# Patient Record
Sex: FEMALE | Race: WHITE | NOT HISPANIC OR LATINO | Employment: OTHER | URBAN - METROPOLITAN AREA
[De-identification: names, ages, dates, MRNs, and addresses within clinical notes are randomized per-mention and may not be internally consistent; named-entity substitution may affect disease eponyms.]

---

## 2017-01-03 ENCOUNTER — GENERIC CONVERSION - ENCOUNTER (OUTPATIENT)
Dept: OTHER | Facility: OTHER | Age: 72
End: 2017-01-03

## 2017-01-17 ENCOUNTER — APPOINTMENT (OUTPATIENT)
Dept: SLEEP CENTER | Facility: CLINIC | Age: 72
End: 2017-01-17
Payer: MEDICARE

## 2017-01-24 ENCOUNTER — TRANSCRIBE ORDERS (OUTPATIENT)
Dept: URGENT CARE | Age: 72
End: 2017-01-24

## 2017-01-24 ENCOUNTER — GENERIC CONVERSION - ENCOUNTER (OUTPATIENT)
Dept: OTHER | Facility: OTHER | Age: 72
End: 2017-01-24

## 2017-01-24 ENCOUNTER — HOSPITAL ENCOUNTER (OUTPATIENT)
Dept: RADIOLOGY | Age: 72
Discharge: HOME | End: 2017-01-24
Payer: MEDICARE

## 2017-01-24 DIAGNOSIS — M85.80 OTHER SPECIFIED DISORDERS OF BONE DENSITY AND STRUCTURE, UNSPECIFIED SITE: ICD-10-CM

## 2017-01-24 DIAGNOSIS — J18.9 PNEUMONIA: ICD-10-CM

## 2017-01-24 PROCEDURE — 71020 HB CHEST X-RAY 2VW FRONTAL&LATL: CPT

## 2017-01-24 PROCEDURE — 77080 DXA BONE DENSITY AXIAL: CPT

## 2017-01-25 ENCOUNTER — GENERIC CONVERSION - ENCOUNTER (OUTPATIENT)
Dept: OTHER | Facility: OTHER | Age: 72
End: 2017-01-25

## 2017-02-27 ENCOUNTER — GENERIC CONVERSION - ENCOUNTER (OUTPATIENT)
Dept: OTHER | Facility: OTHER | Age: 72
End: 2017-02-27

## 2017-02-27 ENCOUNTER — HOSPITAL ENCOUNTER (OUTPATIENT)
Dept: MAMMOGRAPHY | Facility: MEDICAL CENTER | Age: 72
Discharge: HOME | End: 2017-02-27
Payer: MEDICARE

## 2017-02-27 DIAGNOSIS — N60.19 DIFFUSE CYSTIC MASTOPATHY OF BREAST: ICD-10-CM

## 2017-02-27 PROCEDURE — 77063 BREAST TOMOSYNTHESIS BI: CPT

## 2017-02-27 PROCEDURE — G0202 SCR MAMMO BI INCL CAD: HCPCS

## 2017-03-13 ENCOUNTER — ALLSCRIPTS OFFICE VISIT (OUTPATIENT)
Dept: OTHER | Facility: OTHER | Age: 72
End: 2017-03-13

## 2017-03-21 ENCOUNTER — APPOINTMENT (OUTPATIENT)
Dept: LAB | Age: 72
End: 2017-03-21
Payer: MEDICARE

## 2017-03-21 ENCOUNTER — TRANSCRIBE ORDERS (OUTPATIENT)
Dept: ADMINISTRATIVE | Age: 72
End: 2017-03-21

## 2017-03-21 ENCOUNTER — GENERIC CONVERSION - ENCOUNTER (OUTPATIENT)
Dept: OTHER | Facility: OTHER | Age: 72
End: 2017-03-21

## 2017-03-21 DIAGNOSIS — E03.8 OTHER SPECIFIED ACQUIRED HYPOTHYROIDISM: ICD-10-CM

## 2017-03-21 DIAGNOSIS — R73.01 IMPAIRED FASTING GLUCOSE: ICD-10-CM

## 2017-03-21 DIAGNOSIS — E03.8 OTHER SPECIFIED ACQUIRED HYPOTHYROIDISM: Primary | ICD-10-CM

## 2017-03-21 DIAGNOSIS — E03.8 OTHER SPECIFIED HYPOTHYROIDISM: ICD-10-CM

## 2017-03-21 LAB
ALBUMIN SERPL BCP-MCNC: 3.5 G/DL (ref 3.5–5)
ALP SERPL-CCNC: 43 U/L (ref 46–116)
ALT SERPL W P-5'-P-CCNC: 25 U/L (ref 12–78)
ANION GAP SERPL CALCULATED.3IONS-SCNC: 7 MMOL/L (ref 4–13)
AST SERPL W P-5'-P-CCNC: 13 U/L (ref 5–45)
BILIRUB SERPL-MCNC: 0.52 MG/DL (ref 0.2–1)
BUN SERPL-MCNC: 9 MG/DL (ref 5–25)
CALCIUM SERPL-MCNC: 9 MG/DL (ref 8.3–10.1)
CHLORIDE SERPL-SCNC: 105 MMOL/L (ref 100–108)
CHOLEST SERPL-MCNC: 157 MG/DL (ref 50–200)
CO2 SERPL-SCNC: 29 MMOL/L (ref 21–32)
CREAT SERPL-MCNC: 0.51 MG/DL (ref 0.6–1.3)
EST. AVERAGE GLUCOSE BLD GHB EST-MCNC: 128 MG/DL
GFR SERPL CREATININE-BSD FRML MDRD: >60 ML/MIN/1.73SQ M
GLUCOSE P FAST SERPL-MCNC: 110 MG/DL (ref 65–99)
HBA1C MFR BLD: 6.1 % (ref 4.2–6.3)
HCV AB SER QL: NORMAL
HDLC SERPL-MCNC: 52 MG/DL (ref 40–60)
LDLC SERPL CALC-MCNC: 82 MG/DL (ref 0–100)
POTASSIUM SERPL-SCNC: 4.1 MMOL/L (ref 3.5–5.3)
PROT SERPL-MCNC: 6.4 G/DL (ref 6.4–8.2)
SODIUM SERPL-SCNC: 141 MMOL/L (ref 136–145)
T3FREE SERPL-MCNC: 2.28 PG/ML (ref 2.3–4.2)
T4 FREE SERPL-MCNC: 1.11 NG/DL (ref 0.76–1.46)
TRIGL SERPL-MCNC: 113 MG/DL
TSH SERPL DL<=0.05 MIU/L-ACNC: 1.18 UIU/ML (ref 0.36–3.74)

## 2017-03-21 PROCEDURE — 80053 COMPREHEN METABOLIC PANEL: CPT

## 2017-03-21 PROCEDURE — 86803 HEPATITIS C AB TEST: CPT

## 2017-03-21 PROCEDURE — 36415 COLL VENOUS BLD VENIPUNCTURE: CPT

## 2017-03-21 PROCEDURE — 83036 HEMOGLOBIN GLYCOSYLATED A1C: CPT

## 2017-03-21 PROCEDURE — 84481 FREE ASSAY (FT-3): CPT

## 2017-03-21 PROCEDURE — 84439 ASSAY OF FREE THYROXINE: CPT

## 2017-03-21 PROCEDURE — 84443 ASSAY THYROID STIM HORMONE: CPT

## 2017-03-21 PROCEDURE — 80061 LIPID PANEL: CPT

## 2017-03-22 ENCOUNTER — GENERIC CONVERSION - ENCOUNTER (OUTPATIENT)
Dept: OTHER | Facility: OTHER | Age: 72
End: 2017-03-22

## 2017-03-28 DIAGNOSIS — M25.552 PAIN IN LEFT HIP: ICD-10-CM

## 2017-03-28 DIAGNOSIS — R79.9 ABNORMAL FINDING OF BLOOD CHEMISTRY: ICD-10-CM

## 2017-03-28 DIAGNOSIS — E78.5 HYPERLIPIDEMIA: ICD-10-CM

## 2017-03-28 DIAGNOSIS — M19.90 OSTEOARTHRITIS: ICD-10-CM

## 2017-03-28 DIAGNOSIS — M85.80 OTHER SPECIFIED DISORDERS OF BONE DENSITY AND STRUCTURE, UNSPECIFIED SITE: ICD-10-CM

## 2017-03-28 DIAGNOSIS — M41.9 SCOLIOSIS: ICD-10-CM

## 2017-04-03 ENCOUNTER — ALLSCRIPTS OFFICE VISIT (OUTPATIENT)
Dept: OTHER | Facility: OTHER | Age: 72
End: 2017-04-03

## 2017-04-11 ENCOUNTER — ALLSCRIPTS OFFICE VISIT (OUTPATIENT)
Dept: OTHER | Facility: OTHER | Age: 72
End: 2017-04-11

## 2017-04-12 ENCOUNTER — HOSPITAL ENCOUNTER (OUTPATIENT)
Dept: RADIOLOGY | Age: 72
Discharge: HOME | End: 2017-04-12
Payer: MEDICARE

## 2017-04-12 DIAGNOSIS — M25.552 PAIN IN LEFT HIP: ICD-10-CM

## 2017-04-12 PROCEDURE — 72110 X-RAY EXAM L-2 SPINE 4/>VWS: CPT

## 2017-04-12 PROCEDURE — 73502 X-RAY EXAM HIP UNI 2-3 VIEWS: CPT

## 2017-04-14 ENCOUNTER — GENERIC CONVERSION - ENCOUNTER (OUTPATIENT)
Dept: OTHER | Facility: OTHER | Age: 72
End: 2017-04-14

## 2017-04-24 ENCOUNTER — GENERIC CONVERSION - ENCOUNTER (OUTPATIENT)
Dept: OTHER | Facility: OTHER | Age: 72
End: 2017-04-24

## 2017-04-25 ENCOUNTER — TRANSCRIBE ORDERS (OUTPATIENT)
Dept: ADMINISTRATIVE | Age: 72
End: 2017-04-25

## 2017-04-25 ENCOUNTER — APPOINTMENT (OUTPATIENT)
Dept: LAB | Age: 72
End: 2017-04-25
Payer: MEDICARE

## 2017-04-25 DIAGNOSIS — R79.9 ABNORMAL BLOOD CHEMISTRY: ICD-10-CM

## 2017-04-25 DIAGNOSIS — E78.5 OTHER AND UNSPECIFIED HYPERLIPIDEMIA: ICD-10-CM

## 2017-04-25 DIAGNOSIS — R79.9 ABNORMAL BLOOD CHEMISTRY: Primary | ICD-10-CM

## 2017-04-25 LAB
T3FREE SERPL-MCNC: 2.35 PG/ML (ref 2.3–4.2)
TSH SERPL DL<=0.05 MIU/L-ACNC: 2.11 UIU/ML (ref 0.36–3.74)

## 2017-04-25 PROCEDURE — 84443 ASSAY THYROID STIM HORMONE: CPT

## 2017-04-25 PROCEDURE — 84481 FREE ASSAY (FT-3): CPT

## 2017-04-25 PROCEDURE — 36415 COLL VENOUS BLD VENIPUNCTURE: CPT

## 2017-05-15 ENCOUNTER — APPOINTMENT (OUTPATIENT)
Dept: PHYSICAL THERAPY | Age: 72
End: 2017-05-15
Payer: MEDICARE

## 2017-05-15 DIAGNOSIS — M41.9 SCOLIOSIS: ICD-10-CM

## 2017-05-15 DIAGNOSIS — M19.90 OSTEOARTHRITIS: ICD-10-CM

## 2017-05-15 PROCEDURE — G8990 OTHER PT/OT CURRENT STATUS: HCPCS

## 2017-05-15 PROCEDURE — G8991 OTHER PT/OT GOAL STATUS: HCPCS

## 2017-05-15 PROCEDURE — 97110 THERAPEUTIC EXERCISES: CPT

## 2017-05-15 PROCEDURE — 97161 PT EVAL LOW COMPLEX 20 MIN: CPT

## 2017-05-26 ENCOUNTER — APPOINTMENT (OUTPATIENT)
Dept: PHYSICAL THERAPY | Age: 72
End: 2017-05-26
Payer: MEDICARE

## 2017-06-01 ENCOUNTER — GENERIC CONVERSION - ENCOUNTER (OUTPATIENT)
Dept: OTHER | Facility: OTHER | Age: 72
End: 2017-06-01

## 2017-08-11 ENCOUNTER — ALLSCRIPTS OFFICE VISIT (OUTPATIENT)
Dept: OTHER | Facility: OTHER | Age: 72
End: 2017-08-11

## 2017-08-29 ENCOUNTER — GENERIC CONVERSION - ENCOUNTER (OUTPATIENT)
Dept: OTHER | Facility: OTHER | Age: 72
End: 2017-08-29

## 2017-09-20 ENCOUNTER — GENERIC CONVERSION - ENCOUNTER (OUTPATIENT)
Dept: OTHER | Facility: OTHER | Age: 72
End: 2017-09-20

## 2017-09-20 ENCOUNTER — LAB REQUISITION (OUTPATIENT)
Dept: LAB | Facility: HOSPITAL | Age: 72
End: 2017-09-20
Payer: MEDICARE

## 2017-09-20 DIAGNOSIS — K57.30 DIVERTICULOSIS OF LARGE INTESTINE WITHOUT PERFORATION OR ABSCESS WITHOUT BLEEDING: ICD-10-CM

## 2017-09-20 DIAGNOSIS — Z86.010 HISTORY OF COLONIC POLYPS: ICD-10-CM

## 2017-09-20 PROCEDURE — 88305 TISSUE EXAM BY PATHOLOGIST: CPT | Performed by: INTERNAL MEDICINE

## 2017-09-28 ENCOUNTER — GENERIC CONVERSION - ENCOUNTER (OUTPATIENT)
Dept: OTHER | Facility: OTHER | Age: 72
End: 2017-09-28

## 2017-10-03 DIAGNOSIS — E78.5 HYPERLIPIDEMIA: ICD-10-CM

## 2017-10-03 DIAGNOSIS — R94.6 ABNORMAL RESULTS OF THYROID FUNCTION STUDIES: ICD-10-CM

## 2017-10-03 DIAGNOSIS — I65.29 OCCLUSION AND STENOSIS OF UNSPECIFIED CAROTID ARTERY: ICD-10-CM

## 2017-10-03 DIAGNOSIS — Z00.00 ENCOUNTER FOR GENERAL ADULT MEDICAL EXAMINATION WITHOUT ABNORMAL FINDINGS: ICD-10-CM

## 2017-10-26 ENCOUNTER — TRANSCRIBE ORDERS (OUTPATIENT)
Dept: ADMINISTRATIVE | Age: 72
End: 2017-10-26

## 2017-10-26 ENCOUNTER — APPOINTMENT (OUTPATIENT)
Dept: LAB | Age: 72
End: 2017-10-26
Payer: MEDICARE

## 2017-10-26 DIAGNOSIS — R94.6 ABNORMAL RESULTS OF THYROID FUNCTION STUDIES: ICD-10-CM

## 2017-10-26 DIAGNOSIS — Z00.00 ENCOUNTER FOR GENERAL ADULT MEDICAL EXAMINATION WITHOUT ABNORMAL FINDINGS: ICD-10-CM

## 2017-10-26 LAB
ALBUMIN SERPL BCP-MCNC: 3.5 G/DL (ref 3.5–5)
ALP SERPL-CCNC: 40 U/L (ref 46–116)
ALT SERPL W P-5'-P-CCNC: 22 U/L (ref 12–78)
ANION GAP SERPL CALCULATED.3IONS-SCNC: 5 MMOL/L (ref 4–13)
AST SERPL W P-5'-P-CCNC: 10 U/L (ref 5–45)
BILIRUB SERPL-MCNC: 0.43 MG/DL (ref 0.2–1)
BUN SERPL-MCNC: 14 MG/DL (ref 5–25)
CALCIUM SERPL-MCNC: 9 MG/DL (ref 8.3–10.1)
CHLORIDE SERPL-SCNC: 105 MMOL/L (ref 100–108)
CO2 SERPL-SCNC: 30 MMOL/L (ref 21–32)
CREAT SERPL-MCNC: 0.57 MG/DL (ref 0.6–1.3)
EST. AVERAGE GLUCOSE BLD GHB EST-MCNC: 131 MG/DL
GFR SERPL CREATININE-BSD FRML MDRD: 94 ML/MIN/1.73SQ M
GLUCOSE SERPL-MCNC: 111 MG/DL (ref 65–140)
HBA1C MFR BLD: 6.2 % (ref 4.2–6.3)
POTASSIUM SERPL-SCNC: 3.9 MMOL/L (ref 3.5–5.3)
PROT SERPL-MCNC: 6.7 G/DL (ref 6.4–8.2)
SODIUM SERPL-SCNC: 140 MMOL/L (ref 136–145)
T3FREE SERPL-MCNC: 2.41 PG/ML (ref 2.3–4.2)

## 2017-10-26 PROCEDURE — 80053 COMPREHEN METABOLIC PANEL: CPT

## 2017-10-26 PROCEDURE — 36415 COLL VENOUS BLD VENIPUNCTURE: CPT

## 2017-10-26 PROCEDURE — 84481 FREE ASSAY (FT-3): CPT

## 2017-10-26 PROCEDURE — 83036 HEMOGLOBIN GLYCOSYLATED A1C: CPT

## 2017-10-31 ENCOUNTER — GENERIC CONVERSION - ENCOUNTER (OUTPATIENT)
Dept: OTHER | Facility: OTHER | Age: 72
End: 2017-10-31

## 2017-11-03 ENCOUNTER — TRANSCRIBE ORDERS (OUTPATIENT)
Dept: ADMINISTRATIVE | Age: 72
End: 2017-11-03

## 2017-11-03 ENCOUNTER — APPOINTMENT (OUTPATIENT)
Dept: LAB | Age: 72
End: 2017-11-03
Payer: MEDICARE

## 2017-11-03 ENCOUNTER — GENERIC CONVERSION - ENCOUNTER (OUTPATIENT)
Dept: OTHER | Facility: OTHER | Age: 72
End: 2017-11-03

## 2017-11-03 DIAGNOSIS — E78.5 HYPERLIPIDEMIA: ICD-10-CM

## 2017-11-03 DIAGNOSIS — E06.5 HYPOTHYROIDISM DUE TO FIBROUS INVASIVE THYROIDITIS: Primary | ICD-10-CM

## 2017-11-03 DIAGNOSIS — E06.5 HYPOTHYROIDISM DUE TO FIBROUS INVASIVE THYROIDITIS: ICD-10-CM

## 2017-11-03 DIAGNOSIS — E03.8 HYPOTHYROIDISM DUE TO FIBROUS INVASIVE THYROIDITIS: Primary | ICD-10-CM

## 2017-11-03 DIAGNOSIS — E03.8 HYPOTHYROIDISM DUE TO FIBROUS INVASIVE THYROIDITIS: ICD-10-CM

## 2017-11-03 LAB
CHOLEST SERPL-MCNC: 198 MG/DL (ref 50–200)
HDLC SERPL-MCNC: 61 MG/DL (ref 40–60)
LDLC SERPL CALC-MCNC: 111 MG/DL (ref 0–100)
TRIGL SERPL-MCNC: 130 MG/DL
TSH SERPL DL<=0.05 MIU/L-ACNC: 3.58 UIU/ML (ref 0.36–3.74)

## 2017-11-03 PROCEDURE — 84443 ASSAY THYROID STIM HORMONE: CPT

## 2017-11-03 PROCEDURE — 80061 LIPID PANEL: CPT

## 2017-11-03 PROCEDURE — 36415 COLL VENOUS BLD VENIPUNCTURE: CPT

## 2017-11-05 ENCOUNTER — GENERIC CONVERSION - ENCOUNTER (OUTPATIENT)
Dept: OTHER | Facility: OTHER | Age: 72
End: 2017-11-05

## 2017-11-10 ENCOUNTER — TRANSCRIBE ORDERS (OUTPATIENT)
Dept: ADMINISTRATIVE | Facility: HOSPITAL | Age: 72
End: 2017-11-10

## 2017-11-10 DIAGNOSIS — I65.29 STENOSIS OF CAROTID ARTERY, UNSPECIFIED LATERALITY: Primary | ICD-10-CM

## 2017-12-04 ENCOUNTER — GENERIC CONVERSION - ENCOUNTER (OUTPATIENT)
Dept: OTHER | Facility: OTHER | Age: 72
End: 2017-12-04

## 2017-12-04 ENCOUNTER — HOSPITAL ENCOUNTER (OUTPATIENT)
Dept: RADIOLOGY | Age: 72
Discharge: HOME | End: 2017-12-04
Payer: MEDICARE

## 2017-12-04 DIAGNOSIS — Z82.49 FAMILY HX OF AORTIC ANEURYSM: ICD-10-CM

## 2017-12-04 DIAGNOSIS — I65.29 STENOSIS OF CAROTID ARTERY, UNSPECIFIED LATERALITY: ICD-10-CM

## 2017-12-04 PROCEDURE — 76775 US EXAM ABDO BACK WALL LIM: CPT

## 2017-12-13 ENCOUNTER — APPOINTMENT (OUTPATIENT)
Dept: NON INVASIVE DIAGNOSTICS | Facility: CLINIC | Age: 72
End: 2017-12-13
Payer: MEDICARE

## 2017-12-13 ENCOUNTER — HOSPITAL ENCOUNTER (OUTPATIENT)
Dept: MRI IMAGING | Facility: HOSPITAL | Age: 72
Discharge: HOME | End: 2017-12-13
Payer: MEDICARE

## 2017-12-13 ENCOUNTER — GENERIC CONVERSION - ENCOUNTER (OUTPATIENT)
Dept: OTHER | Facility: OTHER | Age: 72
End: 2017-12-13

## 2017-12-13 DIAGNOSIS — I65.29 STENOSIS OF CAROTID ARTERY, UNSPECIFIED LATERALITY: ICD-10-CM

## 2017-12-13 PROCEDURE — 70544 MR ANGIOGRAPHY HEAD W/O DYE: CPT

## 2017-12-14 ENCOUNTER — GENERIC CONVERSION - ENCOUNTER (OUTPATIENT)
Dept: OTHER | Facility: OTHER | Age: 72
End: 2017-12-14

## 2018-01-09 ENCOUNTER — HOSPITAL ENCOUNTER (OUTPATIENT)
Dept: NON INVASIVE DIAGNOSTICS | Facility: CLINIC | Age: 73
Discharge: HOME | End: 2018-01-09
Payer: MEDICARE

## 2018-01-09 ENCOUNTER — GENERIC CONVERSION - ENCOUNTER (OUTPATIENT)
Dept: OTHER | Facility: OTHER | Age: 73
End: 2018-01-09

## 2018-01-09 DIAGNOSIS — I65.29 OCCLUSION AND STENOSIS OF UNSPECIFIED CAROTID ARTERY: ICD-10-CM

## 2018-01-09 DIAGNOSIS — I65.23 CAROTID ARTERY STENOSIS, ASYMPTOMATIC, BILATERAL: ICD-10-CM

## 2018-01-09 PROCEDURE — 93880 EXTRACRANIAL BILAT STUDY: CPT

## 2018-01-10 ENCOUNTER — GENERIC CONVERSION - ENCOUNTER (OUTPATIENT)
Dept: OTHER | Facility: OTHER | Age: 73
End: 2018-01-10

## 2018-01-10 NOTE — RESULT NOTES
Message   notify the pt normal tsh f/u as scheduled        Verified Results  (1) TSH [id number] 06:33AM Beatricea Mast     Test Name Result Flag Reference   TSH 3 580 uIU/mL  0 358-3 740   This is a patient instruction: This test is non-fasting  Please drink two glasses of water morning of bloodwork  Patients undergoing fluorescein dye angiography may retain small amounts of fluorescein in the body for 48-72 hours post procedure  Samples containing fluorescein can produce falsely depressed TSH values  If the patient had this procedure,a specimen should be resubmitted post fluorescein clearance            The recommended reference ranges for TSH during pregnancy are as follows:  First trimester 0 1 to 2 5 uIU/mL  Second trimester  0 2 to 3 0 uIU/mL  Third trimester 0 3 to 3 0 uIU/m

## 2018-01-10 NOTE — RESULT NOTES
Message   Notify the patient normal lipid level follow-up as scheduled        Verified Results  (1) LIPID PANEL, FASTING [id number] 06:33AM Abhi MANN Order Number: [id number]     Test Name Result Flag Reference   CHOLESTEROL 198 mg/dL     HDL,DIRECT 61 mg/dL H 40-60   Specimen collection should occur prior to Metamizole administration due to the potential for falsley depressed results  LDL CHOLESTEROL CALCULATED 111 mg/dL H 0-100   Triglyceride:        Normal <150 mg/dl   Borderline High 150-199 mg/dl   High 200-499 mg/dl   Very High >499 mg/dl      Cholesterol:       Desirable <200 mg/dl    Borderline High 200-239 mg/dl    High >239 mg/dl      HDL Cholesterol:       High>59 mg/dL    Low <41 mg/dL      This screening LDL is a calculated result  It does not have the accuracy of the Direct Measured LDL in the monitoring of patients with hyperlipidemia and/or statin therapy  Direct Measure LDL (PEV385) must be ordered separately in these patients  TRIGLYCERIDES 130 mg/dL  <=150   Specimen collection should occur prior to N-Acetylcysteine or Metamizole administration due to the potential for falsely depressed results

## 2018-01-10 NOTE — RESULT NOTES
Message   Notify the patient the x-ray of the hip no acute abnormality follow-up as scheduled        Verified Results  * XR SPINE LUMBAR MINIMUM 4 VIEWS NON INJURY 12Apr2017 06:33AM [employer] Order Number: [id number]     Test Name Result Flag Reference   XR SPINE LUMBAR MINIMUM 4 VIEWS (Report)     LUMBAR SPINE     INDICATION: Lower back pain  COMPARISON: None     VIEWS: AP, lateral, bilateral oblique and coned down projections     IMAGES: 5     FINDINGS:     Mild lumbar levoscoliosis  There is no radiographic evidence of acute fracture or destructive osseous lesion  Mild degenerative changes are noted in the posterior elements at L4-5 and L5-S1  Surgical clips are noted in the right upper quadrant of the abdomen  IMPRESSION:     Mild lumbar levoscoliosis and lower lumbar degenerative changes  Workstation performed: [id number]     Signed by:   Milvia Jha MD   4/13/17     * XR HIP/PELV 2-3 VWS LEFT W PELVIS IF PERFORMED 12Apr2017 06:33AM [employer] Order Number: [id number]     Test Name Result Flag Reference   * XR HIP/PELV 2-3 VWS LEFT (Report)     LEFT HIP     INDICATION: Left hip pain  COMPARISON: None     VIEWS: AP pelvis and 2 coned down views     IMAGES: 3     FINDINGS:     There is no fracture or dislocation  Thoracolumbar levoscoliosis  No lytic or blastic lesions are seen  Soft tissues are unremarkable  IMPRESSION:     No acute osseous abnormality, left hip  Lower lumbar levoscoliosis  Workstation performed: [id number]     Signed by:   Milvia Jha MD   4/13/17       Signatures   Electronically signed by : Davey Shell DO;  Apr 14 2017  5:31PM EST                       (Author)

## 2018-01-11 NOTE — RESULT NOTES
Message   #1  Please call the patient the with results of her chest x-ray  #2  The radiologist reports that her lungs are clear on the chest x-ray  #3  I advise her to follow-up with her cardiologist, as planned  #4  It is okay to leave a message, if her communication consent allows for it  Verified Results  * XR CHEST PA & LATERAL [id number] 12:45PM Trever Kurtz Order Number: [id number]     Test Name Result Flag Reference   XR CHEST PA & LATERAL (Report)     CHEST      INDICATION: Shortness of breath on exertion for a few months     COMPARISON: 4/24/2015     VIEWS: Frontal and lateral projections; 3 images     FINDINGS:        Cardiomediastinal silhouette appears unremarkable  The lungs are clear  No pneumothorax or pleural effusion  Visualized osseous structures appear within normal limits for the patient's age  IMPRESSION:     No active pulmonary disease         Workstation performed: [id number]     Signed by:   Camila Cazares MD   4/6/16

## 2018-01-11 NOTE — MISCELLANEOUS
Message   Recorded as Task   Date: 05/31/2016 02:52 PM, Created By: Erick Smith   Task Name: Follow Up   Assigned To: Angela Silva   Regarding Patient: Polly Anne, Status: Active   CommentFrancy Romaine - 31 May 2016 2:52 PM     TASK CREATED  Caller: Self; General Medical Question; [phone] (Home)  Patient called and would like to have lab order to have thyroid checked  Please enter  Angela Silva - 31 May 2016 2:58 PM     TASK EDITED  CTN        Plan  Hypothyroidism due to Hashimoto's thyroiditis    · (1) TSH WITH FT4 REFLEX; Status:Active; Requested for:[id number];     Signatures   Electronically signed by :  Trixie Perez MD; May 31 2016  2:59PM EST                       (Author)

## 2018-01-11 NOTE — MISCELLANEOUS
Message  #1  I reviewed her TFT blood test results with her on the phone  #2  At the 137 Âµg dosage, she was over supplemented  #3  At the 125 Âµg dosage, she was under supplemented  #4  I will have her alternate 137 Âµg tablets with 125 Âµg tablets every other day and repeat a blood test in the middle of August 2016  #5  PUI  Plan  Hypothyroidism due to Hashimoto's thyroiditis    · (1) TSH WITH FT4 REFLEX; Status:Active; Requested for:[id number];     Signatures   Electronically signed by :  Melvin Eisenmenger, MD; Jun 29 2016 11:55AM EST                       (Author)

## 2018-01-11 NOTE — RESULT NOTES
Message   Rafyash Revels notify the patient the lab studies show a low free T3 and also prediabetes; please have the patient check ultrasensitive TSH third generation, free T3; please have the patient reduce carbohydrates and sweets in the diet and follow up as scheduled        Verified Results  (1) FREE T3 21Mar2017 06:38AM [facility] Ham    Order Number: [id number]     Test Name Result Flag Reference   FREE T3 2 28 pg/mL L 2 30-4 20   - Patient Instructions: This is a fasting blood test  Water,black tea or black  coffee only after 9:00pm the night before test Drink 2 glasses of water the morning of test      (1) T4, FREE 21Mar2017 06:38AM [employer] [facility]    Order Number: [id number]     Test Name Result Flag Reference   T4,FREE 1 11 ng/dL  0 76-1 46   - Patient Instructions: This is a fasting blood test  Water,black tea or black  coffee only after 9:00pm the night before test Drink 2 glasses of water the morning of test      (1) HEMOGLOBIN A1C 21Mar2017 06:38AM [employer] [facility]    Order Number: [id number]     Test Name Result Flag Reference   HEMOGLOBIN A1C 6 1 %  4 2-6 3   EST  AVG  GLUCOSE 128 mg/dl       (1) COMPREHENSIVE METABOLIC PANEL [id number] 42:45FV Dimitrios Bar Order Number: [id number]     Test Name Result Flag Reference   SODIUM 141 mmol/L  136-145   POTASSIUM 4 1 mmol/L  3 5-5 3   CHLORIDE 105 mmol/L  100-108   CARBON DIOXIDE 29 mmol/L  21-32   ANION GAP (CALC) 7 mmol/L  4-13   BLOOD UREA NITROGEN 9 mg/dL  5-25   CREATININE 0 51 mg/dL L 0 60-1 30   Standardized to IDMS reference method   CALCIUM 9 0 mg/dL  8 3-10 1   BILI, TOTAL 0 52 mg/dL  0 20-1 00   ALK PHOSPHATAS 43 U/L L    ALT (SGPT) 25 U/L  12-78   AST(SGOT) 13 U/L  5-45   ALBUMIN 3 5 g/dL  3 5-5 0   TOTAL PROTEIN 6 4 g/dL  6 4-8 2   eGFR Non-African American      >60 0 ml/min/1 73sq m   - Patient Instructions:  This is a fasting blood test  Water,black tea or black  coffee only after 9:00pm the night before test Drink 2 glasses of water the morning of test   National Kidney Disease Education Program recommendations are as follows:  GFR calculation is accurate only with a steady state creatinine  Chronic Kidney disease less than 60 ml/min/1 73 sq  meters  Kidney failure less than 15 ml/min/1 73 sq  meters  GLUCOSE FASTING 110 mg/dL H 65-99     (1) LIPID PANEL, FASTING 21Mar2017 06:38AM Kavitha Adair    Order Number: [id number]     Test Name Result Flag Reference   CHOLESTEROL 157 mg/dL     HDL,DIRECT 52 mg/dL  40-60   Specimen collection should occur prior to Metamizole administration due to the potential for falsely depressed results  LDL CHOLESTEROL CALCULATED 82 mg/dL  0-100   - Patient Instructions: This is a fasting blood test  Water,black tea or black  coffee only after 9:00pm the night before test   Drink 2 glasses of water the morning of test     - Patient Instructions: This is a fasting blood test  Water,black tea or black  coffee only after 9:00pm the night before test Drink 2 glasses of water the morning of test   Triglyceride:         Normal              <150 mg/dl       Borderline High    150-199 mg/dl       High               200-499 mg/dl       Very High          >499 mg/dl  Cholesterol:         Desirable        <200 mg/dl      Borderline High  200-239 mg/dl      High             >239 mg/dl  HDL Cholesterol:        High    >59 mg/dL      Low     <41 mg/dL  LDL CALCULATED:    This screening LDL is a calculated result  It does not have the accuracy of the Direct Measured LDL in the monitoring of patients with hyperlipidemia and/or statin therapy  Direct Measure LDL (ZWY736) must be ordered separately in these patients  TRIGLYCERIDES 113 mg/dL  <=150   Specimen collection should occur prior to N-Acetylcysteine or Metamizole administration due to the potential for falsely depressed results         Signatures   Electronically signed by : Carl Dodson DO Ch[place]; Mar 21 2017  2:12PM EST                       (Author)

## 2018-01-11 NOTE — RESULT NOTES
Verified Results  * XR CHEST PA & LATERAL [id number] 06:57AM Rebeka Potter Order Number: [id number]     Test Name Result Flag Reference   XR CHEST PA & LATERAL (Report)     CHEST      INDICATION: Pneumonia  COMPARISON: April 6, 2016  VIEWS: Frontal and lateral projections; 2 images     FINDINGS:        Cardiomediastinal silhouette appears unremarkable  The lungs are clear  No pneumothorax or pleural effusion  Visualized osseous structures appear within normal limits for the patient's age  IMPRESSION:     No active pulmonary disease  Workstation performed: [id number]     Signed by:   Fazal Thorpe MD   8/12/16       Discussion/Summary   CXR NORMAL  NO MORE PNEUMONIA, ALL CLEARED UP  GREAT!

## 2018-01-12 VITALS
HEIGHT: 66 IN | DIASTOLIC BLOOD PRESSURE: 62 MMHG | HEART RATE: 72 BPM | BODY MASS INDEX: 29.74 KG/M2 | WEIGHT: 185.04 LBS | RESPIRATION RATE: 16 BRPM | SYSTOLIC BLOOD PRESSURE: 118 MMHG

## 2018-01-12 NOTE — RESULT NOTES
Message   Notify the patient the DEXA scan does show osteopenia i e  decreased total bone mineral density compared to the previous there's been a decrease total bone mineral density of the left hip and also the lumbar spine please have the patient follow up as scheduled to discuss as scheduled        Verified Results  * DXA BONE DENSITY SPINE HIP AND PELVIS [id number] 09:34AM Kenan Metcalf Order Number: [id number]    - Patient Instructions: To schedule this appointment, please contact Central Scheduling at [phone]  Test Name Result Flag Reference   DXA BONE DENSITY SPINE HIP AND PELVIS (Report)     CENTRAL DXA SCAN     CLINICAL HISTORY:  73year old post-menopausal  female risk factors include smoking  Hypothyroidism, degenerative arthritis and renal calculi  Gastroesophageal reflux with Prilosec use  Prior Actonel use  TECHNIQUE: Bone densitometry was performed using a Hologic Horizon A bone densitometer  Regions of interest appear properly placed  There are no obvious fractures or other confounding variables which could limit the study  Degenerative changes of the    lumbar spine and hip  This will falsely elevate the bone mineral densities in these regions  COMPARISON: Several, most recent May 29, 2014     RESULTS:    LUMBAR SPINE: L1-L4:   BMD 0 860 gm/cm2   T-score -1 7   Z-score 0 4     LEFT TOTAL HIP:   BMD 0 848 gm/cm2   T-score -0 8   Z-score 0 7     LEFT FEMORAL NECK:   BMD 0 806 gm/cm2   T-score -0 4   Z-score 1 4             IMPRESSION:   1  Based on the [facility] [place] [place] classification, the T-score of -1 7 in the lumbar spine is consistent with low bone mineral density  2  When compared to the prior examination, there has been a 7 % DECREASE in the total bone mineral density of the lumbar spine and a 4 % DECREASE in the total bone mineral density of the left hip      3  Any secondary causes of low bone mineral density should be excluded prior to treatment, if clinically indicated  4  A daily intake of at least 1200 mg calcium and 800 to 1000 IU of Vitamin D, as well as weight bearing and muscle strengthening exercise, fall prevention and avoidance of tobacco and excessive alcohol intake as basic preventive measures are suggested  5  Repeat DXA in 18 - 24 months, on the same machine, as clinically indicated  The 10 year risk of hip fracture is 1%, with the 10 year risk of major osteoporotic fracture being 7%, as calculated by the [facility] fracture risk assessment tool (FRAX)  The current NOF guidelines recommend treating patients with FRAX 10 year risk score of    >3% for hip fracture and >20% for major osteoporotic fracture        WHO CLASSIFICATION:   Normal (a T-score of -1 0 or higher)   Low bone mineral density (a T-score of less than -1 0 but higher than -2 5)   Osteoporosis (a T-score of -2 5 or less)   Severe osteoporosis (a T-score of -2 5 or less with a fragility fracture)             Workstation performed: [id number]     Signed by:   Nathan Ambrose DO   1/24/17       Signatures   Electronically signed by : Krystal Werner DO; Jan 24 2017  5:34PM EST                       (Author)

## 2018-01-13 VITALS
HEART RATE: 84 BPM | DIASTOLIC BLOOD PRESSURE: 66 MMHG | HEIGHT: 67 IN | SYSTOLIC BLOOD PRESSURE: 118 MMHG | BODY MASS INDEX: 29.24 KG/M2 | WEIGHT: 186.31 LBS

## 2018-01-13 VITALS
HEIGHT: 67 IN | BODY MASS INDEX: 28.64 KG/M2 | WEIGHT: 182.5 LBS | HEART RATE: 76 BPM | DIASTOLIC BLOOD PRESSURE: 72 MMHG | SYSTOLIC BLOOD PRESSURE: 115 MMHG

## 2018-01-13 NOTE — RESULT NOTES
Verified Results  US THYROID [id number] 08:00AM Megan Baugh Order Number: [id number]    - Patient Instructions: To schedule this appointment, please contact Central Scheduling at [phone]  Test Name Result Flag Reference   US THYROID (Report)     THYROID ULTRASOUND     INDICATION: Follow-up nodules     COMPARISON: 3/23/2015     TECHNIQUE:  Ultrasound of the thyroid was performed with a high frequency linear transducer in transverse and sagittal planes including volumetric imaging sweeps as well as traditional still imaging technique  FINDINGS:   The thyroid is atrophic and heterogeneous  Right gland:  0 8 x 0 9 x 3 3 cm  Right lower pole  0 4 x 0 7 x 0 8 cm  Solid hypoechoic nodule  Smooth, well defined margins  No calcifications  Nodule is not taller than it is wide  Unchanged from prior, dating back to 2011 where it measured 0 5 x 0 7 x 0 9 cm  Right lower pole  0 7 x 0 7 x 1 0 cm  Solid hypoechoic nodule  Smooth, well defined margins  No calcifications  Nodule is not taller than it is wide  Unchanged from prior, dating back to 2011 where it measured 0 6 x 0 9 x 1 1 cm  Left gland: 1 0 x 0 8 x 2 7 cm     Isthmus: 0 1 cm in AP dimension  IMPRESSION:     Stable nodules  While the 1 cm hypoechoic nodule technically meets the most recent guidelines for fine-needle aspiration, stability since 2011 is highly suggestive of benignity  It is also possible that these nodules represent normal lymph nodes given    the far inferior location, possibly external to the atrophic thyroid         Workstation performed: [id number]     Signed by:   Kristel Jacob MD   9/6/16

## 2018-01-14 VITALS
SYSTOLIC BLOOD PRESSURE: 110 MMHG | HEART RATE: 92 BPM | DIASTOLIC BLOOD PRESSURE: 82 MMHG | BODY MASS INDEX: 30.91 KG/M2 | WEIGHT: 192.31 LBS | RESPIRATION RATE: 16 BRPM | HEIGHT: 66 IN

## 2018-01-14 NOTE — RESULT NOTES
Message   Notify the patient x-ray of lumbar spine does show mild scoliosis and also arthritis please have the patient go for physical therapy  and follow-up as scheduled        Verified Results  * XR SPINE LUMBAR MINIMUM 4 VIEWS NON INJURY 12Apr2017 06:33AM [facility] Order Number: [id number]     Test Name Result Flag Reference   XR SPINE LUMBAR MINIMUM 4 VIEWS (Report)     LUMBAR SPINE     INDICATION: Lower back pain  COMPARISON: None     VIEWS: AP, lateral, bilateral oblique and coned down projections     IMAGES: 5     FINDINGS:     Mild lumbar levoscoliosis  There is no radiographic evidence of acute fracture or destructive osseous lesion  Mild degenerative changes are noted in the posterior elements at L4-5 and L5-S1  Surgical clips are noted in the right upper quadrant of the abdomen  IMPRESSION:     Mild lumbar levoscoliosis and lower lumbar degenerative changes  Workstation performed: [id number]     Signed by:   Ashish Soliz MD   4/13/17       Signatures   Electronically signed by : Coby Allred DO;  Apr 14 2017  5:29PM EST                       (Author)

## 2018-01-14 NOTE — RESULT NOTES
Verified Results  (1) TSH WITH FT4 REFLEX [id number] 07:13AM Maritza Lacey     Test Name Result Flag Reference   TSH 1 180 uIU/mL  0 358-3 740   Patients undergoing fluorescein dye angiography may retain small amounts of fluorescein in the body for 48-72 hours post procedure  Samples containing fluorescein can produce falsely depressed TSH values  If the patient had this procedure,a specimen should be resubmitted post fluorescein clearance            The recommended reference ranges for TSH during pregnancy are as follows:  First trimester 0 1 to 2 5 uIU/mL  Second trimester  0 2 to 3 0 uIU/mL  Third trimester 0 3 to 3 0 uIU/m

## 2018-01-15 NOTE — PROGRESS NOTES
Assessment    1  Medicare annual wellness visit, initial (V70 0) (Z00 00)   2  Dupuytren's contracture (728 6) (M72 0)   3  Left hip pain (719 45) (M25 552)   4  Abnormal thyroid blood test (794 5) (R94 6)   5  Osteopenia (733 90) (M85 80)    Assessment #1 annual Medicare wellness examination completed per patient overall patient is currently stable and doing well she is up-to-date on her mammogram, colonoscopy; reports patient follow healthy balanced diet  Routine walking and exercise we did review her laboratories with her today and I will set up laboratories  She will go for her in 6 months including the comprehensive metabolic panel and hemoglobin A1c  #2 Dupuytren's contracture all have the patient see hand specialist Dr Ness Kendrick #3 left hip pain check x-ray of left hip and lumbar spine number for abnormal thyroid blood test she does have a slightly low free T3 will recheck the free T3  #4 osteopenia patient will start Os-Jaciel plus vitamin D 500 mg 1 tablet per day and continue with her usual dose of vitamin D we'll check a PTH along with a vitamin D level I did repeat the DEXA scan with the patient RTO in 6 months call with any problems  Plan  Abnormal thyroid blood test    · (1) FREE T3; Status:Active; Requested [id number];   Dupuytren's contracture    · 1 - Lelo PARRA, Nico Freeman  (Orthopedic Surgery) Physician Referral  Consult Only: the  expectation is that the referring provider will communicate back to the patient on  treatment options  Evaluation and Treatment: the expectation is that the referred to  provider will communicate back to the patient on treatment options  Status: Active   Requested for: 2017  Care Summary provided  : Yes  Left hip pain    · * XR SPINE LUMBAR MINIMUM 4 VIEWS NON INJURY; Status:Active; Requested  for:2017;    · XR HIP/PELV 4+ VW LEFT W PELVIS IF PERFORMED; Status:Active;  Requested  for:2017;   Medicare annual wellness visit, initial    · (1) COMPREHENSIVE METABOLIC PANEL; Status:Active; Requested XFK:[id number];    · (1) HEMOGLOBIN A1C; Status:Active; Requested LZE:60HTD5480;   Osteopenia    · (1) PTH N-TERMINAL (INTACT); Status:Active; Requested for:03Apr2017;    · *(Q) VITAMIN D, 25-HYDROXY, LC/MS/MS; Status:Active; Requested for:03Apr2017;   Screening for genitourinary condition    · *VB - Urinary Incontinence Screen (Dx Z13 89 Screen for UI); Status:Complete -  Retrospective By Protocol Authorization;   Done: 36IVG5432 08:35AM    History of Present Illness  Welcome to Medicare and Wellness Visits: The patient is being seen for the initial annual wellness visit  Medicare Screening and Risk Factors   Hospitalizations: no previous hospitalizations  Medicare Screening Tests Risk Questions   Osteoporosis risk assessment: dexa a few months ago  HIV risk assessment: none indicated  Drug and Alcohol Use: The patient is a former cigarette smoker  She has smoked for 15 year(s) and has 5 pack year(s) of cigarette use  The patient reports occasional alcohol use and drinking 1 drinks per month  Alcohol concern:   The patient has no concerns about alcohol abuse  She has never used illicit drugs  Diet and Physical Activity: Current diet includes well balanced meals, 0 servings of fruit per day, 2 servings of vegetables per day, 2 servings of meat per day, 0 servings of whole grains per day, 2-3 servings of dairy products per day, 3 cups of coffee per day, 2 cups of tea per day and 2-3 week cans of diet soda per day  She exercises 2 times per week  Exercise: walking 30 hours per week  (atkins )   Mood Disorder and Cognitive Impairment Screening: She denies feeling down, depressed, or hopeless over the past two weeks  She denies feeling little interest or pleasure in doing things over the past two weeks     Cognitive impairment screening: denies difficulty learning/retaining new information, denies difficulty handling complex tasks, denies difficulty with reasoning, denies difficulty with spatial ability and orientation, denies difficulty with language and denies difficulty with behavior  Functional Ability/Level of Safety: Hearing is normal in the right ear, slightly decreased in the left ear and a hearing aid is used  The patient is currently able to do activities of daily living without limitations, able to do instrumental activities of daily living without limitations and able to participate in social activities without limitations  Activities of daily living details: does not need help using the phone, no transportation help needed, does not need help shopping, no meal preparation help needed, does not need help doing housework, does not need help doing laundry, does not need help managing medications and does not need help managing money  Fall risk factors: The patient fell 0 times in the past 12 months  Home safety risk factors:  loose rugs and no grab bars in the bathroom, but no unfamiliar surroundings, no poor household lighting, no uneven floors, no household clutter and handrails on the stairs  Advance Directives: Advance directives: living will, durable power of  for health care directives, advance directives and   Co-Managers and Medical Equipment/Suppliers: See Patient Care Team   Preventive Quality Program 65 and Older: Falls Risk: The patient fell 0 times in the past 12 months  The patient currently has no urinary incontinence symptoms         Patient Care Team    Care Team Member Role Specialty Office Number   [address] Specialist Otolaryngology [phone]   Lorene INFANTE Specialist Cardiology [phone]   Sydnee Lopez MD Specialist Vascular Surgery [phone]   Stacy Stovall MD  Gastroenterology Adult [phone]   Adan Price MD Specialist Endocrinology [phone]   [address] DPM  Podiatry [phone]   Eliseo Ramirez MD Specialist Orthopedic Surgery [phone]   [address] Obstetrics/Gynecology [phone]   Jarochoashley Peñae DPM  Podiatry [phone] Specialist Orthopedic Surgery [phone]   Lindsey Ospina MD Resident Dermatology [phone]  Nurse Practitioner [phone]     Active Problems    1  Abdominal pain, RLQ (right lower quadrant) (789 03) (R10 31)   2  Abnormal blood chemistry (790 6) (R79 9)   3  Adenomatous colon polyp (211 3) (D12 6)   4  History of Breast Surgery Enlargement Procedure With Prosthetic Implant (V43 82)   5  History of CAP (community acquired pneumonia) (5) (J18 9)   6  Carotid artery plaque (433 10) (I65 29)   7  Community acquired pneumonia (5) (J18 9)   8  Dense breast tissue on mammogram (793 89) (R92 2)   9  Edema (782 3) (R60 9)   10  Encounter for screening mammogram for breast cancer (V76 12) (Z12 31)   11  Encounter for screening mammogram for malignant neoplasm of breast (V76 12)    (Z12 31)   12  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)   13  Fibrocystic breast changes (610 1) (N60 19)   14  Foot pain, right (729 5) (M79 671)   15  Headache (784 0) (R51)   16  Hyperlipidemia (272 4) (E78 5)   17  Hyperplastic colon polyp (211 3) (K63 5)   18  Hypothyroidism due to Hashimoto's thyroiditis (244 8,245 2) (E03 8,E06 3)   19  Impaired fasting glucose (790 21) (R73 01)   20  Iron deficiency anemia (280 9) (D50 9)   21  Long term use of drug (V58 69) (Z79 899)   22  Lymphedema (457 1) (I89 0)   23  Multiple thyroid nodules (241 1) (E04 2)   24  Need for hepatitis C screening test (V73 89) (Z11 59)   25  Need for prophylactic vaccination and inoculation against influenza (V04 81) (Z23)   26  Need for Tdap vaccination (V06 1) (Z23)   27  Need for vaccination with 13-polyvalent pneumococcal conjugate vaccine (V03 82) (Z23)   28  Nephrolithiasis (592 0) (N20 0)   29  Osteoarthritis (715 90) (M19 90)   30  Osteopenia (733 90) (M85 80)   31  Screening for genitourinary condition (V81 6) (Z13 89)   32   Screening for neurological condition (V80 09) (Z13 89)   33  Screening for osteoporosis (V82 81) (Z13 820)   34  Shortness of breath on exertion (786 05) (R06 02)   35  Shoulder pain, right (719 41) (M25 511)   36  Sigmoid diverticulosis (562 10) (K57 30)   37  Trouble swallowing (787 20) (R13 10)   38  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) (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 93)   39  Vitamin D deficiency (268 9) (E55 9)    Past Medical History    · History of Biliary dyskinesia (575 8) (K82 8)   · History of CAP (community acquired pneumonia) (5) (J18 9)   · History of Chest discomfort (786 59) (R07 89)   · History of Chills (780 64) (R68 83)   · History of Cough (786 2) (R05)   · History of Frequent urination at night (788 43) (R35 1)   · History of Helicobacter pylori gastritis (535 10,041 86) (K29 70,B96 81)   · History of backache (V13 59) (Z87 39)   · History of chest pain (V13 89) (T35 982)   · History of cholelithiasis (V12 79) (Z87 19)   · History of constipation (V12 79) (Z87 19)   · History of depression (V11 8) (Z86 59)   · History of diarrhea (V12 79) (Z23 783)   · History of frequent headaches   · History of gastroesophageal reflux (GERD) (V12 79) (Z87 19)   · History of hyperlipidemia (V12 29) (Z86 39)   · History of pulmonary embolism (V12 55) (Z86 711)   · History of rosacea (V13 3) (Z87 2)   · History of shortness of breath (V13 89) (B97 428)   · History of upper respiratory infection (V12 09) (Z87 09)   · History of wheezing (V12 69) (E48 454)   · History of Joint pain (719 40) (M25 50)   · 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)   · Need for prophylactic vaccination and inoculation against influenza (V04 81) (Z23)   · History of Otalgia of left ear (388 70) (H92 02)   · History of Palpitations (785 1) (R00 2)   · History of Rectal bleeding (569 3) (K62 5)   · History of Tingling (782 0) (R20 2)    The active problems and past medical history were reviewed and updated today        Surgical History    · History of Breast Surgery Enlargement Procedure With Prosthetic Implant (V43 82)   · History of Cardiac Cath Procedure Summary   · History of Cholecystectomy   · History of Hemorrhoidectomy   · History of Rotator Cuff Repair   · History of Tubal Ligation    The surgical history was reviewed and updated today  Family History  Mother    · Family history of    · Family history of Mother  At Age 78   · Family history of Ovarian Cancer (V16 41)  Father    · Family history of Cirrhosis, alcoholic   · Family history of    · Family history of Father  At Age 48    The family history was reviewed and updated today  Social History    · Caffeine Use   · Consumes alcohol occasionally (V49 89) (Z78 9)   · Denied: History of Drug Use   · Educational Level - Has High School Diploma   · Former smoker (J82 80) (U30 695)   · Smoked 1 pack a week from 18years old until 37years old  Also, less than a pack a      week - off and on - from  until   · Marital History - Currently   The social history was reviewed and updated today  The social history was reviewed and is unchanged  Current Meds   1  Aleve 220 MG Oral Tablet; TAKE TABLET  PRN; Therapy: (Recorded:52Blh6274) to Recorded   2  Atorvastatin Calcium 10 MG Oral Tablet; TAKE 1 TABLET AT BEDTIME; Therapy: 65Ebo1036 to (Evaluate:58Kux5245); Last Rx:77Gmc8095 Ordered   3  Biotin 5000 MCG Oral Capsule; TAKE 1 CAPSULE DAILY; Therapy: (Recorded:03Clk6952) to Recorded   4  Multivitamins Oral Capsule; TAKE 1 CAPSULE DAILY, AT LEAST  [facility]; Therapy: (Recorded:30Iwk9369) to Recorded   5  NexIUM 40 MG Oral Capsule Delayed Release; TAKE 1 CAPSULE DAILY; Therapy: 28EXC6569 to (Evaluate:74Mkg7642)  Requested for: 00IKP8781 Recorded   6  Synthroid 125 MCG Oral Tablet; TAKE 1 TABLET EVERY DAY ON AN EMPTY STOMACH; Therapy: 51OZW0492 to (Evaluate:29Qoo7569)  Requested for: 26Nqi9520; Last   Rx:13Kot8816 Ordered   7  Vitamin C 1000 MG Oral Tablet; TAKE 1 TABLET DAILY; Therapy: 98SLO5516 to Recorded   8  Vitamin D3 2000 UNIT Oral Tablet; Take 1 daily; Therapy: 86HOX8248 to Recorded    The medication list was reviewed and updated today  Allergies    1  HydroCHLOROthiazide TABS    2  No Known Environmental Allergies   3  No Known Food Allergies    Immunizations   ** Printed in Appendix #1 below  Vitals  Signs    Heart Rate: 72  Respiration: 16  Systolic: 285, LUE, Sitting  Diastolic: 62, LUE, Sitting  BP Cuff Size: Large  Height: 5 ft 6 in  Weight: 185 lb 0 6 oz  BMI Calculated: 29 87  BSA Calculated: 1 94    Physical Exam  right hand Dupuytren's contracture     Constitutional   General appearance: No acute distress, well appearing and well nourished  Head and Face   Head and face: Normal     Eyes   Conjunctiva and lids: No swelling, erythema or discharge  Pupils and irises: Equal, round, reactive to light  Ears, Nose, Mouth, and Throat   External inspection of ears and nose: Normal     Otoscopic examination: Tympanic membranes translucent with normal light reflex  Canals patent without erythema  Hearing: Normal     Nasal mucosa, septum, and turbinates: Normal without edema or erythema  Lips, teeth, and gums: Normal, good dentition  Oropharynx: Normal with no erythema, edema, exudate or lesions  Neck   Neck: Supple, symmetric, trachea midline, no masses  Pulmonary   Respiratory effort: No increased work of breathing or signs of respiratory distress  Auscultation of lungs: Clear to auscultation  Cardiovascular   Auscultation of heart: Normal rate and rhythm, normal S1 and S2, no murmurs  Examination of extremities for edema and/or varicosities: Normal     Lymphatic   Palpation of lymph nodes in neck: No lymphadenopathy      Psychiatric   Mood and affect: Normal        Results/Data  *VB - Urinary Incontinence Screen (Dx Z13 89 Screen for UI) 03Apr2017 08:35AM Adelina Bur     Test Name Result Flag Reference   Urinary Incontinence Assessment 03Apr2017       PHQ-2 Adult Depression Screening 03Apr2017 08:34AM User, Ahs     Test Name Result Flag Reference   PHQ-2 Adult Depression Score 0     Over the last two weeks, how often have you been bothered by any of the following problems? Little interest or pleasure in doing things: Not at all - 0  Feeling down, depressed, or hopeless: Not at all - 0   PHQ-2 Adult Depression Screening Negative       Falls Risk Assessment (Dx Z13 89 Screen for Neurologic Disorder) 03Apr2017 08:34AM User, Ahs     Test Name Result Flag Reference   Falls Risk      No falls in the past year     (1) FREE T3 21Mar2017 06:38AM Lily Grow Order Number: [id number]     Test Name Result Flag Reference   FREE T3 2 28 pg/mL L 2 30-4 20   - Patient Instructions: This is a fasting blood test  Water,black tea or black  coffee only after 9:00pm the night before test Drink 2 glasses of water the morning of test      (1) T4, FREE 21Mar2017 06:38AM Assignment Editor Order Number: [id number]     Test Name Result Flag Reference   T4,FREE 1 11 ng/dL  0 76-1 46   - Patient Instructions: This is a fasting blood test  Water,black tea or black  coffee only after 9:00pm the night before test Drink 2 glasses of water the morning of test      (1) HEMOGLOBIN A1C 21Mar2017 06:38AM Assignment Editor Order Number: [id number]     Test Name Result Flag Reference   HEMOGLOBIN A1C 6 1 %  4 2-6 3   EST  AVG   GLUCOSE 128 mg/dl       (1) COMPREHENSIVE METABOLIC PANEL 25POY5977 33:90WH Lily Grow Order Number: [id number]     Test Name Result Flag Reference   SODIUM 141 mmol/L  136-145   POTASSIUM 4 1 mmol/L  3 5-5 3   CHLORIDE 105 mmol/L  100-108   CARBON DIOXIDE 29 mmol/L  21-32   ANION GAP (CALC) 7 mmol/L  4-13   BLOOD UREA NITROGEN 9 mg/dL  5-25   CREATININE 0 51 mg/dL L 0 60-1 30   Standardized to IDMS reference method CALCIUM 9 0 mg/dL  8 3-10 1   BILI, TOTAL 0 52 mg/dL  0 20-1 00   ALK PHOSPHATAS 43 U/L L    ALT (SGPT) 25 U/L  12-78   AST(SGOT) 13 U/L  5-45   ALBUMIN 3 5 g/dL  3 5-5 0   TOTAL PROTEIN 6 4 g/dL  6 4-8 2   eGFR Non-African American      >60 0 ml/min/1 73sq m   - Patient Instructions: This is a fasting blood test  Water,black tea or black  coffee only after 9:00pm the night before test Drink 2 glasses of water the morning of test   National Kidney Disease Education Program recommendations are as follows:  GFR calculation is accurate only with a steady state creatinine  Chronic Kidney disease less than 60 ml/min/1 73 sq  meters  Kidney failure less than 15 ml/min/1 73 sq  meters  GLUCOSE FASTING 110 mg/dL H 65-99     (1) LIPID PANEL, FASTING 21Mar2017 06:38AM [employer]   TW Order Number: [id number]     Test Name Result Flag Reference   CHOLESTEROL 157 mg/dL     HDL,DIRECT 52 mg/dL  40-60   Specimen collection should occur prior to Metamizole administration due to the potential for falsely depressed results  LDL CHOLESTEROL CALCULATED 82 mg/dL  0-100   - Patient Instructions: This is a fasting blood test  Water,black tea or black  coffee only after 9:00pm the night before test   Drink 2 glasses of water the morning of test     - Patient Instructions: This is a fasting blood test  Water,black tea or black  coffee only after 9:00pm the night before test Drink 2 glasses of water the morning of test   Triglyceride:         Normal              <150 mg/dl       Borderline High    150-199 mg/dl       High               200-499 mg/dl       Very High          >499 mg/dl  Cholesterol:         Desirable        <200 mg/dl      Borderline High  200-239 mg/dl      High             >239 mg/dl  HDL Cholesterol:        High    >59 mg/dL      Low     <41 mg/dL  LDL CALCULATED:    This screening LDL is a calculated result    It does not have the accuracy of the Direct Measured LDL in the monitoring of patients with hyperlipidemia and/or statin therapy  Direct Measure LDL (ROY269) must be ordered separately in these patients  TRIGLYCERIDES 113 mg/dL  <=150   Specimen collection should occur prior to N-Acetylcysteine or Metamizole administration due to the potential for falsely depressed results  (1) TSH 40WUC8175 06:38AM Keith Mccarthy     Test Name Result Flag Reference   TSH 1 180 uIU/mL  0 358-3 740   - Patient Instructions: This is a fasting blood test  Water,black tea or black  coffee only after 9:00pm the night before test Drink 2 glasses of water the morning of test   Patients undergoing fluorescein dye angiography may retain small amounts of fluorescein in the body for 48-72 hours post procedure  Samples containing fluorescein can produce falsely depressed TSH values  If the patient had this procedure,a specimen should be resubmitted post fluorescein clearance  The recommended reference ranges for TSH during pregnancy are as follows:  First trimester 0 1 to 2 5 uIU/mL  Second trimester  0 2 to 3 0 uIU/mL  Third trimester 0 3 to 3 0 uIU/m     (1) HEP C ANTIBODY 2017 06:38AM Keith Mccarthy     Test Name Result Flag Reference   HEPATITIS C ANTIBODY Non-reactive  Non-reactive     Future Appointments    Date/Time Provider Specialty Site   2017 08:30 AM Keith Mccarthy DO Internal Medicine [facility]   2017 01:20 PM JOSE FRANCISCO Serna  Cardiology [facility]     Signatures   Electronically signed by : Coby Allred DO; Apr  3 2017  9:40AM EST                       (Author)    Appendix #1     Patient: Marisol Shrestha ; : 1945; MRN: [id number]      1 2 3    Influenza  Temporarily Deferred: Pt requests deferral, To get in the communtiy this Friday   Temporarily Deferred: Patient reports item recently done, 2015 17-Sep-2016    PCV  2017      PPSV  29-May-2012 29-May-2012     Td/DT  2004      Tdap 16-Aug-2016      Zoster  27-Aug-2007

## 2018-01-15 NOTE — RESULT NOTES
Message   Notify the patient normal TSH and normal hepatitis C antibody follow up as scheduled        Verified Results  (1) TSH [id number] 06:38AM Chayo Loida     Test Name Result Flag Reference   TSH 1 180 uIU/mL  0 358-3 740   - Patient Instructions: This is a fasting blood test  Water,black tea or black  coffee only after 9:00pm the night before test Drink 2 glasses of water the morning of test   Patients undergoing fluorescein dye angiography may retain small amounts of fluorescein in the body for 48-72 hours post procedure  Samples containing fluorescein can produce falsely depressed TSH values  If the patient had this procedure,a specimen should be resubmitted post fluorescein clearance            The recommended reference ranges for TSH during pregnancy are as follows:  First trimester 0 1 to 2 5 uIU/mL  Second trimester  0 2 to 3 0 uIU/mL  Third trimester 0 3 to 3 0 uIU/m     (1) HEP C ANTIBODY 21Mar2017 06:38AM Chayo [employer]     Test Name Result Flag Reference   HEPATITIS C ANTIBODY Non-reactive  Non-reactive       Signatures   Electronically signed by : Maya Vines DO; Mar 22 2017  9:11PM EST                       (Author)

## 2018-01-16 NOTE — RESULT NOTES
Verified Results  (1) TSH WITH FT4 REFLEX [id number] 09:57AM Josh Vazquez   Patients undergoing fluorescein dye angiography may retain small amounts of fluorescein in the body for 48-72 hours post procedure  Samples containing fluorescein can produce falsely depressed TSH values  If the patient had this procedure,a specimen should be resubmitted post fluorescein clearance          The recommended reference ranges for TSH during pregnancy are as follows:  First trimester 0 1 to 2 5 uIU/mL  Second trimester  0 2 to 3 0 uIU/mL  Third trimester 0 3 to 3 0 uIU/m     Test Name Result Flag Reference   TSH 2 220 uIU/mL  0 358-3 740

## 2018-01-16 NOTE — RESULT NOTES
Verified Results  (1) TSH WITH FT4 REFLEX 15Jun2016 11:07AM Jade Morales Order Number: [id number]   Order Number: [id number]     Test Name Result Flag Reference   TSH 0 255 uIU/mL L 0 358-3 740   The recommended reference ranges for TSH during pregnancy are as follows:  First trimester 0 1 to 2 5 uIU/mL  Second trimester  0 2 to 3 0 uIU/mL  Third trimester 0 3 to 3 0 uIU/m   T4,FREE 1 22 ng/dL  0 76-1 46

## 2018-01-16 NOTE — RESULT NOTES
Message   Notify the patient normal thyroid lab test follow up as scheduled        Verified Results  (1) FREE T3 25Apr2017 06:43AM Elle John     Test Name Result Flag Reference   FREE T3 2 35 pg/mL  2 30-4 20     (1) TSH WITH FT4 REFLEX 25Apr2017 06:43AM Elle John     Test Name Result Flag Reference   TSH 2 110 uIU/mL  0 358-3 740   Patients undergoing fluorescein dye angiography may retain small amounts of fluorescein in the body for 48-72 hours post procedure  Samples containing fluorescein can produce falsely depressed TSH values  If the patient had this procedure,a specimen should be resubmitted post fluorescein clearance  The recommended reference ranges for TSH during pregnancy are as follows:  First trimester 0 1 to 2 5 uIU/mL  Second trimester  0 2 to 3 0 uIU/mL  Third trimester 0 3 to 3 0 uIU/m       Plan  Arthritis, Mild scoliosis    · *1 - [facility] Co-[facility]  *  Status: Active  Requested for: 24Apr2017  Care Summary provided  : Yes    Signatures   Electronically signed by : Farhat Abdi DO;  Apr 26 2017  9:11PM EST                       (Author)

## 2018-01-16 NOTE — RESULT NOTES
Message   Notify the patient the mammogram no change when compared to the prior study recheck in 1 year, see OB/GYN for routine examination, follow-up as scheduled        Verified Results  [address] & CAD [id number] 12:16PM Eduardo De La Torre Order Number: [id number]    - Patient Instructions: To schedule this appointment, please contact Central Scheduling at [phone]  Do not wear any perfume, powder, lotion or deodorant on breast or underarm area  Please bring your doctors order, referral (if needed) and insurance information with you on the day of the test  Failure to bring this information may result in this test being rescheduled  Arrive 15 minutes prior to your appointment time to register  On the day of your test, please bring any prior mammogram or breast studies with you that were not performed at a [facility]  Failure to bring prior exams may result in your test needing to be rescheduled  Test Name Result Flag Reference   MAMMO SCREENING BILATERAL W 3D & CAD (Report)     Patient History:   Patient is postmenopausal    Family history of ovarian cancer at age 67 in mother, unknown    cancer in brother  Pre-pectoral saline implants in both breasts, January 1, 1998  Benign excisional biopsy of the left breast, 1993  Benign core    biopsy of the left breast, 1974  Took hormonal contraceptives for 5 years  Patient is a former smoker  Patient's BMI is 31 3  Reason for exam: screening, asymptomatic  Mammo Screening Bilateral W DBT and CAD: February 27, 2017 -    Check In #: [de-identified]   2D/3D Procedure   3D views: Bilateral MLOID view(s) were taken  CCID view(s) were    taken of the left breast    2D views: Bilateral MLO, CC, and CCID view(s) were taken  Technologist: JOHNY Hanks (JOHNY)(M)   Prior study comparison: February 19, 2016, mammo screening    bilateral W CAD, performed at [facility]      February 17, 2015, bilateral WB digitl bilat yaz, performed at    [address] [facility]  April 10, 2014, digital    bilateral screening mammogram, performed at [address]  April 3, 2013, digital bilateral screening mammogram,    performed at [facility]  March 28, 2012,    digital bilateral screening mammogram, performed at [facility]  The breast tissue is heterogeneously dense, potentially limiting    the sensitivity of mammography  Patient risk, included in this    report, assists in determining the appropriate screening regimen    (such as 3-D mammography or the inclusion of automated breast    ultrasound or MRI)  3-D mammography may also remain indicated as    screening  No dominant soft tissue mass, architectural distortion or    suspicious calcifications are noted in either breast   The skin    and nipple structures are within normal limits  Bilateral    implants intact  No significant changes when compared with prior studies  ACR BI-RADSï¾® Assessments: BiRad:2 - Benign     Recommendation:   Routine screening mammogram of both breasts in 1 year  A    reminder letter will be scheduled  8-10% of cancers will be missed on mammography  Management of a    palpable abnormality must be based on clinical grounds  Patients    will be notified of their results via letter from our facility  Accredited by [employer] and FDA       Transcription Location:  [place] [address]: [id number]     Risk Value(s):   Tyrer-Cuzick 10 Year: 3 700%, Tyrer-Cuzick Lifetime: 6 300%,    Myriad Table: 3 0%, FAHAD 5 Year: 3 4%, NCI Lifetime: 10 2%       Signatures   Electronically signed by : Maged Aldrich DO; Feb 27 2017  5:36PM EST                       (Author)

## 2018-01-17 NOTE — RESULT NOTES
Message   #1  Please call the patient with the results of her laboratory testing  #2  Her laboratory test results look well, except that her blood sugar is slightly elevated  #3  I recommend that she continue with her current medications, until her office visit later this month  #4  You may leave a message, if her communication consent allows for it  Verified Results  (1) TSH WITH FT4 REFLEX 01Apr2016 06:36AM [facility]   Patients undergoing fluorescein dye angiography may retain small amounts of fluorescein in the body for 48-72 hours post procedure  Samples containing fluorescein can produce falsely depressed TSH values  If the patient had this procedure,a specimen should be resubmitted post fluorescein clearance  The recommended reference ranges for TSH during pregnancy are as follows:  First trimester 0 1 to 2 5 uIU/mL  Second trimester  0 2 to 3 0 uIU/mL  Third trimester 0 3 to 3 0 uIU/m     Test Name Result Flag Reference   TSH 0 988 uIU/mL  0 358-3 740     (1) CBC/PLT/DIFF 01Apr2016 06:36AM Glendy China     Test Name Result Flag Reference   WBC COUNT 5 63 Thousand/uL  4 31-10 16   RBC COUNT 4 57 Million/uL  3 81-5 12   HEMOGLOBIN 12 7 g/dL  11 5-15 4   HEMATOCRIT 38 9 %  34 8-46  1   MCV 85 fL  82-98   MCH 27 8 pg  26 8-34 3   MCHC 32 6 g/dL  31 4-37 4   RDW 14 5 %  11 6-15 1   MPV 10 2 fL  8 9-12 7   PLATELET COUNT 908 Thousands/uL  149-390   nRBC AUTOMATED 0 /100 WBCs     NEUTROPHILS RELATIVE PERCENT 54 %  43-75   LYMPHOCYTES RELATIVE PERCENT 30 %  14-44   MONOCYTES RELATIVE PERCENT 9 %  4-12   EOSINOPHILS RELATIVE PERCENT 6 %  0-6   BASOPHILS RELATIVE PERCENT 1 %  0-1   NEUTROPHILS ABSOLUTE COUNT 3 09 Thousands/µL  1 85-7 62   LYMPHOCYTES ABSOLUTE COUNT 1 67 Thousands/µL  0 60-4 47   MONOCYTES ABSOLUTE COUNT 0 50 Thousand/µL  0 17-1 22   EOSINOPHILS ABSOLUTE COUNT 0 31 Thousand/µL  0 00-0 61   BASOPHILS ABSOLUTE COUNT 0 05 Thousands/µL  0 00-0 10     (1) COMPREHENSIVE METABOLIC PANEL [id number] 79:98KB Chintan, Andres [facility] Kidney Disease Education Program recommendations are as follows:  GFR calculation is accurate only with a steady state creatinine  Chronic Kidney disease less than 60 ml/min/1 73 sq  meters  Kidney failure less than 15 ml/min/1 73 sq  meters  Test Name Result Flag Reference   GLUCOSE,RANDM 103 mg/dL     SODIUM 140 mmol/L  136-145   POTASSIUM 4 2 mmol/L  3 5-5 3   CHLORIDE 107 mmol/L  100-108   CARBON DIOXIDE 26 mmol/L  21-32   ANION GAP (CALC) 7 mmol/L  4-13   BLOOD UREA NITROGEN 13 mg/dL  5-25   CREATININE 0 54 mg/dL L 0 60-1 30   CALCIUM 8 3 mg/dL  8 3-10 1   BILI, TOTAL 0 52 mg/dL  0 20-1 00   ALK PHOSPHATAS 46 U/L     ALT (SGPT) 25 U/L  12-78   AST(SGOT) 14 U/L  5-45   ALBUMIN 3 5 g/dL  3 5-5 0   TOTAL PROTEIN 6 3 g/dL L 6 4-8 2   eGFR Non-African American      >60 0 ml/min/1 73sq m     (1) HEMOGLOBIN A1C 01Apr2016 06:36AM Darene Hayder   5 7-6 4% impaired fasting glucose  >=6 5% diagnosis of diabetes    Falsely low levels are seen in conditions linked to short RBC life span-  hemolytic anemia, and splenomegaly  Falsely elevated levels are seen in situations where there is an increased production of RBC- receipt of erythropoietin or blood transfusions  Adopted from ADA-Clinical Practice Recommendations     Test Name Result Flag Reference   HEMOGLOBIN A1C 6 3 % H 4 0-5 6   EST  AVG   GLUCOSE 134 mg/dl       (1) LIPID PANEL FASTING W DIRECT LDL REFLEX 01Apr2016 06:36AM Darene Hayder   Triglyceride:         Normal              <150 mg/dl       Borderline High    150-199 mg/dl       High               200-499 mg/dl       Very High          >499 mg/dl  Cholesterol:         Desirable        <200 mg/dl      Borderline High  200-239 mg/dl      High             >239 mg/dl  HDL Cholesterol:        High    >59 mg/dL      Low     <41 mg/dL  LDL Cholesterol:        Optimal          <100 mg/dl         Near Optimal     100-129 mg/dl        Above Optimal          Borderline High   130-159 mg/dl          High              160-189 mg/dl          Very High        >189 mg/dl  LDL CALCULATED:    This screening LDL is a calculated result  It does not have the accuracy of the Direct Measured LDL in the monitoring of patients with hyperlipidemia and/or statin therapy  Direct Measure LDL (EXT191) must be ordered separately in these patients  Test Name Result Flag Reference   CHOLESTEROL 134 mg/dL     LDL CHOLESTEROL CALCULATED 59 mg/dL  0-100   TRIGLYCERIDES 116 mg/dL  <=150   Specimen collection should occur prior to N-Acetylcysteine or Metamizole administration due to the potential for falsely depressed results     HDL,DIRECT 52 mg/dL  40-60     (1) HEP C ANTIBODY 01Apr2016 06:36AM Tone Bras     Test Name Result Flag Reference   HEPATITIS C ANTIBODY Non-reactive  Non-reactive

## 2018-01-17 NOTE — RESULT NOTES
Message   Notify the patient chest x-ray no active pulmonary disease follow up as scheduled        Verified Results  * XR CHEST PA & LATERAL [id number] 09:39AM Timmy Courtney Order Number: [id number]     Test Name Result Flag Reference   XR CHEST PA & LATERAL (Report)     CHEST     INDICATION: Pneumonia  COMPARISON: 8/12/2016     VIEWS: Frontal and lateral projections; 2 images     FINDINGS:         The cardiomediastinal silhouette is unremarkable  The lungs are clear  No pleural effusions  Osseous structures are age appropriate  IMPRESSION:     No active pulmonary disease  Workstation performed: [id number]     Signed by:    Darren Machado MD   1/25/17       Signatures   Electronically signed by : Maricruz Allred DO; Jan 25 2017  8:01PM EST                       (Author)

## 2018-01-17 NOTE — RESULT NOTES
Verified Results  (1) TSH WITH FT4 REFLEX 25Oct2016 06:59AM Taina Prasad Order Number: [id number]     Test Name Result Flag Reference   TSH 1 520 uIU/mL  0 358-3 740   Performing Comments: RETURN ORDER SLIP TO THE PATIENT - REUSABLE FOR 6 MONTHS  - Patient Instructions: NO FASTING NEEDED BEFORE THE TEST - DO MONTHLY  - Patient Instructions: NO fastng needed before doing the lab test Performing Comments: RETURN ORDER SLIP TO THE PATIENT - REUSABLE FOR 6 MONTHS  - Patient Instructions: NO FASTING NEEDED BEFORE THE TEST - DO MONTHLY  Patients undergoing fluorescein dye angiography may retain small amounts of fluorescein in the body for 48-72 hours post procedure  Samples containing fluorescein can produce falsely depressed TSH values  If the patient had this procedure,a specimen should be resubmitted post fluorescein clearance  The recommended reference ranges for TSH during pregnancy are as follows:  First trimester 0 1 to 2 5 uIU/mL  Second trimester  0 2 to 3 0 uIU/mL  Third trimester 0 3 to 3 0 uIU/m     (1) COMPREHENSIVE METABOLIC PANEL [id number] 73:61HF Taina Prasad Order Number: [id number]     Test Name Result Flag Reference   GLUCOSE,RANDM 104 mg/dL     If the patient is fasting, the ADA then defines impaired fasting glucose as > 100 mg/dL and diabetes as > or equal to 123 mg/dL     SODIUM 139 mmol/L  136-145   POTASSIUM 4 1 mmol/L  3 5-5 3   CHLORIDE 106 mmol/L  100-108   CARBON DIOXIDE 28 mmol/L  21-32   ANION GAP (CALC) 5 mmol/L  4-13   BLOOD UREA NITROGEN 13 mg/dL  5-25   CREATININE 0 62 mg/dL  0 60-1 30   Standardized to IDMS reference method   CALCIUM 8 4 mg/dL  8 3-10 1   BILI, TOTAL 0 36 mg/dL  0 20-1 00   ALK PHOSPHATAS 46 U/L     ALT (SGPT) 23 U/L  12-78   AST(SGOT) 16 U/L  5-45   ALBUMIN 3 5 g/dL  3 5-5 0   TOTAL PROTEIN 6 7 g/dL  6 4-8 2   eGFR Non-African American      >60 0 ml/min/1 73sq m   - Patient Instructions: NO fastng needed before doing the lab test     - Patient Instructions: NO fastng needed before doing the lab test Performing Comments: [address] 6 MONTHS  - Patient Instructions: NO FASTING NEEDED BEFORE THE TEST - DO MONTHLY  National Kidney Disease Education Program recommendations are as follows:  GFR calculation is accurate only with a steady state creatinine  Chronic Kidney disease less than 60 ml/min/1 73 sq  meters  Kidney failure less than 15 ml/min/1 73 sq  meters

## 2018-01-22 VITALS
DIASTOLIC BLOOD PRESSURE: 84 MMHG | HEART RATE: 89 BPM | BODY MASS INDEX: 29.36 KG/M2 | HEIGHT: 67 IN | SYSTOLIC BLOOD PRESSURE: 134 MMHG | WEIGHT: 187.05 LBS | OXYGEN SATURATION: 94 % | RESPIRATION RATE: 16 BRPM

## 2018-01-23 NOTE — RESULT NOTES
Message   Notify the patient MRA of the brain radiologist reports no indication of intracranial aneurysm  No large vessel flow restrictive disease  Please have the pt follow up with me to discuss as scheduled        Verified Results  * MRA AND OR MRV HEAD WO CONTRAST [id number] 11:00AM Carolyn Santana     Test Name Result Flag Reference   MRA HEAD WO CONTRAST (Report)     MRA BRAIN     INDICATION: History of ruptured aneurysm, stenosis of the carotid artery     COMPARISON: MR of the brain 2/26/2013     TECHNIQUE: Axial 3-D time-of-flight imaging with 3-D reconstructions  FINDINGS:     IMAGE QUALITY: Diagnostic  ANATOMY     INTERNAL CAROTID ARTERIES: Normal flow related enhancement of the distal cervical, petrous and cavernous segments of the internal carotid arteries  Normal ICA terminus  ANTERIOR CIRCULATION: Normal A1 segments  Normal anterior communicating artery  Normal flow-related enhancement of the anterior cerebral arteries  MIDDLE CEREBRAL ARTERY CIRCULATION: The M1 segment and middle cerebral artery branches demonstrate normal flow-related enhancement  DISTAL VERTEBRAL ARTERIES: Distal vertebral arteries are patient with a normal vertebrobasilar junction  The right AICA/PICA and left posterior inferior cerebellar artery origins are normal       BASILAR ARTERY: Normal      POSTERIOR CEREBRAL ARTERIES: Both posterior cerebral arteries demonstrate normal flow-related enhancement  Posterior communicating arteries are prominent bilaterally, minor hypoplasia right P1 segment  IMPRESSION:     No indication of intracranial aneurysm  No large vessel flow restrictive disease  Workstation performed: [id number]     Signed by:    Cash Monroy MD   12/14/17

## 2018-01-23 NOTE — RESULT NOTES
Message   Please notify pt normal test-ultrasound abdominal aorta no evidence of abdominal aortic aneurysm please have the pt follow up with me to discuss as scheduled        Verified Results  [address] [id number] 07:44AM Lela Spotted     Test Name Result Flag Reference   US ABDOMINAL AORTA (Report)     ABDOMINAL AORTIC ULTRASOUND     INDICATION: Evaluate for aortic aneurysm  Family history of abdominal aortic aneurysm     COMPARISON: None  FINDINGS:      Ultrasound of the abdominal aorta was performed in longitudinal and transverse planes with a curvilinear transducer  Proximal aorta: 2 2 x 2 5 cm   Mid aorta:  2 1 x 2 4 cm   Distal aorta:  1 6 x 2 1 cm   Right common iliac origin: 1 3 x 1 6 cm   Left common iliac origin: 1 3 x 1 2 cm     No periaortic collections or adenopathy detected  IMPRESSION:     No evidence for abdominal aortic aneurysm         Workstation performed: [id number]     Signed by:   Aure Rivera MD   12/4/17

## 2018-01-23 NOTE — RESULT NOTES
Message   Notify the patient Carotid Doppler right side no evidence of significant stenosis, left carotid less than 50% stenosis, compared to the prior study there is no significant change please have the pt follow up with me to discuss as scheduled        Verified Results  VAS CAROTID COMPLETE STUDY [id number] 10:11AM Frank Rodriguez Order Number: [id number]    - Patient Instructions: To schedule this appointment, please contact Central Scheduling at [phone]  Test Name Result Flag Reference   VAS CAROTID COMPLETE STUDY (Report)     [facility] REPORT   CLINICAL:   Indications:   Carotid disease w/o CVA [I65 29]  Patient presents for a general health   evaluation secondary to other cardiovascular symptoms  Patient is asymptomatic   from a cerebral vascular standpoint  Operative History   Patient denies any cardiovascular surgeries   Risk Factors   The patient has history of hyperlipidemia and previous smoking (quit <1yr ago)  Clinical   Right Brachial Pressure: 134/82 mmHg, Left Brachial Pressure: 130/82 mmHg  FINDINGS:      Right    Impression PSV EDV (cm/s) Direction of Flow Ratio    Dist  ICA        124     54           2 18    Mid  ICA         83     38           1 47    Prox  ICA  Normal    47     18           0 83    Dist CCA         79     23                 Mid CCA          57     14           0 58    Prox CCA         99     22                 Ext Carotid        84      9           1 48    Prox Vert         48     14 Antegrade            Subclavian        98      0                    Left     Impression PSV EDV (cm/s) Direction of Flow Ratio    Dist  ICA         77     30           1 22    Mid  ICA         66     27           1 06    Prox   ICA  1 - 49%   58     20           0 93    Dist CCA         57     21                 Mid CCA          62     22           0 77    Prox CCA         82     19                 Ext Carotid        84     10           1 35    Prox Vert 43     20 Antegrade            Subclavian        108      0                          CONCLUSION:      Impression   RIGHT:   There is no evidence of significant stenosis noted  Vertebral artery flow is antegrade  There is no significant subclavian artery   disease  LEFT:   There is <50% stenosis noted in the internal carotid artery  Plaque is   homogenous and smooth  Vertebral artery flow is antegrade  There is no significant subclavian artery   disease  Compared to previous study on 06/04/2012, there is no significant change        SIGNATURE:   Electronically Signed by: Mary Dunn on 2018-01-09 09:21:40 PM

## 2018-02-28 ENCOUNTER — HOSPITAL ENCOUNTER (OUTPATIENT)
Dept: RADIOLOGY | Age: 73
Discharge: HOME | End: 2018-02-28
Payer: MEDICARE

## 2018-02-28 DIAGNOSIS — Z12.31 ENCOUNTER FOR SCREENING MAMMOGRAM FOR MALIGNANT NEOPLASM OF BREAST: ICD-10-CM

## 2018-02-28 PROCEDURE — 77067 SCR MAMMO BI INCL CAD: CPT

## 2018-02-28 PROCEDURE — 77063 BREAST TOMOSYNTHESIS BI: CPT

## 2018-03-06 ENCOUNTER — HOSPITAL ENCOUNTER (OUTPATIENT)
Dept: ULTRASOUND IMAGING | Facility: CLINIC | Age: 73
Discharge: HOME | End: 2018-03-06
Payer: MEDICARE

## 2018-03-06 DIAGNOSIS — R92.8 ABNORMAL MAMMOGRAM: ICD-10-CM

## 2018-03-06 PROCEDURE — 76642 ULTRASOUND BREAST LIMITED: CPT

## 2018-03-16 DIAGNOSIS — I10 ESSENTIAL HYPERTENSION: Primary | ICD-10-CM

## 2018-03-16 DIAGNOSIS — E78.5 HYPERLIPIDEMIA, UNSPECIFIED HYPERLIPIDEMIA TYPE: ICD-10-CM

## 2018-03-16 RX ORDER — MULTIVIT WITH MINERALS/LUTEIN
1 TABLET ORAL DAILY
COMMUNITY
Start: 2012-11-06

## 2018-03-16 RX ORDER — METRONIDAZOLE 7.5 MG/G
LOTION TOPICAL
COMMUNITY
End: 2019-08-20 | Stop reason: SDUPTHER

## 2018-03-16 RX ORDER — MECLIZINE HYDROCHLORIDE 25 MG/1
TABLET ORAL AS NEEDED
COMMUNITY

## 2018-03-16 RX ORDER — ESOMEPRAZOLE MAGNESIUM 40 MG/1
1 CAPSULE, DELAYED RELEASE ORAL DAILY
COMMUNITY
Start: 2011-05-03

## 2018-03-16 RX ORDER — NICOTINE 14MG/24HR
1 PATCH, TRANSDERMAL 24 HOURS TRANSDERMAL 2 TIMES DAILY
COMMUNITY
End: 2019-04-30

## 2018-03-16 RX ORDER — NAPROXEN SODIUM 220 MG
TABLET ORAL
COMMUNITY
End: 2019-06-27

## 2018-03-16 RX ORDER — ATORVASTATIN CALCIUM 10 MG/1
1 TABLET, FILM COATED ORAL
COMMUNITY
Start: 2016-12-27 | End: 2018-03-16 | Stop reason: SDUPTHER

## 2018-03-16 RX ORDER — CHOLECALCIFEROL (VITAMIN D3) 125 MCG
1 CAPSULE ORAL DAILY
COMMUNITY
Start: 2012-11-06 | End: 2020-01-17 | Stop reason: ALTCHOICE

## 2018-03-16 RX ORDER — DOXYCYCLINE HYCLATE 100 MG
TABLET ORAL AS NEEDED
COMMUNITY

## 2018-03-16 RX ORDER — MULTIVITAMIN
1 CAPSULE ORAL DAILY
COMMUNITY

## 2018-03-16 RX ORDER — LEVOTHYROXINE SODIUM 0.12 MG/1
TABLET ORAL
COMMUNITY
Start: 2016-06-29 | End: 2018-04-30 | Stop reason: SDUPTHER

## 2018-03-16 RX ORDER — ATORVASTATIN CALCIUM 10 MG/1
10 TABLET, FILM COATED ORAL DAILY
Qty: 90 TABLET | Refills: 1 | Status: SHIPPED | OUTPATIENT
Start: 2018-03-16 | End: 2018-09-12 | Stop reason: SDUPTHER

## 2018-04-30 DIAGNOSIS — E03.9 HYPOTHYROIDISM, UNSPECIFIED TYPE: Primary | ICD-10-CM

## 2018-04-30 DIAGNOSIS — R73.01 IMPAIRED FASTING GLUCOSE: ICD-10-CM

## 2018-04-30 DIAGNOSIS — E78.5 HYPERLIPIDEMIA: ICD-10-CM

## 2018-04-30 RX ORDER — LEVOTHYROXINE SODIUM 125 UG/1
TABLET ORAL
Qty: 90 TABLET | Refills: 0 | Status: SHIPPED | OUTPATIENT
Start: 2018-04-30 | End: 2018-07-06 | Stop reason: SDUPTHER

## 2018-05-02 ENCOUNTER — TRANSCRIBE ORDERS (OUTPATIENT)
Dept: ADMINISTRATIVE | Age: 73
End: 2018-05-02

## 2018-05-02 ENCOUNTER — LAB (OUTPATIENT)
Dept: LAB | Age: 73
End: 2018-05-02
Payer: MEDICARE

## 2018-05-02 DIAGNOSIS — R73.01 IMPAIRED FASTING GLUCOSE: ICD-10-CM

## 2018-05-02 DIAGNOSIS — E78.5 HYPERLIPIDEMIA, UNSPECIFIED HYPERLIPIDEMIA TYPE: ICD-10-CM

## 2018-05-02 DIAGNOSIS — E78.5 HYPERLIPIDEMIA: ICD-10-CM

## 2018-05-02 DIAGNOSIS — E78.5 HYPERLIPIDEMIA, UNSPECIFIED HYPERLIPIDEMIA TYPE: Primary | ICD-10-CM

## 2018-05-02 LAB
ALBUMIN SERPL BCP-MCNC: 3.7 G/DL (ref 3.5–5)
ALP SERPL-CCNC: 39 U/L (ref 46–116)
ALT SERPL W P-5'-P-CCNC: 18 U/L (ref 12–78)
ANION GAP SERPL CALCULATED.3IONS-SCNC: 7 MMOL/L (ref 4–13)
AST SERPL W P-5'-P-CCNC: 12 U/L (ref 5–45)
BILIRUB SERPL-MCNC: 0.64 MG/DL (ref 0.2–1)
BUN SERPL-MCNC: 14 MG/DL (ref 5–25)
CALCIUM SERPL-MCNC: 9.1 MG/DL (ref 8.3–10.1)
CHLORIDE SERPL-SCNC: 104 MMOL/L (ref 100–108)
CHOLEST SERPL-MCNC: 174 MG/DL (ref 50–200)
CO2 SERPL-SCNC: 28 MMOL/L (ref 21–32)
CREAT SERPL-MCNC: 0.56 MG/DL (ref 0.6–1.3)
EST. AVERAGE GLUCOSE BLD GHB EST-MCNC: 126 MG/DL
GFR SERPL CREATININE-BSD FRML MDRD: 94 ML/MIN/1.73SQ M
GLUCOSE P FAST SERPL-MCNC: 110 MG/DL (ref 65–99)
HBA1C MFR BLD: 6 % (ref 4.2–6.3)
HDLC SERPL-MCNC: 64 MG/DL (ref 40–60)
LDLC SERPL CALC-MCNC: 93 MG/DL (ref 0–100)
NONHDLC SERPL-MCNC: 110 MG/DL
POTASSIUM SERPL-SCNC: 3.8 MMOL/L (ref 3.5–5.3)
PROT SERPL-MCNC: 6.8 G/DL (ref 6.4–8.2)
SODIUM SERPL-SCNC: 139 MMOL/L (ref 136–145)
TRIGL SERPL-MCNC: 86 MG/DL

## 2018-05-02 PROCEDURE — 83036 HEMOGLOBIN GLYCOSYLATED A1C: CPT

## 2018-05-02 PROCEDURE — 80053 COMPREHEN METABOLIC PANEL: CPT

## 2018-05-02 PROCEDURE — 80061 LIPID PANEL: CPT

## 2018-05-08 ENCOUNTER — OFFICE VISIT (OUTPATIENT)
Dept: INTERNAL MEDICINE CLINIC | Facility: CLINIC | Age: 73
End: 2018-05-08
Payer: MEDICARE

## 2018-05-08 VITALS
WEIGHT: 201.8 LBS | OXYGEN SATURATION: 98 % | BODY MASS INDEX: 31.67 KG/M2 | RESPIRATION RATE: 16 BRPM | HEIGHT: 67 IN | DIASTOLIC BLOOD PRESSURE: 80 MMHG | SYSTOLIC BLOOD PRESSURE: 124 MMHG | HEART RATE: 79 BPM

## 2018-05-08 DIAGNOSIS — E03.9 HYPOTHYROIDISM, UNSPECIFIED TYPE: ICD-10-CM

## 2018-05-08 DIAGNOSIS — E66.09 CLASS 1 OBESITY DUE TO EXCESS CALORIES WITHOUT SERIOUS COMORBIDITY WITH BODY MASS INDEX (BMI) OF 31.0 TO 31.9 IN ADULT: ICD-10-CM

## 2018-05-08 DIAGNOSIS — E78.5 HYPERLIPIDEMIA, UNSPECIFIED HYPERLIPIDEMIA TYPE: ICD-10-CM

## 2018-05-08 DIAGNOSIS — I89.0 LYMPHEDEMA: Primary | ICD-10-CM

## 2018-05-08 DIAGNOSIS — R53.83 FATIGUE, UNSPECIFIED TYPE: ICD-10-CM

## 2018-05-08 PROBLEM — E66.811 CLASS 1 OBESITY DUE TO EXCESS CALORIES WITHOUT SERIOUS COMORBIDITY WITH BODY MASS INDEX (BMI) OF 31.0 TO 31.9 IN ADULT: Status: ACTIVE | Noted: 2018-05-08

## 2018-05-08 PROCEDURE — 99214 OFFICE O/P EST MOD 30 MIN: CPT | Performed by: INTERNAL MEDICINE

## 2018-05-08 RX ORDER — FUROSEMIDE 20 MG/1
TABLET ORAL
Qty: 30 TABLET | Refills: 0 | Status: SHIPPED | OUTPATIENT
Start: 2018-05-08 | End: 2018-06-04 | Stop reason: SDUPTHER

## 2018-05-08 NOTE — ASSESSMENT & PLAN NOTE
Hyperlipidemia controlled continue with current medical regiment recommend a low-cholesterol diet and recommend routine exercise we will continue to monitor the progress

## 2018-05-08 NOTE — ASSESSMENT & PLAN NOTE
Obesity -I have counseled patient following healthy and balanced diet, I would like the patient to lose weight, I would like the patient exercise routinely; we will continue monitor the patient's progress

## 2018-05-08 NOTE — PROGRESS NOTES
Assessment/Plan:    Hypothyroidism  Hypothyroidism controlled the patient is currently euthyroid I will be ordering a TSH  and the patient will continue with current medical regiment; we will continue to monitor the patient's progress  Based on her next TSH we may try to optimize her levothyroxine she is currently trying to lose weight  Fatigue  Check 3rd generation TSH    Hyperlipidemia  Hyperlipidemia controlled continue with current medical regiment recommend a low-cholesterol diet and recommend routine exercise we will continue to monitor the progress  Lymphedema  Patient does have the lymphedema pumps, compression stockings she reports me still intermittent swelling of the leg will treat with Lasix 20 milligrams 1 tablet once a day as needed the patient is to increase her potassium on the days of taking the Lasix  Will check a chemistry prior to next visit    Class 1 obesity due to excess calories without serious comorbidity with body mass index (BMI) of 31 0 to 31 9 in adult  Obesity -I have counseled patient following healthy and balanced diet, I would like the patient to lose weight, I would like the patient exercise routinely; we will continue monitor the patient's progress  Return to office  6 months  call if any problems     Problem List Items Addressed This Visit     Lymphedema - Primary     Patient does have the lymphedema pumps, compression stockings she reports me still intermittent swelling of the leg will treat with Lasix 20 milligrams 1 tablet once a day as needed the patient is to increase her potassium on the days of taking the Lasix    Will check a chemistry prior to next visit         Relevant Medications    furosemide (LASIX) 20 mg tablet    Fatigue     Check 3rd generation TSH         Relevant Orders    Vitamin B12    Hyperlipidemia     Hyperlipidemia controlled continue with current medical regiment recommend a low-cholesterol diet and recommend routine exercise we will continue to monitor the progress  Relevant Orders    Comprehensive metabolic panel    HEMOGLOBIN A1C W/ EAG ESTIMATION    Lipid Panel with Direct LDL reflex    Hypothyroidism     Hypothyroidism controlled the patient is currently euthyroid I will be ordering a TSH  and the patient will continue with current medical regiment; we will continue to monitor the patient's progress  Based on her next TSH we may try to optimize her levothyroxine she is currently trying to lose weight  Relevant Orders    TSH, 3rd generation    Class 1 obesity due to excess calories without serious comorbidity with body mass index (BMI) of 31 0 to 31 9 in adult     Obesity -I have counseled patient following healthy and balanced diet, I would like the patient to lose weight, I would like the patient exercise routinely; we will continue monitor the patient's progress  Subjective:      Patient ID: Tone Mejia is a 72 y o  female  HPI [age]-year old female coming in for a follow up office visit regarding hypothyroidism, class 1 obesity, fatigue, hyperlipidemia, chronic lymphedema; The patient reports me compliant taking medications without untoward side effects the  The patient is here to review his medical condition, update me on the medical condition and the patient reports me no hospitalizations and no ER visits  Patient does report me leg edema when on her feet for long periods of time most recently at her grandson's 1st communion she developed leg swelling and then needed to go out to eat dinner she does request further treatments of the leg edema  Reports me that she has been on the weight watchers diet over the last 2 weeks she reports me prior to this her diet had not been as good as usual   She reports me she developed my pressure she saw her optometrist who diagnosed her with narrow angle glaucoma and she will be going to see ophthalmologist Dr Josh Rosen    Patient is up-to-date on colonoscopy and upper endoscopy  The patient reports me she has tried to go off PPI in the past but not able to because of worsening of the acid reflux  She does have a known history of hiatal hernia she does follow a ulcer free diet and has a wedge pillow  The following portions of the patient's history were reviewed and updated as appropriate: allergies, current medications, past medical history, past social history, past surgical history and problem list     Review of Systems   Constitutional: Negative for activity change, appetite change and unexpected weight change  HENT: Negative for postnasal drip  Eyes: Negative for visual disturbance  Respiratory: Negative for cough and shortness of breath  Cardiovascular: Negative for chest pain  Gastrointestinal: Negative for abdominal pain, diarrhea, nausea and vomiting  Neurological: Negative for dizziness, light-headedness and headaches  Hematological: Negative for adenopathy  Objective:      /80 (BP Location: Right arm, Patient Position: Sitting, Cuff Size: Large)   Pulse 79   Resp 16   Ht 5' 7" (1 702 m)   Wt 91 5 kg (201 lb 12 8 oz)   SpO2 98%   BMI 31 61 kg/m²                     No Follow-up on file        Allergies   Allergen Reactions    Hydrochlorothiazide Palpitations       Past Medical History:   Diagnosis Date    Biliary dyskinesia     CAP (community acquired pneumonia)     last assessed 8/17/16    Depression     GERD (gastroesophageal reflux disease)     Hyperlipidemia     Palpitations     Pulmonary embolism ([facility] [address] )      Past Surgical History:   Procedure Laterality Date    AUGMENTATION MAMMAPLASTY      breast surgery- with prosthetic implant 1999    CARDIAC CATHETERIZATION      procedure summary    CHOLECYSTECTOMY      onset 2003    HEMORRHOID [address]       onset 2010    TUBAL LIGATION      onset 1980     Current Outpatient Prescriptions on File Prior to Visit   Medication Sig Dispense Refill    Ascorbic Acid (VITAMIN C) 1000 MG tablet Take 1 tablet by mouth daily      atorvastatin (LIPITOR) 10 mg tablet Take 1 tablet (10 mg total) by mouth daily 90 tablet 1    Biotin 5000 MCG CAPS Take 1 capsule by mouth daily      Cholecalciferol (VITAMIN D3) 2000 units TABS Take 1 tablet by mouth daily      doxycycline hyclate (VIBRA-TABS) 100 mg tablet Take by mouth      esomeprazole (NEXIUM) 40 MG capsule Take 1 capsule by mouth daily      meclizine (ANTIVERT) 25 mg tablet Take by mouth      METRONIDAZOLE, TOPICAL, 0 75 % LOTN Apply topically      Multiple Vitamin (MULTIVITAMIN) capsule Take 1 capsule by mouth daily      naproxen sodium (ALEVE) 220 MG tablet Take by mouth      Saccharomyces boulardii (PROBIOTIC) 250 MG CAPS Take 1 capsule by mouth 2 (two) times a day      SYNTHROID 125 MCG tablet TAKE 1 TABLET DAILY ON AN EMPTY STOMACH 90 tablet 0     No current facility-administered medications on file prior to visit  Family History   Problem Relation Age of Onset    Aneurysm Mother      cerebral    Ovarian cancer Mother     Cirrhosis Father      alcoholic    Aneurysm Brother      abdominal aortic; cerebral     Social History     Social History    Marital status: /Civil Union     Spouse name: N/A    Number of children: N/A    Years of education: 12     Occupational History    Not on file       Social History Main Topics    Smoking status: Former Smoker    Smokeless tobacco: Never Used      Comment: smoked 1 pk/wk from 18 yrs old until 37 yrs old; also, less than a pk/wk - off and on- from  [id number]    Alcohol use Yes      Comment: occasionally    Drug use: No    Sexual activity: Not on file     Other Topics Concern    Not on file     Social History Narrative    Caffeine use         Vitals:    05/08/18 0755   BP: 124/80   BP Location: Right arm   Patient Position: Sitting   Cuff Size: Large   Pulse: 79   Resp: 16   SpO2: 98%   Weight: 91 5 kg (201 lb 12 8 oz)   Height: 5' 7" (3 702 m)     Results for orders placed or performed in visit on 05/02/18   Lipid panel   Result Value Ref Range    Cholesterol 174 50 - 200 mg/dL    Triglycerides 86 <=150 mg/dL    HDL, Direct 64 (H) 40 - 60 mg/dL    LDL Calculated 93 0 - 100 mg/dL    Non-HDL-Chol (CHOL-HDL) 110 mg/dl   Comprehensive metabolic panel   Result Value Ref Range    Sodium 139 136 - 145 mmol/L    Potassium 3 8 3 5 - 5 3 mmol/L    Chloride 104 100 - 108 mmol/L    CO2 28 21 - 32 mmol/L    Anion Gap 7 4 - 13 mmol/L    BUN 14 5 - 25 mg/dL    Creatinine 0 56 (L) 0 60 - 1 30 mg/dL    Glucose, Fasting 110 (H) 65 - 99 mg/dL    Calcium 9 1 8 3 - 10 1 mg/dL    AST 12 5 - 45 U/L    ALT 18 12 - 78 U/L    Alkaline Phosphatase 39 (L) 46 - 116 U/L    Total Protein 6 8 6 4 - 8 2 g/dL    Albumin 3 7 3 5 - 5 0 g/dL    Total Bilirubin 0 64 0 20 - 1 00 mg/dL    eGFR 94 ml/min/1 73sq m   Hemoglobin A1c   Result Value Ref Range    Hemoglobin A1C 6 0 4 2 - 6 3 %     mg/dl     Weight (last 2 days)     Date/Time   Weight    05/08/18 0755  91 5 (201 8)            Body mass index is 31 61 kg/m²  BP      Temp      Pulse     Resp      SpO2        Vitals:    05/08/18 0755   Weight: 91 5 kg (201 lb 12 8 oz)     Vitals:    05/08/18 0755   Weight: 91 5 kg (201 lb 12 8 oz)          Physical Exam   Constitutional: She appears well-developed and well-nourished  HENT:   Head: Normocephalic  Mouth/Throat: Oropharynx is clear and moist    Eyes: Conjunctivae are normal  Pupils are equal, round, and reactive to light  Right eye exhibits no discharge  Left eye exhibits no discharge  No scleral icterus  Neck: Neck supple  Cardiovascular: Normal rate, regular rhythm, normal heart sounds and intact distal pulses  Exam reveals no gallop and no friction rub  No murmur heard  Pulmonary/Chest: Breath sounds normal  No respiratory distress  She has no wheezes  She has no rales  Abdominal: Soft  Bowel sounds are normal  She exhibits no distension and no mass   There is no tenderness  There is no rebound and no guarding  Musculoskeletal: She exhibits no edema or deformity  Lymphadenopathy:     She has no cervical adenopathy  Neurological: She is alert   Coordination normal

## 2018-05-08 NOTE — ASSESSMENT & PLAN NOTE
Patient does have the lymphedema pumps, compression stockings she reports me still intermittent swelling of the leg will treat with Lasix 20 milligrams 1 tablet once a day as needed the patient is to increase her potassium on the days of taking the Lasix    Will check a chemistry prior to next visit

## 2018-05-08 NOTE — ASSESSMENT & PLAN NOTE
Hypothyroidism controlled the patient is currently euthyroid I will be ordering a TSH  and the patient will continue with current medical regiment; we will continue to monitor the patient's progress  Based on her next TSH we may try to optimize her levothyroxine she is currently trying to lose weight

## 2018-05-11 ENCOUNTER — APPOINTMENT (OUTPATIENT)
Dept: LAB | Age: 73
End: 2018-05-11
Payer: MEDICARE

## 2018-05-11 DIAGNOSIS — E03.9 HYPOTHYROIDISM, UNSPECIFIED TYPE: ICD-10-CM

## 2018-05-11 DIAGNOSIS — R53.83 FATIGUE, UNSPECIFIED TYPE: ICD-10-CM

## 2018-05-11 LAB
TSH SERPL DL<=0.05 MIU/L-ACNC: 1.21 UIU/ML (ref 0.36–3.74)
VIT B12 SERPL-MCNC: 815 PG/ML (ref 100–900)

## 2018-05-11 PROCEDURE — 36415 COLL VENOUS BLD VENIPUNCTURE: CPT

## 2018-05-11 PROCEDURE — 84443 ASSAY THYROID STIM HORMONE: CPT

## 2018-05-11 PROCEDURE — 82607 VITAMIN B-12: CPT

## 2018-05-16 ENCOUNTER — OFFICE VISIT (OUTPATIENT)
Dept: CARDIOLOGY CLINIC | Facility: CLINIC | Age: 73
End: 2018-05-16
Payer: MEDICARE

## 2018-05-16 VITALS
HEART RATE: 99 BPM | HEIGHT: 67 IN | BODY MASS INDEX: 32.38 KG/M2 | WEIGHT: 206.3 LBS | DIASTOLIC BLOOD PRESSURE: 72 MMHG | SYSTOLIC BLOOD PRESSURE: 118 MMHG

## 2018-05-16 DIAGNOSIS — E78.2 MIXED HYPERLIPIDEMIA: Primary | ICD-10-CM

## 2018-05-16 PROCEDURE — 99213 OFFICE O/P EST LOW 20 MIN: CPT | Performed by: INTERNAL MEDICINE

## 2018-05-16 NOTE — PROGRESS NOTES
Cardiology Follow Up    [id number]  1945  [id number]  [address] [facility]  [address] [place] [address] [address]    1  Mixed hyperlipidemia         Interval History:  Cardiology follow-up  Patient continues to do well, he experience intermittent episode of midsternal left-sided chest discomfort, very brief and mild in intensity  No changing patterns  Overall feeling well  No exertional dyspnea he tries to walk regularly  Compliant with low-cholesterol diet  Recent lipid profile revealed an LDL 93 with an HDL 64 on statin therapy medium intensity  Compliant with low-sodium diet  Patient Active Problem List   Diagnosis    Lymphedema    Fatigue    Hyperlipidemia    Hypothyroidism    Class 1 obesity due to excess calories without serious comorbidity with body mass index (BMI) of 31 0 to 31 9 in adult     Past Medical History:   Diagnosis Date    Biliary dyskinesia     CAP (community acquired pneumonia)     last assessed 8/17/16    Depression     GERD (gastroesophageal reflux disease)     Hyperlipidemia     Palpitations     Pulmonary embolism ([facility])      Social History     Social History    Marital status: /Civil Union     Spouse name: N/A    Number of children: N/A    Years of education: 12     Occupational History    Not on file       Social History Main Topics    Smoking status: Former Smoker    Smokeless tobacco: Never Used      Comment: smoked 1 pk/wk from 18 yrs old until 37 yrs old; also, less than a pk/wk - off and on- from  [phone]    Alcohol use Yes      Comment: occasionally    Drug use: No    Sexual activity: Not on file     Other Topics Concern    Not on file     Social History Narrative    Caffeine use          Family History   Problem Relation Age of Onset    Aneurysm Mother      cerebral    Ovarian cancer Mother     Cirrhosis Father      alcoholic    Aneurysm Brother      abdominal aortic; cerebral     Past Surgical History:   Procedure Laterality Date    AUGMENTATION MAMMAPLASTY      breast surgery- with prosthetic implant 1999    CARDIAC CATHETERIZATION      procedure summary    CHOLECYSTECTOMY      onset 2003    HEMORRHOID SURGERY      ROTATOR CUFF REPAIR      onset 2010    TUBAL LIGATION      onset 1980       Current Outpatient Prescriptions:     Ascorbic Acid (VITAMIN C) 1000 MG tablet, Take 1 tablet by mouth daily, Disp: , Rfl:     atorvastatin (LIPITOR) 10 mg tablet, Take 1 tablet (10 mg total) by mouth daily, Disp: 90 tablet, Rfl: 1    Biotin 5000 MCG CAPS, Take 1 capsule by mouth daily, Disp: , Rfl:     Cholecalciferol (VITAMIN D3) 2000 units TABS, Take 1 tablet by mouth daily, Disp: , Rfl:     doxycycline hyclate (VIBRA-TABS) 100 mg tablet, Take by mouth as needed  , Disp: , Rfl:     esomeprazole (NEXIUM) 40 MG capsule, Take 1 capsule by mouth daily, Disp: , Rfl:     furosemide (LASIX) 20 mg tablet, Once a day prn edema, Disp: 30 tablet, Rfl: 0    meclizine (ANTIVERT) 25 mg tablet, Take by mouth as needed  , Disp: , Rfl:     METRONIDAZOLE, TOPICAL, 0 75 % LOTN, Apply topically, Disp: , Rfl:     Multiple Vitamin (MULTIVITAMIN) capsule, Take 1 capsule by mouth daily, Disp: , Rfl:     naproxen sodium (ALEVE) 220 MG tablet, Take by mouth, Disp: , Rfl:     Saccharomyces boulardii (PROBIOTIC) 250 MG CAPS, Take 1 capsule by mouth 2 (two) times a day, Disp: , Rfl:     SYNTHROID 125 MCG tablet, TAKE 1 TABLET DAILY ON AN EMPTY STOMACH, Disp: 90 tablet, Rfl: 0  Allergies   Allergen Reactions    Hydrochlorothiazide Palpitations       Labs:  Appointment on 05/11/2018   Component Date Value    TSH 3RD GENERATON 05/11/2018 1 210     Vitamin B-12 05/11/2018 815    Appointment on 05/02/2018   Component Date Value    Cholesterol 05/02/2018 174     Triglycerides 05/02/2018 86     HDL, Direct 05/02/2018 64*    LDL Calculated 05/02/2018 93     Non-HDL-Chol (CHOL-HDL) 05/02/2018 110     Sodium 05/02/2018 139     Potassium 05/02/2018 3 8     Chloride 05/02/2018 104     CO2 05/02/2018 28     Anion Gap 05/02/2018 7     BUN 05/02/2018 14     Creatinine 05/02/2018 0 56*    Glucose, Fasting 05/02/2018 110*    Calcium 05/02/2018 9 1     AST 05/02/2018 12     ALT 05/02/2018 18     Alkaline Phosphatase 05/02/2018 39*    Total Protein 05/02/2018 6 8     Albumin 05/02/2018 3 7     Total Bilirubin 05/02/2018 0 64     eGFR 05/02/2018 94     Hemoglobin A1C 05/02/2018 6 0     EAG 05/02/2018 126      Imaging: No results found  Review of Systems:  Review of Systems   Constitutional: Negative for activity change and fatigue  Respiratory: Negative for shortness of breath, wheezing and stridor  Cardiovascular: Positive for chest pain and leg swelling  Negative for palpitations  Gastrointestinal: Negative for abdominal pain  Neurological: Negative for syncope  Hematological: Does not bruise/bleed easily  Physical Exam:  Physical Exam   Constitutional: She is oriented to person, place, and time  No distress  Neck: No JVD present  Cardiovascular: Normal rate, regular rhythm and normal heart sounds  Exam reveals no gallop and no friction rub  No murmur heard  Lymphedema of the legs   Neurological: She is alert and oriented to person, place, and time  Skin: She is not diaphoretic  Psychiatric: She has a normal mood and affect  Discussion/Summary:  Chest pain syndrome, atypical   Stress test 2 years ago revealed no EKG changes, and small fixed anteroapical defect suggestive of breast attenuation artifact  But no ischemia  The ventricular systolic function previously normal  Normal coronary arteries on catheterization several years ago  Continue current medications  She is wearing compression stockings, postural maneuvers were again recommended

## 2018-06-04 DIAGNOSIS — I89.0 LYMPHEDEMA: ICD-10-CM

## 2018-06-04 RX ORDER — FUROSEMIDE 20 MG/1
TABLET ORAL
Qty: 30 TABLET | Refills: 0 | Status: SHIPPED | OUTPATIENT
Start: 2018-06-04 | End: 2018-11-15 | Stop reason: ALTCHOICE

## 2018-07-06 DIAGNOSIS — E03.9 HYPOTHYROIDISM, UNSPECIFIED TYPE: ICD-10-CM

## 2018-07-06 RX ORDER — LEVOTHYROXINE SODIUM 125 UG/1
TABLET ORAL
Qty: 90 TABLET | Refills: 0 | Status: SHIPPED | OUTPATIENT
Start: 2018-07-06 | End: 2018-10-04 | Stop reason: SDUPTHER

## 2018-09-12 DIAGNOSIS — E78.5 HYPERLIPIDEMIA, UNSPECIFIED HYPERLIPIDEMIA TYPE: ICD-10-CM

## 2018-09-12 RX ORDER — ATORVASTATIN CALCIUM 10 MG/1
TABLET, FILM COATED ORAL
Qty: 90 TABLET | Refills: 1 | Status: SHIPPED | OUTPATIENT
Start: 2018-09-12 | End: 2019-03-11 | Stop reason: SDUPTHER

## 2018-10-04 DIAGNOSIS — E03.9 HYPOTHYROIDISM, UNSPECIFIED TYPE: ICD-10-CM

## 2018-10-04 RX ORDER — LEVOTHYROXINE SODIUM 125 UG/1
TABLET ORAL
Qty: 90 TABLET | Refills: 0 | Status: SHIPPED | OUTPATIENT
Start: 2018-10-04 | End: 2019-01-06 | Stop reason: SDUPTHER

## 2018-10-31 ENCOUNTER — TRANSCRIBE ORDERS (OUTPATIENT)
Dept: ADMINISTRATIVE | Facility: HOSPITAL | Age: 73
End: 2018-10-31

## 2018-10-31 DIAGNOSIS — M25.531 RIGHT WRIST PAIN: Primary | ICD-10-CM

## 2018-11-06 ENCOUNTER — HOSPITAL ENCOUNTER (OUTPATIENT)
Dept: RADIOLOGY | Age: 73
Discharge: HOME | End: 2018-11-06
Payer: MEDICARE

## 2018-11-06 DIAGNOSIS — M25.531 RIGHT WRIST PAIN: ICD-10-CM

## 2018-11-06 PROCEDURE — 73221 MRI JOINT UPR EXTREM W/O DYE: CPT

## 2018-11-12 ENCOUNTER — APPOINTMENT (OUTPATIENT)
Dept: LAB | Age: 73
End: 2018-11-12
Payer: MEDICARE

## 2018-11-12 ENCOUNTER — TRANSCRIBE ORDERS (OUTPATIENT)
Dept: ADMINISTRATIVE | Age: 73
End: 2018-11-12

## 2018-11-12 DIAGNOSIS — E78.5 HYPERLIPIDEMIA, UNSPECIFIED HYPERLIPIDEMIA TYPE: ICD-10-CM

## 2018-11-12 LAB
ALBUMIN SERPL BCP-MCNC: 3.6 G/DL (ref 3.5–5)
ALP SERPL-CCNC: 41 U/L (ref 46–116)
ALT SERPL W P-5'-P-CCNC: 20 U/L (ref 12–78)
ANION GAP SERPL CALCULATED.3IONS-SCNC: 6 MMOL/L (ref 4–13)
AST SERPL W P-5'-P-CCNC: 12 U/L (ref 5–45)
BILIRUB SERPL-MCNC: 0.43 MG/DL (ref 0.2–1)
BUN SERPL-MCNC: 13 MG/DL (ref 5–25)
CALCIUM SERPL-MCNC: 8.8 MG/DL (ref 8.3–10.1)
CHLORIDE SERPL-SCNC: 104 MMOL/L (ref 100–108)
CHOLEST SERPL-MCNC: 192 MG/DL (ref 50–200)
CO2 SERPL-SCNC: 29 MMOL/L (ref 21–32)
CREAT SERPL-MCNC: 0.57 MG/DL (ref 0.6–1.3)
EST. AVERAGE GLUCOSE BLD GHB EST-MCNC: 140 MG/DL
GFR SERPL CREATININE-BSD FRML MDRD: 94 ML/MIN/1.73SQ M
GLUCOSE P FAST SERPL-MCNC: 105 MG/DL (ref 65–99)
HBA1C MFR BLD: 6.5 % (ref 4.2–6.3)
HDLC SERPL-MCNC: 52 MG/DL (ref 40–60)
LDLC SERPL CALC-MCNC: 103 MG/DL (ref 0–100)
POTASSIUM SERPL-SCNC: 3.7 MMOL/L (ref 3.5–5.3)
PROT SERPL-MCNC: 6.5 G/DL (ref 6.4–8.2)
SODIUM SERPL-SCNC: 139 MMOL/L (ref 136–145)
TRIGL SERPL-MCNC: 183 MG/DL

## 2018-11-12 PROCEDURE — 83036 HEMOGLOBIN GLYCOSYLATED A1C: CPT

## 2018-11-12 PROCEDURE — 80053 COMPREHEN METABOLIC PANEL: CPT

## 2018-11-12 PROCEDURE — 80061 LIPID PANEL: CPT

## 2018-11-12 PROCEDURE — 36415 COLL VENOUS BLD VENIPUNCTURE: CPT

## 2018-11-15 ENCOUNTER — OFFICE VISIT (OUTPATIENT)
Dept: INTERNAL MEDICINE CLINIC | Facility: CLINIC | Age: 73
End: 2018-11-15
Payer: MEDICARE

## 2018-11-15 VITALS
OXYGEN SATURATION: 98 % | HEART RATE: 81 BPM | HEIGHT: 67 IN | RESPIRATION RATE: 16 BRPM | WEIGHT: 212.8 LBS | SYSTOLIC BLOOD PRESSURE: 118 MMHG | BODY MASS INDEX: 33.4 KG/M2 | DIASTOLIC BLOOD PRESSURE: 74 MMHG

## 2018-11-15 DIAGNOSIS — E03.9 HYPOTHYROIDISM, UNSPECIFIED TYPE: ICD-10-CM

## 2018-11-15 DIAGNOSIS — E11.9 TYPE 2 DIABETES MELLITUS WITHOUT COMPLICATION, WITHOUT LONG-TERM CURRENT USE OF INSULIN (HCC): Primary | ICD-10-CM

## 2018-11-15 DIAGNOSIS — E66.09 CLASS 1 OBESITY DUE TO EXCESS CALORIES WITH SERIOUS COMORBIDITY AND BODY MASS INDEX (BMI) OF 33.0 TO 33.9 IN ADULT: ICD-10-CM

## 2018-11-15 DIAGNOSIS — R07.89 ATYPICAL CHEST PAIN: ICD-10-CM

## 2018-11-15 PROCEDURE — 99214 OFFICE O/P EST MOD 30 MIN: CPT | Performed by: INTERNAL MEDICINE

## 2018-11-15 NOTE — PROGRESS NOTES
Assessment/Plan:           Problem List Items Addressed This Visit        Endocrine    Hypothyroidism     Hypothyroidism controlled the patient is currently euthyroid I will be ordering a TSH prior to the next office visit and the patient will continue with current medical regiment; we will continue to monitor the patient's progress  Relevant Orders    TSH, 3rd generation    TSH, 3rd generation    Type 2 diabetes mellitus without complication, without long-term current use of insulin ([facility] [address] ) - Primary     Lab Results   Component Value Date    HGBA1C 6 5 (H) 11/12/2018       No results for input(s): POCGLU in the last 72 hours  Blood Sugar Average: Last 72 hrs:   today is see initial diagnosis for the patient today I did  patient at length about diabetic complications including retinopathy, nephropathy, neuropathy  I did  the patient about a healthy/balance diet reducing carbohydrates and sweets and going for exercise routinely  The patient appears to be very motivated to make change in her lifestyle  I will be ordering diabetic laboratories including comprehensive metabolic panel, hemoglobin A1c, urine microalbumin, lipid panel  Will be checked in 3 months  To consider metformin next visit I did explain to the patient she will need to get eye examination once yearly I will refer her for ambulatory medical nutritional therapy for diabetes    I did review the laboratories in detail with the patient         Relevant Orders    Comprehensive metabolic panel    Ambulatory referral to medical nutrition therapy for diabetes    Hemoglobin A1C    Microalbumin / creatinine urine ratio       Other    Class 1 obesity due to excess calories with serious comorbidity and body mass index (BMI) of 33 0 to 33 9 in adult     Obesity -I have counseled patient following healthy and balanced diet, I would like the patient to lose weight, I would like the patient exercise routinely; we will continue monitor the patient's progress  Atypical chest pain     Atypical in nature currently asymptomatic she does have several cardiac risk factors including new onset diabetes, hyperlipidemia I will check a stress echocardiogram          Relevant Orders    Echo stress test w contrast if indicated          Return to office 3  months  call if any problems  Subjective:      Patient ID: Panchito Jones is a 72 y o  female  HPI 69-year old female coming in for a follow up office visit regarding type 2 diabetes, hypothyroidism, atypical chest pain, obesity class 1; The patient reports me compliant taking medications without untoward side effects the  The patient is here to review his medical condition, update me on the medical condition and the patient reports me no hospitalizations and no ER visits  She does report me she could be doing a better job on her diet  She has not been going to gym  No family history of diabetes  She is here to review her laboratories  She also does mention to me mild chest pain at rest intermittent currently she is asymptomatic no exertional chest pain  diet not as good ,    The following portions of the patient's history were reviewed and updated as appropriate: allergies, current medications, past family history, past medical history, past social history, past surgical history and problem list     Review of Systems   Constitutional: Negative for activity change, appetite change and unexpected weight change  HENT: Negative for congestion and postnasal drip  Eyes: Negative for visual disturbance  Respiratory: Negative for cough and shortness of breath  Cardiovascular: Positive for chest pain (At rest, intermittent nonexertional)  Gastrointestinal: Negative for abdominal pain, diarrhea, nausea and vomiting  Neurological: Negative for dizziness, light-headedness and headaches  Hematological: Negative for adenopathy           Objective:      /74 (BP Location: Right arm, Patient Position: Sitting, Cuff Size: Standard)   Pulse 81   Resp 16   Ht 5' 7" (1 702 m)   Wt 96 5 kg (212 lb 12 8 oz)   SpO2 98%   BMI 33 33 kg/m²          Physical Exam   Constitutional: She appears well-developed and well-nourished  HENT:   Head: Normocephalic  Mouth/Throat: Oropharynx is clear and moist    Eyes: Pupils are equal, round, and reactive to light  Conjunctivae are normal  Right eye exhibits no discharge  Left eye exhibits no discharge  No scleral icterus  Neck: Neck supple  Cardiovascular: Normal rate, regular rhythm, normal heart sounds and intact distal pulses  Exam reveals no gallop and no friction rub  No murmur heard  Pulmonary/Chest: Breath sounds normal  No respiratory distress  She has no wheezes  She has no rales  Abdominal: Soft  Bowel sounds are normal  She exhibits no distension and no mass  There is no tenderness  There is no rebound and no guarding  Musculoskeletal: She exhibits no edema or deformity  Lymphadenopathy:     She has no cervical adenopathy  Neurological: She is alert   Coordination normal

## 2018-11-18 PROBLEM — R07.89 ATYPICAL CHEST PAIN: Status: ACTIVE | Noted: 2018-11-18

## 2018-11-18 PROBLEM — E11.9 TYPE 2 DIABETES MELLITUS WITHOUT COMPLICATION, WITHOUT LONG-TERM CURRENT USE OF INSULIN (HCC): Status: ACTIVE | Noted: 2018-11-18

## 2018-11-18 NOTE — ASSESSMENT & PLAN NOTE
Atypical in nature currently asymptomatic she does have several cardiac risk factors including new onset diabetes, hyperlipidemia I will check a stress echocardiogram  Bladder non-tender and non-distended. Urine clear yellow.

## 2018-11-18 NOTE — ASSESSMENT & PLAN NOTE
Lab Results   Component Value Date    HGBA1C 6 5 (H) 11/12/2018       No results for input(s): POCGLU in the last 72 hours  Blood Sugar Average: Last 72 hrs:   today is see initial diagnosis for the patient today I did  patient at length about diabetic complications including retinopathy, nephropathy, neuropathy  I did  the patient about a healthy/balance diet reducing carbohydrates and sweets and going for exercise routinely  The patient appears to be very motivated to make change in her lifestyle  I will be ordering diabetic laboratories including comprehensive metabolic panel, hemoglobin A1c, urine microalbumin, lipid panel  Will be checked in 3 months  To consider metformin next visit I did explain to the patient she will need to get eye examination once yearly I will refer her for ambulatory medical nutritional therapy for diabetes    I did review the laboratories in detail with the patient

## 2018-11-28 ENCOUNTER — APPOINTMENT (OUTPATIENT)
Dept: LAB | Age: 73
End: 2018-11-28
Payer: MEDICARE

## 2018-11-28 DIAGNOSIS — E03.9 HYPOTHYROIDISM, UNSPECIFIED TYPE: ICD-10-CM

## 2018-11-28 LAB — TSH SERPL DL<=0.05 MIU/L-ACNC: 1.97 UIU/ML (ref 0.36–3.74)

## 2018-11-28 PROCEDURE — 84443 ASSAY THYROID STIM HORMONE: CPT

## 2018-11-28 PROCEDURE — 36415 COLL VENOUS BLD VENIPUNCTURE: CPT

## 2019-01-06 DIAGNOSIS — E03.9 HYPOTHYROIDISM, UNSPECIFIED TYPE: ICD-10-CM

## 2019-01-06 RX ORDER — LEVOTHYROXINE SODIUM 125 UG/1
TABLET ORAL
Qty: 90 TABLET | Refills: 0 | Status: SHIPPED | OUTPATIENT
Start: 2019-01-06 | End: 2019-04-08 | Stop reason: SDUPTHER

## 2019-01-22 ENCOUNTER — HOSPITAL ENCOUNTER (OUTPATIENT)
Dept: NON INVASIVE DIAGNOSTICS | Facility: CLINIC | Age: 74
Discharge: HOME | End: 2019-01-22
Payer: MEDICARE

## 2019-01-22 DIAGNOSIS — R07.89 ATYPICAL CHEST PAIN: ICD-10-CM

## 2019-01-22 LAB
CHEST PAIN STATEMENT: NORMAL
MAX DIASTOLIC BP: 80 MMHG
MAX HEART RATE: 150 BPM
MAX PREDICTED HEART RATE: 149 BPM
MAX. SYSTOLIC BP: 170 MMHG
PROTOCOL NAME: NORMAL
REASON FOR TERMINATION: NORMAL
TARGET HR FORMULA: NORMAL
TEST INDICATION: NORMAL
TIME IN EXERCISE PHASE: NORMAL

## 2019-01-22 PROCEDURE — 93350 STRESS TTE ONLY: CPT

## 2019-01-22 PROCEDURE — 93351 STRESS TTE COMPLETE: CPT | Performed by: INTERNAL MEDICINE

## 2019-02-13 ENCOUNTER — APPOINTMENT (OUTPATIENT)
Dept: LAB | Age: 74
End: 2019-02-13
Payer: MEDICARE

## 2019-02-13 ENCOUNTER — TRANSCRIBE ORDERS (OUTPATIENT)
Dept: ADMINISTRATIVE | Age: 74
End: 2019-02-13

## 2019-02-13 DIAGNOSIS — E13.8 DIABETES MELLITUS OF OTHER TYPE WITH COMPLICATION, UNSPECIFIED WHETHER LONG TERM INSULIN USE: ICD-10-CM

## 2019-02-13 DIAGNOSIS — E13.8 DIABETES MELLITUS OF OTHER TYPE WITH COMPLICATION, UNSPECIFIED WHETHER LONG TERM INSULIN USE: Primary | ICD-10-CM

## 2019-02-13 LAB
ALBUMIN SERPL BCP-MCNC: 3.9 G/DL (ref 3.5–5)
ALP SERPL-CCNC: 44 U/L (ref 46–116)
ALT SERPL W P-5'-P-CCNC: 22 U/L (ref 12–78)
ANION GAP SERPL CALCULATED.3IONS-SCNC: 7 MMOL/L (ref 4–13)
AST SERPL W P-5'-P-CCNC: 15 U/L (ref 5–45)
BILIRUB SERPL-MCNC: 0.43 MG/DL (ref 0.2–1)
BUN SERPL-MCNC: 15 MG/DL (ref 5–25)
CALCIUM SERPL-MCNC: 8.9 MG/DL (ref 8.3–10.1)
CHLORIDE SERPL-SCNC: 104 MMOL/L (ref 100–108)
CO2 SERPL-SCNC: 28 MMOL/L (ref 21–32)
CREAT SERPL-MCNC: 0.58 MG/DL (ref 0.6–1.3)
EST. AVERAGE GLUCOSE BLD GHB EST-MCNC: 128 MG/DL
GFR SERPL CREATININE-BSD FRML MDRD: 93 ML/MIN/1.73SQ M
GLUCOSE P FAST SERPL-MCNC: 128 MG/DL (ref 65–99)
HBA1C MFR BLD: 6.1 % (ref 4.2–6.3)
POTASSIUM SERPL-SCNC: 4.1 MMOL/L (ref 3.5–5.3)
PROT SERPL-MCNC: 6.6 G/DL (ref 6.4–8.2)
SODIUM SERPL-SCNC: 139 MMOL/L (ref 136–145)
TSH SERPL DL<=0.05 MIU/L-ACNC: 3.08 UIU/ML (ref 0.36–3.74)

## 2019-02-13 PROCEDURE — 80053 COMPREHEN METABOLIC PANEL: CPT

## 2019-02-13 PROCEDURE — 83036 HEMOGLOBIN GLYCOSYLATED A1C: CPT

## 2019-02-13 PROCEDURE — 84443 ASSAY THYROID STIM HORMONE: CPT

## 2019-02-13 PROCEDURE — 36415 COLL VENOUS BLD VENIPUNCTURE: CPT

## 2019-02-14 ENCOUNTER — OFFICE VISIT (OUTPATIENT)
Dept: INTERNAL MEDICINE CLINIC | Facility: CLINIC | Age: 74
End: 2019-02-14
Payer: MEDICARE

## 2019-02-14 VITALS — RESPIRATION RATE: 16 BRPM | HEIGHT: 67 IN | BODY MASS INDEX: 31.83 KG/M2 | WEIGHT: 202.8 LBS

## 2019-02-14 DIAGNOSIS — E11.9 TYPE 2 DIABETES MELLITUS WITHOUT COMPLICATION, WITHOUT LONG-TERM CURRENT USE OF INSULIN (HCC): Primary | ICD-10-CM

## 2019-02-14 DIAGNOSIS — E66.09 CLASS 1 OBESITY DUE TO EXCESS CALORIES WITH SERIOUS COMORBIDITY AND BODY MASS INDEX (BMI) OF 33.0 TO 33.9 IN ADULT: ICD-10-CM

## 2019-02-14 DIAGNOSIS — Z00.00 WELLNESS EXAMINATION: ICD-10-CM

## 2019-02-14 DIAGNOSIS — Z80.41 FAMILY HISTORY OF OVARIAN CANCER: ICD-10-CM

## 2019-02-14 DIAGNOSIS — R10.31 RIGHT LOWER QUADRANT ABDOMINAL PAIN: ICD-10-CM

## 2019-02-14 DIAGNOSIS — R10.2 PELVIC PAIN: ICD-10-CM

## 2019-02-14 LAB
BILIRUB UR QL STRIP: NEGATIVE
CLARITY UR: CLEAR
COLOR UR: YELLOW
CREAT UR-MCNC: 34.8 MG/DL
GLUCOSE UR STRIP-MCNC: NEGATIVE MG/DL
HGB UR QL STRIP.AUTO: NEGATIVE
KETONES UR STRIP-MCNC: NEGATIVE MG/DL
LEUKOCYTE ESTERASE UR QL STRIP: NEGATIVE
MICROALBUMIN UR-MCNC: <5 MG/L (ref 0–20)
MICROALBUMIN/CREAT 24H UR: <14 MG/G CREATININE (ref 0–30)
NITRITE UR QL STRIP: NEGATIVE
PH UR STRIP.AUTO: 6.5 [PH] (ref 4.5–8)
PROT UR STRIP-MCNC: NEGATIVE MG/DL
SL AMB  POCT GLUCOSE, UA: NEGATIVE
SL AMB LEUKOCYTE ESTERASE,UA: NEGATIVE
SL AMB POCT BILIRUBIN,UA: NEGATIVE
SL AMB POCT BLOOD,UA: NEGATIVE
SL AMB POCT CLARITY,UA: CLEAR
SL AMB POCT COLOR,UA: YELLOW
SL AMB POCT KETONES,UA: NEGATIVE
SL AMB POCT NITRITE,UA: NEGATIVE
SL AMB POCT PH,UA: 6
SL AMB POCT SPECIFIC GRAVITY,UA: 1.02
SL AMB POCT URINE PROTEIN: NEGATIVE
SL AMB POCT UROBILINOGEN: 3.5
SP GR UR STRIP.AUTO: 1.01 (ref 1–1.03)
UROBILINOGEN UR QL STRIP.AUTO: 0.2 E.U./DL

## 2019-02-14 PROCEDURE — 81002 URINALYSIS NONAUTO W/O SCOPE: CPT | Performed by: INTERNAL MEDICINE

## 2019-02-14 PROCEDURE — 82570 ASSAY OF URINE CREATININE: CPT | Performed by: INTERNAL MEDICINE

## 2019-02-14 PROCEDURE — 99214 OFFICE O/P EST MOD 30 MIN: CPT | Performed by: INTERNAL MEDICINE

## 2019-02-14 PROCEDURE — G0439 PPPS, SUBSEQ VISIT: HCPCS | Performed by: INTERNAL MEDICINE

## 2019-02-14 PROCEDURE — 81003 URINALYSIS AUTO W/O SCOPE: CPT | Performed by: INTERNAL MEDICINE

## 2019-02-14 PROCEDURE — 82043 UR ALBUMIN QUANTITATIVE: CPT | Performed by: INTERNAL MEDICINE

## 2019-02-14 NOTE — PROGRESS NOTES
Assessment and Plan:    Problem List Items Addressed This Visit        Endocrine    Type 2 diabetes mellitus without complication, without long-term current use of insulin ([facility] [address] ) - Primary     Lab Results   Component Value Date    HGBA1C 6 1 02/13/2019       No results for input(s): POCGLU in the last 72 hours  Blood Sugar Average: Last 72 hrs:   improved I have counselled the pt to follow a healthy and balanced diet ,and recommend routine exercise  I will be ordering diabetic laboratories including comprehensive metabolic panel, hemoglobin A1c, urine microalbumin, lipid panel  Relevant Orders    Ambulatory referral to Ophthalmology    Glucometer    Lancets    Glucometer test strips    Comprehensive metabolic panel    Hemoglobin A1C    Lipid Panel with Direct LDL reflex    Microalbumin / creatinine urine ratio (Completed)       Other    Class 1 obesity due to excess calories with serious comorbidity and body mass index (BMI) of 33 0 to 33 9 in adult     Obesity -I have counseled patient following healthy and balanced diet, I would like the patient to lose weight, I would like the patient exercise routinely; we will continue monitor the patient's progress           Right lower quadrant abdominal pain     Will check a CT scan of the abdomen and pelvis         Relevant Orders    CT abdomen pelvis wo contrast    UA w Reflex to Microscopic w Reflex to Culture - Clinic Collect (Completed)    POCT urine dip (Completed)    Pelvic pain     Will check a CT scan of the pelvis rule out ovarian mass there is a family history ovary cancer         Relevant Orders    CT abdomen pelvis wo contrast    UA w Reflex to Microscopic w Reflex to Culture - Clinic Collect (Completed)    POCT urine dip (Completed)    Family history of ovarian cancer     Right lower quadrant abdominal pain/right pelvic pain will check a CT scan of the abdomen and pelvis the         Relevant Orders    CT abdomen pelvis wo contrast    Wellness examination     Assessment and plan 1  Health maintenance annual wellness examination overall the patient is clinically stable and doing well, we encouraged the patient to follow a healthy and balanced diet  We recommend that the patient exercise routinely approximately 30 minutes 5 times per week   We have reviewed the patient's vaccines and have made recommendations for updates if necessary consider the shingles vaccine at the pharmacy       We will be ordering screening laboratories which are age appropriate  Return to the office in  6 months    call if any problems  Health Maintenance Due   Topic Date Due    Medicare Annual Wellness Visit (AWV)  1945    Diabetic Foot Exam  04/20/1957    DM Eye Exam  04/20/1957    URINE MICROALBUMIN  04/20/1957    BMI: Followup Plan  04/20/1965    HEPATITIS B VACCINES (1 of 3 - Risk 3-dose series) 04/20/1966         HPI:  Yannick Pizarro is a 72 y o  female here for her Subsequent Wellness Visit      Patient Active Problem List   Diagnosis    Lymphedema    Fatigue    Hyperlipidemia    Hypothyroidism    Class 1 obesity due to excess calories with serious comorbidity and body mass index (BMI) of 33 0 to 33 9 in adult    Type 2 diabetes mellitus without complication, without long-term current use of insulin (HCC)    Atypical chest pain    Right lower quadrant abdominal pain    Pelvic pain    Family history of ovarian cancer    Wellness examination     Past Medical History:   Diagnosis Date    Biliary dyskinesia     CAP (community acquired pneumonia)     last assessed 8/17/16    Depression     GERD (gastroesophageal reflux disease)     Hyperlipidemia     Palpitations     Pulmonary embolism ([address] )      Past Surgical History:   Procedure Laterality Date    AUGMENTATION MAMMAPLASTY      breast surgery- with prosthetic implant 1999    CARDIAC CATHETERIZATION      procedure summary    CHOLECYSTECTOMY      onset 2003    HEMORRHOID SURGERY  ROTATOR CUFF REPAIR      onset 2010    TUBAL LIGATION      onset 1980     Family History   Problem Relation Age of Onset    Aneurysm Mother         cerebral    Ovarian cancer Mother     Cirrhosis Father         alcoholic    Aneurysm Brother         abdominal aortic; cerebral     Social History     Tobacco Use   Smoking Status Former Smoker   Smokeless Tobacco Never Used   Tobacco Comment    smoked 1 pk/wk from 18 yrs old until 37 yrs old; also, less than a pk/wk - off and on- from  [id number]     Social History     Substance and Sexual Activity   Alcohol Use Yes    Comment: occasionally      Social History     Substance and Sexual Activity   Drug Use No       Current Outpatient Medications   Medication Sig Dispense Refill    Ascorbic Acid (VITAMIN C) 1000 MG tablet Take 1 tablet by mouth daily      atorvastatin (LIPITOR) 10 mg tablet TAKE 1 TABLET DAILY 90 tablet 1    Biotin 5000 MCG CAPS Take 1 capsule by mouth daily      Cholecalciferol (VITAMIN D3) 2000 units TABS Take 1 tablet by mouth daily      doxycycline hyclate (VIBRA-TABS) 100 mg tablet Take by mouth as needed        esomeprazole (NEXIUM) 40 MG capsule Take 1 capsule by mouth daily      meclizine (ANTIVERT) 25 mg tablet Take by mouth as needed        METRONIDAZOLE, TOPICAL, 0 75 % LOTN Apply topically      Multiple Vitamin (MULTIVITAMIN) capsule Take 1 capsule by mouth daily      naproxen sodium (ALEVE) 220 MG tablet Take by mouth      Saccharomyces boulardii (PROBIOTIC) 250 MG CAPS Take 1 capsule by mouth 2 (two) times a day      SYNTHROID 125 MCG tablet TAKE 1 TABLET DAILY ON AN EMPTY STOMACH 90 tablet 0     No current facility-administered medications for this visit        Allergies   Allergen Reactions    Hydrochlorothiazide Palpitations     Immunization History   Administered Date(s) Administered    H1N1, All Formulations 01/13/2010    Influenza Split High Dose Preservative Free IM 09/17/2016, 08/21/2017    Influenza, high dose seasonal 0 5 mL 09/19/2018    Pneumococcal Conjugate 13-Valent 01/04/2017    Pneumococcal Polysaccharide PPV23 05/29/2012, 05/29/2012    Td (adult), adsorbed 01/01/2004    Tdap 08/16/2016    Zoster 08/27/2007, 07/15/2018    Zoster Vaccine Recombinant 05/08/2018, 07/15/2018       Patient Care Team:  Williams Salazar DO as PCP - General  Elvia Brown, MD Norma Bermeo, MD Andrew Cuenca, Meaghan Boyle, MD Cait Manning, MD Aliza Ortega, MD Estefani Garsia, DPM    Medicare Screening Tests and Risk Assessments:  Aletha Leyden is here for her Subsequent Wellness visit  Health Risk Assessment:  Patient rates overall health as good  Patient feels that their physical health rating is Same  Eyesight was rated as Same  Hearing was rated as Slightly worse  Patient feels that their emotional and mental health rating is Same  Pain experienced by patient in the last 7 days has been None  Patient states that she has experienced no weight loss or gain in last 6 months  (Additional comments: optho Dr Von Gupta (iridotomy), the pt has hearing aid and adjusted)    Emotional/Mental Health:  Patient has been feeling nervous/anxious  PHQ-9 Depression Screening:    Frequency of the following problems over the past two weeks:      1  Little interest or pleasure in doing things: 0 - not at all      2  Feeling down, depressed, or hopeless: 0 - not at all  PHQ-2 Score: 0          Broken Bones/Falls: Fall Risk Assessment:    In the past year, patient has experienced: No history of falling in past year          Bladder/Bowel:  Patient has leaked urine accidently in the last six months  Patient reports no loss of bowel control  (Additional Comments: Stress inc  Minimal )    Immunizations:  Patient has had a flu vaccination within the last year  Patient has received a pneumonia shot  Patient has received a shingles shot  Patient has received tetanus/diphtheria shot       Home Safety:  Patient does not have trouble with stairs inside or outside of their home  Patient currently reports that there are no safety hazards present in home, working smoke alarms, working carbon monoxide detectors  Preventative Screenings:   Breast cancer screening performed, colon cancer screen completed, cholesterol screen completed, glaucoma eye exam completed,     Nutrition:  Current diet: Regular, Low Carb, Low Saturated Fat, Low Cholesterol and Limited junk food with servings of the following:    Medications:  Patient is currently taking over-the-counter supplements  List of OTC medications includes: mvi, vit c, biotin  Patient is able to manage medications  Lifestyle Choices:  Patient reports no tobacco use  Patient has smoked or used tobacco in the past   Patient has stopped her tobacco use  Tobacco use quit date: 7 yr ago (2 pack yr)  Patient reports alcohol use  Alcohol use per week: rare  Patient drives a vehicle  Patient wears seat belt  Current level of exercise of physical activity described by patient as: treadmill 3x week and bike 30 min  Activities of Daily Living:  Can get out of bed by his or her self, able to dress self, able to make own meals, able to do own shopping, able to bathe self, can do own laundry/housekeeping, can manage own money, pay bills and track expenses    Previous Hospitalizations:  No hospitalization or ED visit in past 12 months        Advanced Directives:  Patient has decided on a power of   Patient has spoken to designated power of   Patient has completed advanced directive          Preventative Screening/Counseling:      Cardiovascular:      General: Risks and Benefits Discussed      Counseling: Healthy Diet, Healthy Weight and Improve Exercise Tolerance          Diabetes:      General: Risks and Benefits Discussed      Counseling: Healthy Diet and Healthy Weight          Colorectal Cancer:      General: Screening Current          Breast Cancer:      General: Screening Current          Cervical Cancer:      General: Screening Current          Osteoporosis:      General: Screening Not Indicated          AAA:      General: Screening Not Indicated          Glaucoma:      General: Screening Current          HIV:      General: Screening Not Indicated          Hepatitis C:      General: Screening Not Indicated        Advanced Directives:   Patient has living will for healthcare,

## 2019-02-14 NOTE — PROGRESS NOTES
Assessment/Plan:           Problem List Items Addressed This Visit        Endocrine    Type 2 diabetes mellitus without complication, without long-term current use of insulin ([facility] [address] ) - Primary     Lab Results   Component Value Date    HGBA1C 6 1 02/13/2019       No results for input(s): POCGLU in the last 72 hours  Blood Sugar Average: Last 72 hrs:   improved I have counselled the pt to follow a healthy and balanced diet ,and recommend routine exercise  I will be ordering diabetic laboratories including comprehensive metabolic panel, hemoglobin A1c, urine microalbumin, lipid panel  Relevant Orders    Ambulatory referral to Ophthalmology    Glucometer    Lancets    Glucometer test strips    Comprehensive metabolic panel    Hemoglobin A1C    Lipid Panel with Direct LDL reflex    Microalbumin / creatinine urine ratio (Completed)       Other    Class 1 obesity due to excess calories with serious comorbidity and body mass index (BMI) of 33 0 to 33 9 in adult     Obesity -I have counseled patient following healthy and balanced diet, I would like the patient to lose weight, I would like the patient exercise routinely; we will continue monitor the patient's progress           Right lower quadrant abdominal pain     Will check a CT scan of the abdomen and pelvis         Relevant Orders    CT abdomen pelvis wo contrast    UA w Reflex to Microscopic w Reflex to Culture - Clinic Collect (Completed)    POCT urine dip (Completed)    Pelvic pain     Will check a CT scan of the pelvis rule out ovarian mass there is a family history ovary cancer         Relevant Orders    CT abdomen pelvis wo contrast    UA w Reflex to Microscopic w Reflex to Culture - Clinic Collect (Completed)    POCT urine dip (Completed)    Family history of ovarian cancer     Right lower quadrant abdominal pain/right pelvic pain will check a CT scan of the abdomen and pelvis the         Relevant Orders    CT abdomen pelvis wo contrast          Return to office 1  months  call if any problems  Subjective:      Patient ID: Gladys Barney is a 72 y o  female  HPI 69-year old female coming in for a follow up office visit regarding type 2 diabetes, right lower quadrant abdominal pain, pelvic pain, family history of ovarian cancer and obesity; The patient reports me compliant taking medications without untoward side effects the  The patient is here to review his medical condition, update me on the medical condition and the patient reports me no hospitalizations and no ER visits  rigt lower abd pain for 2 weeks continue must she reports me no change with eating right pelvis/lower abdomen  No change of bowel habitus no blood in the stool no melena no fever no chill throat at time sugar sharp [place]t wall no change the moving  She does report me several weeks ago she had also noticed malodorous urine she increase her fluid intake which was helpful dysuria no frequency and no urgency  No fever no chill    The following portions of the patient's history were reviewed and updated as appropriate: allergies, current medications, past family history, past medical history, past social history, past surgical history and problem list     Review of Systems   Constitutional: Negative for activity change, appetite change and unexpected weight change  HENT: Negative for congestion and postnasal drip  Eyes: Negative for visual disturbance  Respiratory: Negative for cough and shortness of breath  Cardiovascular: Negative for chest pain  Gastrointestinal: Negative for abdominal pain, diarrhea, nausea and vomiting  Hematological: Negative for adenopathy  Objective:    Return in about 1 month (around 3/14/2019)  No results found         Allergies   Allergen Reactions    Hydrochlorothiazide Palpitations       Past Medical History:   Diagnosis Date    Biliary dyskinesia     CAP (community acquired pneumonia)     last assessed 8/17/16    Depression     GERD (gastroesophageal reflux disease)     Hyperlipidemia     Palpitations     Pulmonary embolism (HCC)      Past Surgical History:   Procedure Laterality Date    AUGMENTATION MAMMAPLASTY      breast surgery- with prosthetic implant 1999    CARDIAC CATHETERIZATION      procedure summary    CHOLECYSTECTOMY      onset 2003    HEMORRHOID SURGERY      ROTATOR CUFF REPAIR      onset 2010    TUBAL LIGATION      onset 1980     Current Outpatient Medications on File Prior to Visit   Medication Sig Dispense Refill    Ascorbic Acid (VITAMIN C) 1000 MG tablet Take 1 tablet by mouth daily      atorvastatin (LIPITOR) 10 mg tablet TAKE 1 TABLET DAILY 90 tablet 1    Biotin 5000 MCG CAPS Take 1 capsule by mouth daily      Cholecalciferol (VITAMIN D3) 2000 units TABS Take 1 tablet by mouth daily      doxycycline hyclate (VIBRA-TABS) 100 mg tablet Take by mouth as needed        esomeprazole (NEXIUM) 40 MG capsule Take 1 capsule by mouth daily      meclizine (ANTIVERT) 25 mg tablet Take by mouth as needed        METRONIDAZOLE, TOPICAL, 0 75 % LOTN Apply topically      Multiple Vitamin (MULTIVITAMIN) capsule Take 1 capsule by mouth daily      naproxen sodium (ALEVE) 220 MG tablet Take by mouth      Saccharomyces boulardii (PROBIOTIC) 250 MG CAPS Take 1 capsule by mouth 2 (two) times a day      SYNTHROID 125 MCG tablet TAKE 1 TABLET DAILY ON AN EMPTY STOMACH 90 tablet 0     No current facility-administered medications on file prior to visit        Family History   Problem Relation Age of Onset    Aneurysm Mother         cerebral    Ovarian cancer Mother     Cirrhosis Father         alcoholic    Aneurysm Brother         abdominal aortic; cerebral     Social History     Socioeconomic History    Marital status: /Civil Union     Spouse name: Not on file    Number of children: Not on file    Years of education: 15    Highest education level: Not on file   Occupational History    Not on file   Social Needs  Financial resource strain: Not on file   Zach-Tom insecurity:     Worry: Not on file     Inability: Not on file    Transportation needs:     Medical: Not on file     Non-medical: Not on file   Tobacco Use    Smoking status: Former Smoker    Smokeless tobacco: Never Used    Tobacco comment: smoked 1 pk/wk from 18 yrs old until 37 yrs old; also, less than a pk/wk - off and on- from  [id number]   Substance and Sexual Activity    Alcohol use: Yes     Comment: occasionally    Drug use: No    Sexual activity: Not on file   Lifestyle    Physical activity:     Days per week: Not on file     Minutes per session: Not on file    Stress: Not on file   Relationships    Social connections:     Talks on phone: Not on file     Gets together: Not on file     Attends Rastafarian service: Not on file     Active member of club or organization: Not on file     Attends meetings of clubs or organizations: Not on file     Relationship status: Not on file    Intimate partner violence:     Fear of current or ex partner: Not on file     Emotionally abused: Not on file     Physically abused: Not on file     Forced sexual activity: Not on file   Other Topics Concern    Not on file   Social History Narrative    Caffeine use     Vitals:    02/14/19 0854   Resp: 16   Weight: 92 kg (202 lb 12 8 oz)   Height: 5' 7" (1 702 m)     Results for orders placed or performed in visit on 02/14/19   Microalbumin / creatinine urine ratio   Result Value Ref Range    Creatinine, Ur 34 8 mg/dL    Microalbum  ,U,Random <5 0 0 0 - 20 0 mg/L    Microalb Creat Ratio <14 0 - 30 mg/g creatinine   UA w Reflex to Microscopic w Reflex to Culture - Clinic Collect   Result Value Ref Range    Color, UA Yellow     Clarity, UA Clear     Specific [place], UA 1 012 1 003 - 1 030    pH, UA 6 5 4 5 - 8 0    Leukocytes, UA Negative Negative    Nitrite, UA Negative Negative    Protein, UA Negative Negative mg/dl    Glucose, UA Negative Negative mg/dl    Ketones, UA Negative Negative mg/dl    Urobilinogen, UA 0 2 0 2, 1 0 E U /dl E U /dl    Bilirubin, UA Negative Negative    Blood, UA Negative Negative   POCT urine dip   Result Value Ref Range    LEUKOCYTE ESTERASE,UA Negative     NITRITE,UA Negative     SL AMB POCT UROBILINOGEN 3 5     POCT URINE PROTEIN Negative      PH,UA 6 0     BLOOD,UA Negative     SPECIFIC GRAVITY,UA 1 020     KETONES,UA Negative     BILIRUBIN,UA Negative     GLUCOSE, UA Negative      COLOR,UA Yellow     CLARITY,UA Clear      Weight (last 2 days)     Date/Time   Weight    02/14/19 0854   92 (202 8)            Body mass index is 31 76 kg/m²  BP      Temp      Pulse     Resp      SpO2        Vitals:    02/14/19 0854   Weight: 92 kg (202 lb 12 8 oz)     Vitals:    02/14/19 0854   Weight: 92 kg (202 lb 12 8 oz)       Resp 16   Ht 5' 7" (1 702 m)   Wt 92 kg (202 lb 12 8 oz)   BMI 31 76 kg/m²       Patient's shoes and socks removed  Right Foot/Ankle   Right Foot Inspection  Skin Exam: skin normal and skin intact no dry skin, no warmth, no callus, no erythema, no maceration, no abnormal color, no pre-ulcer, no ulcer and no callus                          Toe Exam: ROM and strength within normal limits  Sensory       Monofilament testing: intact  Vascular  Capillary refills: < 3 seconds  The right DP pulse is 2+  The right PT pulse is 2+  Left Foot/Ankle  Left Foot Inspection  Skin Exam: skin normal and skin intactno dry skin, no warmth, no erythema, no maceration, normal color, no pre-ulcer, no ulcer and no callus                         Toe Exam: ROM and strength within normal limits                   Sensory       Monofilament: intact  Vascular  Capillary refills: < 3 seconds  The left PT pulse is 2+  Assign Risk Category:  No deformity present; No loss of protective sensation; No weak pulses       Risk: 0     Physical Exam   Constitutional: She appears well-developed and well-nourished  HENT:   Head: Normocephalic     Mouth/Throat: Oropharynx is clear and moist    Eyes: Pupils are equal, round, and reactive to light  Conjunctivae are normal  Right eye exhibits no discharge  Left eye exhibits no discharge  No scleral icterus  Neck: Neck supple  Cardiovascular: Normal rate, regular rhythm, normal heart sounds and intact distal pulses  Exam reveals no gallop and no friction rub  Pulses are no weak pulses  No murmur heard  Pulses:       Dorsalis pedis pulses are 2+ on the right side  Posterior tibial pulses are 2+ on the right side, and 2+ on the left side  Pulmonary/Chest: Breath sounds normal  No respiratory distress  She has no wheezes  She has no rales  Abdominal: Soft  Bowel sounds are normal  She exhibits no distension and no mass  There is tenderness (Right lower abdomen and also right pelvis )  There is no rebound and no guarding  Musculoskeletal: She exhibits no edema or deformity  Feet:   Right Foot:   Skin Integrity: Negative for ulcer, skin breakdown, erythema, warmth, callus or dry skin  Left Foot:   Skin Integrity: Negative for ulcer, skin breakdown, erythema, warmth, callus or dry skin  Lymphadenopathy:     She has no cervical adenopathy  Neurological: She is alert   Coordination normal      examination of the abdomen/pelvis Trina in the room mild tenderness of the right lower quadrant and right pelvic region

## 2019-02-15 NOTE — ASSESSMENT & PLAN NOTE
Right lower quadrant abdominal pain/right pelvic pain will check a CT scan of the abdomen and pelvis the

## 2019-02-15 NOTE — ASSESSMENT & PLAN NOTE
Assessment and plan 1  Health maintenance annual wellness examination overall the patient is clinically stable and doing well, we encouraged the patient to follow a healthy and balanced diet  We recommend that the patient exercise routinely approximately 30 minutes 5 times per week   We have reviewed the patient's vaccines and have made recommendations for updates if necessary consider the shingles vaccine at the pharmacy       We will be ordering screening laboratories which are age appropriate  Return to the office in  6 months    call if any problems

## 2019-02-15 NOTE — ASSESSMENT & PLAN NOTE
Lab Results   Component Value Date    HGBA1C 6 1 02/13/2019       No results for input(s): POCGLU in the last 72 hours  Blood Sugar Average: Last 72 hrs:   improved I have counselled the pt to follow a healthy and balanced diet ,and recommend routine exercise  I will be ordering diabetic laboratories including comprehensive metabolic panel, hemoglobin A1c, urine microalbumin, lipid panel

## 2019-02-18 ENCOUNTER — TELEPHONE (OUTPATIENT)
Dept: INTERNAL MEDICINE CLINIC | Facility: CLINIC | Age: 74
End: 2019-02-18

## 2019-02-18 DIAGNOSIS — E11.9 TYPE 2 DIABETES MELLITUS WITHOUT COMPLICATION, WITHOUT LONG-TERM CURRENT USE OF INSULIN (HCC): Primary | ICD-10-CM

## 2019-02-18 RX ORDER — BLOOD-GLUCOSE METER
KIT MISCELLANEOUS
Qty: 1 EACH | Refills: 0 | Status: SHIPPED | OUTPATIENT
Start: 2019-02-18 | End: 2020-02-28 | Stop reason: HOSPADM

## 2019-02-18 RX ORDER — LANCETS
EACH MISCELLANEOUS
Qty: 100 EACH | Refills: 5 | Status: SHIPPED | OUTPATIENT
Start: 2019-02-18 | End: 2020-02-28 | Stop reason: HOSPADM

## 2019-02-18 NOTE — TELEPHONE ENCOUNTER
Sabiha Sargent from [facility] called in stating that the patient dropped off orders for a glucometer, test strips and lancets and because she has medicare they need new orders with specifics on it; which brand, how often does the patient test, DX code and NPI all need to be on the script (previous orders under other orders tab not medications)

## 2019-02-20 ENCOUNTER — HOSPITAL ENCOUNTER (OUTPATIENT)
Dept: RADIOLOGY | Age: 74
Discharge: HOME | End: 2019-02-20
Payer: MEDICARE

## 2019-02-20 DIAGNOSIS — R10.2 PELVIC PAIN: ICD-10-CM

## 2019-02-20 DIAGNOSIS — Z80.41 FAMILY HISTORY OF OVARIAN CANCER: ICD-10-CM

## 2019-02-20 DIAGNOSIS — R10.31 RIGHT LOWER QUADRANT ABDOMINAL PAIN: ICD-10-CM

## 2019-02-20 PROCEDURE — 74176 CT ABD & PELVIS W/O CONTRAST: CPT

## 2019-03-06 ENCOUNTER — HOSPITAL ENCOUNTER (OUTPATIENT)
Dept: RADIOLOGY | Age: 74
Discharge: HOME | End: 2019-03-06
Payer: MEDICARE

## 2019-03-06 VITALS — HEIGHT: 67 IN | BODY MASS INDEX: 31.71 KG/M2 | WEIGHT: 202 LBS

## 2019-03-06 DIAGNOSIS — Z12.31 ENCOUNTER FOR SCREENING MAMMOGRAM FOR MALIGNANT NEOPLASM OF BREAST: ICD-10-CM

## 2019-03-06 PROCEDURE — 77067 SCR MAMMO BI INCL CAD: CPT

## 2019-03-06 PROCEDURE — 77063 BREAST TOMOSYNTHESIS BI: CPT

## 2019-03-11 DIAGNOSIS — E78.5 HYPERLIPIDEMIA, UNSPECIFIED HYPERLIPIDEMIA TYPE: ICD-10-CM

## 2019-03-11 RX ORDER — ATORVASTATIN CALCIUM 10 MG/1
TABLET, FILM COATED ORAL
Qty: 90 TABLET | Refills: 1 | Status: SHIPPED | OUTPATIENT
Start: 2019-03-11 | End: 2019-08-20

## 2019-04-08 DIAGNOSIS — E03.9 HYPOTHYROIDISM, UNSPECIFIED TYPE: ICD-10-CM

## 2019-04-08 RX ORDER — LEVOTHYROXINE SODIUM 125 UG/1
TABLET ORAL
Qty: 90 TABLET | Refills: 0 | Status: SHIPPED | OUTPATIENT
Start: 2019-04-08 | End: 2019-07-07 | Stop reason: SDUPTHER

## 2019-04-30 ENCOUNTER — OFFICE VISIT (OUTPATIENT)
Dept: PULMONOLOGY | Facility: CLINIC | Age: 74
End: 2019-04-30
Payer: MEDICARE

## 2019-04-30 VITALS
TEMPERATURE: 98 F | RESPIRATION RATE: 16 BRPM | DIASTOLIC BLOOD PRESSURE: 80 MMHG | OXYGEN SATURATION: 95 % | SYSTOLIC BLOOD PRESSURE: 130 MMHG | HEART RATE: 72 BPM

## 2019-04-30 DIAGNOSIS — R05.3 PERSISTENT COUGH FOR 3 WEEKS OR LONGER: Primary | ICD-10-CM

## 2019-04-30 PROBLEM — M72.0 DUPUYTREN'S CONTRACTURE: Status: ACTIVE | Noted: 2017-04-03

## 2019-04-30 PROBLEM — I67.1 CEREBRAL ANEURYSM: Status: ACTIVE | Noted: 2018-01-12

## 2019-04-30 PROCEDURE — 99205 OFFICE O/P NEW HI 60 MIN: CPT | Performed by: INTERNAL MEDICINE

## 2019-04-30 RX ORDER — PREDNISONE 10 MG/1
10 TABLET ORAL SEE ADMIN INSTRUCTIONS
Qty: 21 TABLET | Refills: 0 | Status: SHIPPED | OUTPATIENT
Start: 2019-04-30 | End: 2019-08-20 | Stop reason: ALTCHOICE

## 2019-04-30 RX ORDER — FLUTICASONE FUROATE AND VILANTEROL 100; 25 UG/1; UG/1
1 POWDER RESPIRATORY (INHALATION) DAILY
Qty: 1 INHALER | Refills: 2 | Status: SHIPPED | OUTPATIENT
Start: 2019-04-30 | End: 2019-12-31 | Stop reason: CLARIF

## 2019-06-27 ENCOUNTER — OFFICE VISIT (OUTPATIENT)
Dept: PODIATRY | Facility: CLINIC | Age: 74
End: 2019-06-27
Payer: MEDICARE

## 2019-06-27 VITALS
DIASTOLIC BLOOD PRESSURE: 76 MMHG | WEIGHT: 200 LBS | HEIGHT: 67 IN | SYSTOLIC BLOOD PRESSURE: 120 MMHG | HEART RATE: 87 BPM | BODY MASS INDEX: 31.39 KG/M2

## 2019-06-27 DIAGNOSIS — E11.9 CONTROLLED TYPE 2 DIABETES MELLITUS WITHOUT COMPLICATION, WITHOUT LONG-TERM CURRENT USE OF INSULIN (HCC): ICD-10-CM

## 2019-06-27 DIAGNOSIS — M20.22 HALLUX RIGIDUS OF LEFT FOOT: Primary | ICD-10-CM

## 2019-06-27 PROCEDURE — 99213 OFFICE O/P EST LOW 20 MIN: CPT | Performed by: PODIATRIST

## 2019-06-27 RX ORDER — NAPROXEN 500 MG/1
500 TABLET ORAL 2 TIMES DAILY WITH MEALS
Qty: 60 TABLET | Refills: 0 | Status: SHIPPED | OUTPATIENT
Start: 2019-06-27 | End: 2019-07-24 | Stop reason: SDUPTHER

## 2019-06-27 RX ORDER — METRONIDAZOLE 7.5 MG/G
GEL TOPICAL AS NEEDED
Refills: 3 | COMMUNITY
Start: 2019-04-22

## 2019-07-07 DIAGNOSIS — E03.9 HYPOTHYROIDISM, UNSPECIFIED TYPE: ICD-10-CM

## 2019-07-07 RX ORDER — LEVOTHYROXINE SODIUM 125 UG/1
TABLET ORAL
Qty: 90 TABLET | Refills: 0 | Status: SHIPPED | OUTPATIENT
Start: 2019-07-07 | End: 2019-10-06 | Stop reason: SDUPTHER

## 2019-07-24 DIAGNOSIS — M20.22 HALLUX RIGIDUS OF LEFT FOOT: ICD-10-CM

## 2019-07-25 RX ORDER — NAPROXEN 500 MG/1
TABLET ORAL
Qty: 60 TABLET | Refills: 0 | Status: SHIPPED | OUTPATIENT
Start: 2019-07-25 | End: 2019-12-31 | Stop reason: CLARIF

## 2019-08-14 ENCOUNTER — APPOINTMENT (OUTPATIENT)
Dept: LAB | Age: 74
End: 2019-08-14
Payer: MEDICARE

## 2019-08-14 ENCOUNTER — TRANSCRIBE ORDERS (OUTPATIENT)
Dept: ADMINISTRATIVE | Age: 74
End: 2019-08-14

## 2019-08-14 ENCOUNTER — TRANSCRIBE ORDERS (OUTPATIENT)
Dept: ADMINISTRATIVE | Facility: HOSPITAL | Age: 74
End: 2019-08-14

## 2019-08-14 DIAGNOSIS — Z13.820 SPECIAL SCREENING FOR OSTEOPOROSIS: Primary | ICD-10-CM

## 2019-08-14 DIAGNOSIS — E03.9 HYPOTHYROIDISM, UNSPECIFIED TYPE: ICD-10-CM

## 2019-08-14 DIAGNOSIS — E11.9 TYPE 2 DIABETES MELLITUS WITHOUT COMPLICATION, WITHOUT LONG-TERM CURRENT USE OF INSULIN (HCC): ICD-10-CM

## 2019-08-14 LAB
ALBUMIN SERPL BCP-MCNC: 4.1 G/DL (ref 3.5–5)
ALP SERPL-CCNC: 47 U/L (ref 46–116)
ALT SERPL W P-5'-P-CCNC: 20 U/L (ref 12–78)
ANION GAP SERPL CALCULATED.3IONS-SCNC: 10 MMOL/L (ref 4–13)
AST SERPL W P-5'-P-CCNC: 20 U/L (ref 5–45)
BILIRUB SERPL-MCNC: 0.54 MG/DL (ref 0.2–1)
BUN SERPL-MCNC: 15 MG/DL (ref 5–25)
CALCIUM SERPL-MCNC: 9.2 MG/DL (ref 8.3–10.1)
CHLORIDE SERPL-SCNC: 105 MMOL/L (ref 100–108)
CHOLEST SERPL-MCNC: 191 MG/DL (ref 50–200)
CO2 SERPL-SCNC: 27 MMOL/L (ref 21–32)
CREAT SERPL-MCNC: 0.57 MG/DL (ref 0.6–1.3)
EST. AVERAGE GLUCOSE BLD GHB EST-MCNC: 126 MG/DL
GFR SERPL CREATININE-BSD FRML MDRD: 93 ML/MIN/1.73SQ M
GLUCOSE P FAST SERPL-MCNC: 100 MG/DL (ref 65–99)
HBA1C MFR BLD: 6 % (ref 4.2–6.3)
HDLC SERPL-MCNC: 54 MG/DL (ref 40–60)
LDLC SERPL CALC-MCNC: 107 MG/DL (ref 0–100)
POTASSIUM SERPL-SCNC: 3.8 MMOL/L (ref 3.5–5.3)
PROT SERPL-MCNC: 7 G/DL (ref 6.4–8.2)
SODIUM SERPL-SCNC: 142 MMOL/L (ref 136–145)
TRIGL SERPL-MCNC: 152 MG/DL
TSH SERPL DL<=0.05 MIU/L-ACNC: 3.46 UIU/ML (ref 0.36–3.74)

## 2019-08-14 PROCEDURE — 36415 COLL VENOUS BLD VENIPUNCTURE: CPT

## 2019-08-14 PROCEDURE — 80053 COMPREHEN METABOLIC PANEL: CPT

## 2019-08-14 PROCEDURE — 83036 HEMOGLOBIN GLYCOSYLATED A1C: CPT

## 2019-08-14 PROCEDURE — 80061 LIPID PANEL: CPT

## 2019-08-14 PROCEDURE — 84443 ASSAY THYROID STIM HORMONE: CPT

## 2019-08-20 ENCOUNTER — OFFICE VISIT (OUTPATIENT)
Dept: INTERNAL MEDICINE CLINIC | Facility: CLINIC | Age: 74
End: 2019-08-20
Payer: MEDICARE

## 2019-08-20 VITALS
DIASTOLIC BLOOD PRESSURE: 74 MMHG | OXYGEN SATURATION: 96 % | HEIGHT: 67 IN | HEART RATE: 87 BPM | BODY MASS INDEX: 32.36 KG/M2 | SYSTOLIC BLOOD PRESSURE: 122 MMHG | RESPIRATION RATE: 16 BRPM | TEMPERATURE: 98.2 F | WEIGHT: 206.2 LBS

## 2019-08-20 DIAGNOSIS — E11.9 TYPE 2 DIABETES MELLITUS WITHOUT COMPLICATION, WITHOUT LONG-TERM CURRENT USE OF INSULIN (HCC): ICD-10-CM

## 2019-08-20 DIAGNOSIS — E66.09 CLASS 1 OBESITY DUE TO EXCESS CALORIES WITH SERIOUS COMORBIDITY AND BODY MASS INDEX (BMI) OF 33.0 TO 33.9 IN ADULT: ICD-10-CM

## 2019-08-20 DIAGNOSIS — E03.9 HYPOTHYROIDISM, UNSPECIFIED TYPE: Primary | ICD-10-CM

## 2019-08-20 DIAGNOSIS — Z12.31 ENCOUNTER FOR SCREENING MAMMOGRAM FOR BREAST CANCER: ICD-10-CM

## 2019-08-20 DIAGNOSIS — E66.09 CLASS 1 OBESITY DUE TO EXCESS CALORIES WITHOUT SERIOUS COMORBIDITY WITH BODY MASS INDEX (BMI) OF 31.0 TO 31.9 IN ADULT: ICD-10-CM

## 2019-08-20 DIAGNOSIS — J01.90 ACUTE NON-RECURRENT SINUSITIS, UNSPECIFIED LOCATION: ICD-10-CM

## 2019-08-20 DIAGNOSIS — I89.0 LYMPHEDEMA: ICD-10-CM

## 2019-08-20 DIAGNOSIS — I10 ESSENTIAL HYPERTENSION: ICD-10-CM

## 2019-08-20 DIAGNOSIS — E78.5 HYPERLIPIDEMIA, UNSPECIFIED HYPERLIPIDEMIA TYPE: ICD-10-CM

## 2019-08-20 PROBLEM — E66.811 CLASS 1 OBESITY DUE TO EXCESS CALORIES WITHOUT SERIOUS COMORBIDITY WITH BODY MASS INDEX (BMI) OF 31.0 TO 31.9 IN ADULT: Status: ACTIVE | Noted: 2019-08-20

## 2019-08-20 PROCEDURE — 99214 OFFICE O/P EST MOD 30 MIN: CPT | Performed by: INTERNAL MEDICINE

## 2019-08-20 RX ORDER — FLUTICASONE PROPIONATE 50 MCG
2 SPRAY, SUSPENSION (ML) NASAL DAILY
Qty: 1 BOTTLE | Refills: 2 | Status: SHIPPED | OUTPATIENT
Start: 2019-08-20 | End: 2019-12-30 | Stop reason: SDUPTHER

## 2019-08-20 RX ORDER — ATORVASTATIN CALCIUM 20 MG/1
20 TABLET, FILM COATED ORAL DAILY
Qty: 90 TABLET | Refills: 2 | Status: SHIPPED | OUTPATIENT
Start: 2019-08-20 | End: 2020-01-20 | Stop reason: SDUPTHER

## 2019-08-20 RX ORDER — AMOXICILLIN 500 MG/1
500 CAPSULE ORAL EVERY 8 HOURS SCHEDULED
Qty: 30 CAPSULE | Refills: 0 | Status: SHIPPED | OUTPATIENT
Start: 2019-08-20 | End: 2019-08-30

## 2019-08-20 NOTE — PROGRESS NOTES
BMI Counseling: Body mass index is 32 3 kg/m²  Discussed the patient's BMI with her  The BMI is above average  BMI counseling and education was provided to the patient  Nutrition recommendations include reducing portion sizes  Assessment/Plan:    Hypothyroidism  Hypothyroidism controlled the patient is currently euthyroid I will be ordering a TSH prior to the next office visit and the patient will continue with current medical regiment; we will continue to monitor the patient's progress  Type 2 diabetes mellitus without complication, without long-term current use of insulin ([facility])  Lab Results   Component Value Date    HGBA1C 6 0 08/14/2019       No results for input(s): POCGLU in the last 72 hours  Blood Sugar Average: Last 72 hrs:   improving currently on no medication annual eye examination, I will be ordering diabetic laboratories including comprehensive metabolic panel, hemoglobin A1c, urine microalbumin, lipid panel  I have counselled the pt to follow a healthy and balanced diet ,and recommend routine exercise  Acute non-recurrent sinusitis  Will treat with amoxicillin 500 mg t i d  Times 10 days, Flonase 2 sprays each nostril once a day  Plenty of fluids, rest proper handwashing, she may use the Flonase as needed if she develops any further is congestion or viral type symptoms and if it does progress sinus infection she should notify me immediately  Essential hypertension  Hypertension - controlled, I have counseled patient following healthy balance diet, I would like the patient reduce sodium, exercise routinely, I would like the patient continued the med current medical regiment and we will continue to monitor      Class 1 obesity due to excess calories with serious comorbidity and body mass index (BMI) of 33 0 to 33 9 in adult  Obesity -I have counseled patient following healthy and balanced diet, I would like the patient to lose weight, I would like the patient exercise routinely; we will continue monitor the patient's progress  Hyperlipidemia  Hyperlipidemia controlled continue with current medical regiment recommend a low-cholesterol diet and recommend routine exercise we will continue to monitor the progress  Problem List Items Addressed This Visit        Endocrine    Hypothyroidism - Primary     Hypothyroidism controlled the patient is currently euthyroid I will be ordering a TSH prior to the next office visit and the patient will continue with current medical regiment; we will continue to monitor the patient's progress  Type 2 diabetes mellitus without complication, without long-term current use of insulin ([facility])     Lab Results   Component Value Date    HGBA1C 6 0 08/14/2019       No results for input(s): POCGLU in the last 72 hours  Blood Sugar Average: Last 72 hrs:   improving currently on no medication annual eye examination, I will be ordering diabetic laboratories including comprehensive metabolic panel, hemoglobin A1c, urine microalbumin, lipid panel  I have counselled the pt to follow a healthy and balanced diet ,and recommend routine exercise  Relevant Orders    Ambulatory referral to Ophthalmology    Comprehensive metabolic panel    Hemoglobin A1C    Lipid Panel with Direct LDL reflex    Microalbumin / creatinine urine ratio       Respiratory    Acute non-recurrent sinusitis     Will treat with amoxicillin 500 mg t i d  Times 10 days, Flonase 2 sprays each nostril once a day  Plenty of fluids, rest proper handwashing, she may use the Flonase as needed if she develops any further is congestion or viral type symptoms and if it does progress sinus infection she should notify me immediately           Relevant Medications    amoxicillin (AMOXIL) 500 mg capsule    fluticasone (FLONASE) 50 mcg/act nasal spray       Cardiovascular and Mediastinum    Essential hypertension     Hypertension - controlled, I have counseled patient following healthy balance diet, I would like the patient reduce sodium, exercise routinely, I would like the patient continued the med current medical regiment and we will continue to monitor  Other    Lymphedema    Hyperlipidemia     Hyperlipidemia controlled continue with current medical regiment recommend a low-cholesterol diet and recommend routine exercise we will continue to monitor the progress  Relevant Medications    atorvastatin (LIPITOR) 20 mg tablet    Other Relevant Orders    Ambulatory referral to Ophthalmology    Comprehensive metabolic panel    Hemoglobin A1C    Lipid Panel with Direct LDL reflex    Microalbumin / creatinine urine ratio    Class 1 obesity due to excess calories with serious comorbidity and body mass index (BMI) of 33 0 to 33 9 in adult     Obesity -I have counseled patient following healthy and balanced diet, I would like the patient to lose weight, I would like the patient exercise routinely; we will continue monitor the patient's progress  Class 1 obesity due to excess calories without serious comorbidity with body mass index (BMI) of 31 0 to 31 9 in adult    Encounter for screening mammogram for breast cancer    Relevant Orders    Mammo screening bilateral w 3d & cad          Return to office 6  months  call if any problems  Subjective:      Patient ID: Lois Lobo is a 69 y o  female  HPI 59-year-old female coming in for follow-up examination hypothyroidism, obesity, hypertension, hyperlipidemia, type 2 diabetes, acute sinus infection; The patient reports me compliant taking medications without untoward side effects the  The patient is here to review his medical condition, update me on the medical condition and the patient reports me no hospitalizations and no ER visits    She reports me she has developed cold symptoms last Thursday which have led into a sinus infection sinus pressure, pain  she reports me that she has developed a sinus infection she had noticed symptoms starting past Thursday including frontal sinus pressure, bilateral maxillary sinus pain    The following portions of the patient's history were reviewed and updated as appropriate: allergies, current medications, past family history, past medical history, past social history, past surgical history and problem list     Review of Systems   Constitutional: Negative for activity change, appetite change and unexpected weight change  HENT: Positive for congestion, postnasal drip and sinus pressure  Eyes: Negative for visual disturbance  Respiratory: Positive for cough (Secondary to postnasal drip)  Negative for shortness of breath  Cardiovascular: Negative for chest pain  Gastrointestinal: Negative for abdominal pain, diarrhea, nausea and vomiting  Neurological: Negative for dizziness, light-headedness and headaches  Hematological: Negative for adenopathy  Objective:    No follow-ups on file  No results found         Allergies   Allergen Reactions    Hydrochlorothiazide Palpitations       Past Medical History:   Diagnosis Date    Biliary dyskinesia     CAP (community acquired pneumonia)     last assessed 8/17/16    Depression     GERD (gastroesophageal reflux disease)     Hyperlipidemia     Palpitations      Past Surgical History:   Procedure Laterality Date    AUGMENTATION MAMMAPLASTY      breast surgery- with prosthetic implant 1999    BREAST BIOPSY Left 1974    BREAST EXCISIONAL BIOPSY Left 1993    CARDIAC CATHETERIZATION      procedure summary    CHOLECYSTECTOMY      onset 2003    HEMORRHOID SURGERY      ROTATOR CUFF REPAIR      onset 2010    TUBAL LIGATION      onset 1980     Current Outpatient Medications on File Prior to Visit   Medication Sig Dispense Refill    Ascorbic Acid (VITAMIN C) 1000 MG tablet Take 1 tablet by mouth daily      Biotin 5000 MCG CAPS Take 1 capsule by mouth daily      Blood Glucose Monitoring Suppl (ONE TOUCH ULTRA MINI) w/Device KIT Test blood sugar once daily  DX: E11 9 NPI# [id number] 1 each 0    Cholecalciferol (VITAMIN D3) 2000 units TABS Take 1 tablet by mouth daily      doxycycline hyclate (VIBRA-TABS) 100 mg tablet Take by mouth as needed        esomeprazole (NEXIUM) 40 MG capsule Take 1 capsule by mouth daily      fluticasone-vilanterol (BREO ELLIPTA) 100-25 mcg/inh inhaler Inhale 1 puff daily Rinse mouth after use  1 Inhaler 2    glucose blood (ONE TOUCH ULTRA TEST) test strip Test blood sugar once daily  DX: E11 9 IXO#[id number] 100 each 5    Lancets (ONETOUCH ULTRASOFT) lancets Test blood sugar once daily  DX: E11 9 NPI # [id number] 100 each 5    meclizine (ANTIVERT) 25 mg tablet Take by mouth as needed        metroNIDAZOLE (METROGEL) 0 75 % gel APPLY TO AFFECTED AREA EVERY DAY  3    Multiple Vitamin (MULTIVITAMIN) capsule Take 1 capsule by mouth daily      naproxen (NAPROSYN) 500 mg tablet TAKE 1 TABLET BY MOUTH TWICE A DAY WITH A MEAL 60 tablet 0    SYNTHROID 125 MCG tablet TAKE 1 TABLET DAILY ON AN EMPTY STOMACH 90 tablet 0    [DISCONTINUED] atorvastatin (LIPITOR) 10 mg tablet TAKE 1 TABLET DAILY 90 tablet 1    [DISCONTINUED] METRONIDAZOLE, TOPICAL, 0 75 % LOTN Apply topically      [DISCONTINUED] predniSONE 10 mg tablet Take 1 tablet (10 mg total) by mouth see administration instructions 2 tablet daily for 1 week then 1 tablet daily for 1 week 21 tablet 0    [DISCONTINUED] naproxen sodium (ALEVE) 220 MG tablet Take by mouth       No current facility-administered medications on file prior to visit        Family History   Problem Relation Age of Onset    Aneurysm Mother         cerebral    Ovarian cancer Mother 67    Cirrhosis Father         alcoholic    Aneurysm Brother         abdominal aortic; cerebral    Diabetes Son      Social History     Socioeconomic History    Marital status: /Civil Union     Spouse name: Not on file    Number of children: Not on file    Years of education: 12    Highest education level: Not on file   Occupational History    Not on file   Social Needs    Financial resource strain: Not on file    Food insecurity:     Worry: Not on file     Inability: Not on file    Transportation needs:     Medical: Not on file     Non-medical: Not on file   Tobacco Use    Smoking status: Former Smoker     Packs/day: 0 25     Years: 17 00     Pack years: 4 25     Types: Cigarettes     Last attempt to quit:      Years since quittin 6    Smokeless tobacco: Never Used    Tobacco comment: 0 5 ppw intermittently last smoked    Substance and Sexual Activity    Alcohol use: Yes     Comment: occasionally    Drug use: No    Sexual activity: Not on file   Lifestyle    Physical activity:     Days per week: Not on file     Minutes per session: Not on file    Stress: Not on file   Relationships    Social connections:     Talks on phone: Not on file     Gets together: Not on file     Attends Yazdanism service: Not on file     Active member of club or organization: Not on file     Attends meetings of clubs or organizations: Not on file     Relationship status: Not on file    Intimate partner violence:     Fear of current or ex partner: Not on file     Emotionally abused: Not on file     Physically abused: Not on file     Forced sexual activity: Not on file   Other Topics Concern    Not on file   Social History Narrative    Caffeine use     Vitals:    19 0820   BP: 122/74   Pulse: 87   Resp: 16   Temp: 98 2 °F (36 8 °C)   TempSrc: Oral   SpO2: 96%   Weight: 93 5 kg (206 lb 3 2 oz)   Height: 5' 7" (1 702 m)     Results for orders placed or performed in visit on 19   Comprehensive metabolic panel   Result Value Ref Range    Sodium 142 136 - 145 mmol/L    Potassium 3 8 3 5 - 5 3 mmol/L    Chloride 105 100 - 108 mmol/L    CO2 27 21 - 32 mmol/L    ANION GAP 10 4 - 13 mmol/L    BUN 15 5 - 25 mg/dL    Creatinine 0 57 (L) 0 60 - 1 30 mg/dL    Glucose, Fasting 100 (H) 65 - 99 mg/dL    Calcium 9 2 8 3 - 10 1 mg/dL    AST 20 5 - 45 U/L    ALT 20 12 - 78 U/L    Alkaline Phosphatase 47 46 - 116 U/L    Total Protein 7 0 6 4 - 8 2 g/dL    Albumin 4 1 3 5 - 5 0 g/dL    Total Bilirubin 0 54 0 20 - 1 00 mg/dL    eGFR 93 ml/min/1 73sq m   Hemoglobin A1C   Result Value Ref Range    Hemoglobin A1C 6 0 4 2 - 6 3 %     mg/dl   Lipid Panel with Direct LDL reflex   Result Value Ref Range    Cholesterol 191 50 - 200 mg/dL    Triglycerides 152 (H) <=150 mg/dL    HDL, Direct 54 40 - 60 mg/dL    LDL Calculated 107 (H) 0 - 100 mg/dL   TSH, 3rd generation   Result Value Ref Range    TSH 3RD GENERATON 3 460 0 358 - 3 740 uIU/mL     Weight (last 2 days)     Date/Time   Weight    08/20/19 0820   93 5 (206 2)            Body mass index is 32 3 kg/m²  /74 (08/20/19 0820)    Temp 98 2 °F (36 8 °C) (08/20/19 0820)    Pulse 87 (08/20/19 0820)   Resp 16 (08/20/19 0820)    SpO2 96 % (08/20/19 0820)      Vitals:    08/20/19 0820   Weight: 93 5 kg (206 lb 3 2 oz)     Vitals:    08/20/19 0820   Weight: 93 5 kg (206 lb 3 2 oz)       /74   Pulse 87   Temp 98 2 °F (36 8 °C) (Oral)   Resp 16   Ht 5' 7" (1 702 m)   Wt 93 5 kg (206 lb 3 2 oz)   SpO2 96%   BMI 32 30 kg/m²          Physical Exam   Constitutional: She appears well-developed and well-nourished  HENT:   Head: Normocephalic  Mouth/Throat: Oropharynx is clear and moist    Eyes: Pupils are equal, round, and reactive to light  Conjunctivae are normal  Right eye exhibits no discharge  Left eye exhibits no discharge  No scleral icterus  Neck: Neck supple  Cardiovascular: Normal rate, regular rhythm, normal heart sounds and intact distal pulses  Exam reveals no gallop and no friction rub  No murmur heard  Pulmonary/Chest: Breath sounds normal  No respiratory distress  She has no wheezes  She has no rales  Abdominal: Soft  Bowel sounds are normal  She exhibits no distension and no mass  There is no tenderness  There is no rebound and no guarding  Musculoskeletal: She exhibits no edema or deformity  Lymphadenopathy:     She has no cervical adenopathy  Neurological: She is alert   Coordination normal      frontal sinus tenderness, bilateral maxillary sinus tenderness,

## 2019-09-04 ENCOUNTER — HOSPITAL ENCOUNTER (OUTPATIENT)
Dept: RADIOLOGY | Age: 74
Discharge: HOME | End: 2019-09-04
Payer: MEDICARE

## 2019-09-04 DIAGNOSIS — Z13.820 SPECIAL SCREENING FOR OSTEOPOROSIS: ICD-10-CM

## 2019-09-04 PROCEDURE — 77080 DXA BONE DENSITY AXIAL: CPT

## 2019-09-07 DIAGNOSIS — E78.5 HYPERLIPIDEMIA, UNSPECIFIED HYPERLIPIDEMIA TYPE: ICD-10-CM

## 2019-09-07 RX ORDER — ATORVASTATIN CALCIUM 10 MG/1
TABLET, FILM COATED ORAL
Qty: 90 TABLET | Refills: 4 | Status: SHIPPED | OUTPATIENT
Start: 2019-09-07 | End: 2019-12-31 | Stop reason: CLARIF

## 2019-09-13 LAB
LEFT EYE DIABETIC RETINOPATHY: NORMAL
RIGHT EYE DIABETIC RETINOPATHY: NORMAL

## 2019-10-06 DIAGNOSIS — E03.9 HYPOTHYROIDISM, UNSPECIFIED TYPE: ICD-10-CM

## 2019-10-06 RX ORDER — LEVOTHYROXINE SODIUM 125 UG/1
TABLET ORAL
Qty: 90 TABLET | Refills: 4 | Status: SHIPPED | OUTPATIENT
Start: 2019-10-06 | End: 2020-09-30 | Stop reason: SDUPTHER

## 2019-12-30 DIAGNOSIS — J01.90 ACUTE NON-RECURRENT SINUSITIS, UNSPECIFIED LOCATION: ICD-10-CM

## 2019-12-30 RX ORDER — FLUTICASONE PROPIONATE 50 MCG
SPRAY, SUSPENSION (ML) NASAL
Qty: 16 ML | Refills: 2 | Status: SHIPPED | OUTPATIENT
Start: 2019-12-30 | End: 2020-04-03

## 2019-12-31 ENCOUNTER — OFFICE VISIT (OUTPATIENT)
Dept: CARDIOLOGY CLINIC | Facility: CLINIC | Age: 74
End: 2019-12-31
Payer: MEDICARE

## 2019-12-31 VITALS
HEART RATE: 99 BPM | DIASTOLIC BLOOD PRESSURE: 72 MMHG | HEIGHT: 67 IN | OXYGEN SATURATION: 94 % | WEIGHT: 211.3 LBS | SYSTOLIC BLOOD PRESSURE: 118 MMHG | BODY MASS INDEX: 33.16 KG/M2

## 2019-12-31 DIAGNOSIS — I10 ESSENTIAL HYPERTENSION: Primary | ICD-10-CM

## 2019-12-31 DIAGNOSIS — R07.9 CHEST PAIN, UNSPECIFIED TYPE: ICD-10-CM

## 2019-12-31 DIAGNOSIS — R07.89 ATYPICAL CHEST PAIN: Primary | ICD-10-CM

## 2019-12-31 DIAGNOSIS — E78.2 MIXED HYPERLIPIDEMIA: ICD-10-CM

## 2019-12-31 PROCEDURE — 93000 ELECTROCARDIOGRAM COMPLETE: CPT | Performed by: INTERNAL MEDICINE

## 2019-12-31 PROCEDURE — 99214 OFFICE O/P EST MOD 30 MIN: CPT | Performed by: INTERNAL MEDICINE

## 2019-12-31 NOTE — PROGRESS NOTES
Cardiology Follow Up    Edie Reardon [facility]  1945  [id number]  [facility] [address]  [phone]    1  Essential hypertension     2  Mixed hyperlipidemia     3  Chest pain, unspecified type  POCT ECG       Interval History:   Cardiology follow-up  Patient has not been feeling well  She tells me she has been experiencing dyspnea crescendo pattern over last 2-3 months to minimal efforts  Sometimes associated with some chest tightness /heaviness discomfort midsternal without radiation, usually short-lived  She has had no resting symptoms  No orthopnea no PND  He admits to be sedentary, she has been compliant with low-cholesterol diet, most recent lipid profile total cholesterol 191 HDL 54   Her diabetes under fair control recent A1c of 6 0  He does use nonsteroidals intermittently    Her statin therapy was recently increased to 20 mg daily    Patient Active Problem List   Diagnosis    Lymphedema    Fatigue    Hyperlipidemia    Hypothyroidism    Class 1 obesity due to excess calories with serious comorbidity and body mass index (BMI) of 33 0 to 33 9 in adult    Type 2 diabetes mellitus without complication, without long-term current use of insulin (HCC)    Atypical chest pain    Right lower quadrant abdominal pain    Pelvic pain    Family history of ovarian cancer    Wellness examination    Cerebral aneurysm    Carotid artery plaque    Dense breast tissue on mammogram    Fibrocystic breast changes    Dupuytren's contracture    Diverticular disease of colon    Gastro-esophageal reflux disease with esophagitis    History of adenomatous polyp of colon    Hypothyroidism due to Hashimoto's thyroiditis    Impaired fasting glucose    Nephrolithiasis    Persistent cough for 3 weeks or longer    Essential hypertension    Class 1 obesity due to excess calories without serious comorbidity with body mass index (BMI) of 31 0 to 31 9 in adult    Acute non-recurrent sinusitis    Encounter for screening mammogram for breast cancer     Past Medical History:   Diagnosis Date    Biliary dyskinesia     CAP (community acquired pneumonia)     last assessed 8/17/16    Depression     GERD (gastroesophageal reflux disease)     Hyperlipidemia     Palpitations      Social History     Socioeconomic History    Marital status: /Civil Union     Spouse name: Not on file    Number of children: Not on file    Years of education: 15    Highest education level: Not on file   Occupational History    Not on file   Social Needs    Financial resource strain: Not on file    Food insecurity:     Worry: Not on file     Inability: Not on file    Transportation needs:     Medical: Not on file     Non-medical: Not on file   Tobacco Use    Smoking status: Former Smoker     Packs/day: 0 25     Years: 17 00     Pack years: 4 25     Types: Cigarettes     Last attempt to quit: 2010     Years since quitting: 10 0    Smokeless tobacco: Never Used    Tobacco comment: 0 5 ppw intermittently last smoked 2010   Substance and Sexual Activity    Alcohol use: Yes     Comment: occasionally    Drug use: No    Sexual activity: Not on file   Lifestyle    Physical activity:     Days per week: Not on file     Minutes per session: Not on file    Stress: Not on file   Relationships    Social connections:     Talks on phone: Not on file     Gets together: Not on file     Attends Protestant service: Not on file     Active member of club or organization: Not on file     Attends meetings of clubs or organizations: Not on file     Relationship status: Not on file    Intimate partner violence:     Fear of current or ex partner: Not on file     Emotionally abused: Not on file     Physically abused: Not on file     Forced sexual activity: Not on file   Other Topics Concern    Not on file   Social History Narrative    Caffeine use      Family History   Problem Relation Age of Onset    Aneurysm Mother         cerebral    Ovarian cancer Mother 67    Cirrhosis Father         alcoholic    Aneurysm Brother         abdominal aortic; cerebral    Diabetes Son      Past Surgical History:   Procedure Laterality Date    AUGMENTATION MAMMAPLASTY      breast surgery- with prosthetic implant 1999    BREAST BIOPSY Left 1974    BREAST EXCISIONAL BIOPSY Left 1993    CARDIAC CATHETERIZATION      procedure summary    CHOLECYSTECTOMY      onset 2003    HEMORRHOID SURGERY      ROTATOR CUFF REPAIR      onset 2010    TUBAL LIGATION      onset 1980       Current Outpatient Medications:     Ascorbic Acid (VITAMIN C) 1000 MG tablet, Take 1 tablet by mouth daily, Disp: , Rfl:     atorvastatin (LIPITOR) 20 mg tablet, Take 1 tablet (20 mg total) by mouth daily, Disp: 90 tablet, Rfl: 2    Biotin 5000 MCG CAPS, Take 1 capsule by mouth daily, Disp: , Rfl:     Blood Glucose Monitoring Suppl (ONE TOUCH ULTRA MINI) w/Device KIT, Test blood sugar once daily  DX: E11 9 NPI# [id number], Disp: 1 each, Rfl: 0    Cholecalciferol (VITAMIN D3) 2000 units TABS, Take 1 tablet by mouth daily, Disp: , Rfl:     CINNAMON PO, Take by mouth, Disp: , Rfl:     doxycycline hyclate (VIBRA-TABS) 100 mg tablet, Take by mouth as needed  , Disp: , Rfl:     esomeprazole (NEXIUM) 40 MG capsule, Take 1 capsule by mouth daily, Disp: , Rfl:     fluticasone (FLONASE) 50 mcg/act nasal spray, SPRAY 2 SPRAYS INTO EACH NOSTRIL EVERY DAY (Patient taking differently: as needed ), Disp: 16 mL, Rfl: 2    glucose blood (ONE TOUCH ULTRA TEST) test strip, Test blood sugar once daily  DX: E11 9 P#[id number], Disp: 100 each, Rfl: 5    Lancets (ONETOUCH ULTRASOFT) lancets, Test blood sugar once daily   DX: E11 9 NPI # [id number], Disp: 100 each, Rfl: 5    metroNIDAZOLE (METROGEL) 0 75 % gel, as needed , Disp: , Rfl: 3    Multiple Vitamin (MULTIVITAMIN) capsule, Take 1 capsule by mouth daily, Disp: , Rfl:     SYNTHROID 125 MCG tablet, TAKE 1 TABLET DAILY ON AN EMPTY STOMACH, Disp: 90 tablet, Rfl: 4    meclizine (ANTIVERT) 25 mg tablet, Take by mouth as needed  , Disp: , Rfl:   Allergies   Allergen Reactions    Hydrochlorothiazide Palpitations       Labs:  Orders Only on 09/13/2019   Component Date Value    Right Eye Diabetic Retin* 09/13/2019 None     Left Eye Diabetic Retino* 09/13/2019 None    Appointment on 08/14/2019   Component Date Value    Sodium 08/14/2019 142     Potassium 08/14/2019 3 8     Chloride 08/14/2019 105     CO2 08/14/2019 27     ANION GAP 08/14/2019 10     BUN 08/14/2019 15     Creatinine 08/14/2019 0 57*    Glucose, Fasting 08/14/2019 100*    Calcium 08/14/2019 9 2     AST 08/14/2019 20     ALT 08/14/2019 20     Alkaline Phosphatase 08/14/2019 47     Total Protein 08/14/2019 7 0     Albumin 08/14/2019 4 1     Total Bilirubin 08/14/2019 0 54     eGFR 08/14/2019 93     Hemoglobin A1C 08/14/2019 6 0     EAG 08/14/2019 126     Cholesterol 08/14/2019 191     Triglycerides 08/14/2019 152*    HDL, Direct 08/14/2019 54     LDL Calculated 08/14/2019 107*    TSH 3RD GENERATON 08/14/2019 3 460      Imaging: No results found  Review of Systems:  Review of Systems   Constitutional: Negative for fatigue  Eyes: Negative for visual disturbance  Respiratory: Negative for apnea, shortness of breath, wheezing and stridor  Cardiovascular: Positive for leg swelling  Negative for chest pain and palpitations  Gastrointestinal: Negative for abdominal pain and anal bleeding  Genitourinary: Negative for hematuria  Neurological: Negative for syncope  Hematological: Does not bruise/bleed easily  Psychiatric/Behavioral: Negative for sleep disturbance  Physical Exam:  Physical Exam   Constitutional: She appears well-developed  Non-toxic appearance  She does not appear ill  No distress     Neck: No hepatojugular reflux and no JVD present  Cardiovascular: Normal rate, regular rhythm, normal heart sounds and intact distal pulses  No extrasystoles are present  Exam reveals no gallop, no friction rub and no decreased pulses  No murmur heard  Pulmonary/Chest: Effort normal and breath sounds normal  No accessory muscle usage or stridor  No apnea, no tachypnea and no bradypnea  No respiratory distress  She has no decreased breath sounds  She has no wheezes  She has no rhonchi  Musculoskeletal:        Right lower leg: She exhibits edema (Chronic lymphedema)  Neurological: She is alert  Skin: Skin is warm and dry  Capillary refill takes less than 2 seconds  No cyanosis  Nails show no clubbing  Psychiatric: She has a normal mood and affect  Her behavior is normal    Vitals reviewed  Discussion/Summary:  Dyspnea class 3 with chest discomfort, possible angina  Patient had a stress test earlier this year she did 5 minutes of Binh protocol achieving target heart rate  There was no EKG or echocardiographic criteria suggestive ischemia, previous stress test 2 years ago suggested breast attenuation artifact  Catheterization 11 years ago revealed mild nonobstructive coronary artery disease  EKG today is abnormal with questionable lateral infarct and septal infarct but no acute changes are noted  Given the severity of symptoms, favor invasive evaluation  Cardiac catheterization has been recommended  Possible right heart catheterization as well  Asked him to start a baby aspirin daily  Further recommendations after the catheterization  The procedure was thoroughly explained to the patient, as well as his potential risk and complications, including but not limited to death, myocardial infarction, vascular complications with bleeding or thrombosis, CVA  Options including ongoing or escalating medical therapy or expectant therapy as well as the risk associated with this approach were discussed with the patient    My recommendation is that the potential benefits clearly outweigh the risk of the procedure  Questions and concerns were answered to the patient's satisfaction  The patient understands, agrees and wishes to proceed  This note was completed in part utilizing m-modal fluency direct voice recognition software  Grammatical errors, random word insertion, spelling mistakes, and incomplete sentences may be an occasional consequence of the system secondary to software limitations, ambient noise and hardware issues  At the time of dictation, efforts were made to edit, clarify and /or correct errors  Please read the chart carefully and recognize, using context, where substitutions have occurred  If you have any questions or concerns about the context, text or information contained within the body of this dictation, please contact myself, the provider, for further clarification

## 2019-12-31 NOTE — H&P (VIEW-ONLY)
Cardiology Follow Up    Eduin Christianson [facility]  1945  [id number]  [facility] [place] [address]  [phone]    1  Essential hypertension     2  Mixed hyperlipidemia     3  Chest pain, unspecified type  POCT ECG       Interval History:   Cardiology follow-up  Patient has not been feeling well  She tells me she has been experiencing dyspnea crescendo pattern over last 2-3 months to minimal efforts  Sometimes associated with some chest tightness /heaviness discomfort midsternal without radiation, usually short-lived  She has had no resting symptoms  No orthopnea no PND  He admits to be sedentary, she has been compliant with low-cholesterol diet, most recent lipid profile total cholesterol 191 HDL 54   Her diabetes under fair control recent A1c of 6 0  He does use nonsteroidals intermittently    Her statin therapy was recently increased to 20 mg daily    Patient Active Problem List   Diagnosis    Lymphedema    Fatigue    Hyperlipidemia    Hypothyroidism    Class 1 obesity due to excess calories with serious comorbidity and body mass index (BMI) of 33 0 to 33 9 in adult    Type 2 diabetes mellitus without complication, without long-term current use of insulin (HCC)    Atypical chest pain    Right lower quadrant abdominal pain    Pelvic pain    Family history of ovarian cancer    Wellness examination    Cerebral aneurysm    Carotid artery plaque    Dense breast tissue on mammogram    Fibrocystic breast changes    Dupuytren's contracture    Diverticular disease of colon    Gastro-esophageal reflux disease with esophagitis    History of adenomatous polyp of colon    Hypothyroidism due to Hashimoto's thyroiditis    Impaired fasting glucose    Nephrolithiasis    Persistent cough for 3 weeks or longer    Essential hypertension    Class 1 obesity due to excess calories without serious comorbidity with body mass index (BMI) of 31 0 to 31 9 in adult    Acute non-recurrent sinusitis    Encounter for screening mammogram for breast cancer     Past Medical History:   Diagnosis Date    Biliary dyskinesia     CAP (community acquired pneumonia)     last assessed 8/17/16    Depression     GERD (gastroesophageal reflux disease)     Hyperlipidemia     Palpitations      Social History     Socioeconomic History    Marital status: /Civil Union     Spouse name: Not on file    Number of children: Not on file    Years of education: 15    Highest education level: Not on file   Occupational History    Not on file   Social Needs    Financial resource strain: Not on file    Food insecurity:     Worry: Not on file     Inability: Not on file    Transportation needs:     Medical: Not on file     Non-medical: Not on file   Tobacco Use    Smoking status: Former Smoker     Packs/day: 0 25     Years: 17 00     Pack years: 4 25     Types: Cigarettes     Last attempt to quit: 2010     Years since quitting: 10 0    Smokeless tobacco: Never Used    Tobacco comment: 0 5 ppw intermittently last smoked 2010   Substance and Sexual Activity    Alcohol use: Yes     Comment: occasionally    Drug use: No    Sexual activity: Not on file   Lifestyle    Physical activity:     Days per week: Not on file     Minutes per session: Not on file    Stress: Not on file   Relationships    Social connections:     Talks on phone: Not on file     Gets together: Not on file     Attends Restorationism service: Not on file     Active member of club or organization: Not on file     Attends meetings of clubs or organizations: Not on file     Relationship status: Not on file    Intimate partner violence:     Fear of current or ex partner: Not on file     Emotionally abused: Not on file     Physically abused: Not on file     Forced sexual activity: Not on file   Other Topics Concern    Not on file   Social History Narrative    Caffeine use      Family History   Problem Relation Age of Onset    Aneurysm Mother         cerebral    Ovarian cancer Mother 67    Cirrhosis Father         alcoholic    Aneurysm Brother         abdominal aortic; cerebral    Diabetes Son      Past Surgical History:   Procedure Laterality Date    AUGMENTATION MAMMAPLASTY      breast surgery- with prosthetic implant 1999    BREAST BIOPSY Left 1974    BREAST EXCISIONAL BIOPSY Left 1993    CARDIAC CATHETERIZATION      procedure summary    CHOLECYSTECTOMY      onset 2003    HEMORRHOID SURGERY      ROTATOR CUFF REPAIR      onset 2010    TUBAL LIGATION      onset 1980       Current Outpatient Medications:     Ascorbic Acid (VITAMIN C) 1000 MG tablet, Take 1 tablet by mouth daily, Disp: , Rfl:     atorvastatin (LIPITOR) 20 mg tablet, Take 1 tablet (20 mg total) by mouth daily, Disp: 90 tablet, Rfl: 2    Biotin 5000 MCG CAPS, Take 1 capsule by mouth daily, Disp: , Rfl:     Blood Glucose Monitoring Suppl (ONE TOUCH ULTRA MINI) w/Device KIT, Test blood sugar once daily  DX: E11 9 NPI# [id number], Disp: 1 each, Rfl: 0    Cholecalciferol (VITAMIN D3) 2000 units TABS, Take 1 tablet by mouth daily, Disp: , Rfl:     CINNAMON PO, Take by mouth, Disp: , Rfl:     doxycycline hyclate (VIBRA-TABS) 100 mg tablet, Take by mouth as needed  , Disp: , Rfl:     esomeprazole (NEXIUM) 40 MG capsule, Take 1 capsule by mouth daily, Disp: , Rfl:     fluticasone (FLONASE) 50 mcg/act nasal spray, SPRAY 2 SPRAYS INTO EACH NOSTRIL EVERY DAY (Patient taking differently: as needed ), Disp: 16 mL, Rfl: 2    glucose blood (ONE TOUCH ULTRA TEST) test strip, Test blood sugar once daily  DX: E11 9 PAP#[id number], Disp: 100 each, Rfl: 5    Lancets (ONETOUCH ULTRASOFT) lancets, Test blood sugar once daily   DX: E11 9 NPI # [id number], Disp: 100 each, Rfl: 5    metroNIDAZOLE (METROGEL) 0 75 % gel, as needed , Disp: , Rfl: 3    Multiple Vitamin (MULTIVITAMIN) capsule, Take 1 capsule by mouth daily, Disp: , Rfl:     SYNTHROID 125 MCG tablet, TAKE 1 TABLET DAILY ON AN EMPTY STOMACH, Disp: 90 tablet, Rfl: 4    meclizine (ANTIVERT) 25 mg tablet, Take by mouth as needed  , Disp: , Rfl:   Allergies   Allergen Reactions    Hydrochlorothiazide Palpitations       Labs:  Orders Only on 09/13/2019   Component Date Value    Right Eye Diabetic Retin* 09/13/2019 None     Left Eye Diabetic Retino* 09/13/2019 None    Appointment on 08/14/2019   Component Date Value    Sodium 08/14/2019 142     Potassium 08/14/2019 3 8     Chloride 08/14/2019 105     CO2 08/14/2019 27     ANION GAP 08/14/2019 10     BUN 08/14/2019 15     Creatinine 08/14/2019 0 57*    Glucose, Fasting 08/14/2019 100*    Calcium 08/14/2019 9 2     AST 08/14/2019 20     ALT 08/14/2019 20     Alkaline Phosphatase 08/14/2019 47     Total Protein 08/14/2019 7 0     Albumin 08/14/2019 4 1     Total Bilirubin 08/14/2019 0 54     eGFR 08/14/2019 93     Hemoglobin A1C 08/14/2019 6 0     EAG 08/14/2019 126     Cholesterol 08/14/2019 191     Triglycerides 08/14/2019 152*    HDL, Direct 08/14/2019 54     LDL Calculated 08/14/2019 107*    TSH 3RD GENERATON 08/14/2019 3 460      Imaging: No results found  Review of Systems:  Review of Systems   Constitutional: Negative for fatigue  Eyes: Negative for visual disturbance  Respiratory: Negative for apnea, shortness of breath, wheezing and stridor  Cardiovascular: Positive for leg swelling  Negative for chest pain and palpitations  Gastrointestinal: Negative for abdominal pain and anal bleeding  Genitourinary: Negative for hematuria  Neurological: Negative for syncope  Hematological: Does not bruise/bleed easily  Psychiatric/Behavioral: Negative for sleep disturbance  Physical Exam:  Physical Exam   Constitutional: She appears well-developed  Non-toxic appearance  She does not appear ill  No distress     Neck: No hepatojugular reflux and no JVD present  Cardiovascular: Normal rate, regular rhythm, normal heart sounds and intact distal pulses  No extrasystoles are present  Exam reveals no gallop, no friction rub and no decreased pulses  No murmur heard  Pulmonary/Chest: Effort normal and breath sounds normal  No accessory muscle usage or stridor  No apnea, no tachypnea and no bradypnea  No respiratory distress  She has no decreased breath sounds  She has no wheezes  She has no rhonchi  Musculoskeletal:        Right lower leg: She exhibits edema (Chronic lymphedema)  Neurological: She is alert  Skin: Skin is warm and dry  Capillary refill takes less than 2 seconds  No cyanosis  Nails show no clubbing  Psychiatric: She has a normal mood and affect  Her behavior is normal    Vitals reviewed  Discussion/Summary:  Dyspnea class 3 with chest discomfort, possible angina  Patient had a stress test earlier this year she did 5 minutes of Binh protocol achieving target heart rate  There was no EKG or echocardiographic criteria suggestive ischemia, previous stress test 2 years ago suggested breast attenuation artifact  Catheterization 11 years ago revealed mild nonobstructive coronary artery disease  EKG today is abnormal with questionable lateral infarct and septal infarct but no acute changes are noted  Given the severity of symptoms, favor invasive evaluation  Cardiac catheterization has been recommended  Possible right heart catheterization as well  Asked him to start a baby aspirin daily  Further recommendations after the catheterization  The procedure was thoroughly explained to the patient, as well as his potential risk and complications, including but not limited to death, myocardial infarction, vascular complications with bleeding or thrombosis, CVA  Options including ongoing or escalating medical therapy or expectant therapy as well as the risk associated with this approach were discussed with the patient    My recommendation is that the potential benefits clearly outweigh the risk of the procedure  Questions and concerns were answered to the patient's satisfaction  The patient understands, agrees and wishes to proceed  This note was completed in part utilizing m-modal fluency direct voice recognition software  Grammatical errors, random word insertion, spelling mistakes, and incomplete sentences may be an occasional consequence of the system secondary to software limitations, ambient noise and hardware issues  At the time of dictation, efforts were made to edit, clarify and /or correct errors  Please read the chart carefully and recognize, using context, where substitutions have occurred  If you have any questions or concerns about the context, text or information contained within the body of this dictation, please contact myself, the provider, for further clarification

## 2019-12-31 NOTE — PROGRESS NOTES
Pt in office today- pt is scheduled for [facility] with Dr Chuy Villeda 1/17/20 @ [facility]  Pt requested to wait until end of January and wanted to go to [facility]  Pt will have labs done by 1/13/20 @ [address]  PT is aware of all instructions and instructions given to pt in office today

## 2020-01-10 ENCOUNTER — APPOINTMENT (OUTPATIENT)
Dept: LAB | Age: 75
End: 2020-01-10
Payer: MEDICARE

## 2020-01-10 ENCOUNTER — TRANSCRIBE ORDERS (OUTPATIENT)
Dept: ADMINISTRATIVE | Age: 75
End: 2020-01-10

## 2020-01-10 LAB
ALBUMIN SERPL BCP-MCNC: 3.6 G/DL (ref 3.5–5)
ALP SERPL-CCNC: 45 U/L (ref 46–116)
ALT SERPL W P-5'-P-CCNC: 24 U/L (ref 12–78)
ANION GAP SERPL CALCULATED.3IONS-SCNC: 4 MMOL/L (ref 4–13)
AST SERPL W P-5'-P-CCNC: 13 U/L (ref 5–45)
BASOPHILS # BLD AUTO: 0.08 THOUSANDS/ΜL (ref 0–0.1)
BASOPHILS NFR BLD AUTO: 1 % (ref 0–1)
BILIRUB SERPL-MCNC: 0.42 MG/DL (ref 0.2–1)
BUN SERPL-MCNC: 12 MG/DL (ref 5–25)
CALCIUM SERPL-MCNC: 9.3 MG/DL (ref 8.3–10.1)
CHLORIDE SERPL-SCNC: 106 MMOL/L (ref 100–108)
CO2 SERPL-SCNC: 29 MMOL/L (ref 21–32)
CREAT SERPL-MCNC: 0.64 MG/DL (ref 0.6–1.3)
EOSINOPHIL # BLD AUTO: 0.26 THOUSAND/ΜL (ref 0–0.61)
EOSINOPHIL NFR BLD AUTO: 4 % (ref 0–6)
ERYTHROCYTE [DISTWIDTH] IN BLOOD BY AUTOMATED COUNT: 13.9 % (ref 11.6–15.1)
GFR SERPL CREATININE-BSD FRML MDRD: 89 ML/MIN/1.73SQ M
GLUCOSE P FAST SERPL-MCNC: 118 MG/DL (ref 65–99)
HCT VFR BLD AUTO: 41.4 % (ref 34.8–46.1)
HGB BLD-MCNC: 12.8 G/DL (ref 11.5–15.4)
IMM GRANULOCYTES # BLD AUTO: 0.01 THOUSAND/UL (ref 0–0.2)
IMM GRANULOCYTES NFR BLD AUTO: 0 % (ref 0–2)
LYMPHOCYTES # BLD AUTO: 2.04 THOUSANDS/ΜL (ref 0.6–4.47)
LYMPHOCYTES NFR BLD AUTO: 32 % (ref 14–44)
MCH RBC QN AUTO: 27.8 PG (ref 26.8–34.3)
MCHC RBC AUTO-ENTMCNC: 30.9 G/DL (ref 31.4–37.4)
MCV RBC AUTO: 90 FL (ref 82–98)
MONOCYTES # BLD AUTO: 0.51 THOUSAND/ΜL (ref 0.17–1.22)
MONOCYTES NFR BLD AUTO: 8 % (ref 4–12)
NEUTROPHILS # BLD AUTO: 3.44 THOUSANDS/ΜL (ref 1.85–7.62)
NEUTS SEG NFR BLD AUTO: 55 % (ref 43–75)
NRBC BLD AUTO-RTO: 0 /100 WBCS
PLATELET # BLD AUTO: 326 THOUSANDS/UL (ref 149–390)
PMV BLD AUTO: 10.5 FL (ref 8.9–12.7)
POTASSIUM SERPL-SCNC: 3.9 MMOL/L (ref 3.5–5.3)
PROT SERPL-MCNC: 6.9 G/DL (ref 6.4–8.2)
RBC # BLD AUTO: 4.61 MILLION/UL (ref 3.81–5.12)
SODIUM SERPL-SCNC: 139 MMOL/L (ref 136–145)
WBC # BLD AUTO: 6.34 THOUSAND/UL (ref 4.31–10.16)

## 2020-01-10 PROCEDURE — 80053 COMPREHEN METABOLIC PANEL: CPT

## 2020-01-10 PROCEDURE — 85025 COMPLETE CBC W/AUTO DIFF WBC: CPT

## 2020-01-10 PROCEDURE — 36415 COLL VENOUS BLD VENIPUNCTURE: CPT

## 2020-01-16 RX ORDER — SODIUM CHLORIDE 9 MG/ML
100 INJECTION, SOLUTION INTRAVENOUS CONTINUOUS
Status: CANCELLED | OUTPATIENT
Start: 2020-01-17

## 2020-01-16 RX ORDER — ASPIRIN 81 MG/1
324 TABLET, CHEWABLE ORAL ONCE
Status: CANCELLED | OUTPATIENT
Start: 2020-01-16 | End: 2020-01-16

## 2020-01-17 ENCOUNTER — HOSPITAL ENCOUNTER (OUTPATIENT)
Dept: NON INVASIVE DIAGNOSTICS | Facility: HOSPITAL | Age: 75
Discharge: HOME | End: 2020-01-17
Attending: INTERNAL MEDICINE | Admitting: INTERNAL MEDICINE
Payer: MEDICARE

## 2020-01-17 VITALS
RESPIRATION RATE: 18 BRPM | BODY MASS INDEX: 32.18 KG/M2 | SYSTOLIC BLOOD PRESSURE: 151 MMHG | HEIGHT: 67 IN | HEART RATE: 75 BPM | OXYGEN SATURATION: 96 % | TEMPERATURE: 97 F | WEIGHT: 205 LBS | DIASTOLIC BLOOD PRESSURE: 57 MMHG

## 2020-01-17 DIAGNOSIS — R07.89 ATYPICAL CHEST PAIN: Primary | ICD-10-CM

## 2020-01-17 DIAGNOSIS — I10 ESSENTIAL HYPERTENSION: ICD-10-CM

## 2020-01-17 DIAGNOSIS — R07.9 CHEST PAIN, UNSPECIFIED TYPE: ICD-10-CM

## 2020-01-17 DIAGNOSIS — E78.2 MIXED HYPERLIPIDEMIA: ICD-10-CM

## 2020-01-17 LAB
ANION GAP SERPL CALCULATED.3IONS-SCNC: 5 MMOL/L (ref 4–13)
BUN SERPL-MCNC: 15 MG/DL (ref 5–25)
CALCIUM SERPL-MCNC: 8.6 MG/DL (ref 8.3–10.1)
CHLORIDE SERPL-SCNC: 106 MMOL/L (ref 100–108)
CO2 SERPL-SCNC: 28 MMOL/L (ref 21–32)
CREAT SERPL-MCNC: 0.64 MG/DL (ref 0.6–1.3)
ERYTHROCYTE [DISTWIDTH] IN BLOOD BY AUTOMATED COUNT: 14 % (ref 11.6–15.1)
GFR SERPL CREATININE-BSD FRML MDRD: 89 ML/MIN/1.73SQ M
GLUCOSE P FAST SERPL-MCNC: 106 MG/DL (ref 65–99)
GLUCOSE SERPL-MCNC: 106 MG/DL (ref 65–140)
HCT VFR BLD AUTO: 41.5 % (ref 34.8–46.1)
HGB BLD-MCNC: 12.9 G/DL (ref 11.5–15.4)
INR PPP: 1 (ref 0.84–1.19)
MCH RBC QN AUTO: 27.8 PG (ref 26.8–34.3)
MCHC RBC AUTO-ENTMCNC: 31.1 G/DL (ref 31.4–37.4)
MCV RBC AUTO: 89 FL (ref 82–98)
PLATELET # BLD AUTO: 292 THOUSANDS/UL (ref 149–390)
PMV BLD AUTO: 9.8 FL (ref 8.9–12.7)
POTASSIUM SERPL-SCNC: 5.5 MMOL/L (ref 3.5–5.3)
PROTHROMBIN TIME: 12.6 SECONDS (ref 11.6–14.5)
RBC # BLD AUTO: 4.64 MILLION/UL (ref 3.81–5.12)
SODIUM SERPL-SCNC: 139 MMOL/L (ref 136–145)
WBC # BLD AUTO: 6.5 THOUSAND/UL (ref 4.31–10.16)

## 2020-01-17 PROCEDURE — C1894 INTRO/SHEATH, NON-LASER: HCPCS | Performed by: INTERNAL MEDICINE

## 2020-01-17 PROCEDURE — C1769 GUIDE WIRE: HCPCS | Performed by: INTERNAL MEDICINE

## 2020-01-17 PROCEDURE — 82810 BLOOD GASES O2 SAT ONLY: CPT | Performed by: INTERNAL MEDICINE

## 2020-01-17 PROCEDURE — 80048 BASIC METABOLIC PNL TOTAL CA: CPT | Performed by: NURSE PRACTITIONER

## 2020-01-17 PROCEDURE — 1123F ACP DISCUSS/DSCN MKR DOCD: CPT | Performed by: INTERNAL MEDICINE

## 2020-01-17 PROCEDURE — 93460 R&L HRT ART/VENTRICLE ANGIO: CPT | Performed by: INTERNAL MEDICINE

## 2020-01-17 PROCEDURE — 85027 COMPLETE CBC AUTOMATED: CPT | Performed by: NURSE PRACTITIONER

## 2020-01-17 PROCEDURE — 85610 PROTHROMBIN TIME: CPT | Performed by: NURSE PRACTITIONER

## 2020-01-17 PROCEDURE — 99153 MOD SED SAME PHYS/QHP EA: CPT | Performed by: INTERNAL MEDICINE

## 2020-01-17 PROCEDURE — 99152 MOD SED SAME PHYS/QHP 5/>YRS: CPT | Performed by: INTERNAL MEDICINE

## 2020-01-17 RX ORDER — HEPARIN SODIUM 1000 [USP'U]/ML
INJECTION, SOLUTION INTRAVENOUS; SUBCUTANEOUS CODE/TRAUMA/SEDATION MEDICATION
Status: COMPLETED | OUTPATIENT
Start: 2020-01-17 | End: 2020-01-17

## 2020-01-17 RX ORDER — FENTANYL CITRATE 50 UG/ML
INJECTION, SOLUTION INTRAMUSCULAR; INTRAVENOUS CODE/TRAUMA/SEDATION MEDICATION
Status: COMPLETED | OUTPATIENT
Start: 2020-01-17 | End: 2020-01-17

## 2020-01-17 RX ORDER — PRASUGREL 10 MG/1
TABLET, FILM COATED ORAL CODE/TRAUMA/SEDATION MEDICATION
Status: COMPLETED | OUTPATIENT
Start: 2020-01-17 | End: 2020-01-17

## 2020-01-17 RX ORDER — ASPIRIN 81 MG/1
81 TABLET, CHEWABLE ORAL DAILY
COMMUNITY
End: 2020-10-30 | Stop reason: ALTCHOICE

## 2020-01-17 RX ORDER — ASPIRIN 81 MG/1
324 TABLET, CHEWABLE ORAL ONCE
Status: COMPLETED | OUTPATIENT
Start: 2020-01-17 | End: 2020-01-17

## 2020-01-17 RX ORDER — LIDOCAINE HYDROCHLORIDE 10 MG/ML
INJECTION, SOLUTION EPIDURAL; INFILTRATION; INTRACAUDAL; PERINEURAL CODE/TRAUMA/SEDATION MEDICATION
Status: COMPLETED | OUTPATIENT
Start: 2020-01-17 | End: 2020-01-17

## 2020-01-17 RX ORDER — SODIUM CHLORIDE 9 MG/ML
100 INJECTION, SOLUTION INTRAVENOUS CONTINUOUS
Status: DISCONTINUED | OUTPATIENT
Start: 2020-01-17 | End: 2020-01-18 | Stop reason: HOSPADM

## 2020-01-17 RX ORDER — ACETAMINOPHEN 325 MG/1
650 TABLET ORAL EVERY 4 HOURS PRN
Status: DISCONTINUED | OUTPATIENT
Start: 2020-01-17 | End: 2020-01-18 | Stop reason: HOSPADM

## 2020-01-17 RX ORDER — VERAPAMIL HYDROCHLORIDE 2.5 MG/ML
INJECTION, SOLUTION INTRAVENOUS CODE/TRAUMA/SEDATION MEDICATION
Status: COMPLETED | OUTPATIENT
Start: 2020-01-17 | End: 2020-01-17

## 2020-01-17 RX ORDER — MIDAZOLAM HYDROCHLORIDE 2 MG/2ML
INJECTION, SOLUTION INTRAMUSCULAR; INTRAVENOUS CODE/TRAUMA/SEDATION MEDICATION
Status: COMPLETED | OUTPATIENT
Start: 2020-01-17 | End: 2020-01-17

## 2020-01-17 RX ORDER — SODIUM CHLORIDE 9 MG/ML
125 INJECTION, SOLUTION INTRAVENOUS CONTINUOUS
Status: DISPENSED | OUTPATIENT
Start: 2020-01-17 | End: 2020-01-17

## 2020-01-17 RX ORDER — NITROGLYCERIN 20 MG/100ML
INJECTION INTRAVENOUS CODE/TRAUMA/SEDATION MEDICATION
Status: COMPLETED | OUTPATIENT
Start: 2020-01-17 | End: 2020-01-17

## 2020-01-17 RX ORDER — ACETAMINOPHEN,DIPHENHYDRAMINE HCL 500; 25 MG/1; MG/1
1 TABLET, FILM COATED ORAL
COMMUNITY

## 2020-01-17 RX ORDER — MELATONIN
2000 DAILY
COMMUNITY

## 2020-01-17 RX ADMIN — NITROGLYCERIN 200 MCG: 20 INJECTION INTRAVENOUS at 09:40

## 2020-01-17 RX ADMIN — FENTANYL CITRATE 50 MCG: 50 INJECTION INTRAMUSCULAR; INTRAVENOUS at 09:50

## 2020-01-17 RX ADMIN — HEPARIN SODIUM 4000 UNITS: 1000 INJECTION INTRAVENOUS; SUBCUTANEOUS at 09:39

## 2020-01-17 RX ADMIN — PRASUGREL HYDROCHLORIDE 60 MG: 10 TABLET, FILM COATED ORAL at 09:25

## 2020-01-17 RX ADMIN — IOHEXOL 66 ML: 350 INJECTION, SOLUTION INTRAVENOUS at 10:09

## 2020-01-17 RX ADMIN — FENTANYL CITRATE 50 MCG: 50 INJECTION INTRAMUSCULAR; INTRAVENOUS at 10:00

## 2020-01-17 RX ADMIN — MIDAZOLAM HYDROCHLORIDE 2 MG: 1 INJECTION, SOLUTION INTRAMUSCULAR; INTRAVENOUS at 09:28

## 2020-01-17 RX ADMIN — MIDAZOLAM HYDROCHLORIDE 1 MG: 1 INJECTION, SOLUTION INTRAMUSCULAR; INTRAVENOUS at 09:48

## 2020-01-17 RX ADMIN — MIDAZOLAM HYDROCHLORIDE 1 MG: 1 INJECTION, SOLUTION INTRAMUSCULAR; INTRAVENOUS at 09:37

## 2020-01-17 RX ADMIN — FENTANYL CITRATE 50 MCG: 50 INJECTION INTRAMUSCULAR; INTRAVENOUS at 09:28

## 2020-01-17 RX ADMIN — VERAPAMIL HYDROCHLORIDE 2.5 MG: 2.5 INJECTION, SOLUTION INTRAVENOUS at 09:39

## 2020-01-17 RX ADMIN — LIDOCAINE HYDROCHLORIDE 1 ML: 10 INJECTION, SOLUTION EPIDURAL; INFILTRATION; INTRACAUDAL; PERINEURAL at 09:36

## 2020-01-17 RX ADMIN — SODIUM CHLORIDE 100 ML/HR: 0.9 INJECTION, SOLUTION INTRAVENOUS at 08:19

## 2020-01-17 RX ADMIN — FENTANYL CITRATE 50 MCG: 50 INJECTION INTRAMUSCULAR; INTRAVENOUS at 09:37

## 2020-01-17 RX ADMIN — LIDOCAINE HYDROCHLORIDE 8 ML: 10 INJECTION, SOLUTION EPIDURAL; INFILTRATION; INTRACAUDAL; PERINEURAL at 09:50

## 2020-01-17 RX ADMIN — ASPIRIN 81 MG 243 MG: 81 TABLET ORAL at 08:18

## 2020-01-17 NOTE — DISCHARGE INSTRUCTIONS
1  Please see the post cardiac catheterization dishcarge instructions  No heavy lifting, greater than 10 lbs  or strenuous  activity for 48 hrs  2 Remove band aid tomorrow  Shower and wash area- wrist and groin gently with soap and water- beginning tomorrow  Rinse and pat dry  Apply new water seal band aid  Repeat this process for 5 days  No powders, creams lotions or antibiotic ointments  for 5 days  No tub baths, hot tubs or swimming for 5 days  3  Please call our office ([phone]) if you have any fever, redness, swelling, discharge from your wrist and groin access site      4 No driving for 2 days

## 2020-01-17 NOTE — INTERVAL H&P NOTE
/65   Pulse 79   Temp 97 6 °F (36 4 °C) (Temporal)   Resp 18   Ht 5' 7" (1 702 m)   Wt 93 kg (205 lb)   SpO2 93%   BMI 32 11 kg/m²   H&P reviewed  After examining the patient, I find no changed to the H&P since it had been written  Patient re-evaluated   Accept as history and physical     Sharon Cornell MD/January 17, 2020/8:41 AM

## 2020-01-20 DIAGNOSIS — E78.5 HYPERLIPIDEMIA, UNSPECIFIED HYPERLIPIDEMIA TYPE: ICD-10-CM

## 2020-01-20 RX ORDER — ATORVASTATIN CALCIUM 20 MG/1
20 TABLET, FILM COATED ORAL DAILY
Qty: 90 TABLET | Refills: 2 | Status: SHIPPED | OUTPATIENT
Start: 2020-01-20 | End: 2020-09-30

## 2020-01-27 ENCOUNTER — HOSPITAL ENCOUNTER (OUTPATIENT)
Dept: NON INVASIVE DIAGNOSTICS | Facility: CLINIC | Age: 75
Discharge: HOME | End: 2020-01-27
Payer: MEDICARE

## 2020-01-27 ENCOUNTER — TRANSCRIBE ORDERS (OUTPATIENT)
Dept: ADMINISTRATIVE | Facility: HOSPITAL | Age: 75
End: 2020-01-27

## 2020-01-27 DIAGNOSIS — I82.4Y1 DEEP VEIN THROMBOSIS (DVT) OF PROXIMAL VEIN OF RIGHT LOWER EXTREMITY, UNSPECIFIED CHRONICITY (HCC): ICD-10-CM

## 2020-01-27 DIAGNOSIS — I82.4Y1 DEEP VEIN THROMBOSIS (DVT) OF PROXIMAL VEIN OF RIGHT LOWER EXTREMITY, UNSPECIFIED CHRONICITY (HCC): Primary | ICD-10-CM

## 2020-01-27 PROCEDURE — 93971 EXTREMITY STUDY: CPT | Performed by: SURGERY

## 2020-01-27 PROCEDURE — 93971 EXTREMITY STUDY: CPT

## 2020-02-18 ENCOUNTER — EVALUATION (OUTPATIENT)
Dept: PHYSICAL THERAPY | Age: 75
End: 2020-02-18
Payer: MEDICARE

## 2020-02-18 DIAGNOSIS — M25.561 ACUTE PAIN OF RIGHT KNEE: Primary | ICD-10-CM

## 2020-02-18 PROCEDURE — 97110 THERAPEUTIC EXERCISES: CPT | Performed by: PHYSICAL THERAPIST

## 2020-02-18 PROCEDURE — 97161 PT EVAL LOW COMPLEX 20 MIN: CPT | Performed by: PHYSICAL THERAPIST

## 2020-02-18 NOTE — PROGRESS NOTES
PT Evaluation     Today's date: 2020  Patient name: Lizbeth Tracy  : 1945  MRN: [id number]  Referring provider: Janis Maria MD  Dx:   Encounter Diagnosis     ICD-10-CM    1  Acute pain of right knee [id number] [id number]                   Assessment  Assessment details: Patient presents with R knee pain - presents with decreased AROM, PROM, weakness, mild swelling, and pain  Impairments: abnormal or restricted ROM, impaired physical strength, lacks appropriate home exercise program and pain with function  Understanding of Dx/Px/POC: excellent  Goals  Short Term goals - 4 weeks  1  Patient will be independent HEP  2   Patient will report a 50% decrease in pain complaints  3   Increase strength 1/2 grade  4   Increase ROM 5-10 degrees  Long Term goals - 8 weeks  1  Patient will report elimination of pain complaints  2   Patient will return to all work related activities without restriction  3   Patient will return to all recreational activities without restriction  4   ROM WFL  5   Strength 5/5  Plan  Planned therapy interventions: manual therapy, strengthening and stretching        Subjective Evaluation    History of Present Illness  Mechanism of injury: Patient reports insidious onset of R posterior knee pain - possibly after bowling  X-rays show minimal OA changes to patient  Patient was placed on a 2 weeks prednisone course which has helped sx's  Patient currently complaining of pain with bending activities, prolonged standing, and stairs      Quality of life: good    Pain  Current pain ratin  At best pain rating: 3  At worst pain ratin  Quality: tight, throbbing, pressure and dull ache  Aggravating factors: sitting, standing, walking and stair climbing  Progression: improved    Patient Goals  Patient goals for therapy: decreased pain, increased strength, independence with ADLs/IADLs and increased motion          Objective     Palpation     Right   No palpable tenderness to the distal biceps femoris, distal semimembranosus and distal semitendinosus  Active Range of Motion     Right Knee   Flexion: 120 degrees with pain  Extensor la degrees with pain    Strength/Myotome Testing     Right Knee   Flexion: 3+  Extension: 3+    Tests     Right Knee   Negative anterior Lachman, posterior drawer, posterior Lachman, valgus stress test at 0 degrees and varus stress test at 0 degrees  General Comments:      Knee Comments  Mild swelling noted - posteriorly primarily               Precautions: None       Manual                           LC                                                        Exercise Diary                                        TKE             SLR flexion             SLR abd                          Seated hamstring stretch                          cybex knee flexion             cybex knee ext             cybex leg press - no pain                                                                                                                                      Modalities

## 2020-02-24 ENCOUNTER — APPOINTMENT (OUTPATIENT)
Dept: PHYSICAL THERAPY | Age: 75
End: 2020-02-24
Payer: MEDICARE

## 2020-02-25 ENCOUNTER — APPOINTMENT (OUTPATIENT)
Dept: LAB | Age: 75
End: 2020-02-25
Payer: MEDICARE

## 2020-02-25 DIAGNOSIS — E78.5 HYPERLIPIDEMIA, UNSPECIFIED HYPERLIPIDEMIA TYPE: ICD-10-CM

## 2020-02-25 DIAGNOSIS — E11.9 TYPE 2 DIABETES MELLITUS WITHOUT COMPLICATION, WITHOUT LONG-TERM CURRENT USE OF INSULIN (HCC): ICD-10-CM

## 2020-02-25 LAB
ALBUMIN SERPL BCP-MCNC: 3.6 G/DL (ref 3.5–5)
ALP SERPL-CCNC: 40 U/L (ref 46–116)
ALT SERPL W P-5'-P-CCNC: 22 U/L (ref 12–78)
ANION GAP SERPL CALCULATED.3IONS-SCNC: 5 MMOL/L (ref 4–13)
AST SERPL W P-5'-P-CCNC: 17 U/L (ref 5–45)
BILIRUB SERPL-MCNC: 0.59 MG/DL (ref 0.2–1)
BUN SERPL-MCNC: 9 MG/DL (ref 5–25)
CALCIUM SERPL-MCNC: 9.1 MG/DL (ref 8.3–10.1)
CHLORIDE SERPL-SCNC: 107 MMOL/L (ref 100–108)
CHOLEST SERPL-MCNC: 158 MG/DL (ref 50–200)
CO2 SERPL-SCNC: 28 MMOL/L (ref 21–32)
CREAT SERPL-MCNC: 0.59 MG/DL (ref 0.6–1.3)
CREAT UR-MCNC: 101 MG/DL
EST. AVERAGE GLUCOSE BLD GHB EST-MCNC: 126 MG/DL
GFR SERPL CREATININE-BSD FRML MDRD: 92 ML/MIN/1.73SQ M
GLUCOSE P FAST SERPL-MCNC: 125 MG/DL (ref 65–99)
HBA1C MFR BLD: 6 %
HDLC SERPL-MCNC: 46 MG/DL
LDLC SERPL CALC-MCNC: 75 MG/DL (ref 0–100)
MICROALBUMIN UR-MCNC: 8.1 MG/L (ref 0–20)
MICROALBUMIN/CREAT 24H UR: 8 MG/G CREATININE (ref 0–30)
POTASSIUM SERPL-SCNC: 4.2 MMOL/L (ref 3.5–5.3)
PROT SERPL-MCNC: 6.4 G/DL (ref 6.4–8.2)
SODIUM SERPL-SCNC: 140 MMOL/L (ref 136–145)
TRIGL SERPL-MCNC: 185 MG/DL

## 2020-02-25 PROCEDURE — 82043 UR ALBUMIN QUANTITATIVE: CPT | Performed by: INTERNAL MEDICINE

## 2020-02-25 PROCEDURE — 82570 ASSAY OF URINE CREATININE: CPT | Performed by: INTERNAL MEDICINE

## 2020-02-25 PROCEDURE — 80053 COMPREHEN METABOLIC PANEL: CPT

## 2020-02-25 PROCEDURE — 80061 LIPID PANEL: CPT

## 2020-02-25 PROCEDURE — 83036 HEMOGLOBIN GLYCOSYLATED A1C: CPT

## 2020-02-25 PROCEDURE — 36415 COLL VENOUS BLD VENIPUNCTURE: CPT

## 2020-02-26 ENCOUNTER — APPOINTMENT (OUTPATIENT)
Dept: PHYSICAL THERAPY | Age: 75
End: 2020-02-26
Payer: MEDICARE

## 2020-02-27 ENCOUNTER — TRANSCRIBE ORDERS (OUTPATIENT)
Dept: ADMINISTRATIVE | Facility: HOSPITAL | Age: 75
End: 2020-02-27

## 2020-02-27 DIAGNOSIS — M25.561 ACUTE PAIN OF RIGHT KNEE: Primary | ICD-10-CM

## 2020-02-28 ENCOUNTER — OFFICE VISIT (OUTPATIENT)
Dept: INTERNAL MEDICINE CLINIC | Facility: CLINIC | Age: 75
End: 2020-02-28
Payer: MEDICARE

## 2020-02-28 VITALS
HEIGHT: 66 IN | BODY MASS INDEX: 33.14 KG/M2 | OXYGEN SATURATION: 94 % | SYSTOLIC BLOOD PRESSURE: 127 MMHG | TEMPERATURE: 98.6 F | WEIGHT: 206.2 LBS | DIASTOLIC BLOOD PRESSURE: 66 MMHG | HEART RATE: 96 BPM

## 2020-02-28 DIAGNOSIS — Z00.00 MEDICARE ANNUAL WELLNESS VISIT, SUBSEQUENT: ICD-10-CM

## 2020-02-28 DIAGNOSIS — E03.9 HYPOTHYROIDISM, UNSPECIFIED TYPE: ICD-10-CM

## 2020-02-28 DIAGNOSIS — E66.09 CLASS 1 OBESITY DUE TO EXCESS CALORIES WITH SERIOUS COMORBIDITY AND BODY MASS INDEX (BMI) OF 33.0 TO 33.9 IN ADULT: ICD-10-CM

## 2020-02-28 DIAGNOSIS — E11.9 TYPE 2 DIABETES MELLITUS WITHOUT COMPLICATION, WITHOUT LONG-TERM CURRENT USE OF INSULIN (HCC): ICD-10-CM

## 2020-02-28 DIAGNOSIS — I10 ESSENTIAL HYPERTENSION: Primary | ICD-10-CM

## 2020-02-28 DIAGNOSIS — E78.5 HYPERLIPIDEMIA, UNSPECIFIED HYPERLIPIDEMIA TYPE: ICD-10-CM

## 2020-02-28 PROCEDURE — 1170F FXNL STATUS ASSESSED: CPT | Performed by: INTERNAL MEDICINE

## 2020-02-28 PROCEDURE — 3044F HG A1C LEVEL LT 7.0%: CPT | Performed by: INTERNAL MEDICINE

## 2020-02-28 PROCEDURE — 4040F PNEUMOC VAC/ADMIN/RCVD: CPT | Performed by: INTERNAL MEDICINE

## 2020-02-28 PROCEDURE — 99214 OFFICE O/P EST MOD 30 MIN: CPT | Performed by: INTERNAL MEDICINE

## 2020-02-28 PROCEDURE — 3078F DIAST BP <80 MM HG: CPT | Performed by: INTERNAL MEDICINE

## 2020-02-28 PROCEDURE — 3074F SYST BP LT 130 MM HG: CPT | Performed by: INTERNAL MEDICINE

## 2020-02-28 PROCEDURE — 1036F TOBACCO NON-USER: CPT | Performed by: INTERNAL MEDICINE

## 2020-02-28 PROCEDURE — 1160F RVW MEDS BY RX/DR IN RCRD: CPT | Performed by: INTERNAL MEDICINE

## 2020-02-28 PROCEDURE — 1125F AMNT PAIN NOTED PAIN PRSNT: CPT | Performed by: INTERNAL MEDICINE

## 2020-02-28 PROCEDURE — 2022F DILAT RTA XM EVC RTNOPTHY: CPT | Performed by: INTERNAL MEDICINE

## 2020-02-28 PROCEDURE — G0439 PPPS, SUBSEQ VISIT: HCPCS | Performed by: INTERNAL MEDICINE

## 2020-02-28 NOTE — PROGRESS NOTES
Assessment and Plan:     Problem List Items Addressed This Visit        Endocrine    Hypothyroidism     Hypothyroidism controlled the patient is currently euthyroid          Type 2 diabetes mellitus without complication, without long-term current use of insulin ([facility] [address] )       Lab Results   Component Value Date    HGBA1C 6 0 (H) 02/25/2020   I have counselled the pt to follow a healthy and balanced diet ,and recommend routine exercise  Annual eye examination recommended and the foot examination was completed today         Relevant Orders    Diabetic foot exam    Hemoglobin A1C    Microalbumin / creatinine urine ratio       Cardiovascular and Mediastinum    Essential hypertension - Primary     Hypertension - controlled, I have counseled patient following healthy balance diet, I would like the patient reduce sodium, exercise routinely, I would like the patient continued the med current medical regiment and we will continue to monitor  Relevant Orders    Comprehensive metabolic panel    Lipid Panel with Direct LDL reflex       Other    Hyperlipidemia     Hyperlipidemia controlled continue with current medical regiment recommend a low-cholesterol diet and recommend routine exercise we will continue to monitor the progress  Class 1 obesity due to excess calories with serious comorbidity and body mass index (BMI) of 33 0 to 33 9 in adult     Obesity -I have counseled patient following healthy and balanced diet, I would like the patient to lose weight, I would like the patient exercise routinely; we will continue monitor the patient's progress  Medicare annual wellness visit, subsequent     Assessment and plan 1  Medicare subsequent annual wellness examination overall the patient is clinically stable and doing well, we encouraged the patient to follow a healthy and balanced diet  We recommend that the patient exercise routinely approximately 30 minutes 5 times per week     We have reviewed the patient's vaccines and have made recommendations for updates if necessary  annual flu vaccine, she has received the new shingles vaccine     We will be ordering screening laboratories which are age appropriate  Return to the office in   6 months   call if any problems  BMI Counseling: Body mass index is 33 08 kg/m²  The BMI is above normal  Nutrition recommendations include decreasing portion sizes, encouraging healthy choices of fruits and vegetables and moderation in carbohydrate intake  Exercise recommendations include exercising 3-5 times per week  Preventive health issues were discussed with patient, and age appropriate screening tests were ordered as noted in patient's After Visit Summary  Personalized health advice and appropriate referrals for health education or preventive services given if needed, as noted in patient's After Visit Summary       History of Present Illness:     Patient presents for Medicare Annual Wellness visit    Patient Care Team:  [facility] as PCP - General  MD Lynne Cardozo, MD Chanel Lynch MD Darris Sport, DO Deneen Martyr, MD Heber Pickler, MD Cici Ramirez, DPM     Problem List:     Patient Active Problem List   Diagnosis    Lymphedema    Fatigue    Hyperlipidemia    Hypothyroidism    Class 1 obesity due to excess calories with serious comorbidity and body mass index (BMI) of 33 0 to 33 9 in adult    Type 2 diabetes mellitus without complication, without long-term current use of insulin ([address] )    Atypical chest pain    Right lower quadrant abdominal pain    Pelvic pain    Family history of ovarian cancer    Wellness examination    Cerebral aneurysm    Carotid artery plaque    Dense breast tissue on mammogram    Fibrocystic breast changes    Dupuytren's contracture    Diverticular disease of colon    Gastro-esophageal reflux disease with esophagitis    History of adenomatous polyp of colon    Hypothyroidism due to Hashimoto's thyroiditis    Impaired fasting glucose    Nephrolithiasis    Persistent cough for 3 weeks or longer    Essential hypertension    Class 1 obesity due to excess calories without serious comorbidity with body mass index (BMI) of 31 0 to 31 9 in adult    Acute non-recurrent sinusitis    Encounter for screening mammogram for breast cancer    Medicare annual wellness visit, subsequent      Past Medical and Surgical History:     Past Medical History:   Diagnosis Date    Biliary dyskinesia     CAP (community acquired pneumonia)     last assessed 8/17/16    GERD (gastroesophageal reflux disease)     Hyperlipidemia     Palpitations     Shortness of breath      Past Surgical History:   Procedure Laterality Date    AUGMENTATION MAMMAPLASTY      breast surgery- with prosthetic implant 1999    BREAST BIOPSY Left 1974    BREAST EXCISIONAL BIOPSY Left 1993    CARDIAC CATHETERIZATION      procedure summary    CHOLECYSTECTOMY      onset 2003    HEMORRHOID SURGERY      ROTATOR CUFF REPAIR Bilateral     onset 2010    TUBAL LIGATION      onset 1980      Family History:     Family History   Problem Relation Age of Onset    Aneurysm Mother         cerebral    Ovarian cancer Mother 67    Cirrhosis Father         alcoholic    Aneurysm Brother         abdominal aortic; cerebral    Diabetes Son       Social History:        Social History     Socioeconomic History    Marital status: /Civil Union     Spouse name: Not on file    Number of children: Not on file    Years of education: 15    Highest education level: Not on file   Occupational History    Not on file   Social Needs    Financial resource strain: Not on file    Food insecurity:     Worry: Not on file     Inability: Not on file    Transportation needs:     Medical: Not on file     Non-medical: Not on file   Tobacco Use    Smoking status: Former Smoker     Packs/day: 0 25 Years: 17 00     Pack years: 4 25     Types: Cigarettes     Last attempt to quit: 2010     Years since quitting: 10 1    Smokeless tobacco: Never Used    Tobacco comment: 0 5 ppw intermittently last smoked 2010   Substance and Sexual Activity    Alcohol use: Yes     Frequency: 2-4 times a month     Comment: occasionally    Drug use: No    Sexual activity: Not on file   Lifestyle    Physical activity:     Days per week: Not on file     Minutes per session: Not on file    Stress: Not on file   Relationships    Social connections:     Talks on phone: Not on file     Gets together: Not on file     Attends Jainism service: Not on file     Active member of club or organization: Not on file     Attends meetings of clubs or organizations: Not on file     Relationship status: Not on file    Intimate partner violence:     Fear of current or ex partner: Not on file     Emotionally abused: Not on file     Physically abused: Not on file     Forced sexual activity: Not on file   Other Topics Concern    Not on file   Social History Narrative    Caffeine use      Medications and Allergies:     Current Outpatient Medications   Medication Sig Dispense Refill    Ascorbic Acid (VITAMIN C) 1000 MG tablet Take 1 tablet by mouth daily      aspirin 81 mg chewable tablet Chew 81 mg daily      atorvastatin (LIPITOR) 20 mg tablet Take 1 tablet (20 mg total) by mouth daily 90 tablet 2    Biotin 5000 MCG CAPS Take 1 capsule by mouth daily      cholecalciferol (VITAMIN D3) 1,000 units tablet Take 1,000 Units by mouth daily      CINNAMON PO Take by mouth      diphenhydrAMINE-acetaminophen (TYLENOL PM)  MG TABS Take 1 tablet by mouth daily at bedtime as needed for sleep      doxycycline hyclate (VIBRA-TABS) 100 mg tablet Take by mouth as needed        esomeprazole (NEXIUM) 40 MG capsule Take 1 capsule by mouth daily      fluticasone (FLONASE) 50 mcg/act nasal spray SPRAY 2 SPRAYS INTO EACH NOSTRIL EVERY DAY (Patient taking differently: as needed ) 16 mL 2    meclizine (ANTIVERT) 25 mg tablet Take by mouth as needed        metroNIDAZOLE (METROGEL) 0 75 % gel as needed   3    Multiple Vitamin (MULTIVITAMIN) capsule Take 1 capsule by mouth daily      SYNTHROID 125 MCG tablet TAKE 1 TABLET DAILY ON AN EMPTY STOMACH 90 tablet 4     No current facility-administered medications for this visit  Allergies   Allergen Reactions    Hydrochlorothiazide Palpitations      Immunizations:     Immunization History   Administered Date(s) Administered    H1N1, All Formulations 01/13/2010    INFLUENZA 09/19/2018    Influenza Split High Dose Preservative Free IM 09/17/2016, 08/21/2017, 08/30/2019    Influenza, high dose seasonal 0 5 mL 09/19/2018    Pneumococcal Conjugate 13-Valent 01/04/2017    Pneumococcal Polysaccharide PPV23 05/29/2012, 05/29/2012    Td (adult), adsorbed 01/01/2004    Tdap 08/16/2016    Zoster 08/27/2007, 05/08/2018, 07/15/2018    Zoster Vaccine Recombinant 05/08/2018, 07/15/2018      Health Maintenance:         Topic Date Due    MAMMOGRAM  03/06/2020    CRC Screening: Colonoscopy  09/20/2027    Hepatitis C Screening  Completed     There are no preventive care reminders to display for this patient  Medicare Health Risk Assessment:     /66   Pulse 96   Temp 98 6 °F (37 °C)   Ht 5' 6 2" (1 681 m)   Wt 93 5 kg (206 lb 3 2 oz)   SpO2 94%   BMI 33 08 kg/m²          Health Risk Assessment:   Patient rates overall health as good  Patient feels that their physical health rating is same  Eyesight was rated as same  Hearing was rated as same  Patient feels that their emotional and mental health rating is same  Pain experienced in the last 7 days has been a lot  Patient's pain rating has been 7/10  Patient states that she has experienced no weight loss or gain in last 6 months  Depression Screening:   PHQ-2 Score: 0      Fall Risk Screening:    In the past year, patient has experienced: no history of falling in past year      Urinary Incontinence Screening:   INCONTINENCE    Home Safety:  Patient has trouble with stairs inside or outside of their home  Patient has working smoke alarms and has working carbon monoxide detector  Home safety hazards include: none  Nutrition:   Current diet is Regular  WEIGHT WATCHERS    Medications:   Patient is currently taking over-the-counter supplements  OTC medications include: see medication list  Patient is able to manage medications  Activities of Daily Living (ADLs)/Instrumental Activities of Daily Living (IADLs):   Walk and transfer into and out of bed and chair?: Yes  Dress and groom yourself?: Yes    Bathe or shower yourself?: Yes    Feed yourself? Yes  Do your laundry/housekeeping?: Yes  Manage your money, pay your bills and track your expenses?: Yes  Make your own meals?: Yes    Do your own shopping?: Yes    Durable Medical Equipment Suppliers  NONE    Previous Hospitalizations:   Any hospitalizations or ED visits within the last 12 months?: No      Advance Care Planning:   Living will: Yes    Durable POA for healthcare:  Yes    Advanced directive: Yes      PREVENTIVE SCREENINGS      Cardiovascular Screening:    General: Screening Not Indicated and History Lipid Disorder      Diabetes Screening:     General: Screening Not Indicated and History Diabetes      Colorectal Cancer Screening:     General: Screening Current      Breast Cancer Screening:     General: Screening Current      Cervical Cancer Screening:    General: Screening Not Indicated      Hepatitis C Screening:    General: Screening Current      Kylah Malone DO

## 2020-02-28 NOTE — PROGRESS NOTES
Diabetic Foot Exam    Patient's shoes and socks removed  Right Foot/Ankle   Right Foot Inspection  Skin Exam: skin normal and skin intact no dry skin, no warmth, no callus, no erythema, no maceration, no abnormal color, no pre-ulcer, no ulcer and no callus                          Toe Exam: ROM and strength within normal limits  Sensory       Monofilament testing: intact  Vascular    The right DP pulse is 2+  The right PT pulse is 2+  Left Foot/Ankle  Left Foot Inspection  Skin Exam: skin normal and skin intactno dry skin, no warmth, no erythema, no maceration, normal color, no pre-ulcer, no ulcer and no callus                         Toe Exam: ROM and strength within normal limits                   Sensory       Monofilament: intact  Vascular    The left DP pulse is 2+  The left PT pulse is 2+  Assign Risk Category:  No deformity present; No loss of protective sensation; No weak pulses       Risk: 0    Assessment/Plan:    Hypothyroidism  Hypothyroidism controlled the patient is currently euthyroid     Type 2 diabetes mellitus without complication, without long-term current use of insulin ([facility] [address] )    Lab Results   Component Value Date    HGBA1C 6 0 (H) 02/25/2020   I have counselled the pt to follow a healthy and balanced diet ,and recommend routine exercise  Annual eye examination recommended and the foot examination was completed today    Essential hypertension  Hypertension - controlled, I have counseled patient following healthy balance diet, I would like the patient reduce sodium, exercise routinely, I would like the patient continued the med current medical regiment and we will continue to monitor      Class 1 obesity due to excess calories with serious comorbidity and body mass index (BMI) of 33 0 to 33 9 in adult  Obesity -I have counseled patient following healthy and balanced diet, I would like the patient to lose weight, I would like the patient exercise routinely; we will continue monitor the patient's progress  Hyperlipidemia  Hyperlipidemia controlled continue with current medical regiment recommend a low-cholesterol diet and recommend routine exercise we will continue to monitor the progress  Problem List Items Addressed This Visit        Endocrine    Hypothyroidism     Hypothyroidism controlled the patient is currently euthyroid          Type 2 diabetes mellitus without complication, without long-term current use of insulin ([facility] [address] )       Lab Results   Component Value Date    HGBA1C 6 0 (H) 02/25/2020   I have counselled the pt to follow a healthy and balanced diet ,and recommend routine exercise  Annual eye examination recommended and the foot examination was completed today         Relevant Orders    Diabetic foot exam    Hemoglobin A1C    Microalbumin / creatinine urine ratio       Cardiovascular and Mediastinum    Essential hypertension - Primary     Hypertension - controlled, I have counseled patient following healthy balance diet, I would like the patient reduce sodium, exercise routinely, I would like the patient continued the med current medical regiment and we will continue to monitor  Relevant Orders    Comprehensive metabolic panel    Lipid Panel with Direct LDL reflex       Other    Hyperlipidemia     Hyperlipidemia controlled continue with current medical regiment recommend a low-cholesterol diet and recommend routine exercise we will continue to monitor the progress  Class 1 obesity due to excess calories with serious comorbidity and body mass index (BMI) of 33 0 to 33 9 in adult     Obesity -I have counseled patient following healthy and balanced diet, I would like the patient to lose weight, I would like the patient exercise routinely; we will continue monitor the patient's progress  Return to office 6  months  call if any problems  Subjective:      Patient ID: Gosia Bell is a 69 y o  female      HPI 72-year old female coming in for a follow up office visit regarding hypertension, type 2 diabetes, hypothyroidism, hyperlipidemia and obesity; The patient reports me compliant taking medications without untoward side effects the  The patient is here to review his medical condition, update me on the medical condition and the patient reports me no hospitalizations and no ER visits  She reports me overall she is feeling well she does have chronic shortness of breath underwent a cardiac catheterization that was negative she will be seeing Pulmonary for a workup  She denies cough she denies wheezing she does have a former history of smoking may years ago and also history of secondhand smoke exposure  ? Though if she might be deconditioned    The following portions of the patient's history were reviewed and updated as appropriate: allergies, current medications, past family history, past medical history, past social history, past surgical history and problem list     Review of Systems   Constitutional: Negative for activity change, appetite change and unexpected weight change  HENT: Negative for congestion and postnasal drip  Eyes: Negative for visual disturbance  Respiratory: Positive for shortness of breath (Chronic dyspnea on exertion)  Negative for cough  Cardiovascular: Negative for chest pain  Gastrointestinal: Negative for abdominal pain, diarrhea, nausea and vomiting  Neurological: Negative for dizziness, light-headedness and headaches  Hematological: Negative for adenopathy  Objective:    No follow-ups on file  No results found        Allergies   Allergen Reactions    Hydrochlorothiazide Palpitations       Past Medical History:   Diagnosis Date    Biliary dyskinesia     CAP (community acquired pneumonia)     last assessed 8/17/16    GERD (gastroesophageal reflux disease)     Hyperlipidemia     Palpitations     Shortness of breath      Past Surgical History:   Procedure Laterality Date    AUGMENTATION MAMMAPLASTY breast surgery- with prosthetic implant 1999    BREAST BIOPSY Left 1974    BREAST EXCISIONAL BIOPSY Left 1993    CARDIAC CATHETERIZATION      procedure summary    CHOLECYSTECTOMY      onset 2003    HEMORRHOID SURGERY      ROTATOR CUFF REPAIR Bilateral     onset 2010    TUBAL LIGATION      onset 1980     Current Outpatient Medications on File Prior to Visit   Medication Sig Dispense Refill    Ascorbic Acid (VITAMIN C) 1000 MG tablet Take 1 tablet by mouth daily      aspirin 81 mg chewable tablet Chew 81 mg daily      atorvastatin (LIPITOR) 20 mg tablet Take 1 tablet (20 mg total) by mouth daily 90 tablet 2    Biotin 5000 MCG CAPS Take 1 capsule by mouth daily      cholecalciferol (VITAMIN D3) 1,000 units tablet Take 1,000 Units by mouth daily      CINNAMON PO Take by mouth      diphenhydrAMINE-acetaminophen (TYLENOL PM)  MG TABS Take 1 tablet by mouth daily at bedtime as needed for sleep      doxycycline hyclate (VIBRA-TABS) 100 mg tablet Take by mouth as needed        esomeprazole (NEXIUM) 40 MG capsule Take 1 capsule by mouth daily      fluticasone (FLONASE) 50 mcg/act nasal spray SPRAY 2 SPRAYS INTO EACH NOSTRIL EVERY DAY (Patient taking differently: as needed ) 16 mL 2    meclizine (ANTIVERT) 25 mg tablet Take by mouth as needed        metroNIDAZOLE (METROGEL) 0 75 % gel as needed   3    Multiple Vitamin (MULTIVITAMIN) capsule Take 1 capsule by mouth daily      SYNTHROID 125 MCG tablet TAKE 1 TABLET DAILY ON AN EMPTY STOMACH 90 tablet 4    [DISCONTINUED] naproxen sodium (ALEVE) 220 MG tablet Take by mouth       No current facility-administered medications on file prior to visit        Family History   Problem Relation Age of Onset    Aneurysm Mother         cerebral    Ovarian cancer Mother 67    Cirrhosis Father         alcoholic    Aneurysm Brother         abdominal aortic; cerebral    Diabetes Son      Social History     Socioeconomic History    Marital status: /Civil Union     Spouse name: Not on file    Number of children: Not on file    Years of education: 15    Highest education level: Not on file   Occupational History    Not on file   Social Needs    Financial resource strain: Not on file    Food insecurity:     Worry: Not on file     Inability: Not on file    Transportation needs:     Medical: Not on file     Non-medical: Not on file   Tobacco Use    Smoking status: Former Smoker     Packs/day: 0 25     Years: 17 00     Pack years: 4 25     Types: Cigarettes     Last attempt to quit: 2010     Years since quitting: 10 1    Smokeless tobacco: Never Used    Tobacco comment: 0 5 ppw intermittently last smoked 2010   Substance and Sexual Activity    Alcohol use: Yes     Frequency: 2-4 times a month     Comment: occasionally    Drug use: No    Sexual activity: Not on file   Lifestyle    Physical activity:     Days per week: Not on file     Minutes per session: Not on file    Stress: Not on file   Relationships    Social connections:     Talks on phone: Not on file     Gets together: Not on file     Attends Mu-ism service: Not on file     Active member of club or organization: Not on file     Attends meetings of clubs or organizations: Not on file     Relationship status: Not on file    Intimate partner violence:     Fear of current or ex partner: Not on file     Emotionally abused: Not on file     Physically abused: Not on file     Forced sexual activity: Not on file   Other Topics Concern    Not on file   Social History Narrative    Caffeine use     Vitals:    02/28/20 0810   BP: 127/66   Pulse: 96   Temp: 98 6 °F (37 °C)   SpO2: 94%   Weight: 93 5 kg (206 lb 3 2 oz)   Height: 5' 6 2" (1 681 m)     Results for orders placed or performed in visit on 02/25/20   Comprehensive metabolic panel   Result Value Ref Range    Sodium 140 136 - 145 mmol/L    Potassium 4 2 3 5 - 5 3 mmol/L    Chloride 107 100 - 108 mmol/L    CO2 28 21 - 32 mmol/L    ANION GAP 5 4 - 13 mmol/L    BUN 9 5 - 25 mg/dL    Creatinine 0 59 (L) 0 60 - 1 30 mg/dL    Glucose, Fasting 125 (H) 65 - 99 mg/dL    Calcium 9 1 8 3 - 10 1 mg/dL    AST 17 5 - 45 U/L    ALT 22 12 - 78 U/L    Alkaline Phosphatase 40 (L) 46 - 116 U/L    Total Protein 6 4 6 4 - 8 2 g/dL    Albumin 3 6 3 5 - 5 0 g/dL    Total Bilirubin 0 59 0 20 - 1 00 mg/dL    eGFR 92 ml/min/1 73sq m   Hemoglobin A1C   Result Value Ref Range    Hemoglobin A1C 6 0 (H) Normal 3 8-5 6%; PreDiabetic 5 7-6 4%; Diabetic >=6 5%; Glycemic control for adults with diabetes <7 0% %     mg/dl   Lipid Panel with Direct LDL reflex   Result Value Ref Range    Cholesterol 158 50 - 200 mg/dL    Triglycerides 185 (H) <=150 mg/dL    HDL, Direct 46 >=40 mg/dL    LDL Calculated 75 0 - 100 mg/dL     Weight (last 2 days)     Date/Time   Weight    02/28/20 0810   93 5 (206 2)            Body mass index is 33 08 kg/m²  BP      Temp      Pulse     Resp      SpO2        Vitals:    02/28/20 0810   Weight: 93 5 kg (206 lb 3 2 oz)     Vitals:    02/28/20 0810   Weight: 93 5 kg (206 lb 3 2 oz)       /66   Pulse 96   Temp 98 6 °F (37 °C)   Ht 5' 6 2" (1 681 m)   Wt 93 5 kg (206 lb 3 2 oz)   SpO2 94%   BMI 33 08 kg/m²          Physical Exam   Constitutional: She appears well-developed and well-nourished  HENT:   Head: Normocephalic  Mouth/Throat: Oropharynx is clear and moist    Eyes: Pupils are equal, round, and reactive to light  Conjunctivae are normal  Right eye exhibits no discharge  Left eye exhibits no discharge  No scleral icterus  Neck: Neck supple  Cardiovascular: Normal rate, regular rhythm, normal heart sounds and intact distal pulses  Exam reveals no gallop and no friction rub  Pulses are no weak pulses  No murmur heard  Pulses:       Dorsalis pedis pulses are 2+ on the right side, and 2+ on the left side  Posterior tibial pulses are 2+ on the right side, and 2+ on the left side     Pulmonary/Chest: Breath sounds normal  No respiratory distress  She has no wheezes  She has no rales  Abdominal: Soft  Bowel sounds are normal  She exhibits no distension and no mass  There is no tenderness  There is no rebound and no guarding  Musculoskeletal: She exhibits no edema or deformity  Feet:   Right Foot:   Skin Integrity: Negative for ulcer, skin breakdown, erythema, warmth, callus or dry skin  Left Foot:   Skin Integrity: Negative for ulcer, skin breakdown, erythema, warmth, callus or dry skin  Lymphadenopathy:     She has no cervical adenopathy  Neurological: She is alert   Coordination normal

## 2020-03-01 NOTE — ASSESSMENT & PLAN NOTE
Lab Results   Component Value Date    HGBA1C 6 0 (H) 02/25/2020   I have counselled the pt to follow a healthy and balanced diet ,and recommend routine exercise    Annual eye examination recommended and the foot examination was completed today

## 2020-03-01 NOTE — ASSESSMENT & PLAN NOTE
Assessment and plan 1  Medicare subsequent annual wellness examination overall the patient is clinically stable and doing well, we encouraged the patient to follow a healthy and balanced diet  We recommend that the patient exercise routinely approximately 30 minutes 5 times per week   We have reviewed the patient's vaccines and have made recommendations for updates if necessary  annual flu vaccine, she has received the new shingles vaccine     We will be ordering screening laboratories which are age appropriate  Return to the office in   6 months   call if any problems

## 2020-03-05 ENCOUNTER — HOSPITAL ENCOUNTER (OUTPATIENT)
Dept: RADIOLOGY | Age: 75
Discharge: HOME | End: 2020-03-05
Payer: MEDICARE

## 2020-03-05 DIAGNOSIS — M25.561 ACUTE PAIN OF RIGHT KNEE: ICD-10-CM

## 2020-03-05 PROCEDURE — 73721 MRI JNT OF LWR EXTRE W/O DYE: CPT

## 2020-03-10 ENCOUNTER — HOSPITAL ENCOUNTER (OUTPATIENT)
Dept: RADIOLOGY | Age: 75
Discharge: HOME | End: 2020-03-10
Payer: MEDICARE

## 2020-03-10 VITALS — BODY MASS INDEX: 33.11 KG/M2 | WEIGHT: 206 LBS | HEIGHT: 66 IN

## 2020-03-10 DIAGNOSIS — Z12.31 ENCOUNTER FOR SCREENING MAMMOGRAM FOR BREAST CANCER: ICD-10-CM

## 2020-03-10 PROCEDURE — 77067 SCR MAMMO BI INCL CAD: CPT

## 2020-03-10 PROCEDURE — 77063 BREAST TOMOSYNTHESIS BI: CPT

## 2020-03-12 ENCOUNTER — OFFICE VISIT (OUTPATIENT)
Dept: PULMONOLOGY | Facility: CLINIC | Age: 75
End: 2020-03-12
Payer: MEDICARE

## 2020-03-12 VITALS
SYSTOLIC BLOOD PRESSURE: 128 MMHG | BODY MASS INDEX: 32.71 KG/M2 | WEIGHT: 208.4 LBS | HEART RATE: 87 BPM | HEIGHT: 67 IN | OXYGEN SATURATION: 92 % | DIASTOLIC BLOOD PRESSURE: 72 MMHG | TEMPERATURE: 98.6 F

## 2020-03-12 DIAGNOSIS — R06.00 DYSPNEA ON EXERTION: Primary | ICD-10-CM

## 2020-03-12 PROBLEM — I67.1 CEREBRAL ANEURYSM: Status: RESOLVED | Noted: 2018-01-12 | Resolved: 2020-03-12

## 2020-03-12 PROBLEM — R05.3 PERSISTENT COUGH FOR 3 WEEKS OR LONGER: Status: RESOLVED | Noted: 2019-04-30 | Resolved: 2020-03-12

## 2020-03-12 PROBLEM — R06.09 DYSPNEA ON EXERTION: Status: ACTIVE | Noted: 2020-03-12

## 2020-03-12 PROCEDURE — 2022F DILAT RTA XM EVC RTNOPTHY: CPT | Performed by: INTERNAL MEDICINE

## 2020-03-12 PROCEDURE — 3078F DIAST BP <80 MM HG: CPT | Performed by: INTERNAL MEDICINE

## 2020-03-12 PROCEDURE — 4040F PNEUMOC VAC/ADMIN/RCVD: CPT | Performed by: INTERNAL MEDICINE

## 2020-03-12 PROCEDURE — 3008F BODY MASS INDEX DOCD: CPT | Performed by: INTERNAL MEDICINE

## 2020-03-12 PROCEDURE — 1036F TOBACCO NON-USER: CPT | Performed by: INTERNAL MEDICINE

## 2020-03-12 PROCEDURE — 1170F FXNL STATUS ASSESSED: CPT | Performed by: INTERNAL MEDICINE

## 2020-03-12 PROCEDURE — 1160F RVW MEDS BY RX/DR IN RCRD: CPT | Performed by: INTERNAL MEDICINE

## 2020-03-12 PROCEDURE — 3044F HG A1C LEVEL LT 7.0%: CPT | Performed by: INTERNAL MEDICINE

## 2020-03-12 PROCEDURE — 94010 BREATHING CAPACITY TEST: CPT | Performed by: INTERNAL MEDICINE

## 2020-03-12 PROCEDURE — 3074F SYST BP LT 130 MM HG: CPT | Performed by: INTERNAL MEDICINE

## 2020-03-12 PROCEDURE — 99214 OFFICE O/P EST MOD 30 MIN: CPT | Performed by: INTERNAL MEDICINE

## 2020-03-12 NOTE — ASSESSMENT & PLAN NOTE
· Prior chronic cough that was associated with upper respiratory illness has completely resolved  No longer taking Breo Ellipta and completed the course of steroids  Prednisone helped her symptoms quite significantly  · Suspect that the component of shortness of breath with exertion is related to weight and deconditioning  · We will check a chest x-ray  · Last chest x-ray was at [facility]   This was during a period of acute illness  · Recent cardiac catheterization did not show any ischemic etiology nor did the right heart catheterization show any evidence of pulmonary hypertension or right ventricular dysfunction  · If chest x-ray is negative, I did suggest that she initiate in exercise regimen for conditioning  If she is able to lose a little bit of weight and improve her cardiovascular conditioning, I suspect that her shortness of breath may improve

## 2020-03-12 NOTE — PROGRESS NOTES
Progress note - Pulmonary Medicine   Case Patel 69 y o  female MRN: [id number]       Impression & Plan:     Dyspnea on exertion  · Prior chronic cough that was associated with upper respiratory illness has completely resolved  No longer taking Breo Ellipta and completed the course of steroids  Prednisone helped her symptoms quite significantly  · Suspect that the component of shortness of breath with exertion is related to weight and deconditioning  · We will check a chest x-ray  · Last chest x-ray was at [facility]   This was during a period of acute illness  · Recent cardiac catheterization did not show any ischemic etiology nor did the right heart catheterization show any evidence of pulmonary hypertension or right ventricular dysfunction  · If chest x-ray is negative, I did suggest that she initiate in exercise regimen for conditioning  If she is able to lose a little bit of weight and improve her cardiovascular conditioning, I suspect that her shortness of breath may improve  I have recommended that she return to see me in 6 months   ______________________________________________________________________    HPI:    Case Patel presents today for follow-up  The chronic cough that she had presented for has since resolved  She recovered quickly with combination of prednisone and Breo Ellipta  She is no longer on Breo Ellipta  She returns today will later than anticipated because she has had some persistent shortness of breath  Her shortness of breath did predispose the cough complaints and has been going on for several months  She has no wheezing  No chest tightness  She notices the shortness of breath going up stairs or walking up inclines  She has not had any lightheadedness or dizziness  No chest pain or cardiac complaints  She has had a thorough cardiovascular evaluation by Dr Colby Purchase  There has been no cardiac cause determined  She denies any fever or chills    No constitutional complaints  She is overweight  She does not exercise regularly  She does not perform regular cardiovascular activities  She denies any abdominal pain or GERD symptoms  No myalgias  She has been having issues with ambulation due to a torn meniscus  She recently had an injection in her knee  Mobility is currently limited    Current Medications:    Current Outpatient Medications:     Ascorbic Acid (VITAMIN C) 1000 MG tablet, Take 1 tablet by mouth daily, Disp: , Rfl:     aspirin 81 mg chewable tablet, Chew 81 mg daily, Disp: , Rfl:     atorvastatin (LIPITOR) 20 mg tablet, Take 1 tablet (20 mg total) by mouth daily, Disp: 90 tablet, Rfl: 2    Biotin 5000 MCG CAPS, Take 1 capsule by mouth daily, Disp: , Rfl:     cholecalciferol (VITAMIN D3) 1,000 units tablet, Take 1,000 Units by mouth daily, Disp: , Rfl:     CINNAMON PO, Take by mouth, Disp: , Rfl:     diphenhydrAMINE-acetaminophen (TYLENOL PM)  MG TABS, Take 1 tablet by mouth daily at bedtime as needed for sleep, Disp: , Rfl:     doxycycline hyclate (VIBRA-TABS) 100 mg tablet, Take by mouth as needed  , Disp: , Rfl:     esomeprazole (NEXIUM) 40 MG capsule, Take 1 capsule by mouth daily, Disp: , Rfl:     fluticasone (FLONASE) 50 mcg/act nasal spray, SPRAY 2 SPRAYS INTO EACH NOSTRIL EVERY DAY (Patient taking differently: as needed ), Disp: 16 mL, Rfl: 2    meclizine (ANTIVERT) 25 mg tablet, Take by mouth as needed  , Disp: , Rfl:     metroNIDAZOLE (METROGEL) 0 75 % gel, as needed , Disp: , Rfl: 3    Multiple Vitamin (MULTIVITAMIN) capsule, Take 1 capsule by mouth daily, Disp: , Rfl:     SYNTHROID 125 MCG tablet, TAKE 1 TABLET DAILY ON AN EMPTY STOMACH, Disp: 90 tablet, Rfl: 4    Review of Systems:  Review of Systems   Constitutional: Negative for appetite change and fever  HENT: Negative for ear pain, postnasal drip, rhinorrhea, sneezing, sore throat and trouble swallowing  Respiratory: Positive for shortness of breath  Cardiovascular: Negative for chest pain  Musculoskeletal: Negative for myalgias  Skin: Negative  Neurological: Negative for headaches  Hematological: Negative  Psychiatric/Behavioral: Negative  All other systems reviewed and are negative  Answers for HPI/ROS submitted by the patient on 3/12/2020   Primary symptoms  Chronicity: chronic  When did you first notice your symptoms?: more than 1 month ago  How often do your symptoms occur?: intermittently  Since you first noticed this problem, how has it changed?: unchanged  Do you have shortness of breath that occurs with effort or exertion?: Yes  Do you have ear congestion?: No  Do you have heartburn?: No  Do you have fatigue?: No  Do you have nasal congestion?: No  Do you have shortness of breath when lying flat?: No  Do you have shortness of breath when you wake up?: No  Do you have sweats?: No  Have you experienced weight loss?: No  Which of the following makes your symptoms worse?: climbing stairs, exercise, strenuous activity  Which of the following makes your symptoms better?: rest      Past medical history, surgical history, and family history were reviewed and updated as appropriate    Social history updates:  Social History     Tobacco Use   Smoking Status Former Smoker    Packs/day: 0 25    Years: 17 00    Pack years: 4 25    Types: Cigarettes    Last attempt to quit: 2010    Years since quitting: 10 2   Smokeless Tobacco Never Used   Tobacco Comment    0 5 ppw intermittently last smoked 2010       PhysicalExamination:  Vitals:   /72 (BP Location: Left arm, Patient Position: Sitting)   Pulse 87   Temp 98 6 °F (37 °C) (Tympanic)   Ht 5' 6 5" (1 689 m)   Wt 94 5 kg (208 lb 6 4 oz)   SpO2 92%   BMI 33 13 kg/m²     Gen:  Overweight  Comfortable on room air and without respiratory distress  HEENT: PERRL  Oropharynx is clear without any erythema or exudate  Neck: Supple   There is no JVD, lymphadenopathy or thyromegaly appreciated  Trachea is midline  Chest: Symmetric chest wall excursion  Lung fields are clear to auscultation  No wheezes, rales or rhonchi  Normal resonance to percussion  Cardiac: Regular rate and rhythm  There are no murmurs  Abdomen: Soft and nontender  Benign  Extremities: No clubbing, cyanosis or edema  Neurologic: No focal deficits  Skin: No appreciable rashes  Diagnostic Data:  Labs: I personally reviewed the most recent laboratory data pertinent to today's visit    Lab Results   Component Value Date    WBC 6 50 01/17/2020    HGB 12 9 01/17/2020    HCT 41 5 01/17/2020    MCV 89 01/17/2020     01/17/2020     Lab Results   Component Value Date    SODIUM 140 02/25/2020    K 4 2 02/25/2020    CO2 28 02/25/2020     02/25/2020    BUN 9 02/25/2020    CREATININE 0 59 (L) 02/25/2020    CALCIUM 9 1 02/25/2020       PFT results: The most recent pulmonary function tests were reviewed  Spirometry performed in the office today was normal     Imaging:  No recent imaging  Had imaging at [facility] during an acute URI  Will order chest x-ray  Other studies:  Cardiac catheterization results reviewed  No evidence of obstructing coronary disease    Right heart catheterization without pulmonary hypertension or elevated LVEDP    Willis Lemon MD

## 2020-04-02 DIAGNOSIS — J01.90 ACUTE NON-RECURRENT SINUSITIS, UNSPECIFIED LOCATION: ICD-10-CM

## 2020-04-03 RX ORDER — FLUTICASONE PROPIONATE 50 MCG
SPRAY, SUSPENSION (ML) NASAL
Qty: 16 ML | Refills: 2 | Status: SHIPPED | OUTPATIENT
Start: 2020-04-03 | End: 2020-08-23

## 2020-08-20 ENCOUNTER — OFFICE VISIT (OUTPATIENT)
Dept: CARDIOLOGY CLINIC | Facility: CLINIC | Age: 75
End: 2020-08-20
Payer: MEDICARE

## 2020-08-20 VITALS
WEIGHT: 199 LBS | DIASTOLIC BLOOD PRESSURE: 84 MMHG | HEART RATE: 78 BPM | BODY MASS INDEX: 31.23 KG/M2 | TEMPERATURE: 98.2 F | HEIGHT: 67 IN | SYSTOLIC BLOOD PRESSURE: 124 MMHG

## 2020-08-20 DIAGNOSIS — E78.2 MIXED HYPERLIPIDEMIA: Primary | ICD-10-CM

## 2020-08-20 DIAGNOSIS — R06.00 DYSPNEA ON EXERTION: ICD-10-CM

## 2020-08-20 DIAGNOSIS — I10 ESSENTIAL HYPERTENSION: ICD-10-CM

## 2020-08-20 PROCEDURE — 4040F PNEUMOC VAC/ADMIN/RCVD: CPT | Performed by: INTERNAL MEDICINE

## 2020-08-20 PROCEDURE — 3079F DIAST BP 80-89 MM HG: CPT | Performed by: INTERNAL MEDICINE

## 2020-08-20 PROCEDURE — 3074F SYST BP LT 130 MM HG: CPT | Performed by: INTERNAL MEDICINE

## 2020-08-20 PROCEDURE — 1036F TOBACCO NON-USER: CPT | Performed by: INTERNAL MEDICINE

## 2020-08-20 PROCEDURE — 3044F HG A1C LEVEL LT 7.0%: CPT | Performed by: INTERNAL MEDICINE

## 2020-08-20 PROCEDURE — 99213 OFFICE O/P EST LOW 20 MIN: CPT | Performed by: INTERNAL MEDICINE

## 2020-08-20 PROCEDURE — 1160F RVW MEDS BY RX/DR IN RCRD: CPT | Performed by: INTERNAL MEDICINE

## 2020-08-20 PROCEDURE — 2022F DILAT RTA XM EVC RTNOPTHY: CPT | Performed by: INTERNAL MEDICINE

## 2020-08-20 PROCEDURE — 3008F BODY MASS INDEX DOCD: CPT | Performed by: INTERNAL MEDICINE

## 2020-08-20 NOTE — PROGRESS NOTES
Cardiology Follow Up    [facility]  1945  [id number]  [facility] [address]  [phone]    1  Mixed hyperlipidemia     2  Dyspnea on exertion     3  Essential hypertension         Interval History:  Cardiology follow-up  Patient doing well, his her dyspnea actually significantly improved with an exercise program, she has been able to do a that she is receiving topical treatments for her DJD of her knee  Denies any chest pain, no orthopnea no PND  She does use nonsteroidals compliant low-cholesterol diet, lipids improved LDL from 107-75 on statin therapy, HDL 46  She does have lower extremity edema  Duplex this year revealed no DVT and triphasic waveforms  Pulmonary function tests were also normal with an FEV1 of 90% of predicted for age    Compliant low-sodium diet, blood pressures been well control as well     Patient Active Problem List   Diagnosis    Lymphedema    Fatigue    Hyperlipidemia    Hypothyroidism    Class 1 obesity due to excess calories with serious comorbidity and body mass index (BMI) of 33 0 to 33 9 in adult    Type 2 diabetes mellitus without complication, without long-term current use of insulin (HCC)    Atypical chest pain    Right lower quadrant abdominal pain    Pelvic pain    Family history of ovarian cancer    Wellness examination    Carotid artery plaque    Dense breast tissue on mammogram    Fibrocystic breast changes    Dupuytren's contracture    Diverticular disease of colon    Gastro-esophageal reflux disease with esophagitis    History of adenomatous polyp of colon    Hypothyroidism due to Hashimoto's thyroiditis    Impaired fasting glucose    Nephrolithiasis    Essential hypertension    Class 1 obesity due to excess calories without serious comorbidity with body mass index (BMI) of 31 0 to 31 9 in adult    Acute non-recurrent sinusitis    Encounter for screening mammogram for breast cancer    Medicare annual wellness visit, subsequent    Dyspnea on exertion     Past Medical History:   Diagnosis Date    Biliary dyskinesia     CAP (community acquired pneumonia)     last assessed 8/17/16    GERD (gastroesophageal reflux disease)     Hyperlipidemia     Palpitations     Shortness of breath      Social History     Socioeconomic History    Marital status: /Civil Union     Spouse name: Not on file    Number of children: Not on file    Years of education: 15    Highest education level: Not on file   Occupational History    Not on file   Social Needs    Financial resource strain: Not on file    Food insecurity     Worry: Not on file     Inability: Not on file    Transportation needs     Medical: Not on file     Non-medical: Not on file   Tobacco Use    Smoking status: Former Smoker     Packs/day: 0 25     Years: 17 00     Pack years: 4 25     Types: Cigarettes     Last attempt to quit: 2010     Years since quitting: 10 6    Smokeless tobacco: Never Used    Tobacco comment: 0 5 ppw intermittently last smoked 2010   Substance and Sexual Activity    Alcohol use: Yes     Frequency: 2-4 times a month     Comment: occasionally    Drug use: No    Sexual activity: Not on file   Lifestyle    Physical activity     Days per week: Not on file     Minutes per session: Not on file    Stress: Not on file   Relationships    Social connections     Talks on phone: Not on file     Gets together: Not on file     Attends [employer]ical service: Not on file     Active member of club or organization: Not on file     Attends meetings of clubs or organizations: Not on file     Relationship status: Not on file    Intimate partner violence     Fear of current or ex partner: Not on file     Emotionally abused: Not on file     Physically abused: Not on file     Forced sexual activity: Not on file   Other Topics Concern    Not on file   Social History Narrative    Caffeine use      Family History   Problem Relation Age of Onset    Aneurysm Mother         cerebral    Ovarian cancer Mother 67    Cirrhosis Father         alcoholic    Aneurysm Brother         abdominal aortic; cerebral    Diabetes Son     No Known Problems Daughter     No Known Problems Maternal Grandmother     No Known Problems Maternal Grandfather     No Known Problems Paternal Grandmother     No Known Problems Paternal Grandfather     No Known Problems Son     No Known Problems Son     No Known Problems Son     No Known Problems Maternal Aunt     No Known Problems Maternal Aunt     No Known Problems Maternal Aunt     No Known Problems Maternal Aunt     No Known Problems Paternal Aunt     No Known Problems Paternal Aunt     No Known Problems Paternal Aunt     No Known Problems Paternal Aunt     No Known Problems Paternal Aunt     Cancer Brother 79        bladder cancer     Past Surgical History:   Procedure Laterality Date    AUGMENTATION MAMMAPLASTY Bilateral 1998    breast surgery- with prosthetic implant 1998; pre-pectoral salline    BREAST BIOPSY Left 1974    BREAST BIOPSY Left 1994    BREAST EXCISIONAL BIOPSY Left 1993    CARDIAC CATHETERIZATION      procedure summary    CHOLECYSTECTOMY      onset 2003    HEMORRHOID SURGERY      ROTATOR CUFF REPAIR Bilateral     onset 2010    TUBAL LIGATION      onset 1980       Current Outpatient Medications:     Ascorbic Acid (VITAMIN C) 1000 MG tablet, Take 1 tablet by mouth daily, Disp: , Rfl:     aspirin 81 mg chewable tablet, Chew 81 mg daily, Disp: , Rfl:     atorvastatin (LIPITOR) 20 mg tablet, Take 1 tablet (20 mg total) by mouth daily, Disp: 90 tablet, Rfl: 2    Biotin 5000 MCG CAPS, Take 1 capsule by mouth daily, Disp: , Rfl:     cholecalciferol (VITAMIN D3) 1,000 units tablet, Take 1,000 Units by mouth daily, Disp: , Rfl:     CINNAMON PO, Take by mouth, Disp: , Rfl:    diphenhydrAMINE-acetaminophen (TYLENOL PM)  MG TABS, Take 1 tablet by mouth daily at bedtime as needed for sleep, Disp: , Rfl:     doxycycline hyclate (VIBRA-TABS) 100 mg tablet, Take by mouth as needed  , Disp: , Rfl:     esomeprazole (NEXIUM) 40 MG capsule, Take 1 capsule by mouth daily, Disp: , Rfl:     fluticasone (FLONASE) 50 mcg/act nasal spray, SPRAY 2 SPRAYS INTO EACH NOSTRIL EVERY DAY, Disp: 16 mL, Rfl: 2    meclizine (ANTIVERT) 25 mg tablet, Take by mouth as needed  , Disp: , Rfl:     metroNIDAZOLE (METROGEL) 0 75 % gel, as needed , Disp: , Rfl: 3    Multiple Vitamin (MULTIVITAMIN) capsule, Take 1 capsule by mouth daily, Disp: , Rfl:     SYNTHROID 125 MCG tablet, TAKE 1 TABLET DAILY ON AN EMPTY STOMACH, Disp: 90 tablet, Rfl: 4  Allergies   Allergen Reactions    Hydrochlorothiazide Palpitations    Meloxicam Rash       Labs:  Appointment on 02/25/2020   Component Date Value    Sodium 02/25/2020 140     Potassium 02/25/2020 4 2     Chloride 02/25/2020 107     CO2 02/25/2020 28     ANION GAP 02/25/2020 5     BUN 02/25/2020 9     Creatinine 02/25/2020 0 59*    Glucose, Fasting 02/25/2020 125*    Calcium 02/25/2020 9 1     AST 02/25/2020 17     ALT 02/25/2020 22     Alkaline Phosphatase 02/25/2020 40*    Total Protein 02/25/2020 6 4     Albumin 02/25/2020 3 6     Total Bilirubin 02/25/2020 0 59     eGFR 02/25/2020 92     Hemoglobin A1C 02/25/2020 6 0*    EAG 02/25/2020 126     Cholesterol 02/25/2020 158     Triglycerides 02/25/2020 185*    HDL, Direct 02/25/2020 46     LDL Calculated 02/25/2020 75      Imaging: No results found  Review of Systems:  Review of Systems   Constitutional: Negative for activity change and fatigue  Respiratory: Negative for apnea, shortness of breath, wheezing and stridor  Cardiovascular: Positive for leg swelling  Negative for chest pain and palpitations  Neurological: Negative for syncope  Hematological: Does not bruise/bleed easily  Psychiatric/Behavioral: Positive for sleep disturbance  The patient is nervous/anxious  Physical Exam:  Physical Exam  Vitals signs reviewed  Constitutional:       General: She is not in acute distress  Appearance: She is obese  She is not ill-appearing, toxic-appearing or diaphoretic  Cardiovascular:      Rate and Rhythm: Normal rate and regular rhythm  Pulses: Normal pulses  Heart sounds: Normal heart sounds  No murmur  No friction rub  No gallop  Pulmonary:      Effort: Pulmonary effort is normal  No respiratory distress  Breath sounds: Normal breath sounds  No stridor  No wheezing, rhonchi or rales  Skin:     General: Skin is warm and dry  Capillary Refill: Capillary refill takes less than 2 seconds  Neurological:      General: No focal deficit present  Mental Status: She is alert  Psychiatric:         Mood and Affect: Mood normal          Discussion/Summary:  Exertional in dyspnea improved on an exercise program   Cardiac catheterization this year for this symptoms revealed normal coronary arteries and normal right heart pressures  Normal left ventricle systolic function ventricular end-diastolic pressure, pulmonary function tests were unremarkable  Continue regular exercise, I did ask her to discontinue aspirin therapy  Postural maneuvers were recommended for lower extremity    This note was completed in part utilizing m-[employer] fluency direct voice recognition software  Grammatical errors, random word insertion, spelling mistakes, and incomplete sentences may be an occasional consequence of the system secondary to software limitations, ambient noise and hardware issues  At the time of dictation, efforts were made to edit, clarify and /or correct errors  Please read the chart carefully and recognize, using context, where substitutions have occurred    If you have any questions or concerns about the context, text or information contained within the body of this dictation, please contact myself, the provider, for further clarification

## 2020-08-22 DIAGNOSIS — J01.90 ACUTE NON-RECURRENT SINUSITIS, UNSPECIFIED LOCATION: ICD-10-CM

## 2020-08-23 RX ORDER — FLUTICASONE PROPIONATE 50 MCG
SPRAY, SUSPENSION (ML) NASAL
Qty: 16 ML | Refills: 2 | Status: SHIPPED | OUTPATIENT
Start: 2020-08-23 | End: 2021-04-18

## 2020-08-25 DIAGNOSIS — Z00.00 HEALTHCARE MAINTENANCE: Primary | ICD-10-CM

## 2020-08-28 ENCOUNTER — APPOINTMENT (OUTPATIENT)
Dept: LAB | Age: 75
End: 2020-08-28
Payer: MEDICARE

## 2020-08-28 ENCOUNTER — APPOINTMENT (OUTPATIENT)
Dept: RADIOLOGY | Age: 75
End: 2020-08-28
Payer: MEDICARE

## 2020-08-28 DIAGNOSIS — R06.00 DYSPNEA ON EXERTION: ICD-10-CM

## 2020-08-28 DIAGNOSIS — E11.9 TYPE 2 DIABETES MELLITUS WITHOUT COMPLICATION, WITHOUT LONG-TERM CURRENT USE OF INSULIN (HCC): ICD-10-CM

## 2020-08-28 DIAGNOSIS — Z00.00 HEALTHCARE MAINTENANCE: ICD-10-CM

## 2020-08-28 DIAGNOSIS — I10 ESSENTIAL HYPERTENSION: ICD-10-CM

## 2020-08-28 LAB
ABO GROUP BLD: NORMAL
ALBUMIN SERPL BCP-MCNC: 3.6 G/DL (ref 3.5–5)
ALP SERPL-CCNC: 41 U/L (ref 46–116)
ALT SERPL W P-5'-P-CCNC: 25 U/L (ref 12–78)
ANION GAP SERPL CALCULATED.3IONS-SCNC: 9 MMOL/L (ref 4–13)
AST SERPL W P-5'-P-CCNC: 15 U/L (ref 5–45)
BILIRUB SERPL-MCNC: 0.53 MG/DL (ref 0.2–1)
BLD GP AB SCN SERPL QL: NEGATIVE
BUN SERPL-MCNC: 13 MG/DL (ref 5–25)
CALCIUM SERPL-MCNC: 8.9 MG/DL (ref 8.3–10.1)
CHLORIDE SERPL-SCNC: 109 MMOL/L (ref 100–108)
CHOLEST SERPL-MCNC: 186 MG/DL (ref 50–200)
CO2 SERPL-SCNC: 24 MMOL/L (ref 21–32)
CREAT SERPL-MCNC: 0.54 MG/DL (ref 0.6–1.3)
CREAT UR-MCNC: 107 MG/DL
EST. AVERAGE GLUCOSE BLD GHB EST-MCNC: 128 MG/DL
GFR SERPL CREATININE-BSD FRML MDRD: 94 ML/MIN/1.73SQ M
GLUCOSE P FAST SERPL-MCNC: 117 MG/DL (ref 65–99)
HBA1C MFR BLD: 6.1 %
HDLC SERPL-MCNC: 57 MG/DL
LDLC SERPL CALC-MCNC: 103 MG/DL (ref 0–100)
MICROALBUMIN UR-MCNC: 6.6 MG/L (ref 0–20)
MICROALBUMIN/CREAT 24H UR: 6 MG/G CREATININE (ref 0–30)
POTASSIUM SERPL-SCNC: 3.8 MMOL/L (ref 3.5–5.3)
PROT SERPL-MCNC: 6.7 G/DL (ref 6.4–8.2)
RH BLD: POSITIVE
SODIUM SERPL-SCNC: 142 MMOL/L (ref 136–145)
SPECIMEN EXPIRATION DATE: NORMAL
TRIGL SERPL-MCNC: 128 MG/DL

## 2020-08-28 PROCEDURE — 86850 RBC ANTIBODY SCREEN: CPT

## 2020-08-28 PROCEDURE — 36415 COLL VENOUS BLD VENIPUNCTURE: CPT

## 2020-08-28 PROCEDURE — 80061 LIPID PANEL: CPT

## 2020-08-28 PROCEDURE — 82043 UR ALBUMIN QUANTITATIVE: CPT

## 2020-08-28 PROCEDURE — 82570 ASSAY OF URINE CREATININE: CPT

## 2020-08-28 PROCEDURE — 71046 X-RAY EXAM CHEST 2 VIEWS: CPT

## 2020-08-28 PROCEDURE — 86900 BLOOD TYPING SEROLOGIC ABO: CPT

## 2020-08-28 PROCEDURE — 86901 BLOOD TYPING SEROLOGIC RH(D): CPT

## 2020-08-28 PROCEDURE — 83036 HEMOGLOBIN GLYCOSYLATED A1C: CPT

## 2020-08-28 PROCEDURE — 80053 COMPREHEN METABOLIC PANEL: CPT

## 2020-08-31 ENCOUNTER — OFFICE VISIT (OUTPATIENT)
Dept: PODIATRY | Facility: CLINIC | Age: 75
End: 2020-08-31
Payer: MEDICARE

## 2020-08-31 VITALS
BODY MASS INDEX: 31.5 KG/M2 | HEIGHT: 66 IN | DIASTOLIC BLOOD PRESSURE: 83 MMHG | WEIGHT: 196 LBS | SYSTOLIC BLOOD PRESSURE: 152 MMHG | TEMPERATURE: 97.6 F | HEART RATE: 83 BPM

## 2020-08-31 DIAGNOSIS — M20.22 HALLUX RIGIDUS OF LEFT FOOT: Primary | ICD-10-CM

## 2020-08-31 DIAGNOSIS — E11.9 CONTROLLED TYPE 2 DIABETES MELLITUS WITHOUT COMPLICATION, WITHOUT LONG-TERM CURRENT USE OF INSULIN (HCC): ICD-10-CM

## 2020-08-31 PROCEDURE — 2022F DILAT RTA XM EVC RTNOPTHY: CPT | Performed by: PODIATRIST

## 2020-08-31 PROCEDURE — 1160F RVW MEDS BY RX/DR IN RCRD: CPT | Performed by: PODIATRIST

## 2020-08-31 PROCEDURE — 3008F BODY MASS INDEX DOCD: CPT | Performed by: PODIATRIST

## 2020-08-31 PROCEDURE — 3077F SYST BP >= 140 MM HG: CPT | Performed by: PODIATRIST

## 2020-08-31 PROCEDURE — 99213 OFFICE O/P EST LOW 20 MIN: CPT | Performed by: PODIATRIST

## 2020-08-31 PROCEDURE — 1036F TOBACCO NON-USER: CPT | Performed by: PODIATRIST

## 2020-08-31 PROCEDURE — 3079F DIAST BP 80-89 MM HG: CPT | Performed by: PODIATRIST

## 2020-08-31 PROCEDURE — 4040F PNEUMOC VAC/ADMIN/RCVD: CPT | Performed by: PODIATRIST

## 2020-08-31 PROCEDURE — 3044F HG A1C LEVEL LT 7.0%: CPT | Performed by: PODIATRIST

## 2020-08-31 NOTE — PROGRESS NOTES
Assessment/Plan:  Explained the patient that she is dealing with hallux rigidus of the left foot  Discussed conservative and surgical treatment options  An implant arthroplasty is needed for correction  Patient will consider  For now, patient told to increase her ibuprofen dosage to 1400 mg daily  Cortisone injection along the lateral aspect of the 1st MPJ is also a treatment option  Reappoint p r n  No problem-specific Assessment & Plan notes found for this encounter  Diagnoses and all orders for this visit:    Hallux rigidus of left foot    Controlled type 2 diabetes mellitus without complication, without long-term current use of insulin (HCC)          Subjective:      Patient ID: Marybeth Romero is a 70 y o  female  HPI     Patient, a 63-year-old female presents with pain in the great toe joint of the left foot  Patient has known osteoarthritis in this joint  She has no motion  Pain is most prevalent at night when she tries to sleep and has throbbing  She has been using OTC Advil typically 1000 mg daily and finds that helpful  Patient is a diabetic under good control  Last A1c was 6 1  No numbness or tingling noted in her feet       The following portions of the patient's history were reviewed and updated as appropriate: allergies, current medications, past family history, past medical history, past social history, past surgical history and problem list     Review of Systems   Constitutional: Negative  Respiratory: Negative  Cardiovascular: Negative  Musculoskeletal: Positive for arthralgias  Knee pain   Neurological: Negative  Hematological: Negative  Objective:      /83   Pulse 83   Temp 97 6 °F (36 4 °C)   Ht 5' 6" (1 676 m)   Wt 88 9 kg (196 lb)   BMI 31 64 kg/m²          Physical Exam  Constitutional:       Appearance: Normal appearance  Cardiovascular:      Pulses: Normal pulses        Comments: Temperature and turgor within normal limits bilateral  Musculoskeletal:         General: Swelling, tenderness and deformity present  Comments: Hallux rigidus deformity left foot  Dorsiflexion restricted to 5°  Dorsal exostosis present with callus formation  Pain with palpation lateral aspect left 1st MPJ  Range of motion right 1st MPJ within normal limits  Skin:     General: Skin is warm  Findings: Erythema present  Comments: Mild erythema left 1st MPJ  Neurological:      General: No focal deficit present  Mental Status: She is oriented to person, place, and time  Sensory: No sensory deficit

## 2020-09-03 ENCOUNTER — OFFICE VISIT (OUTPATIENT)
Dept: INTERNAL MEDICINE CLINIC | Facility: CLINIC | Age: 75
End: 2020-09-03
Payer: MEDICARE

## 2020-09-03 VITALS
TEMPERATURE: 98.2 F | WEIGHT: 196 LBS | DIASTOLIC BLOOD PRESSURE: 82 MMHG | BODY MASS INDEX: 31.5 KG/M2 | HEART RATE: 89 BPM | RESPIRATION RATE: 16 BRPM | SYSTOLIC BLOOD PRESSURE: 134 MMHG | OXYGEN SATURATION: 97 % | HEIGHT: 66 IN

## 2020-09-03 DIAGNOSIS — E11.9 TYPE 2 DIABETES MELLITUS WITHOUT COMPLICATION, WITHOUT LONG-TERM CURRENT USE OF INSULIN (HCC): ICD-10-CM

## 2020-09-03 DIAGNOSIS — I10 ESSENTIAL HYPERTENSION: Primary | ICD-10-CM

## 2020-09-03 DIAGNOSIS — E06.3 HYPOTHYROIDISM DUE TO HASHIMOTO'S THYROIDITIS: ICD-10-CM

## 2020-09-03 DIAGNOSIS — E03.8 HYPOTHYROIDISM DUE TO HASHIMOTO'S THYROIDITIS: ICD-10-CM

## 2020-09-03 DIAGNOSIS — E03.9 HYPOTHYROIDISM, UNSPECIFIED TYPE: ICD-10-CM

## 2020-09-03 DIAGNOSIS — K12.1 ORAL ULCERATION: ICD-10-CM

## 2020-09-03 DIAGNOSIS — K21.00 GASTRO-ESOPHAGEAL REFLUX DISEASE WITH ESOPHAGITIS: ICD-10-CM

## 2020-09-03 DIAGNOSIS — I65.29 CAROTID ARTERY PLAQUE: ICD-10-CM

## 2020-09-03 PROBLEM — G47.00 INSOMNIA: Status: ACTIVE | Noted: 2020-09-03

## 2020-09-03 PROCEDURE — 99214 OFFICE O/P EST MOD 30 MIN: CPT | Performed by: INTERNAL MEDICINE

## 2020-09-03 NOTE — ASSESSMENT & PLAN NOTE
Patient reported having hypothyroidism about 20 years  She had hair loss, fatgue and swelling back then  She stated it is well controlled now  She denied constipation, fatigue or cold intolerance     Most recent TSH was 3 4

## 2020-09-03 NOTE — PROGRESS NOTES
Assessment/Plan:    Hypothyroidism  Patient reported having hypothyroidism about 20 years  She had hair loss, fatgue and swelling back then  She stated it is well controlled now  She denied constipation, fatigue or cold intolerance  Most recent TSH was 3 4    Gastro-esophageal reflux disease with esophagitis  Patient currently taking esomeprazole  She denied heartburns, or acid feeling in her throat  Recommended decrease the dose of nexium and bridge it with pepcid  Explained the risk of esmoprazole of kidney disease and osteoporosis  Type 2 diabetes mellitus without complication, without long-term current use of insulin ([facility])    Lab Results   Component Value Date    HGBA1C 6 1 (H) 08/28/2020   Patient is currently not on any medication  Fasting sugar is 117  Patient just had an appointment with podiatry  There was no diabetic neuropathy noted  Patient did report having blurry vision, and she will follow up ophalmologist soon  Recommended lifestyle modification  Carotid artery plaque  Patient reported having throbbing sensation/pulsation of her left neck  She denied lightheadedness or dizziness  She did not have a syncope  Essential hypertension  Patient's BP today is 134/82  Patient reported she took her BP at home and it usually is 121-124  Continue to monitor  Insomnia  Patient reported poor sleep quality  She has been taking melatonin 1 mg  Advice OTC melatonin 5-10 mg  Oral ulceration  She did complained of a blister in her mouth for 1-2 months,  that doesn't change in size or hurt  Patient will follow up with her dentist   Refer to oral surgery  Problem List Items Addressed This Visit        Digestive    Gastro-esophageal reflux disease with esophagitis     Patient currently taking esomeprazole  She denied heartburns, or acid feeling in her throat  Recommended decrease the dose of nexium and bridge it with pepcid     Explained the risk of esmoprazole of kidney disease and osteoporosis  Oral ulceration     She did complained of a blister in her mouth for 1-2 months,  that doesn't change in size or hurt  Patient will follow up with her dentist   Refer to oral surgery  Relevant Orders    Ambulatory referral to Oral Maxillofacial Surgery       Endocrine    Hypothyroidism     Patient reported having hypothyroidism about 20 years  She had hair loss, fatgue and swelling back then  She stated it is well controlled now  She denied constipation, fatigue or cold intolerance  Most recent TSH was 3 4         Relevant Orders    TSH, 3rd generation    T4, free    Type 2 diabetes mellitus without complication, without long-term current use of insulin ([facility])       Lab Results   Component Value Date    HGBA1C 6 1 (H) 08/28/2020   Patient is currently not on any medication  Fasting sugar is 117  Patient just had an appointment with podiatry  There was no diabetic neuropathy noted  Patient did report having blurry vision, and she will follow up ophalmologist soon  Recommended lifestyle modification  Hypothyroidism due to Hashimoto's thyroiditis       Cardiovascular and Mediastinum    Carotid artery plaque     Patient reported having throbbing sensation/pulsation of her left neck  She denied lightheadedness or dizziness  She did not have a syncope  Relevant Orders    VAS carotid complete study    Essential hypertension - Primary     Patient's BP today is 134/82  Patient reported she took her BP at home and it usually is 121-124  Continue to monitor  Subjective:      Patient ID: John Mcclain is a 70 y o  female  Patient is here for a 6 months follow up  She is a 70years old female patient with PMH of prediabetes, HTNm GERD and hypothyroidism  Patient reported she felt well  No major complaints at this time  She did complained of a blister in her mouth for 1-2 months,  that doesn't change in size or hurt   Patient will follow up with her dentist and refer her to oral surgery  She also reported having poor sleep quality, and having hot flashes  Hot flashes has been going on since menopause  It is not new onset  She denied constipation, or cold intolerance  She also had throbbing of her neck, but it doesn't cause lgihtheadness, or syncope  She already had flu shots and her shingle vaccine shots is up to date  The following portions of the patient's history were reviewed and updated as appropriate: allergies, current medications, past family history, past medical history, past social history, past surgical history and problem list     Review of Systems   Constitutional: Negative for activity change, appetite change, chills, diaphoresis, fatigue and fever  HENT: Negative for facial swelling, nosebleeds, rhinorrhea, sinus pressure, sinus pain, sneezing and sore throat  Eyes: Negative for pain, discharge and redness  Respiratory: Negative for apnea, cough, choking, chest tightness, shortness of breath, wheezing and stridor  Cardiovascular: Negative for chest pain, palpitations and leg swelling  Gastrointestinal: Negative for abdominal distention, abdominal pain, blood in stool, constipation, diarrhea, nausea and vomiting  Endocrine: Negative for polydipsia, polyphagia and polyuria  Genitourinary: Negative for decreased urine volume, frequency, hematuria and urgency  Musculoskeletal: Negative for arthralgias, back pain, gait problem, joint swelling, myalgias, neck pain and neck stiffness  Skin: Negative for color change and wound  Neurological: Negative for dizziness, tremors, seizures, syncope, facial asymmetry, speech difficulty, weakness, light-headedness, numbness and headaches  Psychiatric/Behavioral: Negative for agitation, behavioral problems, confusion and hallucinations  Objective:    No follow-ups on file      Procedure: Xr Chest Pa & Lateral    Result Date: 8/28/2020  Narrative: CHEST INDICATION:   R06 00: Dyspnea, unspecified  COMPARISON:  1/24/2017  Chest CT from 4/15/2016  EXAM PERFORMED/VIEWS:  XR CHEST PA & LATERAL WITH DUAL ENERGY SUBTRACTION  FINDINGS: Cardiomediastinal silhouette is normal  Lungs are clear  No effusion or pneumothorax  Osseous structures are within normal limits for age  Cholecystectomy  Impression: No acute cardiopulmonary disease   Workstation performed: [id number]         Allergies   Allergen Reactions    Hydrochlorothiazide Palpitations    Meloxicam Rash       Past Medical History:   Diagnosis Date    Biliary dyskinesia     CAP (community acquired pneumonia)     last assessed 8/17/16    GERD (gastroesophageal reflux disease)     Hyperlipidemia     Palpitations     Shortness of breath      Past Surgical History:   Procedure Laterality Date    AUGMENTATION MAMMAPLASTY Bilateral 1998    breast surgery- with prosthetic implant 1998; pre-pectoral salline    BREAST BIOPSY Left 1974    BREAST BIOPSY Left 1994    BREAST EXCISIONAL BIOPSY Left 1993    CARDIAC CATHETERIZATION      procedure summary    CHOLECYSTECTOMY      onset 2003    HEMORRHOID SURGERY      ROTATOR CUFF REPAIR Bilateral     onset 2010    TUBAL LIGATION      onset 1980     Current Outpatient Medications on File Prior to Visit   Medication Sig Dispense Refill    Ascorbic Acid (VITAMIN C) 1000 MG tablet Take 1 tablet by mouth daily      aspirin 81 mg chewable tablet Chew 81 mg daily      atorvastatin (LIPITOR) 20 mg tablet Take 1 tablet (20 mg total) by mouth daily 90 tablet 2    Biotin 5000 MCG CAPS Take 1 capsule by mouth daily      cholecalciferol (VITAMIN D3) 1,000 units tablet Take 1,000 Units by mouth daily      CINNAMON PO Take by mouth      diphenhydrAMINE-acetaminophen (TYLENOL PM)  MG TABS Take 1 tablet by mouth daily at bedtime as needed for sleep      doxycycline hyclate (VIBRA-TABS) 100 mg tablet Take by mouth as needed        esomeprazole (NEXIUM) 40 MG capsule Take 1 capsule by mouth daily      fluticasone (FLONASE) 50 mcg/act nasal spray SPRAY 2 SPRAYS INTO EACH NOSTRIL EVERY DAY 16 mL 2    meclizine (ANTIVERT) 25 mg tablet Take by mouth as needed        metroNIDAZOLE (METROGEL) 0 75 % gel as needed   3    Multiple Vitamin (MULTIVITAMIN) capsule Take 1 capsule by mouth daily      SYNTHROID 125 MCG tablet TAKE 1 TABLET DAILY ON AN EMPTY STOMACH 90 tablet 4    [DISCONTINUED] naproxen sodium (ALEVE) 220 MG tablet Take by mouth       No current facility-administered medications on file prior to visit        Family History   Problem Relation Age of Onset    Aneurysm Mother         cerebral    Ovarian cancer Mother 67    Cirrhosis Father         alcoholic    Aneurysm Brother         abdominal aortic; cerebral    Diabetes Son     No Known Problems Daughter     No Known Problems Maternal Grandmother     No Known Problems Maternal Grandfather     No Known Problems Paternal Grandmother     No Known Problems Paternal Grandfather     No Known Problems Son     No Known Problems Son     No Known Problems Son     No Known Problems Maternal Aunt     No Known Problems Maternal Aunt     No Known Problems Maternal Aunt     No Known Problems Maternal Aunt     No Known Problems Paternal Aunt     No Known Problems Paternal Aunt     No Known Problems Paternal Aunt     No Known Problems Paternal Aunt     No Known Problems Paternal Aunt     Cancer Brother 79        bladder cancer     Social History     Socioeconomic History    Marital status: /Civil Union     Spouse name: Not on file    Number of children: Not on file    Years of education: 15    Highest education level: Not on file   Occupational History    Not on file   Social Needs    Financial resource strain: Not on file    Food insecurity     Worry: Not on file     Inability: Not on file    Transportation needs     Medical: Not on file     Non-medical: Not on file   Tobacco Use    Smoking status: Former Smoker     Packs/day: 0 25     Years: 17 00     Pack years: 4 25     Types: Cigarettes     Last attempt to quit: 2010     Years since quitting: 10 6    Smokeless tobacco: Never Used    Tobacco comment: 0 5 ppw intermittently last smoked 2010   Substance and Sexual Activity    Alcohol use: Yes     Frequency: 2-4 times a month     Comment: occasionally    Drug use: No    Sexual activity: Not on file   Lifestyle    Physical activity     Days per week: Not on file     Minutes per session: Not on file    Stress: Not on file   Relationships    Social connections     Talks on phone: Not on file     Gets together: Not on file     Attends Holiness service: Not on file     Active member of club or organization: Not on file     Attends meetings of clubs or organizations: Not on file     Relationship status: Not on file    Intimate partner violence     Fear of current or ex partner: Not on file     Emotionally abused: Not on file     Physically abused: Not on file     Forced sexual activity: Not on file   Other Topics Concern    Not on file   Social History Narrative    Caffeine use     Vitals:    09/03/20 0757   BP: 134/82   Pulse: 89   Resp: 16   Temp: 98 2 °F (36 8 °C)   TempSrc: Temporal   SpO2: 97%   Weight: 88 9 kg (196 lb)   Height: 5' 6" (1 676 m)     Results for orders placed or performed in visit on 08/28/20   Comprehensive metabolic panel   Result Value Ref Range    Sodium 142 136 - 145 mmol/L    Potassium 3 8 3 5 - 5 3 mmol/L    Chloride 109 (H) 100 - 108 mmol/L    CO2 24 21 - 32 mmol/L    ANION GAP 9 4 - 13 mmol/L    BUN 13 5 - 25 mg/dL    Creatinine 0 54 (L) 0 60 - 1 30 mg/dL    Glucose, Fasting 117 (H) 65 - 99 mg/dL    Calcium 8 9 8 3 - 10 1 mg/dL    AST 15 5 - 45 U/L    ALT 25 12 - 78 U/L    Alkaline Phosphatase 41 (L) 46 - 116 U/L    Total Protein 6 7 6 4 - 8 2 g/dL    Albumin 3 6 3 5 - 5 0 g/dL    Total Bilirubin 0 53 0 20 - 1 00 mg/dL    eGFR 94 ml/min/1 73sq m   Hemoglobin A1C   Result Value Ref Range    Hemoglobin A1C 6 1 (H) Normal 3 8-5 6%; PreDiabetic 5 7-6 4%; Diabetic >=6 5%; Glycemic control for adults with diabetes <7 0% %     mg/dl   Lipid Panel with Direct LDL reflex   Result Value Ref Range    Cholesterol 186 50 - 200 mg/dL    Triglycerides 128 <=150 mg/dL    HDL, Direct 57 >=40 mg/dL    LDL Calculated 103 (H) 0 - 100 mg/dL   Microalbumin / creatinine urine ratio   Result Value Ref Range    Creatinine, Ur 107 0 mg/dL    Microalbum  ,U,Random 6 6 0 0 - 20 0 mg/L    Microalb Creat Ratio 6 0 - 30 mg/g creatinine   Type and screen   Result Value Ref Range    ABO Grouping A     Rh Factor Positive     Antibody Screen Negative     Specimen Expiration Date [id number]      Weight (last 2 days)     Date/Time   Weight    09/03/20 0757   88 9 (196)            Body mass index is 31 64 kg/m²  BP      Temp      Pulse     Resp      SpO2        Vitals:    09/03/20 0757   Weight: 88 9 kg (196 lb)     Vitals:    09/03/20 0757   Weight: 88 9 kg (196 lb)       /82   Pulse 89   Temp 98 2 °F (36 8 °C) (Temporal)   Resp 16   Ht 5' 6" (1 676 m)   Wt 88 9 kg (196 lb)   SpO2 97%   BMI 31 64 kg/m²          Physical Exam  Vitals signs and nursing note reviewed  Constitutional:       General: She is not in acute distress  Appearance: She is well-developed  She is not diaphoretic  HENT:      Head: Normocephalic and atraumatic  Eyes:      General: No scleral icterus  Right eye: No discharge  Left eye: No discharge  Conjunctiva/sclera: Conjunctivae normal    Neck:      Musculoskeletal: Normal range of motion and neck supple  Cardiovascular:      Rate and Rhythm: Normal rate and regular rhythm  Heart sounds: Normal heart sounds  No murmur  No friction rub  No gallop  Pulmonary:      Effort: Pulmonary effort is normal  No respiratory distress  Breath sounds: Normal breath sounds  No stridor  No wheezing or rales  Chest:      Chest wall: No tenderness  Abdominal:      General: Bowel sounds are normal  There is no distension  Palpations: Abdomen is soft  There is no mass  Tenderness: There is no abdominal tenderness  There is no guarding or rebound  Hernia: No hernia is present  Musculoskeletal: Normal range of motion  General: No tenderness or deformity  Skin:     General: Skin is warm  Coloration: Skin is not pale  Findings: No erythema  Neurological:      Mental Status: She is alert and oriented to person, place, and time     Psychiatric:         Behavior: Behavior normal

## 2020-09-03 NOTE — ASSESSMENT & PLAN NOTE
Patient reported having throbbing sensation/pulsation of her left neck  She denied lightheadedness or dizziness  She did not have a syncope

## 2020-09-03 NOTE — ASSESSMENT & PLAN NOTE
Patient currently taking esomeprazole  She denied heartburns, or acid feeling in her throat  Recommended decrease the dose of nexium and bridge it with pepcid  Explained the risk of esmoprazole of kidney disease and osteoporosis

## 2020-09-03 NOTE — ASSESSMENT & PLAN NOTE
She did complained of a blister in her mouth for 1-2 months,  that doesn't change in size or hurt  Patient will follow up with her dentist   Refer to oral surgery

## 2020-09-03 NOTE — ASSESSMENT & PLAN NOTE
Lab Results   Component Value Date    HGBA1C 6 1 (H) 08/28/2020   Patient is currently not on any medication  Fasting sugar is 117  Patient just had an appointment with podiatry  There was no diabetic neuropathy noted  Patient did report having blurry vision, and she will follow up ophalmologist soon  Recommended lifestyle modification

## 2020-09-03 NOTE — ASSESSMENT & PLAN NOTE
Patient's BP today is 134/82  Patient reported she took her BP at home and it usually is 121-124  Continue to monitor

## 2020-09-03 NOTE — ASSESSMENT & PLAN NOTE
Patient reported poor sleep quality  She has been taking melatonin 1 mg  Advice OTC melatonin 5-10 mg

## 2020-09-03 NOTE — PATIENT INSTRUCTIONS
Insomnia   AMBULATORY CARE:   Insomnia  is a condition that makes it hard to fall or stay asleep  Lack of sleep can lead to attention or memory problems during the day  You may also be palencia, depressed, clumsy, or have headaches  Contact your healthcare provider if:   · Your symptoms do not get better, or they get worse  · You begin to use drugs or alcohol to fall asleep  · You have questions or concerns about your condition or care  Medicines:   · Medicines  may help you sleep more regularly or help you feel less anxious  · Take your medicine as directed  Contact your healthcare provider if you think your medicine is not helping or if you have side effects  Tell him or her if you are allergic to any medicine  Keep a list of the medicines, vitamins, and herbs you take  Include the amounts, and when and why you take them  Bring the list or the pill bottles to follow-up visits  Carry your medicine list with you in case of an emergency  What you can do to improve your sleep:   · Create a sleep schedule  This will help you form a sleep routine  Keep a record of your sleep patterns, and any sleeping problems you have  Bring the record to follow-up visits with healthcare providers  · Do not take naps  Naps could make it hard for you to fall asleep at bedtime  · Keep your bedroom cool, quiet, and dark  Turn on white noise, such as a fan, to help you relax  Do not use your bed for any activity that will keep you awake  Do not read, exercise, eat, or watch TV in your bedroom  · Get up if you do not fall asleep within 20 minutes  Move to another room and do something relaxing until you become sleepy  · Limit caffeine, alcohol, and food to earlier in the day  Only drink caffeine in the morning  Do not drink alcohol within 6 hours of bedtime  Do not eat a heavy meal right before you go to bed  · Exercise regularly  Daily exercise may help you sleep better   Do not exercise within 4 hours of bedtime  Follow up with your healthcare provider as directed: Your healthcare provider may refer you for cognitive behavioral therapy  A behavioral therapist may help you find ways to relax, decrease stress, and improve sleep  Write down your questions so you remember to ask them during your visits  © 2017 [employer] Information is for End User's use only and may not be sold, redistributed or otherwise used for commercial purposes  All illustrations and images included in CareNotes® are the copyrighted property of [employer] , [employer]  or [employer]ice[employer]  The above information is an  only  It is not intended as medical advice for individual conditions or treatments  Talk to your doctor, nurse or pharmacist before following any medical regimen to see if it is safe and effective for you

## 2020-09-30 DIAGNOSIS — E78.5 HYPERLIPIDEMIA, UNSPECIFIED HYPERLIPIDEMIA TYPE: ICD-10-CM

## 2020-09-30 DIAGNOSIS — E03.9 HYPOTHYROIDISM, UNSPECIFIED TYPE: ICD-10-CM

## 2020-09-30 RX ORDER — LEVOTHYROXINE SODIUM 0.12 MG/1
125 TABLET ORAL DAILY
Qty: 90 TABLET | Refills: 3 | Status: SHIPPED | OUTPATIENT
Start: 2020-09-30 | End: 2021-09-07

## 2020-09-30 RX ORDER — ATORVASTATIN CALCIUM 20 MG/1
TABLET, FILM COATED ORAL
Qty: 90 TABLET | Refills: 3 | Status: SHIPPED | OUTPATIENT
Start: 2020-09-30 | End: 2021-09-07

## 2020-10-01 ENCOUNTER — HOSPITAL ENCOUNTER (OUTPATIENT)
Dept: NON INVASIVE DIAGNOSTICS | Facility: CLINIC | Age: 75
Discharge: HOME | End: 2020-10-01
Payer: MEDICARE

## 2020-10-01 DIAGNOSIS — I65.29 CAROTID ARTERY PLAQUE: ICD-10-CM

## 2020-10-01 PROCEDURE — 93880 EXTRACRANIAL BILAT STUDY: CPT

## 2020-10-01 PROCEDURE — 93880 EXTRACRANIAL BILAT STUDY: CPT | Performed by: SURGERY

## 2020-10-07 ENCOUNTER — TELEPHONE (OUTPATIENT)
Dept: INTERNAL MEDICINE CLINIC | Facility: CLINIC | Age: 75
End: 2020-10-07

## 2020-10-07 ENCOUNTER — APPOINTMENT (OUTPATIENT)
Dept: LAB | Age: 75
End: 2020-10-07
Payer: MEDICARE

## 2020-10-07 DIAGNOSIS — E03.9 HYPOTHYROIDISM, UNSPECIFIED TYPE: ICD-10-CM

## 2020-10-07 DIAGNOSIS — E11.9 TYPE 2 DIABETES MELLITUS WITHOUT COMPLICATION, WITHOUT LONG-TERM CURRENT USE OF INSULIN (HCC): ICD-10-CM

## 2020-10-07 LAB
T4 FREE SERPL-MCNC: 1.12 NG/DL (ref 0.76–1.46)
TSH SERPL DL<=0.05 MIU/L-ACNC: 0.83 UIU/ML (ref 0.36–3.74)

## 2020-10-07 PROCEDURE — 36415 COLL VENOUS BLD VENIPUNCTURE: CPT

## 2020-10-07 PROCEDURE — 84439 ASSAY OF FREE THYROXINE: CPT

## 2020-10-07 PROCEDURE — 84443 ASSAY THYROID STIM HORMONE: CPT

## 2020-10-23 ENCOUNTER — TELEMEDICINE (OUTPATIENT)
Dept: INTERNAL MEDICINE CLINIC | Facility: CLINIC | Age: 75
End: 2020-10-23
Payer: MEDICARE

## 2020-10-23 DIAGNOSIS — Z20.828 EXPOSURE TO SARS-ASSOCIATED CORONAVIRUS: ICD-10-CM

## 2020-10-23 DIAGNOSIS — Z20.828 EXPOSURE TO SARS-ASSOCIATED CORONAVIRUS: Primary | ICD-10-CM

## 2020-10-23 PROCEDURE — U0003 INFECTIOUS AGENT DETECTION BY NUCLEIC ACID (DNA OR RNA); SEVERE ACUTE RESPIRATORY SYNDROME CORONAVIRUS 2 (SARS-COV-2) (CORONAVIRUS DISEASE [COVID-19]), AMPLIFIED PROBE TECHNIQUE, MAKING USE OF HIGH THROUGHPUT TECHNOLOGIES AS DESCRIBED BY CMS-2020-01-R: HCPCS | Performed by: NURSE PRACTITIONER

## 2020-10-23 PROCEDURE — 99213 OFFICE O/P EST LOW 20 MIN: CPT | Performed by: NURSE PRACTITIONER

## 2020-10-24 LAB — SARS-COV-2 RNA SPEC QL NAA+PROBE: NOT DETECTED

## 2020-10-29 ENCOUNTER — TELEPHONE (OUTPATIENT)
Dept: UROLOGY | Facility: MEDICAL CENTER | Age: 75
End: 2020-10-29

## 2020-10-30 ENCOUNTER — OFFICE VISIT (OUTPATIENT)
Dept: INTERNAL MEDICINE CLINIC | Facility: CLINIC | Age: 75
End: 2020-10-30
Payer: MEDICARE

## 2020-10-30 VITALS
OXYGEN SATURATION: 94 % | DIASTOLIC BLOOD PRESSURE: 78 MMHG | HEIGHT: 66 IN | BODY MASS INDEX: 32.27 KG/M2 | WEIGHT: 200.8 LBS | SYSTOLIC BLOOD PRESSURE: 142 MMHG | HEART RATE: 78 BPM | RESPIRATION RATE: 16 BRPM | TEMPERATURE: 97.3 F

## 2020-10-30 DIAGNOSIS — Z23 NEED FOR INFLUENZA VACCINATION: ICD-10-CM

## 2020-10-30 DIAGNOSIS — R10.9 RIGHT FLANK PAIN: Primary | ICD-10-CM

## 2020-10-30 LAB
SL AMB  POCT GLUCOSE, UA: NEGATIVE
SL AMB LEUKOCYTE ESTERASE,UA: NEGATIVE
SL AMB POCT BILIRUBIN,UA: NEGATIVE
SL AMB POCT BLOOD,UA: NEGATIVE
SL AMB POCT CLARITY,UA: CLEAR
SL AMB POCT COLOR,UA: YELLOW
SL AMB POCT KETONES,UA: NEGATIVE
SL AMB POCT NITRITE,UA: NEGATIVE
SL AMB POCT PH,UA: 6.5
SL AMB POCT SPECIFIC GRAVITY,UA: 1.01
SL AMB POCT URINE PROTEIN: NEGATIVE
SL AMB POCT UROBILINOGEN: NEGATIVE

## 2020-10-30 PROCEDURE — 81003 URINALYSIS AUTO W/O SCOPE: CPT | Performed by: NURSE PRACTITIONER

## 2020-10-30 PROCEDURE — 99213 OFFICE O/P EST LOW 20 MIN: CPT | Performed by: NURSE PRACTITIONER

## 2020-11-02 ENCOUNTER — HOSPITAL ENCOUNTER (OUTPATIENT)
Dept: RADIOLOGY | Age: 75
Discharge: HOME | End: 2020-11-02
Payer: MEDICARE

## 2020-11-02 DIAGNOSIS — R10.9 RIGHT FLANK PAIN: ICD-10-CM

## 2020-11-02 PROCEDURE — 76770 US EXAM ABDO BACK WALL COMP: CPT

## 2020-11-24 ENCOUNTER — LAB REQUISITION (OUTPATIENT)
Dept: LAB | Facility: HOSPITAL | Age: 75
End: 2020-11-24
Payer: MEDICARE

## 2020-11-24 DIAGNOSIS — K57.30 DIVERTICULOSIS OF LARGE INTESTINE WITHOUT PERFORATION OR ABSCESS WITHOUT BLEEDING: ICD-10-CM

## 2020-11-24 DIAGNOSIS — K22.89 OTHER SPECIFIED DISEASES OF ESOPHAGUS: ICD-10-CM

## 2020-11-24 DIAGNOSIS — B96.81 HELICOBACTER PYLORI (H. PYLORI) AS THE CAUSE OF DISEASES CLASSIFIED ELSEWHERE: ICD-10-CM

## 2020-11-24 DIAGNOSIS — R10.31 RIGHT LOWER QUADRANT PAIN: ICD-10-CM

## 2020-11-24 DIAGNOSIS — K63.5 POLYP OF COLON: ICD-10-CM

## 2020-11-24 DIAGNOSIS — Z86.010 PERSONAL HISTORY OF COLONIC POLYPS: ICD-10-CM

## 2020-11-24 DIAGNOSIS — R10.13 EPIGASTRIC PAIN: ICD-10-CM

## 2020-11-24 PROCEDURE — 88305 TISSUE EXAM BY PATHOLOGIST: CPT | Performed by: PATHOLOGY

## 2020-12-09 ENCOUNTER — TELEMEDICINE (OUTPATIENT)
Dept: INTERNAL MEDICINE CLINIC | Facility: CLINIC | Age: 75
End: 2020-12-09
Payer: MEDICARE

## 2020-12-09 DIAGNOSIS — Z20.822 EXPOSURE TO COVID-19 VIRUS: ICD-10-CM

## 2020-12-09 DIAGNOSIS — Z20.822 EXPOSURE TO COVID-19 VIRUS: Primary | ICD-10-CM

## 2020-12-09 PROCEDURE — U0003 INFECTIOUS AGENT DETECTION BY NUCLEIC ACID (DNA OR RNA); SEVERE ACUTE RESPIRATORY SYNDROME CORONAVIRUS 2 (SARS-COV-2) (CORONAVIRUS DISEASE [COVID-19]), AMPLIFIED PROBE TECHNIQUE, MAKING USE OF HIGH THROUGHPUT TECHNOLOGIES AS DESCRIBED BY CMS-2020-01-R: HCPCS | Performed by: NURSE PRACTITIONER

## 2020-12-09 PROCEDURE — 99442 PR PHYS/QHP TELEPHONE EVALUATION 11-20 MIN: CPT | Performed by: NURSE PRACTITIONER

## 2020-12-10 LAB — SARS-COV-2 RNA SPEC QL NAA+PROBE: NOT DETECTED

## 2020-12-15 PROBLEM — Z20.822 EXPOSURE TO COVID-19 VIRUS: Status: ACTIVE | Noted: 2020-12-15

## 2021-02-25 DIAGNOSIS — Z45.819 ENCOUNTER FOR ADJUSTMENT OR REMOVAL OF UNSPECIFIED BREAST IMPLANT: Primary | ICD-10-CM

## 2021-03-02 DIAGNOSIS — Z23 ENCOUNTER FOR IMMUNIZATION: ICD-10-CM

## 2021-03-12 NOTE — ASSESSMENT & PLAN NOTE
Hyperlipidemia controlled continue with current medical regiment recommend a low-cholesterol diet and recommend routine exercise we will continue to monitor the progress 
Hypertension - controlled, I have counseled patient following healthy balance diet, I would like the patient reduce sodium, exercise routinely, I would like the patient continued the med current medical regiment and we will continue to monitor 
Hypothyroidism controlled the patient is currently euthyroid I will be ordering a TSH prior to the next office visit and the patient will continue with current medical regiment; we will continue to monitor the patient's progress 
Lab Results   Component Value Date    HGBA1C 6 0 08/14/2019       No results for input(s): POCGLU in the last 72 hours  Blood Sugar Average: Last 72 hrs:   improving currently on no medication annual eye examination, I will be ordering diabetic laboratories including comprehensive metabolic panel, hemoglobin A1c, urine microalbumin, lipid panel  I have counselled the pt to follow a healthy and balanced diet ,and recommend routine exercise 
Obesity -I have counseled patient following healthy and balanced diet, I would like the patient to lose weight, I would like the patient exercise routinely; we will continue monitor the patient's progress 
Will treat with amoxicillin 500 mg t i d  Times 10 days, Flonase 2 sprays each nostril once a day  Plenty of fluids, rest proper handwashing, she may use the Flonase as needed if she develops any further is congestion or viral type symptoms and if it does progress sinus infection she should notify me immediately 
Negative

## 2021-03-16 ENCOUNTER — EVALUATION (OUTPATIENT)
Dept: PHYSICAL THERAPY | Age: 76
End: 2021-03-16
Payer: MEDICARE

## 2021-03-16 DIAGNOSIS — M25.551 RIGHT HIP PAIN: Primary | ICD-10-CM

## 2021-03-16 PROCEDURE — 97161 PT EVAL LOW COMPLEX 20 MIN: CPT | Performed by: PHYSICAL THERAPIST

## 2021-03-16 PROCEDURE — 97110 THERAPEUTIC EXERCISES: CPT | Performed by: PHYSICAL THERAPIST

## 2021-03-16 NOTE — PROGRESS NOTES
PT Evaluation     Today's date: 3/16/2021  Patient name: Tatiana Wolf  : 1945  MRN: [id number]  Referring provider: Timur Flores DO  Dx:   Encounter Diagnosis     ICD-10-CM    1  Right hip pain  M25 551                   Assessment  Assessment details: Patient presents with R hip pain/bursitis and R SI jt dysfunction  Patient with decreased hip flexibility, weakness, and pain  Patient is an excellent candidate for PT intervention  Impairments: abnormal or restricted ROM, impaired physical strength, lacks appropriate home exercise program and pain with function  Understanding of Dx/Px/POC: excellent   Prognosis: good    Goals  Short Term goals - 4 weeks  1  Patient will be independent HEP  2   Patient will report a 50% decrease in pain complaints  3   Increase strength 1/2 grade  4   Increase ROM 5-10 degrees  Long Term goals - 8 weeks  1  Patient will report elimination of pain complaints  2   Patient will return to all work related activities without restriction  3   Patient will return to all recreational activities without restriction  4   ROM WFL  5   Strength 5/5  Plan  Planned therapy interventions: manual therapy, strengthening, stretching and therapeutic exercise  Frequency: 2x week  Duration in weeks: 4        Subjective Evaluation    History of Present Illness  Mechanism of injury: Patient reports a couple week history of progressively worsening R hip/SI region pain  Patient reports sx's are most aggravated by R sidelying and getting out of the bed in the am   R hip x-rays - negative  No treatment - oral MEDS or injections  Patient with history of R knee pain      Quality of life: excellent    Pain  Current pain ratin  At best pain ratin  At worst pain ratin  Quality: tight, sharp and dull ache  Progression: no change    Patient Goals  Patient goals for therapy: increased strength, independence with ADLs/IADLs, decreased pain and increased motion          Objective     Active Range of Motion     Lumbar   Flexion:  WFL  Extension:  Restriction level: minimal  Left lateral flexion:  Restriction level: minimal  Right lateral flexion:  with pain Restriction level: moderate    Strength/Myotome Testing     Right Hip   Planes of Motion   Flexion: 3+  Abduction: 3+  External rotation: 3+  Internal rotation: 3+    Tests     Lumbar     Right   Negative passive SLR, quadrant and slump test      Right Hip   Negative MAEVE, femoral nerve tension, SI compression and SI distraction  Robin: Positive         Flowsheet Rows      Most Recent Value   PT/OT G-Codes   Current Score  68   Projected Score  74             Precautions: None      Manuals             LC             Manual Hip flexor stretch, ITB stretch, piriformis stretch                                       Neuro Re-Ed                                                                                                        Ther Ex                          SLR flexion             SLR abd                          Isometric hip add             Cybex hip abd                                       Ther Activity                                       Gait Training                                       Modalities

## 2021-03-23 ENCOUNTER — APPOINTMENT (OUTPATIENT)
Dept: PHYSICAL THERAPY | Age: 76
End: 2021-03-23
Payer: MEDICARE

## 2021-03-26 ENCOUNTER — APPOINTMENT (OUTPATIENT)
Dept: PHYSICAL THERAPY | Age: 76
End: 2021-03-26
Payer: MEDICARE

## 2021-03-31 ENCOUNTER — APPOINTMENT (OUTPATIENT)
Dept: PHYSICAL THERAPY | Age: 76
End: 2021-03-31
Payer: MEDICARE

## 2021-04-08 ENCOUNTER — TRANSCRIBE ORDERS (OUTPATIENT)
Dept: ADMINISTRATIVE | Facility: HOSPITAL | Age: 76
End: 2021-04-08

## 2021-04-08 DIAGNOSIS — M25.551 RIGHT HIP PAIN: Primary | ICD-10-CM

## 2021-04-18 DIAGNOSIS — J01.90 ACUTE NON-RECURRENT SINUSITIS, UNSPECIFIED LOCATION: ICD-10-CM

## 2021-04-18 RX ORDER — FLUTICASONE PROPIONATE 50 MCG
SPRAY, SUSPENSION (ML) NASAL
Qty: 16 ML | Refills: 2 | Status: SHIPPED | OUTPATIENT
Start: 2021-04-18 | End: 2021-05-10 | Stop reason: SDUPTHER

## 2021-04-19 ENCOUNTER — OFFICE VISIT (OUTPATIENT)
Dept: AUDIOLOGY | Age: 76
End: 2021-04-19

## 2021-04-19 DIAGNOSIS — IMO0001 ASYMMETRICAL SENSORINEURAL HEARING LOSS OF BOTH EARS: Primary | ICD-10-CM

## 2021-04-19 NOTE — PROGRESS NOTES
Hearing Aid Evaluation  Name:  Mike Fang  :  1945  Age:  70 y o  Date of Evaluation: 21     Audiologic Results: Audiologic testing was performed on 2021, testing revealed mild sloping to severe asymmetrical hearing loss  Amplification is recommended binaurally    Hearing Aid Evaluation:  Hearing aid styles, technology, and price were discussed with the patient  Possible hearing aid options, programs, and accessories were discussed  Pricing options and technology levels were discussed to determine the appropriate device to recommend  Recommendation/Quote: The first hearing aid recommendation is  Oticon More 2  The second hearing aid recommendation is  Oticon More 3  See attached quote sheet*    The patient opted to demo a pair of Oticon More 2 with noise reduction set to mimic More 3   She will return on         Nini Cherry , [facility]  Clinical Audiologist

## 2021-04-23 ENCOUNTER — OFFICE VISIT (OUTPATIENT)
Dept: AUDIOLOGY | Age: 76
End: 2021-04-23
Payer: COMMERCIAL

## 2021-04-23 DIAGNOSIS — H90.3 SENSORY HEARING LOSS, BILATERAL: Primary | ICD-10-CM

## 2021-04-23 PROCEDURE — V5261 HEARING AID, DIGIT, BIN, BTE: HCPCS | Performed by: AUDIOLOGIST

## 2021-04-23 PROCEDURE — V5160 DISPENSING FEE BINAURAL: HCPCS | Performed by: AUDIOLOGIST

## 2021-04-23 NOTE — PROGRESS NOTES
Progress Note    Name:  Katheryn Ponce  :  1945  Age:  78 y o  Date of Evaluation: 21     Patient came today to return loaner hearing aids  Patient wants to purchase Oticon More 3 hearing aids in chestnut brown  Ordered  Patient scheduled for [facility] on 3021        Nini Mcdowell , CCC-A  Clinical Audiologist

## 2021-04-25 ENCOUNTER — HOSPITAL ENCOUNTER (OUTPATIENT)
Dept: RADIOLOGY | Age: 76
Discharge: HOME | End: 2021-04-25
Payer: MEDICARE

## 2021-04-25 DIAGNOSIS — M25.551 RIGHT HIP PAIN: ICD-10-CM

## 2021-04-25 PROCEDURE — 73721 MRI JNT OF LWR EXTRE W/O DYE: CPT

## 2021-04-26 NOTE — PROGRESS NOTES
Progress Note    Name:  Randye Carrel  :  1945  Age:  78 y o  Date of Evaluation: 21     Coco Tucker [id number]    More 3 miniRITE-R    SN: [id number] & [id number]    Warranty ends: 24     SN: [id number]    HAP already scheduled with Talia Pearl   Clinical Audiologist

## 2021-04-30 ENCOUNTER — OFFICE VISIT (OUTPATIENT)
Dept: AUDIOLOGY | Age: 76
End: 2021-04-30

## 2021-04-30 DIAGNOSIS — H90.3 SENSORY HEARING LOSS, BILATERAL: Primary | ICD-10-CM

## 2021-04-30 NOTE — PROGRESS NOTES
Hearing Aid Fitting    Name:  Michael Nguyen  :  1945  Age:  78 y o  Date of Evaluation: 21     Kasie Singh is being see today to be fit with new hearing aids  Patient is fit with Oticon More 3 hearing aid(s)  Right serial number [id number]  Left serial number [id number]   s/n [id number]  Warranty date: 2024 (Loss/Damage and repair)  TV adapter s/n [id number]  Warranty date 2022      Ear mold Battery  Dome   Right N/a R 2-85 8DV   Left n/a R 2-85 8DV     The hearing aid(s) were adjusted based on the patient's most recent audiogram and patient comfort  Patient noted good sound quality, and was happy with the fitting  Care and cleaning of the hearing aids was reviewed  Domes, filters, and batteries and user manual were reviewed with the patient  Insertion and removal of the hearing aids was done  The patient practiced insertion and removal of the devices in the office, they demonstrated excellent ability to manipulate the hearing aids  Telephone use was reviewed with the patient  The patient expressed satisfaction with the hearing aids  Recommendation:   Follow up in two weeks         Nini Jeffers CCC-A  Clinical Audiologist

## 2021-05-10 DIAGNOSIS — J01.90 ACUTE NON-RECURRENT SINUSITIS, UNSPECIFIED LOCATION: ICD-10-CM

## 2021-05-10 RX ORDER — FLUTICASONE PROPIONATE 50 MCG
2 SPRAY, SUSPENSION (ML) NASAL DAILY
Qty: 3 BOTTLE | Refills: 2 | Status: SHIPPED | OUTPATIENT
Start: 2021-05-10

## 2021-05-17 ENCOUNTER — OFFICE VISIT (OUTPATIENT)
Dept: AUDIOLOGY | Age: 76
End: 2021-05-17

## 2021-05-17 DIAGNOSIS — H90.5 SENSORY HEARING LOSS: Primary | ICD-10-CM

## 2021-05-17 NOTE — PROGRESS NOTES
Hearing Aid Visit:    Name:  Bogdan Plata  :  1945  Age:  78 y o  Date of Evaluation: 21     Kasie iSmon is being seen for a hearing aid visit  Patient is fit with OtLP33.TV More 3 miniRITE -R  hearing aid(s)  Right serial number [id number]  Left serial number [id number]  Warranty date: 2024 (Loss/Damage and repair)  Patient reports concern regarding not hearing a woman in front of her at the grocery store speak to her  She also reports her left hearing aid cutting in and out on one occasion  Action:  Discussed attention vs hearing with patient  Manipulation of left wire did not result in cutting in and out, however left wire was replaced under warranty due to many patients having difficulty with 2-85L receivers (possible bad batch)  There was a small amount of cerumen in patient's left filter - had patient practice replacing filters and domes  Real ear showed 500 and 1,000Hz slightly below target  Increased by 2 clicks  Recommendations:   Return in one month for HAV, or sooner if any difficulties       Nini Betancourt Au D , CCC-A  Clinical Audiologist

## 2021-06-02 ENCOUNTER — TRANSCRIBE ORDERS (OUTPATIENT)
Dept: ADMINISTRATIVE | Age: 76
End: 2021-06-02

## 2021-06-02 ENCOUNTER — APPOINTMENT (OUTPATIENT)
Dept: LAB | Age: 76
End: 2021-06-02
Payer: MEDICARE

## 2021-06-02 DIAGNOSIS — I51.9 MYXEDEMA HEART DISEASE: ICD-10-CM

## 2021-06-02 DIAGNOSIS — I51.9 MYXEDEMA HEART DISEASE: Primary | ICD-10-CM

## 2021-06-02 DIAGNOSIS — E03.9 MYXEDEMA HEART DISEASE: Primary | ICD-10-CM

## 2021-06-02 DIAGNOSIS — E03.9 MYXEDEMA HEART DISEASE: ICD-10-CM

## 2021-06-02 LAB
ALBUMIN SERPL BCP-MCNC: 3.6 G/DL (ref 3.5–5)
ALP SERPL-CCNC: 44 U/L (ref 46–116)
ALT SERPL W P-5'-P-CCNC: 19 U/L (ref 12–78)
ANION GAP SERPL CALCULATED.3IONS-SCNC: 6 MMOL/L (ref 4–13)
AST SERPL W P-5'-P-CCNC: 16 U/L (ref 5–45)
BILIRUB SERPL-MCNC: 0.87 MG/DL (ref 0.2–1)
BUN SERPL-MCNC: 10 MG/DL (ref 5–25)
CALCIUM SERPL-MCNC: 9.1 MG/DL (ref 8.3–10.1)
CHLORIDE SERPL-SCNC: 106 MMOL/L (ref 100–108)
CHOLEST SERPL-MCNC: 174 MG/DL (ref 50–200)
CO2 SERPL-SCNC: 28 MMOL/L (ref 21–32)
CREAT SERPL-MCNC: 0.53 MG/DL (ref 0.6–1.3)
EST. AVERAGE GLUCOSE BLD GHB EST-MCNC: 131 MG/DL
GFR SERPL CREATININE-BSD FRML MDRD: 94 ML/MIN/1.73SQ M
GLUCOSE P FAST SERPL-MCNC: 106 MG/DL (ref 65–99)
HBA1C MFR BLD: 6.2 %
HDLC SERPL-MCNC: 56 MG/DL
LDLC SERPL CALC-MCNC: 93 MG/DL (ref 0–100)
NONHDLC SERPL-MCNC: 118 MG/DL
POTASSIUM SERPL-SCNC: 3.7 MMOL/L (ref 3.5–5.3)
PROT SERPL-MCNC: 6.4 G/DL (ref 6.4–8.2)
SODIUM SERPL-SCNC: 140 MMOL/L (ref 136–145)
TRIGL SERPL-MCNC: 127 MG/DL

## 2021-06-02 PROCEDURE — 80061 LIPID PANEL: CPT

## 2021-06-02 PROCEDURE — 80053 COMPREHEN METABOLIC PANEL: CPT

## 2021-06-02 PROCEDURE — 83036 HEMOGLOBIN GLYCOSYLATED A1C: CPT

## 2021-06-02 PROCEDURE — 36415 COLL VENOUS BLD VENIPUNCTURE: CPT

## 2021-06-08 ENCOUNTER — OFFICE VISIT (OUTPATIENT)
Dept: INTERNAL MEDICINE CLINIC | Facility: CLINIC | Age: 76
End: 2021-06-08
Payer: MEDICARE

## 2021-06-08 VITALS
OXYGEN SATURATION: 97 % | DIASTOLIC BLOOD PRESSURE: 78 MMHG | SYSTOLIC BLOOD PRESSURE: 132 MMHG | WEIGHT: 200.4 LBS | HEART RATE: 96 BPM | HEIGHT: 66 IN | BODY MASS INDEX: 32.21 KG/M2 | RESPIRATION RATE: 16 BRPM

## 2021-06-08 DIAGNOSIS — R53.83 OTHER FATIGUE: ICD-10-CM

## 2021-06-08 DIAGNOSIS — E66.09 CLASS 1 OBESITY DUE TO EXCESS CALORIES WITHOUT SERIOUS COMORBIDITY WITH BODY MASS INDEX (BMI) OF 31.0 TO 31.9 IN ADULT: ICD-10-CM

## 2021-06-08 DIAGNOSIS — Z00.00 MEDICARE ANNUAL WELLNESS VISIT, SUBSEQUENT: Primary | ICD-10-CM

## 2021-06-08 DIAGNOSIS — E04.1 THYROID NODULE: ICD-10-CM

## 2021-06-08 DIAGNOSIS — R60.9 EDEMA, UNSPECIFIED TYPE: ICD-10-CM

## 2021-06-08 DIAGNOSIS — I10 ESSENTIAL HYPERTENSION: ICD-10-CM

## 2021-06-08 DIAGNOSIS — E11.9 TYPE 2 DIABETES MELLITUS WITHOUT COMPLICATION, WITHOUT LONG-TERM CURRENT USE OF INSULIN (HCC): ICD-10-CM

## 2021-06-08 DIAGNOSIS — E78.5 HYPERLIPIDEMIA, UNSPECIFIED HYPERLIPIDEMIA TYPE: ICD-10-CM

## 2021-06-08 PROCEDURE — 1123F ACP DISCUSS/DSCN MKR DOCD: CPT | Performed by: INTERNAL MEDICINE

## 2021-06-08 PROCEDURE — 99214 OFFICE O/P EST MOD 30 MIN: CPT | Performed by: INTERNAL MEDICINE

## 2021-06-08 PROCEDURE — G0439 PPPS, SUBSEQ VISIT: HCPCS | Performed by: INTERNAL MEDICINE

## 2021-06-08 NOTE — ASSESSMENT & PLAN NOTE
Assessment and plan 1  Medicare subsequent annual wellness examination overall the patient is clinically stable and doing well, we encouraged the patient to follow a healthy and balanced diet  We recommend that the patient exercise routinely approximately 30 minutes 5 times per week   We have reviewed the patient's vaccines and have made recommendations for updates if necessary      Annual flu shot   We will be ordering screening laboratories which are age appropriate  Return to the office in    Six-month   call if any problems

## 2021-06-08 NOTE — ASSESSMENT & PLAN NOTE
Lab Results   Component Value Date    HGBA1C 6 2 (H) 06/02/2021    I have counselled the pt to follow a healthy and balanced diet ,and recommend routine exercise  Diabetic foot examination completed today she would prefer not to be on medication I will be ordering diabetic laboratories including comprehensive metabolic panel, hemoglobin A1c, urine microalbumin, lipid panel

## 2021-06-08 NOTE — PROGRESS NOTES
Assessment/Plan:    Thyroid nodule    Currently asymptomatic will check ultrasound thyroid    Type 2 diabetes mellitus without complication, without long-term current use of insulin ([facility])    Lab Results   Component Value Date    HGBA1C 6 2 (H) 06/02/2021    I have counselled the pt to follow a healthy and balanced diet ,and recommend routine exercise  Diabetic foot examination completed today she would prefer not to be on medication I will be ordering diabetic laboratories including comprehensive metabolic panel, hemoglobin A1c, urine microalbumin, lipid panel  Essential hypertension    Hypertension - controlled, I have counseled patient following healthy balance diet, I would like the patient reduce sodium, exercise routinely, I would like the patient continued the med current medical regiment and we will continue to monitor  Class 1 obesity due to excess calories without serious comorbidity with body mass index (BMI) of 31 0 to 31 9 in adult    Obesity -I have counseled patient following healthy and balanced diet, I would like the patient to lose weight, I would like the patient exercise routinely; we will continue monitor the patient's progress  Edema    Chronic lymphedema, compression stocking, physical therapy    Fatigue   Check routine laboratories CBC a 3rd generation TSH    Hyperlipidemia   Low-cholesterol diet continue Lipitor 20 mg once daily         Problem List Items Addressed This Visit        Endocrine    Type 2 diabetes mellitus without complication, without long-term current use of insulin ([facility] [address] )       Lab Results   Component Value Date    HGBA1C 6 2 (H) 06/02/2021    I have counselled the pt to follow a healthy and balanced diet ,and recommend routine exercise  Diabetic foot examination completed today she would prefer not to be on medication I will be ordering diabetic laboratories including comprehensive metabolic panel, hemoglobin A1c, urine microalbumin, lipid panel           Relevant Orders    Diabetic foot exam    Thyroid nodule       Currently asymptomatic will check ultrasound thyroid         Relevant Orders    US thyroid       Cardiovascular and Mediastinum    Essential hypertension       Hypertension - controlled, I have counseled patient following healthy balance diet, I would like the patient reduce sodium, exercise routinely, I would like the patient continued the med current medical regiment and we will continue to monitor  Other    Fatigue      Check routine laboratories CBC a 3rd generation TSH         Relevant Orders    TSH, 3rd generation    T4, free    CBC (Includes Diff/Plt) (Refl)    Hyperlipidemia      Low-cholesterol diet continue Lipitor 20 mg once daily         Class 1 obesity due to excess calories without serious comorbidity with body mass index (BMI) of 31 0 to 31 9 in adult       Obesity -I have counseled patient following healthy and balanced diet, I would like the patient to lose weight, I would like the patient exercise routinely; we will continue monitor the patient's progress  Medicare annual wellness visit, subsequent - Primary    Edema       Chronic lymphedema, compression stocking, physical therapy         Relevant Orders    Ambulatory referral to Physical Therapy           RTO in 6 months call if any problems  Subjective:      Patient ID: Tadeo Conner is a 78 y o  female  HPI 78-year old female coming in for a follow up office visit regarding fatigue, type 2 diabetes, lymphedema chronic, thyroid nodule, obesity hypertension and hyperlipidemia; The patient reports me compliant taking medications without untoward side effects the  The patient is here to review his medical condition, update me on the medical condition and the patient reports me no hospitalizations and no ER visits  Here to review her laboratories in detail reports me chronic fatigue she reports me may years ago had a nodule would like recheck    Also reports me chronic lymphedema fatigue ? thyroid     The following portions of the patient's history were reviewed and updated as appropriate: allergies, current medications, past family history, past medical history, past social history, past surgical history and problem list     Review of Systems   Constitutional: Negative for activity change, appetite change and unexpected weight change  HENT: Negative for congestion and postnasal drip  Eyes: Negative for visual disturbance  Respiratory: Negative for cough and shortness of breath  Cardiovascular: Negative for chest pain  Gastrointestinal: Negative for abdominal pain, diarrhea, nausea and vomiting  Neurological: Negative for dizziness, light-headedness and headaches  Lymphedema chronic    Objective:    No follow-ups on file  No results found        Allergies   Allergen Reactions    Hydrochlorothiazide Palpitations    Meloxicam Rash       Past Medical History:   Diagnosis Date    Biliary dyskinesia     CAP (community acquired pneumonia)     last assessed 8/17/16    GERD (gastroesophageal reflux disease)     Hyperlipidemia     Palpitations     Shortness of breath      Past Surgical History:   Procedure Laterality Date    AUGMENTATION MAMMAPLASTY Bilateral 1998    breast surgery- with prosthetic implant 1998; pre-pectoral salline    BREAST BIOPSY Left 1974    BREAST BIOPSY Left 1994    BREAST EXCISIONAL BIOPSY Left 1993    CARDIAC CATHETERIZATION      procedure summary    CHOLECYSTECTOMY      onset 2003    HEMORRHOID SURGERY      ROTATOR CUFF REPAIR Bilateral     onset 2010    TUBAL LIGATION      onset 1980     Current Outpatient Medications on File Prior to Visit   Medication Sig Dispense Refill    Ascorbic Acid (VITAMIN C) 1000 MG tablet Take 1 tablet by mouth daily      atorvastatin (LIPITOR) 20 mg tablet TAKE 1 TABLET DAILY 90 tablet 3    Biotin 5000 MCG CAPS Take 1 capsule by mouth daily      cholecalciferol (VITAMIN D3) 1,000 units tablet Take 2,000 Units by mouth daily       CINNAMON PO Take by mouth      diphenhydrAMINE-acetaminophen (TYLENOL PM)  MG TABS Take 1 tablet by mouth daily at bedtime as needed for sleep      doxycycline hyclate (VIBRA-TABS) 100 mg tablet Take by mouth as needed        esomeprazole (NEXIUM) 40 MG capsule Take 1 capsule by mouth daily      fluticasone (FLONASE) 50 mcg/act nasal spray 2 sprays into each nostril daily 3 Bottle 2    levothyroxine (Synthroid) 125 mcg tablet Take 1 tablet (125 mcg total) by mouth daily Take on an empty stomach (Patient taking differently: Take 125 mcg by mouth daily Take on an empty stomach/ MUST BE BRAND) 90 tablet 3    meclizine (ANTIVERT) 25 mg tablet Take by mouth as needed        metroNIDAZOLE (METROGEL) 0 75 % gel as needed   3    Multiple Vitamin (MULTIVITAMIN) capsule Take 1 capsule by mouth daily      psyllium (METAMUCIL) 58 6 % powder Take 1 packet by mouth daily      Diclofenac Sodium (VOLTAREN) 1 % APPLY 2 GRAMS TO THE AFFECTED AREA(S) BY TOPICAL ROUTE 4 TIMES PER DAY      [DISCONTINUED] naproxen sodium (ALEVE) 220 MG tablet Take by mouth       No current facility-administered medications on file prior to visit        Family History   Problem Relation Age of Onset    Aneurysm Mother         cerebral    Ovarian cancer Mother 67    Cirrhosis Father         alcoholic    Aneurysm Brother         abdominal aortic; cerebral    Diabetes Son     No Known Problems Daughter     No Known Problems Maternal Grandmother     No Known Problems Maternal Grandfather     No Known Problems Paternal Grandmother     No Known Problems Paternal Grandfather     No Known Problems Son     No Known Problems Son     No Known Problems Son     No Known Problems Maternal Aunt     No Known Problems Maternal Aunt     No Known Problems Maternal Aunt     No Known Problems Maternal Aunt     No Known Problems Paternal Aunt     No Known Problems Paternal Aunt     No Known Problems Paternal Aunt     No Known Problems Paternal Aunt     No Known Problems Paternal Aunt     Cancer Brother 79        bladder cancer     Social History     Socioeconomic History    Marital status: /Civil Union     Spouse name: Not on file    Number of children: Not on file    Years of education: 15    Highest education level: Not on file   Occupational History    Not on file   Social Needs    Financial resource strain: Not on file    Food insecurity     Worry: Not on file     Inability: Not on file    Transportation needs     Medical: Not on file     Non-medical: Not on file   Tobacco Use    Smoking status: Former Smoker     Packs/day: 0 25     Years: 17 00     Pack years: 4 25     Types: Cigarettes     Quit date:      Years since quittin 4    Smokeless tobacco: Never Used    Tobacco comment: 0 5 ppw intermittently last smoked    Substance and Sexual Activity    Alcohol use: Yes     Frequency: 2-4 times a month     Comment: occasionally    Drug use: No    Sexual activity: Not on file   Lifestyle    Physical activity     Days per week: Not on file     Minutes per session: Not on file    Stress: Not on file   Relationships    Social connections     Talks on phone: Not on file     Gets together: Not on file     Attends Voodoo service: Not on file     Active member of club or organization: Not on file     Attends meetings of clubs or organizations: Not on file     Relationship status: Not on file    Intimate partner violence     Fear of current or ex partner: Not on file     Emotionally abused: Not on file     Physically abused: Not on file     Forced sexual activity: Not on file   Other Topics Concern    Not on file   Social History Narrative    Caffeine use     Vitals:    21 0834   BP: 132/78   Pulse: 96   Resp: 16   SpO2: 97%   Weight: 90 9 kg (200 lb 6 4 oz)   Height: 5' 6" (1 676 m)     Results for orders placed or performed in visit on 20   Novel Coronavirus (COVID-19), PCR [facility] [address] or [facility]    Specimen: Nose; Nares   Result Value Ref Range    SARS-CoV-2  Not Detected Not Detected     Weight (last 2 days)     Date/Time   Weight    06/08/21 0834   90 9 (200 4)            Body mass index is 32 35 kg/m²  BP      Temp      Pulse     Resp      SpO2        Vitals:    06/08/21 0834   Weight: 90 9 kg (200 lb 6 4 oz)     Vitals:    06/08/21 0834   Weight: 90 9 kg (200 lb 6 4 oz)       /78   Pulse 96   Resp 16   Ht 5' 6" (1 676 m)   Wt 90 9 kg (200 lb 6 4 oz)   SpO2 97%   BMI 32 35 kg/m²          Physical Exam  Constitutional:       Appearance: She is well-developed  HENT:      Head: Normocephalic  Eyes:      General: No scleral icterus  Right eye: No discharge  Left eye: No discharge  Conjunctiva/sclera: Conjunctivae normal       Pupils: Pupils are equal, round, and reactive to light  Neck:      Musculoskeletal: Neck supple  Cardiovascular:      Rate and Rhythm: Normal rate and regular rhythm  Pulses: no weak pulses          Dorsalis pedis pulses are 2+ on the right side and 2+ on the left side  Posterior tibial pulses are 2+ on the right side and 2+ on the left side  Heart sounds: Normal heart sounds  No murmur  No friction rub  No gallop  Pulmonary:      Effort: No respiratory distress  Breath sounds: Normal breath sounds  No wheezing or rales  Abdominal:      General: Bowel sounds are normal  There is no distension  Palpations: Abdomen is soft  There is no mass  Tenderness: There is no abdominal tenderness  There is no guarding or rebound  Musculoskeletal:         General: No deformity  Feet:      Right foot:      Skin integrity: No ulcer, skin breakdown, erythema, warmth, callus or dry skin  Left foot:      Skin integrity: No ulcer, skin breakdown, erythema, warmth, callus or dry skin  Lymphadenopathy:      Cervical: No cervical adenopathy     Neurological: Mental Status: She is alert  Coordination: Coordination normal        Patient's shoes and socks removed  Right Foot/Ankle   Right Foot Inspection  Skin Exam: skin normal and skin intact no dry skin, no warmth, no callus, no erythema, no maceration, no abnormal color, no pre-ulcer, no ulcer and no callus                          Toe Exam: ROM and strength within normal limits  Sensory       Monofilament testing: intact  Vascular    The right DP pulse is 2+  The right PT pulse is 2+  Left Foot/Ankle  Left Foot Inspection  Skin Exam: skin normal and skin intactno dry skin, no warmth, no erythema, no maceration, normal color, no pre-ulcer, no ulcer and no callus                         Toe Exam: ROM and strength within normal limits                   Sensory       Monofilament: intact  Vascular    The left DP pulse is 2+  The left PT pulse is 2+  Assign Risk Category:  No deformity present; No loss of protective sensation; No weak pulses       Risk: 0    Answers for HPI/ROS submitted by the patient on 6/1/2021   How would you rate your overall health?: fair  Compared to last year, how is your physical health?: slightly worse  In general, how satisfied are you with your life?: very satisfied  Compared to last year, how is your eyesight?: slightly worse  Compared to last year, how is your hearing?: slightly worse  Compared to last year, how is your emotional/mental health?: same  How often is anger a problem for you?: never, rarely  How often do you feel unusually tired/fatigued?: sometimes  In the past 7 days, how much pain have you experienced?: some  If you answered "some" or "a lot", please rate the severity of your pain on a scale of 1 to 10 (1 being the least severe pain and 10 being the most intense pain)  : 2/10  In the past 6 months, have you lost or gained 10 pounds without trying?: No  One or more falls in the last year: No  In the past 6 months, have you accidentally leaked urine?: Yes  Do you have trouble with the stairs inside or outside your home?: Yes  Does your home have working smoke alarms?: Yes  Does your home have a carbon monoxide monitor?: Yes  Which safety hazards (if any) have you experienced in your home? Please select all that apply : none  How would you describe your current diet?  Please select all that apply : Unhealthy  In addition to prescription medications, are you taking any over-the-counter supplements?: Yes  If yes, what supplements are you taking?: Multi vit, vit D3, biotin, cinnamon, align  Can you manage your medications?: Yes  Are you currently taking any opioid medications?: No  Can you walk and transfer into and out of your bed and chair?: Yes  Can you dress and groom yourself?: Yes  Can you bathe or shower yourself?: Yes  Can you feed yourself?: Yes  Can you do your laundry/ housekeeping?: Yes  Can you manage your money, pay your bills, and track your expenses?: Yes  Can you make your own meals?: Yes  Can you do your own shopping?: Yes  Within the last 12 months, have you had any hospitalizations or Emergency Department visits?: No  Do you have a living will?: Yes  Do you have a Durable POA (Power of ) for healthcare decisions?: Yes  Do you have an Advanced Directive for end of life decisions?: Yes  How often have you used an illegal drug (including marijuana) or a prescription medication for non-medical reasons in the past year?: never  What is the typical number of drinks you consume in a day?: 0  What is the typical number of drinks you consume in a week?: 0  How often did you have a drink containing alcohol in the past year?: monthly or less  How many drinks did you have on a typical day  when you were drinking in the past year?: 1 to 2  How often did you have 6 or more drinks on one occasion in the past year?: never

## 2021-06-08 NOTE — PATIENT INSTRUCTIONS
Medicare Preventive Visit Patient Instructions  Thank you for completing your Welcome to Medicare Visit or Medicare Annual Wellness Visit today  Your next wellness visit will be due in one year (6/9/2022)  The screening/preventive services that you may require over the next 5-10 years are detailed below  Some tests may not apply to you based off risk factors and/or age  Screening tests ordered at today's visit but not completed yet may show as past due  Also, please note that scanned in results may not display below  Preventive Screenings:  Service Recommendations Previous Testing/Comments   Colorectal Cancer Screening  * Colonoscopy    * Fecal Occult Blood Test (FOBT)/Fecal Immunochemical Test (FIT)  * Fecal DNA/Cologuard Test  * Flexible Sigmoidoscopy Age: 54-65 years old   Colonoscopy: every 10 years (may be performed more frequently if at higher risk)  OR  FOBT/FIT: every 1 year  OR  Cologuard: every 3 years  OR  Sigmoidoscopy: every 5 years  Screening may be recommended earlier than age 48 if at higher risk for colorectal cancer  Also, an individualized decision between you and your healthcare provider will decide whether screening between the ages of 74-80 would be appropriate  Colonoscopy: 11/24/2020  FOBT/FIT: Not on file  Cologuard: Not on file  Sigmoidoscopy: Not on file          Breast Cancer Screening Age: 36 years old  Frequency: every 1-2 years  Not required if history of left and right mastectomy Mammogram: 03/10/2020        Cervical Cancer Screening Between the ages of 21-29, pap smear recommended once every 3 years  Between the ages of 33-67, can perform pap smear with HPV co-testing every 5 years     Recommendations may differ for women with a history of total hysterectomy, cervical cancer, or abnormal pap smears in past  Pap Smear: Not on file        Hepatitis C Screening Once for adults born between [facility]  More frequently in patients at high risk for Hepatitis C Hep C Antibody: 03/21/2017        Diabetes Screening 1-2 times per year if you're at risk for diabetes or have pre-diabetes Fasting glucose: 106 mg/dL   A1C: 6 2 %        Cholesterol Screening Once every 5 years if you don't have a lipid disorder  May order more often based on risk factors  Lipid panel: 06/02/2021          Other Preventive Screenings Covered by Medicare:  1  Abdominal Aortic Aneurysm (AAA) Screening: covered once if your at risk  You're considered to be at risk if you have a family history of AAA  2  Lung Cancer Screening: covers low dose CT scan once per year if you meet all of the following conditions: (1) Age 50-69; (2) No signs or symptoms of lung cancer; (3) Current smoker or have quit smoking within the last 15 years; (4) You have a tobacco smoking history of at least 30 pack years (packs per day multiplied by number of years you smoked); (5) You get a written order from a healthcare provider  3  Glaucoma Screening: covered annually if you're considered high risk: (1) You have diabetes OR (2) Family history of glaucoma OR (3)  aged 48 and older OR (3)  American aged 72 and older  3  Osteoporosis Screening: covered every 2 years if you meet one of the following conditions: (1) You're estrogen deficient and at risk for osteoporosis based off medical history and other findings; (2) Have a vertebral abnormality; (3) On glucocorticoid therapy for more than 3 months; (4) Have primary hyperparathyroidism; (5) On osteoporosis medications and need to assess response to drug therapy  · Last bone density test (DXA Scan): 09/04/2019   5  HIV Screening: covered annually if you're between the age of 15-65  Also covered annually if you are younger than 13 and older than 72 with risk factors for HIV infection  For pregnant patients, it is covered up to 3 times per pregnancy      Immunizations:  Immunization Recommendations   Influenza Vaccine Annual influenza vaccination during flu season is recommended for all persons aged >= 6 months who do not have contraindications   Pneumococcal Vaccine (Prevnar and Pneumovax)  * Prevnar = PCV13  * Pneumovax = PPSV23   Adults 25-60 years old: 1-3 doses may be recommended based on certain risk factors  Adults 72 years old: Prevnar (PCV13) vaccine recommended followed by Pneumovax (PPSV23) vaccine  If already received PPSV23 since turning 65, then PCV13 recommended at least one year after PPSV23 dose  Hepatitis B Vaccine 3 dose series if at intermediate or high risk (ex: diabetes, end stage renal disease, liver disease)   Tetanus (Td) Vaccine - COST NOT COVERED BY MEDICARE PART B Following completion of primary series, a booster dose should be given every 10 years to maintain immunity against tetanus  Td may also be given as tetanus wound prophylaxis  Tdap Vaccine - COST NOT COVERED BY MEDICARE PART B Recommended at least once for all adults  For pregnant patients, recommended with each pregnancy  Shingles Vaccine (Shingrix) - COST NOT COVERED BY MEDICARE PART B  2 shot series recommended in those aged 48 and above     Health Maintenance Due:      Topic Date Due    MAMMOGRAM  03/10/2021    Colorectal Cancer Screening  11/24/2030    Hepatitis C Screening  Completed     Immunizations Due:  There are no preventive care reminders to display for this patient  Advance Directives   What are advance directives? Advance directives are legal documents that state your wishes and plans for medical care  These plans are made ahead of time in case you lose your ability to make decisions for yourself  Advance directives can apply to any medical decision, such as the treatments you want, and if you want to donate organs  What are the types of advance directives? There are many types of advance directives, and each state has rules about how to use them  You may choose a combination of any of the following:  · Living will:   This is a written record of the treatment you want  You can also choose which treatments you do not want, which to limit, and which to stop at a certain time  This includes surgery, medicine, IV fluid, and tube feedings  · Durable power of  for healthcare [facility]): This is a written record that states who you want to make healthcare choices for you when you are unable to make them for yourself  This person, called a proxy, is usually a family member or a friend  You may choose more than 1 proxy  · Do not resuscitate (DNR) order:  A DNR order is used in case your heart stops beating or you stop breathing  It is a request not to have certain forms of treatment, such as CPR  A DNR order may be included in other types of advance directives  · Medical directive: This covers the care that you want if you are in a coma, near death, or unable to make decisions for yourself  You can list the treatments you want for each condition  Treatment may include pain medicine, surgery, blood transfusions, dialysis, IV or tube feedings, and a ventilator (breathing machine)  · Values history: This document has questions about your views, beliefs, and how you feel and think about life  This information can help others choose the care that you would choose  Why are advance directives important? An advance directive helps you control your care  Although spoken wishes may be used, it is better to have your wishes written down  Spoken wishes can be misunderstood, or not followed  Treatments may be given even if you do not want them  An advance directive may make it easier for your family to make difficult choices about your care  Weight Management   Why it is important to manage your weight:  Being overweight increases your risk of health conditions such as heart disease, high blood pressure, type 2 diabetes, and certain types of cancer  It can also increase your risk for osteoarthritis, sleep apnea, and other respiratory problems  Aim for a slow, steady weight loss  Even a small amount of weight loss can lower your risk of health problems  How to lose weight safely:  A safe and healthy way to lose weight is to eat fewer calories and get regular exercise  You can lose up about 1 pound a week by decreasing the number of calories you eat by 500 calories each day  Healthy meal plan for weight management:  A healthy meal plan includes a variety of foods, contains fewer calories, and helps you stay healthy  A healthy meal plan includes the following:  · Eat whole-grain foods more often  A healthy meal plan should contain fiber  Fiber is the part of grains, fruits, and vegetables that is not broken down by your body  Whole-grain foods are healthy and provide extra fiber in your diet  Some examples of whole-grain foods are whole-wheat breads and pastas, oatmeal, brown rice, and bulgur  · Eat a variety of vegetables every day  Include dark, leafy greens such as spinach, kale, veronica greens, and mustard greens  Eat yellow and orange vegetables such as carrots, sweet potatoes, and winter squash  · Eat a variety of fruits every day  Choose fresh or canned fruit (canned in its own juice or light syrup) instead of juice  Fruit juice has very little or no fiber  · Eat low-fat dairy foods  Drink fat-free (skim) milk or 1% milk  Eat fat-free yogurt and low-fat cottage cheese  Try low-fat cheeses such as mozzarella and other reduced-fat cheeses  · Choose meat and other protein foods that are low in fat  Choose beans or other legumes such as split peas or lentils  Choose fish, skinless poultry (chicken or turkey), or lean cuts of red meat (beef or pork)  Before you cook meat or poultry, cut off any visible fat  · Use less fat and oil  Try baking foods instead of frying them  Add less fat, such as margarine, sour cream, regular salad dressing and mayonnaise to foods  Eat fewer high-fat foods   Some examples of high-fat foods include french fries, doughnuts, ice cream, and cakes   · Eat fewer sweets  Limit foods and drinks that are high in sugar  This includes candy, cookies, regular soda, and sweetened drinks  Exercise:  Exercise at least 30 minutes per day on most days of the week  Some examples of exercise include walking, biking, dancing, and swimming  You can also fit in more physical activity by taking the stairs instead of the elevator or parking farther away from stores  Ask your healthcare provider about the best exercise plan for you  © Copyright [employer] 2018 Information is for End User's use only and may not be sold, redistributed or otherwise used for commercial purposes   All illustrations and images included in CareNotes® are the copyrighted property of [employer]  or [address] [place] [address]

## 2021-06-08 NOTE — PROGRESS NOTES
Assessment and Plan:     Problem List Items Addressed This Visit        Endocrine    Type 2 diabetes mellitus without complication, without long-term current use of insulin ([facility] [address] )       Lab Results   Component Value Date    HGBA1C 6 2 (H) 06/02/2021    I have counselled the pt to follow a healthy and balanced diet ,and recommend routine exercise  Diabetic foot examination completed today she would prefer not to be on medication I will be ordering diabetic laboratories including comprehensive metabolic panel, hemoglobin A1c, urine microalbumin, lipid panel  Relevant Orders    Diabetic foot exam    Thyroid nodule       Currently asymptomatic will check ultrasound thyroid         Relevant Orders    US thyroid       Cardiovascular and Mediastinum    Essential hypertension       Hypertension - controlled, I have counseled patient following healthy balance diet, I would like the patient reduce sodium, exercise routinely, I would like the patient continued the med current medical regiment and we will continue to monitor  Other    Fatigue      Check routine laboratories CBC a 3rd generation TSH         Relevant Orders    TSH, 3rd generation    T4, free    CBC (Includes Diff/Plt) (Refl)    Hyperlipidemia      Low-cholesterol diet continue Lipitor 20 mg once daily         Class 1 obesity due to excess calories without serious comorbidity with body mass index (BMI) of 31 0 to 31 9 in adult       Obesity -I have counseled patient following healthy and balanced diet, I would like the patient to lose weight, I would like the patient exercise routinely; we will continue monitor the patient's progress  Medicare annual wellness visit, subsequent - Primary      Assessment and plan 1  Medicare subsequent annual wellness examination overall the patient is clinically stable and doing well, we encouraged the patient to follow a healthy and balanced diet    We recommend that the patient exercise routinely approximately 30 minutes 5 times per week   We have reviewed the patient's vaccines and have made recommendations for updates if necessary      Annual flu shot   We will be ordering screening laboratories which are age appropriate  Return to the office in    Six-month   call if any problems  Edema       Chronic lymphedema, compression stocking, physical therapy         Relevant Orders    Ambulatory referral to Physical Therapy        BMI Counseling: Body mass index is 32 35 kg/m²  The BMI is above normal  Nutrition recommendations include decreasing portion sizes and moderation in carbohydrate intake  Exercise recommendations include exercising 3-5 times per week  working with ortho Dr Keri Machuca for right meniscus tear (not able to take volteran gel getting shots)  Preventive health issues were discussed with patient, and age appropriate screening tests were ordered as noted in patient's After Visit Summary  Personalized health advice and appropriate referrals for health education or preventive services given if needed, as noted in patient's After Visit Summary       History of Present Illness:     Patient presents for Medicare Annual Wellness visit    Patient Care Team:  Ardia Canavan, DO as PCP - General  MD Satya Child MD Jadene Dross, MD Lonia Berlin, DO Beula Ghee, MD Daphine Blander, MD Jasper Browner, MD Tiffany Paul, SHERRY     Problem List:     Patient Active Problem List   Diagnosis    Lymphedema    Fatigue    Hyperlipidemia    Hypothyroidism    Class 1 obesity due to excess calories with serious comorbidity and body mass index (BMI) of 33 0 to 33 9 in adult    Type 2 diabetes mellitus without complication, without long-term current use of insulin ([facility] [address] )    Atypical chest pain    Right lower quadrant abdominal pain    Pelvic pain    Family history of ovarian cancer    Wellness examination    Carotid artery plaque    Dense breast tissue on mammogram    Fibrocystic breast changes    Dupuytren's contracture    Diverticular disease of colon    Gastro-esophageal reflux disease with esophagitis    History of adenomatous polyp of colon    Hypothyroidism due to Hashimoto's thyroiditis    Impaired fasting glucose    Nephrolithiasis    Essential hypertension    Class 1 obesity due to excess calories without serious comorbidity with body mass index (BMI) of 31 0 to 31 9 in adult    Acute non-recurrent sinusitis    Encounter for screening mammogram for breast cancer    Medicare annual wellness visit, subsequent    Dyspnea on exertion    Insomnia    Oral ulceration    Right flank pain    Exposure to COVID-19 virus    Edema    Thyroid nodule      Past Medical and Surgical History:     Past Medical History:   Diagnosis Date    Biliary dyskinesia     CAP (community acquired pneumonia)     last assessed 8/17/16    GERD (gastroesophageal reflux disease)     Hyperlipidemia     Palpitations     Shortness of breath      Past Surgical History:   Procedure Laterality Date    AUGMENTATION MAMMAPLASTY Bilateral 1998    breast surgery- with prosthetic implant 1998; pre-pectoral salline    BREAST BIOPSY Left 1974    BREAST BIOPSY Left 1994    BREAST EXCISIONAL BIOPSY Left 1993    CARDIAC CATHETERIZATION      procedure summary    CHOLECYSTECTOMY      onset 2003    HEMORRHOID SURGERY      ROTATOR CUFF REPAIR Bilateral     onset 2010    TUBAL LIGATION      onset 1980      Family History:     Family History   Problem Relation Age of Onset    Aneurysm Mother         cerebral    Ovarian cancer Mother 67    Cirrhosis Father         alcoholic    Aneurysm Brother         abdominal aortic; cerebral    Diabetes Son     No Known Problems Daughter     No Known Problems Maternal Grandmother     No Known Problems Maternal Grandfather     No Known Problems Paternal Grandmother     No Known Problems Paternal Grandfather     No Known Problems Son    [employer] No Known Problems Son     No Known Problems Son     No Known Problems Maternal Aunt     No Known Problems Maternal Aunt     No Known Problems Maternal Aunt     No Known Problems Maternal Aunt     No Known Problems Paternal Aunt     No Known Problems Paternal Aunt     No Known Problems Paternal Aunt     No Known Problems Paternal Aunt     No Known Problems Paternal Aunt     Cancer Brother 79        bladder cancer      Social History:     E-Cigarette/Vaping    E-Cigarette Use Never User      E-Cigarette/Vaping Substances    Nicotine No     THC No     CBD No     Flavoring No     Other No     Unknown No      Social History     Socioeconomic History    Marital status: /Civil Union     Spouse name: None    Number of children: None    Years of education: 15    Highest education level: None   Occupational History    None   Social Needs    Financial resource strain: None    Food insecurity     Worry: None     Inability: None    Transportation needs     Medical: None     Non-medical: None   Tobacco Use    Smoking status: Former Smoker     Packs/day: 0 25     Years: 17 00     Pack years: 4 25     Types: Cigarettes     Quit date:      Years since quittin 4    Smokeless tobacco: Never Used    Tobacco comment: 0 5 ppw intermittently last smoked    Substance and Sexual Activity    Alcohol use: Yes     Frequency: 2-4 times a month     Comment: occasionally    Drug use: No    Sexual activity: None   Lifestyle    Physical activity     Days per week: None     Minutes per session: None    Stress: None   Relationships    Social connections     Talks on phone: None     Gets together: None     Attends Ep[employer] service: None     Active member of club or organization: None     Attends meetings of clubs or organizations: None     Relationship status: None    Intimate partner violence     Fear of current or ex partner: None     Emotionally abused: None     Physically abused: None     Forced sexual activity: None   Other Topics Concern    None   Social History Narrative    Caffeine use      Medications and Allergies:     Current Outpatient Medications   Medication Sig Dispense Refill    Ascorbic Acid (VITAMIN C) 1000 MG tablet Take 1 tablet by mouth daily      atorvastatin (LIPITOR) 20 mg tablet TAKE 1 TABLET DAILY 90 tablet 3    Biotin 5000 MCG CAPS Take 1 capsule by mouth daily      cholecalciferol (VITAMIN D3) 1,000 units tablet Take 2,000 Units by mouth daily       CINNAMON PO Take by mouth      diphenhydrAMINE-acetaminophen (TYLENOL PM)  MG TABS Take 1 tablet by mouth daily at bedtime as needed for sleep      doxycycline hyclate (VIBRA-TABS) 100 mg tablet Take by mouth as needed        esomeprazole (NEXIUM) 40 MG capsule Take 1 capsule by mouth daily      fluticasone (FLONASE) 50 mcg/act nasal spray 2 sprays into each nostril daily 3 Bottle 2    levothyroxine (Synthroid) 125 mcg tablet Take 1 tablet (125 mcg total) by mouth daily Take on an empty stomach (Patient taking differently: Take 125 mcg by mouth daily Take on an empty stomach/ MUST BE BRAND) 90 tablet 3    meclizine (ANTIVERT) 25 mg tablet Take by mouth as needed        metroNIDAZOLE (METROGEL) 0 75 % gel as needed   3    Multiple Vitamin (MULTIVITAMIN) capsule Take 1 capsule by mouth daily      psyllium (METAMUCIL) 58 6 % powder Take 1 packet by mouth daily      Diclofenac Sodium (VOLTAREN) 1 % APPLY 2 GRAMS TO THE AFFECTED AREA(S) BY TOPICAL ROUTE 4 TIMES PER DAY       No current facility-administered medications for this visit        Allergies   Allergen Reactions    Hydrochlorothiazide Palpitations    Meloxicam Rash      Immunizations:     Immunization History   Administered Date(s) Administered    H1N1, All Formulations 01/13/2010    INFLUENZA 09/19/2018    Influenza Split High Dose Preservative Free IM 09/17/2016, 08/21/2017, 08/30/2019    Influenza, high dose seasonal 0 7 mL 09/19/2018, 08/26/2020    Pneumococcal Conjugate 13-Valent 01/04/2017    Pneumococcal Polysaccharide PPV23 05/29/2012, 05/29/2012    SARS-CoV-2 / COVID-19 mRNA IM (Moderna) 01/25/2021, 02/22/2021    Td (adult), adsorbed 01/01/2004    Tdap 08/16/2016    Zoster 08/27/2007, 05/08/2018, 07/15/2018    Zoster Vaccine Recombinant 05/08/2018, 07/15/2018      Health Maintenance:         Topic Date Due    MAMMOGRAM  03/10/2021    Colorectal Cancer Screening  11/24/2030    Hepatitis C Screening  Completed     There are no preventive care reminders to display for this patient  Medicare Health Risk Assessment:     /78   Pulse 96   Resp 16   Ht 5' 6" (1 676 m)   Wt 90 9 kg (200 lb 6 4 oz)   SpO2 97%   BMI 32 35 kg/m²          Health Risk Assessment:   Patient rates overall health as fair  Patient feels that their physical health rating is slightly worse  Patient is very satisfied with their life  Eyesight was rated as slightly worse  Hearing was rated as slightly worse  Patient feels that their emotional and mental health rating is same  Patients states they are never, rarely angry  Patient states they are sometimes unusually tired/fatigued  Pain experienced in the last 7 days has been some  Patient's pain rating has been 2/10  Patient states that she has experienced no weight loss or gain in last 6 months  Fall Risk Screening: In the past year, patient has experienced: no history of falling in past year      Urinary Incontinence Screening:   Patient has leaked urine accidently in the last six months  Home Safety:  Patient has trouble with stairs inside or outside of their home  Patient has working smoke alarms and has working carbon monoxide detector  Home safety hazards include: none  Nutrition:   Current diet is Unhealthy  Medications:   Patient is currently taking over-the-counter supplements  OTC medications include: Multi vit, vit D3, biotin, cinnamon, align   Patient is able to manage medications  Activities of Daily Living (ADLs)/Instrumental Activities of Daily Living (IADLs):   Walk and transfer into and out of bed and chair?: Yes  Dress and groom yourself?: Yes    Bathe or shower yourself?: Yes    Feed yourself? Yes  Do your laundry/housekeeping?: Yes  Manage your money, pay your bills and track your expenses?: Yes  Make your own meals?: Yes    Do your own shopping?: Yes    Previous Hospitalizations:   Any hospitalizations or ED visits within the last 12 months?: No      Advance Care Planning:   Living will: Yes    Durable POA for healthcare: Yes    Advanced directive: Yes      PREVENTIVE SCREENINGS      Cardiovascular Screening:    General: Screening Not Indicated and History Lipid Disorder      Diabetes Screening:     General: Screening Not Indicated and History Diabetes      Colorectal Cancer Screening:     General: Screening Current      Breast Cancer Screening:     General: Screening Current      Cervical Cancer Screening:    General: Screening Not Indicated      Lung Cancer Screening:     General: Screening Not Indicated      Hepatitis C Screening:    General: Screening Current    Screening, Brief Intervention, and Referral to Treatment (SBIRT)    Screening  Typical number of drinks in a day: 0  Typical number of drinks in a week: 0  Interpretation: Low risk drinking behavior  AUDIT-C Screenin) How often did you have a drink containing alcohol in the past year? monthly or less  2) How many drinks did you have on a typical day when you were drinking in the past year?  1 to 2  3) How often did you have 6 or more drinks on one occasion in the past year? never    AUDIT-C Score: 1  Interpretation: Score 0-2 (female): Negative screen for alcohol misuse    Single Item Drug Screening:  How often have you used an illegal drug (including marijuana) or a prescription medication for non-medical reasons in the past year? never    Single Item Drug Screen Score: 0  Interpretation: Negative screen for possible drug use disorder      Keila Florez, DO

## 2021-06-18 ENCOUNTER — OFFICE VISIT (OUTPATIENT)
Dept: AUDIOLOGY | Age: 76
End: 2021-06-18

## 2021-06-18 DIAGNOSIS — H90.3 SENSORY HEARING LOSS, BILATERAL: Primary | ICD-10-CM

## 2021-06-18 NOTE — PROGRESS NOTES
Hearing Aid Visit:    Name:  Bogdan Plata  :  1945  Age:  78 y o  Date of Evaluation: 21     Kasie Simon is being seen for a hearing aid visit  Patient is fit with [employer] More 3 [employer]  hearing aid(s)  Right serial number [id number]  Left serial number [id number]  Warranty date: 2024 (Loss/Damage and repair)  Patient reports right hearing aid is disconnecting from thor  Listening check revealed good output, not cutting in and out with movement of   Changed  as in warranty with connection issues  Patient is having difficulty connecting to TV streamer  Sent link to [employer] video demonstrating connection  Patient reports feeling very comfortable and as though she is getting significant benefit from the hearing aids  Recommendations:   Return as needed       Nini Betancourt , CCC-A  Clinical Audiologist

## 2021-06-22 ENCOUNTER — HOSPITAL ENCOUNTER (OUTPATIENT)
Dept: RADIOLOGY | Age: 76
Discharge: HOME | End: 2021-06-22
Payer: MEDICARE

## 2021-06-22 ENCOUNTER — APPOINTMENT (OUTPATIENT)
Dept: LAB | Age: 76
End: 2021-06-22
Payer: MEDICARE

## 2021-06-22 DIAGNOSIS — R53.83 OTHER FATIGUE: ICD-10-CM

## 2021-06-22 DIAGNOSIS — E04.1 THYROID NODULE: ICD-10-CM

## 2021-06-22 LAB
BASOPHILS # BLD AUTO: 0.09 THOUSANDS/ΜL (ref 0–0.1)
BASOPHILS NFR BLD AUTO: 1 % (ref 0–1)
EOSINOPHIL # BLD AUTO: 0.35 THOUSAND/ΜL (ref 0–0.61)
EOSINOPHIL NFR BLD AUTO: 6 % (ref 0–6)
ERYTHROCYTE [DISTWIDTH] IN BLOOD BY AUTOMATED COUNT: 14 % (ref 11.6–15.1)
HCT VFR BLD AUTO: 39.7 % (ref 34.8–46.1)
HGB BLD-MCNC: 12.3 G/DL (ref 11.5–15.4)
IMM GRANULOCYTES # BLD AUTO: 0.01 THOUSAND/UL (ref 0–0.2)
IMM GRANULOCYTES NFR BLD AUTO: 0 % (ref 0–2)
LYMPHOCYTES # BLD AUTO: 1.97 THOUSANDS/ΜL (ref 0.6–4.47)
LYMPHOCYTES NFR BLD AUTO: 31 % (ref 14–44)
MCH RBC QN AUTO: 27.3 PG (ref 26.8–34.3)
MCHC RBC AUTO-ENTMCNC: 31 G/DL (ref 31.4–37.4)
MCV RBC AUTO: 88 FL (ref 82–98)
MONOCYTES # BLD AUTO: 0.52 THOUSAND/ΜL (ref 0.17–1.22)
MONOCYTES NFR BLD AUTO: 8 % (ref 4–12)
NEUTROPHILS # BLD AUTO: 3.37 THOUSANDS/ΜL (ref 1.85–7.62)
NEUTS SEG NFR BLD AUTO: 54 % (ref 43–75)
NRBC BLD AUTO-RTO: 0 /100 WBCS
PLATELET # BLD AUTO: 307 THOUSANDS/UL (ref 149–390)
PMV BLD AUTO: 9.9 FL (ref 8.9–12.7)
RBC # BLD AUTO: 4.5 MILLION/UL (ref 3.81–5.12)
T4 FREE SERPL-MCNC: 1.1 NG/DL (ref 0.76–1.46)
TSH SERPL DL<=0.05 MIU/L-ACNC: 1 UIU/ML (ref 0.36–3.74)
WBC # BLD AUTO: 6.31 THOUSAND/UL (ref 4.31–10.16)

## 2021-06-22 PROCEDURE — 36415 COLL VENOUS BLD VENIPUNCTURE: CPT

## 2021-06-22 PROCEDURE — 76536 US EXAM OF HEAD AND NECK: CPT

## 2021-06-22 PROCEDURE — 85025 COMPLETE CBC W/AUTO DIFF WBC: CPT

## 2021-06-22 PROCEDURE — 84439 ASSAY OF FREE THYROXINE: CPT

## 2021-06-22 PROCEDURE — 84443 ASSAY THYROID STIM HORMONE: CPT

## 2021-07-12 ENCOUNTER — EVALUATION (OUTPATIENT)
Dept: PHYSICAL THERAPY | Age: 76
End: 2021-07-12
Payer: MEDICARE

## 2021-07-12 DIAGNOSIS — R60.9 EDEMA, UNSPECIFIED TYPE: ICD-10-CM

## 2021-07-12 DIAGNOSIS — I89.0 LYMPHEDEMA: Primary | ICD-10-CM

## 2021-07-12 PROCEDURE — 97162 PT EVAL MOD COMPLEX 30 MIN: CPT

## 2021-07-12 PROCEDURE — 97110 THERAPEUTIC EXERCISES: CPT

## 2021-07-13 NOTE — PROGRESS NOTES
PT Evaluation     Today's date: 2021  Patient name: Bro Hadley  : 1945  MRN: [id number]  Referring provider: July Cloud DO  Dx:   Encounter Diagnosis     ICD-10-CM    1  Lymphedema  I89 0    2  Edema, unspecified type  R60 9 Ambulatory referral to Physical Therapy     21  +2 b le's  Left  le Right le reev left  Right     mtp 22cm 21cm      Lat malleolus 27 27 5      4cm prox to lat mal 30 5 27 0      8 33 31      12 35 5 34 5      16 38 38      20 40 40 5      24 41 5 41      28 41 41      32 39 5 40 5      36 39 5 40      40 42 5 40      44 44 5 44      48 46 45      52 51 5 48      56 53cm 52cm      60 54 55      64 55cm 57cm                     Assessment  Assessment details: The pt is a 78y o  year old female referred to outpt Physical therapy with diagnoses of bilateral le lymphedema since age 24 , +4 with exacerbation  of symptoms noted in the Summer of 2020  Bro Hadley presents with decreased ankle range of motion,increased pain, increased girth , + tissue fibrosis and decreased tolerance to functional activity  PT is warranted to address these deficits in efforts to reduce girth, maximize function, and return to prior level of activity   Treatment shall include complex decongestive physical therapy, manual lymphatic drainage massage and soft tissue mobilization, there exer to increase lymphatic circulation, home exer programming, lymphedema education and skin care management, compression wrapping , rom exer, pt to obtain new  home compression pump usage with bilateral  le elevation 40 MMHG, fitting for compression garments knee high versus thigh high (  4 refills 0 20 -30 mmHG )  Impairments: abnormal or restricted ROM, activity intolerance, impaired physical strength, lacks appropriate home exercise program and pain with function  Other impairment: +2 edema "rubber " sensation mixed lymphedema , primary present since age 24 ( no formal lymphedema management to date  , right knee torn meniscus hx and baker's cyst  Functional limitations: sedentary lifestyle, inconsistent wearing of compression knee highs ,   Symptom irritability: moderateBarriers to therapy: Requires additional education of lymphedema management and weight loss encouraged at pt has gained 20lbs during the course of the pandemic per her report  Understanding of Dx/Px/POC: good   Prognosis: good    Goals  STG 1  Independent in there exer program in two weeks           2  Reduction of girth by 2 cm in two weeks      LTG 1 Reduction of girth by 4 cm in 4 weeks  2 The pt shall be independent in donning and doffing compression garments  in 4 weeks    Plan  Patient would benefit from: PT eval, lymphedema eval and skilled physical therapy  Referral necessary: Yes  Other planned modality interventions: obtain new home compression pump for bilateral le's ( pt to call representative for replacement  Planned therapy interventions: manual therapy, massage, muscle pump exercises, neuromuscular re-education, patient education, compression, aquatic therapy, breathing training, therapeutic activities, therapeutic exercise, strengthening, stretching and home exercise program  Frequency: 2x week  Duration in weeks: 8  Plan of Care beginning date: 7/12/2021  Plan of Care expiration date: 9/13/2021  Treatment plan discussed with: patient        Subjective Evaluation    History of Present Illness  Onset date: exacerbation Summer of 2020 present since age 24  Mechanism of injury: Beatriz Tubbs is a 78year old female referred to outpt PT with a diagnosis of mixed lymphedema in both le's with  Exacerbation of symptoms noted in the Summer of 2020 and primary lymphedema present since age 24 per her report  She reports utilizing jobst compression knee highs 20-30 mmHG most of the time but not in the Summer as she reports they are then too hot for her tolerance   She reports a decline in physical activity for the past 6 months but does have access to a treadmill, eliptical and recumbent bicycle for aerobic exer  She also reports having a pool where she currently lives and 6 pools at her other home in [facility]  She also reported having performed aqua aerobics in the past  She has a lymphapress compression pump machine but reports it is 98 years old and has a leak which needs to be repaired  ( pt to call her representative for replacement)  She has never have formal lymphedema treatment for her le's to date and has not been educated on lymphedema management to date  Recurrent probem    Quality of life: good    Pain  Current pain ratin  At best pain ratin  At worst pain ratin  Location: tightness both le's   Quality: dull ache  Relieving factors: change in position and rest  Aggravating factors: walking, standing, stair climbing, sitting and lifting  Progression: worsening    Social Support  Steps to enter house: no  Stairs in house: yes (16 steps right railing)   Lives in: multiple-level home  Lives with: spouse    Employment status: not working  Exercise history: use to exer sedentary for the past 6 months at least per her report    pt has access to a pool daily chooses not to use, pt has treadmill, eliptical and bicycle in her home not using     Treatments  Current treatment: physical therapy  Patient Goals  Patient goals for therapy: decreased edema, independence with ADLs/IADLs, return to sport/leisure activities and decreased pain  Patient goal: reduction of le girth by 2cm in 4 weeks        Objective     Active Range of Motion   Left Hip   Flexion: WFL  Extension: WFL  Abduction: WFL  Adduction: WFL    Right Hip   Flexion: WFL  Extension: WFL  Abduction: WFL  Adduction: WFL  Left Knee   Flexion: WFL  Extension: WFL    Right Knee   Flexion: WFL and with pain  Extension: WFL  Left Ankle/Foot   Dorsiflexion (ke): 15 degrees     Right Ankle/Foot   Dorsiflexion (ke): 15 degrees     Additional Active Range of Motion Details  Min quad tightness right knee pt is s/p right knee torn meniscus Feb of 2020 with Baker's cyst present     Goal 20 degrees dorsiflexion     Strength/Myotome Testing     Left Hip   Planes of Motion   Flexion: 5  Extension: 3+  Abduction: 5  Adduction: 3+    Right Hip   Planes of Motion   Flexion: 5  Extension: 3+  Abduction: 5  Adduction: 3+    Left Knee   Flexion: 5  Extension: 5    Right Knee   Flexion: 4-  Extension: 5    Left Ankle/Foot   Dorsiflexion: 5  Plantar flexion: 5    Right Ankle/Foot   Dorsiflexion: 5  Plantar flexion: 5    Ambulation     Ambulation: Level Surfaces   Ambulation without assistive device: independent    Additional Level Surfaces Ambulation Details  250 ft independent             Precautions: allergies: hydrochlorothiazide, meloxicam  PMH: chest pain "anxiety related", right knee torn meniscus Feb of 2020 , Baker's cyst right knee, s/p gall bladder surgery, hx of lyphedema since age 24, stress incontinence, carotic artery plaque, Hashimoto's , diverticular disease of colon, insomnia, gastroesophageal reflux with esophagitis, dupuytren's contracture, HTN,       Manuals 7/12/21             I eval            Mld massage and soft tissue mobilization both le' s             Assess fit of current compression knee highs 20-30 mmHg per pt report                          Neuro Re-Ed/ there exer              Nu step             Lymphedema le exer packet  10 reps each            Squats                                                                  Ther Ex                                                                                                                     Ther Activity                                       Gait Training                                       Modalities             Pt has her own compression pump 98 years old ( needs repair)

## 2021-07-21 ENCOUNTER — OFFICE VISIT (OUTPATIENT)
Dept: PHYSICAL THERAPY | Age: 76
End: 2021-07-21
Payer: MEDICARE

## 2021-07-21 DIAGNOSIS — I89.0 LYMPHEDEMA: Primary | ICD-10-CM

## 2021-07-21 PROCEDURE — 97140 MANUAL THERAPY 1/> REGIONS: CPT

## 2021-07-21 NOTE — PROGRESS NOTES
Daily Note     Today's date: 2021  Patient name: Vitaliy Guzmán  : 1945  MRN: [id number]  Referring provider: Yordan Caal DO  Dx:   Encounter Diagnosis     ICD-10-CM    1  Lymphedema  I89 0                   Subjective: Pt bringing in compression knees highs ( currently overstretched  )  Objective: See treatment diary below      Assessment: Tolerated treatment well  Patient would benefit from continued PT      Plan: Continue per plan of care        Precautions: allergies: hydrochlorothiazide, meloxicam      Manuals 21            I eval            Mld massage and soft tissue mobilization both le' s  54 min man           Assess fit of current compression knee highs 20-30 mmHg per pt report                          Neuro Re-Ed/ there exer              Nu step  5 min            Lymphedema le exer packet  10 reps each            Squats                                                                  Ther Ex                                                                                                                     Ther Activity                                       Gait Training                                       Modalities             Pt has her own compression pump 98 years old ( needs repair)

## 2021-07-27 ENCOUNTER — APPOINTMENT (OUTPATIENT)
Dept: PHYSICAL THERAPY | Age: 76
End: 2021-07-27
Payer: MEDICARE

## 2021-07-29 ENCOUNTER — OFFICE VISIT (OUTPATIENT)
Dept: PHYSICAL THERAPY | Age: 76
End: 2021-07-29
Payer: MEDICARE

## 2021-07-29 DIAGNOSIS — I89.0 LYMPHEDEMA: Primary | ICD-10-CM

## 2021-07-29 PROCEDURE — 97110 THERAPEUTIC EXERCISES: CPT

## 2021-07-29 PROCEDURE — 97140 MANUAL THERAPY 1/> REGIONS: CPT

## 2021-07-29 NOTE — PROGRESS NOTES
Daily Note     Today's date: 2021  Patient name: Stepan Sam  : 1945  MRN: [id number]  Referring provider: Raul Rojas DO  Dx:   Encounter Diagnosis     ICD-10-CM    1  Lymphedema  I89 0                   Subjective: Without additional complaints  Objective: See treatment diary below      Assessment: Tolerated treatment well  Patient would benefit from continued PT      Plan: Continue per plan of care        Precautions: allergies: hydrochlorothiazide, meloxicam      Manuals 21           I eval            Mld massage and soft tissue mobilization both le' s  55 min man 30 min          Assess fit of current compression knee highs 20-30 mmHg per pt report                          Neuro Re-Ed/ there exer              Nu step  5 min  15 min          Lymphedema le exer packet 15 min 10 reps each  15 min          Squats                                                                  Ther Ex                                                                                                                     Ther Activity                                       Gait Training                                       Modalities             Pt has her own compression pump 98 years old ( needs repair)

## 2021-08-02 DIAGNOSIS — I89.0 LYMPHEDEMA: Primary | ICD-10-CM

## 2021-08-13 DIAGNOSIS — Z01.00 ENCOUNTER FOR EYE EXAM: Primary | ICD-10-CM

## 2021-08-13 DIAGNOSIS — M25.569 KNEE PAIN, UNSPECIFIED CHRONICITY, UNSPECIFIED LATERALITY: ICD-10-CM

## 2021-08-14 ENCOUNTER — HOSPITAL ENCOUNTER (OUTPATIENT)
Dept: RADIOLOGY | Age: 76
Discharge: HOME | End: 2021-08-14
Payer: MEDICARE

## 2021-08-14 VITALS — HEIGHT: 66 IN | WEIGHT: 204 LBS | BODY MASS INDEX: 32.78 KG/M2

## 2021-08-14 DIAGNOSIS — Z12.31 ENCOUNTER FOR SCREENING MAMMOGRAM FOR MALIGNANT NEOPLASM OF BREAST: ICD-10-CM

## 2021-08-14 PROCEDURE — 77067 SCR MAMMO BI INCL CAD: CPT

## 2021-08-14 PROCEDURE — 77063 BREAST TOMOSYNTHESIS BI: CPT

## 2021-09-04 DIAGNOSIS — E78.5 HYPERLIPIDEMIA, UNSPECIFIED HYPERLIPIDEMIA TYPE: ICD-10-CM

## 2021-09-04 DIAGNOSIS — E03.9 HYPOTHYROIDISM, UNSPECIFIED TYPE: ICD-10-CM

## 2021-09-07 ENCOUNTER — CONSULT (OUTPATIENT)
Dept: OBGYN CLINIC | Facility: CLINIC | Age: 76
End: 2021-09-07
Payer: MEDICARE

## 2021-09-07 VITALS
SYSTOLIC BLOOD PRESSURE: 115 MMHG | HEIGHT: 66 IN | BODY MASS INDEX: 33.27 KG/M2 | DIASTOLIC BLOOD PRESSURE: 72 MMHG | WEIGHT: 207 LBS | HEART RATE: 111 BPM

## 2021-09-07 DIAGNOSIS — M25.569 KNEE PAIN, UNSPECIFIED CHRONICITY, UNSPECIFIED LATERALITY: ICD-10-CM

## 2021-09-07 PROCEDURE — 99203 OFFICE O/P NEW LOW 30 MIN: CPT | Performed by: ORTHOPAEDIC SURGERY

## 2021-09-07 RX ORDER — ATORVASTATIN CALCIUM 20 MG/1
TABLET, FILM COATED ORAL
Qty: 90 TABLET | Refills: 3 | Status: SHIPPED | OUTPATIENT
Start: 2021-09-07

## 2021-09-07 RX ORDER — LEVOTHYROXINE SODIUM 0.12 MG/1
125 TABLET ORAL DAILY
Qty: 90 TABLET | Refills: 3 | Status: SHIPPED | OUTPATIENT
Start: 2021-09-07 | End: 2021-11-23 | Stop reason: SDUPTHER

## 2021-09-07 NOTE — PROGRESS NOTES
Patient Name:  Sincere Monk  MRN:  [id number]    Assessment & Plan     Right Knee DJD, medial meniscus tear  1  Patient states her pain in the right knee is well controlled with conservative management consisting of intra-articular corticosteroid injections and Tylenol  2  Advised she continue conservative management at this time  Patient agrees with plan  3  Activities as tolerated, modification to avoid pain  4  Follow-up as needed  Intra-articular corticosteroid injections may be performed every three months  Also discussed hyaluronic acid injections in the future as well  Chief Complaint     Right knee pain    History of the Present Illness     Kasie Goff is a 78 y o  female reports to the office today for evaluation of her right knee  She is here today for a second opinion  She has a known history of DJD and a medial meniscus tear seen on MRI performed in 2020  She has been seeing a physician at [facility] who has been treating this conservatively with intra-articular corticosteroid injections  Her most recent injection was performed 7/26/21 which has provided significant relief  Currently she denies any pain  She notes mild discomfort while walking up and down steps  Discomfort is generalized in nature  She denies any weakness or instability  She denies any swelling or stiffness  She does take Tylenol on occasion  She is unable to take NSAIDs due to GI upset  No numbness or tingling  No fevers or chills  Physical Exam     /72   Pulse (!) 111   Ht 5' 6" (1 676 m)   Wt 93 9 kg (207 lb)   BMI 33 41 kg/m²     Right knee: No gross deformity  Skin intact  No erythema ecchymosis or swelling  No effusion  No joint line tenderness  Range of motion is full without pain  Stable to varus and valgus stress  Stable Lachman test   Negative Janki's test     Eyes: Anicteric sclerae  ENT: Trachea midline  Lungs: Normal respiratory effort    CV: Capillary refill is less than 2 seconds  Skin: Intact without erythema  Lymph: No palpable lymphadenopathy  Neuro: Sensation is grossly intact to light touch  Psych: Mood and affect are appropriate  Data Review     I have personally reviewed pertinent films in PACS, and my interpretation follows:    MRI right knee 3/5/20:  Complex tear medial meniscus posterior horn and root with a radial component as well  Severe degenerative changes in the patellofemoral compartment, focal moderate medial compartment changes as well      Past Medical History:   Diagnosis Date    Biliary dyskinesia     CAP (community acquired pneumonia)     last assessed 8/17/16    GERD (gastroesophageal reflux disease)     Hyperlipidemia     Palpitations     Shortness of breath        Past Surgical History:   Procedure Laterality Date    AUGMENTATION MAMMAPLASTY Bilateral 1998    breast surgery- with prosthetic implant 1998; pre-pectoral salline    BREAST BIOPSY Left 1974    BREAST BIOPSY Left 1994    BREAST EXCISIONAL BIOPSY Left 1993    CARDIAC CATHETERIZATION      procedure summary    CHOLECYSTECTOMY      onset 2003    HEMORRHOID SURGERY      ROTATOR CUFF REPAIR Bilateral     onset 2010    TUBAL LIGATION      onset 1980       Allergies   Allergen Reactions    Hydrochlorothiazide Palpitations    Meloxicam Rash       Current Outpatient Medications on File Prior to Visit   Medication Sig Dispense Refill    Ascorbic Acid (VITAMIN C) 1000 MG tablet Take 1 tablet by mouth daily      atorvastatin (LIPITOR) 20 mg tablet TAKE 1 TABLET DAILY 90 tablet 3    Biotin 5000 MCG CAPS Take 1 capsule by mouth daily      cholecalciferol (VITAMIN D3) 1,000 units tablet Take 2,000 Units by mouth daily       CINNAMON PO Take by mouth      Diclofenac Sodium (VOLTAREN) 1 % APPLY 2 GRAMS TO THE AFFECTED AREA(S) BY TOPICAL ROUTE 4 TIMES PER DAY      diphenhydrAMINE-acetaminophen (TYLENOL PM)  MG TABS Take 1 tablet by mouth daily at bedtime as needed for sleep  doxycycline hyclate (VIBRA-TABS) 100 mg tablet Take by mouth as needed        esomeprazole (NEXIUM) 40 MG capsule Take 1 capsule by mouth daily      fluticasone (FLONASE) 50 mcg/act nasal spray 2 sprays into each nostril daily 3 Bottle 2    levothyroxine (Synthroid) 125 mcg tablet Take 1 tablet (125 mcg total) by mouth daily Take on an empty stomach/ MUST BE BRAND 90 tablet 3    meclizine (ANTIVERT) 25 mg tablet Take by mouth as needed        metroNIDAZOLE (METROGEL) 0 75 % gel as needed   3    Multiple Vitamin (MULTIVITAMIN) capsule Take 1 capsule by mouth daily      psyllium (METAMUCIL) 58 6 % powder Take 1 packet by mouth daily      [DISCONTINUED] atorvastatin (LIPITOR) 20 mg tablet TAKE 1 TABLET DAILY 90 tablet 3    [DISCONTINUED] levothyroxine (Synthroid) 125 mcg tablet Take 1 tablet (125 mcg total) by mouth daily Take on an empty stomach (Patient taking differently: Take 125 mcg by mouth daily Take on an empty stomach/ MUST BE BRAND) 90 tablet 3    [DISCONTINUED] naproxen sodium (ALEVE) 220 MG tablet Take by mouth       No current facility-administered medications on file prior to visit         Social History     Tobacco Use    Smoking status: Former Smoker     Packs/day: 0 25     Years: 17 00     Pack years: 4 25     Types: Cigarettes     Quit date:      Years since quittin 6    Smokeless tobacco: Never Used    Tobacco comment: 0 5 ppw intermittently last smoked    Vaping Use    Vaping Use: Never used   Substance Use Topics    Alcohol use: Yes     Comment: occasionally    Drug use: No       Family History   Problem Relation Age of Onset    Aneurysm Mother         cerebral    Ovarian cancer Mother 67    Cirrhosis Father         alcoholic    Aneurysm Brother         abdominal aortic; cerebral    Diabetes Son     No Known Problems Daughter     No Known Problems Maternal Grandmother     No Known Problems Maternal Grandfather     No Known Problems Paternal Grandmother    Aleksandar Worthy No Known Problems Paternal Grandfather     No Known Problems Son     No Known Problems Son     No Known Problems Son     No Known Problems Maternal Aunt     No Known Problems Maternal Aunt     No Known Problems Maternal Aunt     No Known Problems Maternal Aunt     No Known Problems Paternal Aunt     No Known Problems Paternal Aunt     No Known Problems Paternal Aunt     No Known Problems Paternal Aunt     No Known Problems Paternal Aunt     Cancer Brother 79        bladder cancer         Scribe Attestation    I,:  Molly Bloom PA-C am acting as a scribe while in the presence of the attending physician :       I,:  Tyler Fuentes MD personally performed the services described in this documentation    as scribed in my presence :

## 2021-09-27 ENCOUNTER — CONSULT (OUTPATIENT)
Dept: PLASTIC SURGERY | Facility: CLINIC | Age: 76
End: 2021-09-27

## 2021-09-27 DIAGNOSIS — Z45.819 ENCOUNTER FOR ADJUSTMENT OR REMOVAL OF UNSPECIFIED BREAST IMPLANT: ICD-10-CM

## 2021-09-27 DIAGNOSIS — Z98.82 STATUS POST BREAST AUGMENTATION: Primary | ICD-10-CM

## 2021-09-27 PROCEDURE — COSCON: Performed by: SURGERY

## 2021-09-27 NOTE — PROGRESS NOTES
Assessment/Plan:  Please see HPI  She underwent bilateral saline breast implant placement in 1997 and is interested in having the implants removed  While she is not having any problems related to the breast, per se, she understands that implants are not lifelong devices, and at this point she would prefer to have them removed and not replaced  We discussed implant removal and capsulectomy, how the procedure is performed, as well as potential risks, complications limitations including, but not limited to infection, bleeding, scarring, asymmetry, contour deformity, skin laxity/loose skin, ptotic breasts, hematoma, seroma, the use of drains, and the potential for revisional surgery should she so desire  We showed her a 450 cc implant today and she understands that the volume that will be removed upon implant removal will be greater than this, she understands  She understands, and her questions were answered to her satisfaction  She would like to discuss the fees with our coordinator prior to proceeding, and she would like to speak with her  prior to signing the consent form  She knows to call should she have any questions  Diagnoses and all orders for this visit:    Encounter for adjustment or removal of unspecified breast implant  -     Ambulatory referral to Plastic Surgery          Subjective:   Status post breast augmentation, desires implant removal     Patient ID: Saqib Kaur is a 78 y o  female  HPI Nima Juárez is a very pleasant 68-year-old female, referred by Dr Yenifer Caceres  She is interested in undergoing implant removal   She reports having undergone bilateral saline breast augmentation in 1997 ( Dr Yessica Borges) she is not having any problems referable to the breasts, she understands that implants are not lifelong devices and would like to have them removed prior to her "being [de-identified]   "     It appears that she has saline implants in place, 525 cc nominal volume, final fill volume is unknown  She is not interested in replacement or mastopexy/breast lift  The following portions of the patient's history were reviewed and updated as appropriate: allergies, current medications, past family history, past medical history, past social history, past surgical history and problem list     Review of Systems   Constitutional: Negative for chills and fever  HENT: Positive for hearing loss  Eyes: Positive for visual disturbance  Negative for discharge  Respiratory: Negative for chest tightness and shortness of breath  Cardiovascular: Negative for chest pain and leg swelling  Gastrointestinal: Negative for blood in stool, constipation, diarrhea and nausea  Genitourinary: Negative for dysuria  Musculoskeletal: Negative for gait problem  Skin: Negative for rash  Allergic/Immunologic: Negative for immunocompromised state  Neurological: Negative for seizures and headaches  Hematological: Does not bruise/bleed easily  Psychiatric/Behavioral: Negative for dysphoric mood  The patient is not nervous/anxious  Objective: There were no vitals taken for this visit  Physical Exam  Constitutional:       Appearance: Normal appearance  HENT:      Head: Normocephalic  Eyes:      Extraocular Movements: Extraocular movements intact  Pupils: Pupils are equal, round, and reactive to light  Cardiovascular:      Rate and Rhythm: Normal rate  Pulmonary:      Effort: Pulmonary effort is normal    Abdominal:      Palpations: Abdomen is soft  Musculoskeletal:         General: Normal range of motion  Cervical back: Normal range of motion and neck supple  Skin:     General: Skin is warm  Comments: Status post bilateral breast augmentation, implants in good position, both breasts are soft, supple and without evidence of capsular contracture  The sternal notch to nipple distances are 31 cm on the left, 32 cm on the right, intermammary distance is 27 cm    The inframammary fold to nipple distances are 12 5 cm on the left, 12 cm on the right base diameter is approximately 15 cm bilaterally, see photos   Neurological:      Mental Status: She is alert and oriented to person, place, and time     Psychiatric:         Mood and Affect: Mood normal

## 2021-11-09 ENCOUNTER — HOSPITAL ENCOUNTER (EMERGENCY)
Facility: HOSPITAL | Age: 76
Discharge: HOME | End: 2021-11-09
Attending: EMERGENCY MEDICINE | Admitting: EMERGENCY MEDICINE
Payer: MEDICARE

## 2021-11-09 ENCOUNTER — OFFICE VISIT (OUTPATIENT)
Dept: URGENT CARE | Age: 76
End: 2021-11-09
Payer: MEDICARE

## 2021-11-09 VITALS
OXYGEN SATURATION: 96 % | TEMPERATURE: 98.1 F | DIASTOLIC BLOOD PRESSURE: 69 MMHG | HEART RATE: 96 BPM | SYSTOLIC BLOOD PRESSURE: 156 MMHG | RESPIRATION RATE: 16 BRPM

## 2021-11-09 VITALS
OXYGEN SATURATION: 97 % | RESPIRATION RATE: 20 BRPM | TEMPERATURE: 97.8 F | HEART RATE: 94 BPM | SYSTOLIC BLOOD PRESSURE: 171 MMHG | DIASTOLIC BLOOD PRESSURE: 79 MMHG

## 2021-11-09 DIAGNOSIS — T16.1XXA FOREIGN BODY OF RIGHT EAR, INITIAL ENCOUNTER: Primary | ICD-10-CM

## 2021-11-09 PROCEDURE — 99213 OFFICE O/P EST LOW 20 MIN: CPT | Performed by: NURSE PRACTITIONER

## 2021-11-09 PROCEDURE — G0463 HOSPITAL OUTPT CLINIC VISIT: HCPCS | Performed by: NURSE PRACTITIONER

## 2021-11-09 PROCEDURE — 99284 EMERGENCY DEPT VISIT MOD MDM: CPT | Performed by: PHYSICIAN ASSISTANT

## 2021-11-09 PROCEDURE — 99282 EMERGENCY DEPT VISIT SF MDM: CPT

## 2021-11-09 RX ORDER — ACETAMINOPHEN 325 MG/1
650 TABLET ORAL ONCE
Status: DISCONTINUED | OUTPATIENT
Start: 2021-11-09 | End: 2021-11-09 | Stop reason: HOSPADM

## 2021-11-23 DIAGNOSIS — E03.9 HYPOTHYROIDISM, UNSPECIFIED TYPE: ICD-10-CM

## 2021-11-23 RX ORDER — LEVOTHYROXINE SODIUM 0.12 MG/1
125 TABLET ORAL DAILY
Qty: 90 TABLET | Refills: 3 | Status: SHIPPED | OUTPATIENT
Start: 2021-11-23

## 2021-12-01 ENCOUNTER — OFFICE VISIT (OUTPATIENT)
Dept: AUDIOLOGY | Age: 76
End: 2021-12-01

## 2021-12-01 DIAGNOSIS — H90.3 SENSORY HEARING LOSS, BILATERAL: Primary | ICD-10-CM

## 2021-12-03 DIAGNOSIS — Z13.6 SCREENING FOR CARDIOVASCULAR CONDITION: ICD-10-CM

## 2021-12-03 DIAGNOSIS — R73.03 PRE-DIABETES: Primary | ICD-10-CM

## 2021-12-10 ENCOUNTER — APPOINTMENT (OUTPATIENT)
Dept: LAB | Age: 76
End: 2021-12-10
Payer: MEDICARE

## 2021-12-10 DIAGNOSIS — Z13.6 SCREENING FOR CARDIOVASCULAR CONDITION: ICD-10-CM

## 2021-12-10 DIAGNOSIS — R73.03 PRE-DIABETES: ICD-10-CM

## 2021-12-10 LAB
ALBUMIN SERPL BCP-MCNC: 3.6 G/DL (ref 3.5–5)
ALP SERPL-CCNC: 52 U/L (ref 46–116)
ALT SERPL W P-5'-P-CCNC: 23 U/L (ref 12–78)
ANION GAP SERPL CALCULATED.3IONS-SCNC: 6 MMOL/L (ref 4–13)
AST SERPL W P-5'-P-CCNC: 13 U/L (ref 5–45)
BILIRUB SERPL-MCNC: 0.66 MG/DL (ref 0.2–1)
BUN SERPL-MCNC: 15 MG/DL (ref 5–25)
CALCIUM SERPL-MCNC: 9.2 MG/DL (ref 8.3–10.1)
CHLORIDE SERPL-SCNC: 108 MMOL/L (ref 100–108)
CHOLEST SERPL-MCNC: 164 MG/DL
CO2 SERPL-SCNC: 27 MMOL/L (ref 21–32)
CREAT SERPL-MCNC: 0.67 MG/DL (ref 0.6–1.3)
EST. AVERAGE GLUCOSE BLD GHB EST-MCNC: 128 MG/DL
GFR SERPL CREATININE-BSD FRML MDRD: 87 ML/MIN/1.73SQ M
GLUCOSE P FAST SERPL-MCNC: 109 MG/DL (ref 65–99)
HBA1C MFR BLD: 6.1 %
HDLC SERPL-MCNC: 61 MG/DL
LDLC SERPL CALC-MCNC: 84 MG/DL (ref 0–100)
POTASSIUM SERPL-SCNC: 3.8 MMOL/L (ref 3.5–5.3)
PROT SERPL-MCNC: 6.6 G/DL (ref 6.4–8.2)
SODIUM SERPL-SCNC: 141 MMOL/L (ref 136–145)
TRIGL SERPL-MCNC: 96 MG/DL

## 2021-12-10 PROCEDURE — 80053 COMPREHEN METABOLIC PANEL: CPT

## 2021-12-10 PROCEDURE — 83036 HEMOGLOBIN GLYCOSYLATED A1C: CPT

## 2021-12-10 PROCEDURE — 80061 LIPID PANEL: CPT

## 2021-12-10 PROCEDURE — 36415 COLL VENOUS BLD VENIPUNCTURE: CPT

## 2021-12-14 ENCOUNTER — OFFICE VISIT (OUTPATIENT)
Dept: INTERNAL MEDICINE CLINIC | Facility: CLINIC | Age: 76
End: 2021-12-14
Payer: MEDICARE

## 2021-12-14 VITALS
WEIGHT: 197.8 LBS | DIASTOLIC BLOOD PRESSURE: 70 MMHG | HEIGHT: 67 IN | OXYGEN SATURATION: 96 % | BODY MASS INDEX: 31.04 KG/M2 | HEART RATE: 84 BPM | SYSTOLIC BLOOD PRESSURE: 114 MMHG

## 2021-12-14 DIAGNOSIS — E11.9 TYPE 2 DIABETES MELLITUS WITHOUT COMPLICATION, WITHOUT LONG-TERM CURRENT USE OF INSULIN (HCC): Primary | ICD-10-CM

## 2021-12-14 DIAGNOSIS — E66.09 CLASS 1 OBESITY DUE TO EXCESS CALORIES WITH SERIOUS COMORBIDITY AND BODY MASS INDEX (BMI) OF 33.0 TO 33.9 IN ADULT: ICD-10-CM

## 2021-12-14 DIAGNOSIS — M19.90 OSTEOARTHRITIS, UNSPECIFIED OSTEOARTHRITIS TYPE, UNSPECIFIED SITE: ICD-10-CM

## 2021-12-14 DIAGNOSIS — E78.2 MIXED HYPERLIPIDEMIA: ICD-10-CM

## 2021-12-14 PROCEDURE — 99214 OFFICE O/P EST MOD 30 MIN: CPT | Performed by: INTERNAL MEDICINE

## 2021-12-22 ENCOUNTER — OFFICE VISIT (OUTPATIENT)
Dept: AUDIOLOGY | Age: 76
End: 2021-12-22
Payer: COMMERCIAL

## 2021-12-22 DIAGNOSIS — H90.3 SENSORY HEARING LOSS, BILATERAL: Primary | ICD-10-CM

## 2021-12-22 PROCEDURE — V5274 ALD UNSPECIFIED: HCPCS | Performed by: AUDIOLOGIST-HEARING AID FITTER

## 2022-01-14 ENCOUNTER — OFFICE VISIT (OUTPATIENT)
Dept: AUDIOLOGY | Age: 77
End: 2022-01-14

## 2022-01-14 DIAGNOSIS — H90.3 SENSORINEURAL HEARING LOSS, BILATERAL: Primary | ICD-10-CM

## 2022-01-14 NOTE — PROGRESS NOTES
Progress Note    Name:  Arie Fernandez  :  1945  Age:  78 y o  Date of Evaluation: 22     Patient returned saying that hearing aids are not connecting to the thor  I uninstalled and reinstalled thor - working now  Talia Duff Ma   Clinical Audiologist       Progress Note    Name:  Arie Fernandez  :  1945  Age:  78 y o  Date of Evaluation: 22     Patient being seen to fit RITE molds  Left mold fits well, but right is very difficult to seat in ear and works its way out with jaw movement  Took new earmold impressions and will order half skeletons,highlighting the problem area on right mold  Patient is back to using domes until new molds arrive  Updated firmware on both instruments today  Call patient to schedule HAV with Samantha when RFF        Talia Duff Ma   Clinical Audiologist

## 2022-01-26 NOTE — PROGRESS NOTES
Progress Note    Name:  Michael Nguyen  :  1945  Age:  78 y o  Date of Evaluation: 22     [facility]olds [facility]  [facility]   Clinical Audiologist

## 2022-02-03 ENCOUNTER — NURSE TRIAGE (OUTPATIENT)
Dept: OTHER | Facility: OTHER | Age: 77
End: 2022-02-03

## 2022-02-03 DIAGNOSIS — Z20.822 ENCOUNTER FOR SCREENING LABORATORY TESTING FOR COVID-19 VIRUS: Primary | ICD-10-CM

## 2022-02-03 PROCEDURE — U0003 INFECTIOUS AGENT DETECTION BY NUCLEIC ACID (DNA OR RNA); SEVERE ACUTE RESPIRATORY SYNDROME CORONAVIRUS 2 (SARS-COV-2) (CORONAVIRUS DISEASE [COVID-19]), AMPLIFIED PROBE TECHNIQUE, MAKING USE OF HIGH THROUGHPUT TECHNOLOGIES AS DESCRIBED BY CMS-2020-01-R: HCPCS | Performed by: INTERNAL MEDICINE

## 2022-02-03 PROCEDURE — U0005 INFEC AGEN DETEC AMPLI PROBE: HCPCS | Performed by: INTERNAL MEDICINE

## 2022-02-03 NOTE — TELEPHONE ENCOUNTER
Regarding: Covid- [facility]- symptoms- sore throat, temp of 102, chills bodyaches    ----- Message from Jordyn Peter MA sent at 2/3/2022  4:28 PM EST -----  Pt  Calling with 102 fever  "I have had a sore throat since Tuesday  I took 2 at home covid tests,  both negative   Today I have a temp of 102, chills and body aches"

## 2022-02-04 ENCOUNTER — PATIENT MESSAGE (OUTPATIENT)
Dept: INTERNAL MEDICINE CLINIC | Facility: CLINIC | Age: 77
End: 2022-02-04

## 2022-02-04 NOTE — TELEPHONE ENCOUNTER
Placed call to patient relayed Dr Simone Kawasaki message  Patient states she will go to [facility] tomorrow to be evaluated

## 2022-02-05 ENCOUNTER — OFFICE VISIT (OUTPATIENT)
Dept: URGENT CARE | Age: 77
End: 2022-02-05
Payer: MEDICARE

## 2022-02-05 VITALS
TEMPERATURE: 96.5 F | RESPIRATION RATE: 18 BRPM | HEART RATE: 103 BPM | BODY MASS INDEX: 31.66 KG/M2 | WEIGHT: 197 LBS | OXYGEN SATURATION: 95 % | HEIGHT: 66 IN

## 2022-02-05 DIAGNOSIS — J01.40 ACUTE PANSINUSITIS, RECURRENCE NOT SPECIFIED: ICD-10-CM

## 2022-02-05 DIAGNOSIS — J02.9 SORE THROAT: Primary | ICD-10-CM

## 2022-02-05 LAB — S PYO AG THROAT QL: NEGATIVE

## 2022-02-05 PROCEDURE — G0463 HOSPITAL OUTPT CLINIC VISIT: HCPCS | Performed by: NURSE PRACTITIONER

## 2022-02-05 PROCEDURE — 99213 OFFICE O/P EST LOW 20 MIN: CPT | Performed by: NURSE PRACTITIONER

## 2022-02-05 PROCEDURE — 87070 CULTURE OTHR SPECIMN AEROBIC: CPT | Performed by: NURSE PRACTITIONER

## 2022-02-05 PROCEDURE — 87880 STREP A ASSAY W/OPTIC: CPT | Performed by: NURSE PRACTITIONER

## 2022-02-05 RX ORDER — AMOXICILLIN AND CLAVULANATE POTASSIUM 875; 125 MG/1; MG/1
1 TABLET, FILM COATED ORAL EVERY 12 HOURS SCHEDULED
Qty: 14 TABLET | Refills: 0 | Status: SHIPPED | OUTPATIENT
Start: 2022-02-05 | End: 2022-02-12

## 2022-02-05 NOTE — PROGRESS NOTES
[facility]        NAME: Tanya Vitale is a 78 y o  female  : 1945    MRN: [id number]  DATE: 2022  TIME: 8:36 AM    Assessment and Plan   Sore throat [J02 9]  1  Sore throat  POCT rapid strepA    Throat culture   2  Acute pansinusitis, recurrence not specified  amoxicillin-clavulanate (AUGMENTIN) 875-125 mg per tablet         Patient Instructions     Take meds as directed  Follow up with PCP in 3-5 days  Proceed to  ER if symptoms worsen  Chief Complaint     Chief Complaint   Patient presents with    Sore Throat     sinus infection and nasal congestion since Tuesday   Fatigue    Cough         History of Present Illness       HPI   Reports sore throat, sinus congestion and pain, facial pain and pressure, and cough  Duration 5-6 days  Review of Systems   Review of Systems   Constitutional: Positive for fatigue  Negative for chills and fever  HENT: Positive for congestion, sinus pressure, sinus pain and sore throat  Respiratory: Positive for cough  Negative for chest tightness, shortness of breath and wheezing  Cardiovascular: Negative for chest pain  Gastrointestinal: Negative for diarrhea and vomiting  Neurological: Negative for dizziness and headaches           Current Medications       Current Outpatient Medications:     Ascorbic Acid (VITAMIN C) 1000 MG tablet, Take 1 tablet by mouth daily, Disp: , Rfl:     atorvastatin (LIPITOR) 20 mg tablet, TAKE 1 TABLET DAILY, Disp: 90 tablet, Rfl: 3    Biotin 5000 MCG CAPS, Take 1 capsule by mouth daily, Disp: , Rfl:     cholecalciferol (VITAMIN D3) 1,000 units tablet, Take 2,000 Units by mouth daily , Disp: , Rfl:     CINNAMON PO, Take by mouth, Disp: , Rfl:     Diclofenac Sodium (VOLTAREN) 1 %, APPLY 2 GRAMS TO THE AFFECTED AREA(S) BY TOPICAL ROUTE 4 TIMES PER DAY, Disp: , Rfl:     diphenhydrAMINE-acetaminophen (TYLENOL PM)  MG TABS, Take 1 tablet by mouth daily at bedtime as needed for sleep, Disp: , Rfl:     doxycycline hyclate (VIBRA-TABS) 100 mg tablet, Take by mouth as needed  , Disp: , Rfl:     esomeprazole (NEXIUM) 40 MG capsule, Take 1 capsule by mouth daily, Disp: , Rfl:     fluticasone (FLONASE) 50 mcg/act nasal spray, 2 sprays into each nostril daily, Disp: 3 Bottle, Rfl: 2    levothyroxine (Synthroid) 125 mcg tablet, Take 1 tablet (125 mcg total) by mouth daily Take on an empty stomach, Disp: 90 tablet, Rfl: 3    meclizine (ANTIVERT) 25 mg tablet, Take by mouth as needed  , Disp: , Rfl:     metroNIDAZOLE (METROGEL) 0 75 % gel, as needed , Disp: , Rfl: 3    Multiple Vitamin (MULTIVITAMIN) capsule, Take 1 capsule by mouth daily, Disp: , Rfl:     psyllium (METAMUCIL) 58 6 % powder, Take 1 packet by mouth daily, Disp: , Rfl:     amoxicillin-clavulanate (AUGMENTIN) 875-125 mg per tablet, Take 1 tablet by mouth every 12 (twelve) hours for 7 days, Disp: 14 tablet, Rfl: 0    Current Allergies     Allergies as of 02/05/2022 - Reviewed 02/05/2022   Allergen Reaction Noted    Hydrochlorothiazide Palpitations 09/23/2014    Meloxicam Rash 03/12/2020            The following portions of the patient's history were reviewed and updated as appropriate: allergies, current medications, past family history, past medical history, past social history, past surgical history and problem list      Past Medical History:   Diagnosis Date    Biliary dyskinesia     CAP (community acquired pneumonia)     last assessed 8/17/16    GERD (gastroesophageal reflux disease)     Hyperlipidemia     Palpitations     Shortness of breath        Past Surgical History:   Procedure Laterality Date    AUGMENTATION MAMMAPLASTY Bilateral 1998    breast surgery- with prosthetic implant 1998; pre-pectoral salline    BREAST BIOPSY Left 1974    BREAST BIOPSY Left 1994    BREAST EXCISIONAL BIOPSY Left 1993    CARDIAC CATHETERIZATION      procedure summary    CHOLECYSTECTOMY      onset 2003    HEMORRHOID SURGERY      ROTATOR CUFF REPAIR Bilateral     onset 2010    TUBAL LIGATION      onset 1980       Family History   Problem Relation Age of Onset    Aneurysm Mother         cerebral    Ovarian cancer Mother 67    Cirrhosis Father         alcoholic    Aneurysm Brother         abdominal aortic; cerebral    Diabetes Son     No Known Problems Daughter     No Known Problems Maternal Grandmother     No Known Problems Maternal Grandfather     No Known Problems Paternal Grandmother     No Known Problems Paternal Grandfather     No Known Problems Son     No Known Problems Son     No Known Problems Son     No Known Problems Maternal Aunt     No Known Problems Maternal Aunt     No Known Problems Maternal Aunt     No Known Problems Maternal Aunt     No Known Problems Paternal Aunt     No Known Problems Paternal Aunt     No Known Problems Paternal Aunt     No Known Problems Paternal Aunt     No Known Problems Paternal Aunt     Cancer Brother 79        bladder cancer         Medications have been verified  Objective   Pulse 103   Temp (!) 96 5 °F (35 8 °C) (Temporal)   Resp 18   Ht 5' 6" (1 676 m)   Wt 89 4 kg (197 lb)   SpO2 95%   BMI 31 80 kg/m²   No LMP recorded  Patient is postmenopausal        Physical Exam     Physical Exam  Constitutional:       Appearance: She is not ill-appearing or diaphoretic  HENT:      Right Ear: Tympanic membrane and ear canal normal       Left Ear: Tympanic membrane and ear canal normal       Mouth/Throat:      Mouth: Mucous membranes are moist       Pharynx: Posterior oropharyngeal erythema present  No pharyngeal swelling  Tonsils: No tonsillar exudate  0 on the right  0 on the left  Cardiovascular:      Rate and Rhythm: Regular rhythm  Pulmonary:      Effort: Pulmonary effort is normal       Breath sounds: Normal breath sounds

## 2022-02-05 NOTE — PATIENT INSTRUCTIONS
Sinusitis   WHAT YOU NEED TO KNOW:   Sinusitis is inflammation or infection of your sinuses  Sinusitis is most often caused by a virus  Acute sinusitis may last up to 12 weeks  Chronic sinusitis lasts longer than 12 weeks  Recurrent sinusitis means you have 4 or more infections in 1 year  DISCHARGE INSTRUCTIONS:   Return to the emergency department if:   · You have trouble breathing or wheezing that is getting worse  · You have a stiff neck, a fever, or a bad headache  · You cannot open your eye  · Your eyeball bulges out or you cannot move your eye  · You are more sleepy than normal, or you notice changes in your ability to think, move, or talk  · You have swelling of your forehead or scalp  Call your doctor if:   · You have vision changes, such as double vision  · Your eye and eyelid are red, swollen, and painful  · Your symptoms do not improve or go away after 10 days  · You have nausea and are vomiting  · Your nose is bleeding  · You have questions or concerns about your condition or care  Medicines: Your symptoms may go away on their own  Your healthcare provider may recommend watchful waiting for up to 10 days before starting antibiotics  You may need any of the following:  · Acetaminophen  decreases pain and fever  It is available without a doctor's order  Ask how much to take and how often to take it  Follow directions  Read the labels of all other medicines you are using to see if they also contain acetaminophen, or ask your doctor or pharmacist  Acetaminophen can cause liver damage if not taken correctly  Do not use more than 4 grams (4,000 milligrams) total of acetaminophen in one day  · NSAIDs , such as ibuprofen, help decrease swelling, pain, and fever  This medicine is available with or without a doctor's order  NSAIDs can cause stomach bleeding or kidney problems in certain people   If you take blood thinner medicine, always ask your healthcare provider if NSAIDs are safe for you  Always read the medicine label and follow directions  · Nasal steroid sprays  may help decrease inflammation in your nose and sinuses  · Decongestants  help reduce swelling and drain mucus in the nose and sinuses  They may help you breathe easier  · Antihistamines  help dry mucus in the nose and relieve sneezing  · Antibiotics  help treat or prevent a bacterial infection  · Take your medicine as directed  Contact your healthcare provider if you think your medicine is not helping or if you have side effects  Tell him or her if you are allergic to any medicine  Keep a list of the medicines, vitamins, and herbs you take  Include the amounts, and when and why you take them  Bring the list or the pill bottles to follow-up visits  Carry your medicine list with you in case of an emergency  Self-care:   · Rinse your sinuses as directed  Use a sinus rinse device to rinse your nasal passages with a saline (salt water) solution or distilled water  Do not use tap water  This will help thin the mucus in your nose and rinse away pollen and dirt  It will also help reduce swelling so you can breathe normally  · Use a humidifier  to increase air moisture in your home  This may make it easier for you to breathe and help decrease your cough  · Sleep with your head elevated  Place an extra pillow under your head before you go to sleep to help your sinuses drain  · Drink liquids as directed  Ask your healthcare provider how much liquid to drink each day and which liquids are best for you  Liquids will thin the mucus in your nose and help it drain  Avoid drinks that contain alcohol or caffeine  · Do not smoke, and avoid secondhand smoke  Nicotine and other chemicals in cigarettes and cigars can make your symptoms worse  Ask your healthcare provider for information if you currently smoke and need help to quit   E-cigarettes or smokeless tobacco still contain nicotine  Talk to your healthcare provider before you use these products  Prevent the spread of germs:   · Wash your hands often with soap and water  Wash your hands after you use the bathroom, change a child's diaper, or sneeze  Wash your hands before you prepare or eat food  · Stay away from people who are sick  Some germs spread easily and quickly through contact  Follow up with your doctor as directed: You may be referred to an ear, nose, and throat specialist  Write down your questions so you remember to ask them during your visits  © Copyright [employer] 2021 Information is for End User's use only and may not be sold, redistributed or otherwise used for commercial purposes  All illustrations and images included in CareNotes® are the copyrighted property of [employer]  or Marily [employer]  The above information is an  only  It is not intended as medical advice for individual conditions or treatments  Talk to your doctor, nurse or pharmacist before following any medical regimen to see if it is safe and effective for you

## 2022-02-07 ENCOUNTER — TELEMEDICINE (OUTPATIENT)
Dept: INTERNAL MEDICINE CLINIC | Facility: CLINIC | Age: 77
End: 2022-02-07
Payer: MEDICARE

## 2022-02-07 DIAGNOSIS — J01.90 ACUTE NON-RECURRENT SINUSITIS, UNSPECIFIED LOCATION: Primary | ICD-10-CM

## 2022-02-07 PROBLEM — J32.9 SINUSITIS: Status: ACTIVE | Noted: 2019-08-20

## 2022-02-07 LAB — BACTERIA THROAT CULT: NORMAL

## 2022-02-07 PROCEDURE — 99212 OFFICE O/P EST SF 10 MIN: CPT | Performed by: INTERNAL MEDICINE

## 2022-02-07 NOTE — ASSESSMENT & PLAN NOTE
Discussed with patient that if she noticed her fevers are returned and her sputum color is changing, that she should give us a call back  Complete full 7 day course of Augmentin, last day being 2/11/2022  Discussed using Tylenol and Robitussin DM for symptom control

## 2022-02-07 NOTE — PROGRESS NOTES
Virtual Regular Visit    Verification of patient location:    Patient is located in the following state in which I hold an active license [place]      Assessment/Plan:    Problem List Items Addressed This Visit        Respiratory    Sinusitis - Primary               Reason for visit is   Chief Complaint   Patient presents with    Sinusitis    Virtual Regular Visit        Encounter provider Pablito Ingram MD    Provider located at [address] [place] [address] [place] [id number]      Recent Visits  No visits were found meeting these conditions  Showing recent visits within past 7 days and meeting all other requirements  Today's Visits  Date Type Provider Dept   02/07/22 Telemedicine Pablito Ingram MD [address] [facility] today's visits and meeting all other requirements  Future Appointments  No visits were found meeting these conditions  Showing future appointments within next 150 days and meeting all other requirements       The patient was identified by name and date of birth  Emilio Spicer was informed that this is a telemedicine visit and that the visit is being conducted through [employer] and patient was informed that this is not a secure, HIPAA-compliant platform  She agrees to proceed     My office door was closed  No one else was in the room  She acknowledged consent and understanding of privacy and security of the video platform  The patient has agreed to participate and understands they can discontinue the visit at any time  Patient is aware this is a billable service  Subjective  Emilio Spicer is a 78 y o  female who is here for follow-up of acute sinusitis   Patient was having rhinitis, cough and generalized malaise since Tuesday/Wednesday of last week  Patient visited urgent care and was prescribed Augmentin for a 10 day course    Patient also received COVID-19 test which was negative, and patient also received at home COVID-19 test which was negative  Patient also had strep and throat culture which were both negative  Patient states that she is feeling significantly better and her fevers have resolved  Patient was having a fever of 102 8 last week, has resolved 100 4 after antibiotics  Sinusitis  This is a new problem  The current episode started in the past 7 days  The problem has been gradually improving since onset  The maximum temperature recorded prior to her arrival was 100 4 - 100 9 F  Her pain is at a severity of 2/10  Associated symptoms include congestion and coughing  Pertinent negatives include no chills, shortness of breath, sinus pressure or sore throat  Past treatments include antibiotics and acetaminophen  The treatment provided significant relief          Past Medical History:   Diagnosis Date    Biliary dyskinesia     CAP (community acquired pneumonia)     last assessed 8/17/16    GERD (gastroesophageal reflux disease)     Hyperlipidemia     Palpitations     Shortness of breath        Past Surgical History:   Procedure Laterality Date    AUGMENTATION MAMMAPLASTY Bilateral 1998    breast surgery- with prosthetic implant 1998; pre-pectoral salline    BREAST BIOPSY Left 1974    BREAST BIOPSY Left 1994    BREAST EXCISIONAL BIOPSY Left 1993    CARDIAC CATHETERIZATION      procedure summary    CHOLECYSTECTOMY      onset 2003    HEMORRHOID SURGERY      ROTATOR CUFF REPAIR Bilateral     onset 2010    TUBAL LIGATION      onset 1980       Current Outpatient Medications   Medication Sig Dispense Refill    amoxicillin-clavulanate (AUGMENTIN) 875-125 mg per tablet Take 1 tablet by mouth every 12 (twelve) hours for 7 days 14 tablet 0    Ascorbic Acid (VITAMIN C) 1000 MG tablet Take 1 tablet by mouth daily      atorvastatin (LIPITOR) 20 mg tablet TAKE 1 TABLET DAILY 90 tablet 3    Biotin 5000 MCG CAPS Take 1 capsule by mouth daily      cholecalciferol (VITAMIN D3) 1,000 units tablet Take 2,000 Units by mouth daily       CINNAMON PO Take by mouth      Diclofenac Sodium (VOLTAREN) 1 % APPLY 2 GRAMS TO THE AFFECTED AREA(S) BY TOPICAL ROUTE 4 TIMES PER DAY      diphenhydrAMINE-acetaminophen (TYLENOL PM)  MG TABS Take 1 tablet by mouth daily at bedtime as needed for sleep      doxycycline hyclate (VIBRA-TABS) 100 mg tablet Take by mouth as needed        esomeprazole (NEXIUM) 40 MG capsule Take 1 capsule by mouth daily      fluticasone (FLONASE) 50 mcg/act nasal spray 2 sprays into each nostril daily 3 Bottle 2    levothyroxine (Synthroid) 125 mcg tablet Take 1 tablet (125 mcg total) by mouth daily Take on an empty stomach 90 tablet 3    meclizine (ANTIVERT) 25 mg tablet Take by mouth as needed        metroNIDAZOLE (METROGEL) 0 75 % gel as needed   3    Multiple Vitamin (MULTIVITAMIN) capsule Take 1 capsule by mouth daily      psyllium (METAMUCIL) 58 6 % powder Take 1 packet by mouth daily       No current facility-administered medications for this visit  Allergies   Allergen Reactions    Hydrochlorothiazide Palpitations    Meloxicam Rash       Review of Systems   Constitutional: Negative for chills  HENT: Positive for congestion  Negative for sinus pressure and sore throat  Respiratory: Positive for cough  Negative for shortness of breath  Video Exam    There were no vitals filed for this visit  Physical Exam  Constitutional:       General: She is not in acute distress  Appearance: Normal appearance  She is not ill-appearing, toxic-appearing or diaphoretic  Eyes:      Conjunctiva/sclera: Conjunctivae normal    Pulmonary:      Effort: Pulmonary effort is normal  No respiratory distress  Musculoskeletal:      Cervical back: Normal range of motion  Neurological:      Mental Status: She is alert  Psychiatric:         Mood and Affect: Mood normal          Thought Content:  Thought content normal           I spent 12 minutes directly with the patient during this visit    VIRTUAL VISIT DISCLAIMER      Kasie Vivar verbally agrees to participate in [employer]  Pt is aware that [employer] could be limited without vital signs or the ability to perform a full hands-on physical Virgen Alfredo understands she or the provider may request at any time to terminate the video visit and request the patient to seek care or treatment in person

## 2022-03-03 ENCOUNTER — OFFICE VISIT (OUTPATIENT)
Dept: AUDIOLOGY | Age: 77
End: 2022-03-03

## 2022-03-03 DIAGNOSIS — H90.3 SENSORY HEARING LOSS, BILATERAL: Primary | ICD-10-CM

## 2022-03-08 ENCOUNTER — TELEPHONE (OUTPATIENT)
Dept: CARDIOLOGY CLINIC | Facility: CLINIC | Age: 77
End: 2022-03-08

## 2022-03-08 DIAGNOSIS — R00.2 PALPITATIONS: Primary | ICD-10-CM

## 2022-03-08 NOTE — TELEPHONE ENCOUNTER
----- Message from Roberto Cummings MD sent at 3/8/2022  2:19 PM EST -----  Regarding: FW: Rapid Heart Rate  Order 2w monitor  ----- Message -----  From: Delbert Bowen MA  Sent: 3/8/2022  12:30 PM EST  To: Roberto Cummings MD  Subject: FW: Rapid Heart Rate                               ----- Message -----  From: Js Zamora  Sent: 3/8/2022  12:27 PM EST  To: [facility]  Subject: Rapid Heart Rate                                 Got home around 11 and heart starting beating very fast with fluttering in chest   Felt like my chest was jumping around  Took several atrial fibrillation tests with Apple Watch  It keeps coming back inconclusive and to talk to doctor  Oximeter says 96/130, blood pressure 140/75  My resting heart rate is now 93   Just took another AF test on watch  This time it said no sign of AF  Not sure if I should be concerned or not

## 2022-03-08 NOTE — TELEPHONE ENCOUNTER
2 week Zio Patch monitor ordered via [employer]  Patient is aware that monitor will be delivered to her home

## 2022-03-08 NOTE — TELEPHONE ENCOUNTER
Manuel Jones,    Dr Isidor Severin ordered a 2 week Zio Patch monitor for this patient  Please check for prior auth      Thanks, Rhetta Brittle

## 2022-03-21 ENCOUNTER — TELEPHONE (OUTPATIENT)
Dept: CARDIOLOGY CLINIC | Facility: CLINIC | Age: 77
End: 2022-03-21

## 2022-03-21 NOTE — TELEPHONE ENCOUNTER
----- Message from Donnell Reese MD sent at 3/21/2022  1:26 PM EDT -----  Regarding: FW: Rapid Heart Beat  Monitor?, call pt and tell her I do not have any results  ----- Message -----  From: Calvin Cash  Sent: 3/21/2022   1:24 PM EDT  To: Donnell Reese MD  Subject: FW: Rapid Heart Beat                               ----- Message -----  From: Delgado Guzman  Sent: 3/21/2022  12:42 PM EDT  To: [facility]  Subject: Rapid Heart Beat                                 Tomorrow will be a week that I have been wearing the monitor  I had a sinus infection that started Feb 1  I saw two doctors and had a virtual visit, was on two antibiotics  I finally started to feel better a couple of weeks ago  I still, however, have not been able to get rid of my coughing  I just wanted to be sure that this is still a result of the sinus infection and has nothing to do with the palpitations  *spoke to patient, per LAT will await results of Zio monitor  Pt will follow up with PCP on symptoms of cough/sinus infection

## 2022-03-30 ENCOUNTER — TELEPHONE (OUTPATIENT)
Dept: CARDIOLOGY CLINIC | Facility: CLINIC | Age: 77
End: 2022-03-30

## 2022-03-30 NOTE — TELEPHONE ENCOUNTER
----- Message from Maru Prasad MD sent at 3/29/2022  9:43 AM EDT -----  Regarding: FW: Rapid Heart Beat  Keep it clean and no dressing call back if no improvement  ----- Message -----  From: Tad Ruslan  Sent: 3/28/2022   5:01 PM EDT  To: Maru Prasad MD  Subject: FW: Rapid Heart Beat                               ----- Message -----  From: Trinh James  Sent: 3/28/2022   1:40 PM EDT  To: [facility]  Subject: Rapid Heart Beat                                 I have a quick question for one of the nurses  Tomorrow is my last day with the heart monitor  I removed the surgical tape that was put on with the monitor because it was really looking bad  I put on clean surgical tape  It itched a bit and was really bothering me last night  I removed the tape and above the right side of the monitor it is like a layer of skin is removed  It is really red and sore  I put neosporin on it and put a nonstick pad over it lightly just attached with a band aid  I just want to be sure that I am doing what I should  It is red all around the monitor but the worse part is on the right side  Is there something else I should be doing? I dont want to get an infection

## 2022-03-30 NOTE — TELEPHONE ENCOUNTER
Zio patch created skin irritation-per Dr Pallavi Montana, keep clean and dry  Pt is using some Hydrocortisone cream, seems to be looking better, one area of irritation yet

## 2022-04-01 ENCOUNTER — CLINICAL SUPPORT (OUTPATIENT)
Dept: CARDIOLOGY CLINIC | Facility: CLINIC | Age: 77
End: 2022-04-01
Payer: MEDICARE

## 2022-04-01 DIAGNOSIS — R00.2 PALPITATIONS: ICD-10-CM

## 2022-04-01 PROCEDURE — 93248 EXT ECG>7D<15D REV&INTERPJ: CPT | Performed by: INTERNAL MEDICINE

## 2022-04-04 ENCOUNTER — TELEPHONE (OUTPATIENT)
Dept: CARDIOLOGY CLINIC | Facility: CLINIC | Age: 77
End: 2022-04-04

## 2022-04-12 ENCOUNTER — OFFICE VISIT (OUTPATIENT)
Dept: PODIATRY | Facility: CLINIC | Age: 77
End: 2022-04-12
Payer: MEDICARE

## 2022-04-12 VITALS
SYSTOLIC BLOOD PRESSURE: 136 MMHG | HEIGHT: 66 IN | HEART RATE: 101 BPM | BODY MASS INDEX: 31.8 KG/M2 | DIASTOLIC BLOOD PRESSURE: 82 MMHG

## 2022-04-12 DIAGNOSIS — M20.21 HALLUX RIGIDUS OF RIGHT FOOT: Primary | ICD-10-CM

## 2022-04-12 DIAGNOSIS — E11.9 CONTROLLED TYPE 2 DIABETES MELLITUS WITHOUT COMPLICATION, WITHOUT LONG-TERM CURRENT USE OF INSULIN (HCC): ICD-10-CM

## 2022-04-12 DIAGNOSIS — M20.22 HALLUX RIGIDUS OF LEFT FOOT: ICD-10-CM

## 2022-04-12 PROCEDURE — 99214 OFFICE O/P EST MOD 30 MIN: CPT | Performed by: PODIATRIST

## 2022-04-12 PROCEDURE — 20550 NJX 1 TENDON SHEATH/LIGAMENT: CPT | Performed by: PODIATRIST

## 2022-04-12 RX ORDER — LIDOCAINE HYDROCHLORIDE 10 MG/ML
1 INJECTION, SOLUTION EPIDURAL; INFILTRATION; INTRACAUDAL; PERINEURAL ONCE
Status: COMPLETED | OUTPATIENT
Start: 2022-04-12 | End: 2022-04-12

## 2022-04-12 RX ORDER — TRIAMCINOLONE ACETONIDE 40 MG/ML
20 INJECTION, SUSPENSION INTRA-ARTICULAR; INTRAMUSCULAR ONCE
Status: COMPLETED | OUTPATIENT
Start: 2022-04-12 | End: 2022-04-12

## 2022-04-12 RX ADMIN — TRIAMCINOLONE ACETONIDE 20 MG: 40 INJECTION, SUSPENSION INTRA-ARTICULAR; INTRAMUSCULAR at 14:49

## 2022-04-12 RX ADMIN — LIDOCAINE HYDROCHLORIDE 1 ML: 10 INJECTION, SOLUTION EPIDURAL; INFILTRATION; INTRACAUDAL; PERINEURAL at 14:49

## 2022-04-12 NOTE — PROGRESS NOTES
Assessment/Plan:    I personally reviewed the x-rays of the right foot  They reveal degenerative changes of the 1st MPJ with a narrow joint space  I personally reviewed the x-rays of the left foot  They reveal degenerative changes of the 1st MPJ with a narrow joint space  There is also a small dorsal exostosis on the 1st metatarsal head  Explained to patient that she has arthritis of each great toe joint known as hallux rigidus  There is relatively minimal pain with palpation of the lateral aspect of the 1st MPJ  Nevertheless, injected this area with 0 5 cc Kenalog 40 along with 1 cc 1% xylocaine  Surgical intervention is needed for correction  Discussed implant arthroplasty  Patient to consider  She will be reassessed in 4 weeks  No problem-specific Assessment & Plan notes found for this encounter  Diagnoses and all orders for this visit:    Hallux rigidus of right foot  -     XR foot 2 vw right; Future    Hallux rigidus of left foot  -     XR foot 2 vw left; Future  -     lidocaine (PF) (XYLOCAINE-MPF) 1 % injection 1 mL  -     triamcinolone acetonide (KENALOG-40) 40 mg/mL injection 20 mg          Subjective:      Patient ID: Shilo Marley is a 78 y o  female  HPI     Patient presents with pain in her left great toe joint  Patient has had pain in each of her great toe joints for the past few years  At last visit the right joint was painful but today it is the left  Pain is most prevalent at night when she tries to sleep  She is hopeful that she may have a cortisone injection  She states that it hurts throughout the joint  Patient has a history of diabetes mellitus  She is not taking medication for this  I personally reviewed A1c dated 12/10/2021  It was 6 1  I personally reviewed A1c dated 06/02/2021  It was 6 2      The following portions of the patient's history were reviewed and updated as appropriate: allergies, current medications, past family history, past medical history, past social history, past surgical history and problem list     Review of Systems   Gastrointestinal:        GERD   Musculoskeletal: Positive for arthralgias and gait problem  Neurological: Negative for numbness  Objective:      /82   Pulse 101   Ht 5' 6" (1 676 m)   BMI 31 80 kg/m²          Physical Exam  Constitutional:       Appearance: Normal appearance  Cardiovascular:      Pulses: Normal pulses  Musculoskeletal:         General: Tenderness and deformity present  Comments: Range of motion 1st MPJ bilateral is limited to 5° of dorsiflexion  Dorsal exostosis noted left 1st metatarsal head  Skin:     General: Skin is warm  Neurological:      General: No focal deficit present  Mental Status: She is oriented to person, place, and time  Foot injection     Date/Time 4/12/2022 2:42 PM     Performed by  Carl Padilla DPM     Authorized by Carl Padilla DPM      Universal Protocol   Consent: Verbal consent obtained  Risks and benefits: risks, benefits and alternatives were discussed  Consent given by: patient  Patient understanding: patient states understanding of the procedure being performed  Patient identity confirmed: verbally with patient        Local anesthesia used: yes     Anesthesia   Local anesthesia used: yes  Local Anesthetic: lidocaine 1% without epinephrine     Procedure Details   Procedure Notes: Injected 1st MPJ left foot with 0 5 cc Kenalog 40 along with 1 cc 1% xylocaine

## 2022-04-18 DIAGNOSIS — R00.2 PALPITATIONS: Primary | ICD-10-CM

## 2022-06-01 ENCOUNTER — APPOINTMENT (OUTPATIENT)
Dept: LAB | Age: 77
End: 2022-06-01
Payer: MEDICARE

## 2022-06-01 ENCOUNTER — HOSPITAL ENCOUNTER (OUTPATIENT)
Dept: RADIOLOGY | Age: 77
Discharge: HOME | End: 2022-06-01
Payer: MEDICARE

## 2022-06-01 DIAGNOSIS — E11.9 TYPE 2 DIABETES MELLITUS WITHOUT COMPLICATION, WITHOUT LONG-TERM CURRENT USE OF INSULIN (HCC): ICD-10-CM

## 2022-06-01 DIAGNOSIS — Z78.0 POSTMENOPAUSAL STATUS: ICD-10-CM

## 2022-06-01 DIAGNOSIS — E03.9 HYPOTHYROIDISM, UNSPECIFIED TYPE: ICD-10-CM

## 2022-06-01 LAB — TSH SERPL DL<=0.05 MIU/L-ACNC: 2.37 UIU/ML (ref 0.45–4.5)

## 2022-06-01 PROCEDURE — 84443 ASSAY THYROID STIM HORMONE: CPT

## 2022-06-01 PROCEDURE — 36415 COLL VENOUS BLD VENIPUNCTURE: CPT

## 2022-06-01 PROCEDURE — 77080 DXA BONE DENSITY AXIAL: CPT

## 2022-06-08 ENCOUNTER — OFFICE VISIT (OUTPATIENT)
Dept: CARDIOLOGY CLINIC | Facility: CLINIC | Age: 77
End: 2022-06-08
Payer: MEDICARE

## 2022-06-08 VITALS
SYSTOLIC BLOOD PRESSURE: 120 MMHG | DIASTOLIC BLOOD PRESSURE: 82 MMHG | HEART RATE: 92 BPM | BODY MASS INDEX: 33.89 KG/M2 | WEIGHT: 210 LBS

## 2022-06-08 DIAGNOSIS — I10 ESSENTIAL HYPERTENSION: Primary | ICD-10-CM

## 2022-06-08 DIAGNOSIS — E78.2 MIXED HYPERLIPIDEMIA: ICD-10-CM

## 2022-06-08 DIAGNOSIS — I47.1 SVT (SUPRAVENTRICULAR TACHYCARDIA) (HCC): ICD-10-CM

## 2022-06-08 DIAGNOSIS — R06.00 DYSPNEA ON EXERTION: ICD-10-CM

## 2022-06-08 DIAGNOSIS — R07.89 ATYPICAL CHEST PAIN: ICD-10-CM

## 2022-06-08 PROBLEM — I47.10 SVT (SUPRAVENTRICULAR TACHYCARDIA): Status: ACTIVE | Noted: 2022-06-08

## 2022-06-08 PROCEDURE — 93000 ELECTROCARDIOGRAM COMPLETE: CPT | Performed by: INTERNAL MEDICINE

## 2022-06-08 PROCEDURE — 99213 OFFICE O/P EST LOW 20 MIN: CPT | Performed by: INTERNAL MEDICINE

## 2022-06-08 NOTE — PROGRESS NOTES
Cardiology Follow Up    [facility]  1945  [id number]  [facility] [address]  [phone]    1  Essential hypertension     2  Mixed hyperlipidemia     3  Dyspnea on exertion     4  Atypical chest pain     5  SVT (supraventricular tachycardia) ([facility])         Interval History:  Cardiology follow-up  Patient was last seen on 03/20  Patient complain of intermittent palpitation last few months, in 1 occasion her Apple watch suggested atrial fibrillation  We started low-dose p r n  Beta-blocker, she has noted some improvement in her symptoms  She denies any syncope or presyncope  She does have any chest pain chest discomfort does complain of mild exertional dyspnea she has been trying to exercise more he wants to start an exercise program   States been compliant low-cholesterol diet, lipids last year total cholesterol 169, HDL 61 year revealed 64, she is on medium intensity statin therapy      Patient Active Problem List   Diagnosis    Lymphedema    Fatigue    Hyperlipidemia    Hypothyroidism    Class 1 obesity due to excess calories with serious comorbidity and body mass index (BMI) of 33 0 to 33 9 in adult    Type 2 diabetes mellitus without complication, without long-term current use of insulin (HCC)    Atypical chest pain    Right lower quadrant abdominal pain    Pelvic pain    Family history of ovarian cancer    Wellness examination    Carotid artery plaque    Dense breast tissue on mammogram    Fibrocystic breast changes    Dupuytren's contracture    Diverticular disease of colon    Gastro-esophageal reflux disease with esophagitis    History of adenomatous polyp of colon    Hypothyroidism due to Hashimoto's thyroiditis    Impaired fasting glucose    Nephrolithiasis    Essential hypertension    Class 1 obesity due to excess calories without serious comorbidity with body mass index (BMI) of 31 0 to 31 9 in adult    Sinusitis    Encounter for screening mammogram for breast cancer    Medicare annual wellness visit, subsequent    Dyspnea on exertion    Insomnia    Oral ulceration    Right flank pain    Exposure to COVID-19 virus    Edema    Thyroid nodule    Osteoarthritis    SVT (supraventricular tachycardia) (HCC)     Past Medical History:   Diagnosis Date    Biliary dyskinesia     CAP (community acquired pneumonia)     last assessed 16    Diabetes mellitus ([facility] [address] ) Dec 2018    Type 2 no insulin    Disease of thyroid gland     GERD (gastroesophageal reflux disease)     Hyperlipidemia     Palpitations     Shortness of breath      Social History     Socioeconomic History    Marital status: /Civil Union     Spouse name: Not on file    Number of children: Not on file    Years of education: 15    Highest education level: Not on file   Occupational History    Not on file   Tobacco Use    Smoking status: Former Smoker     Packs/day: 0 25     Years: 17      Pack years: 4 25     Types: Cigarettes     Quit date:      Years since quittin 4    Smokeless tobacco: Never Used    Tobacco comment: 0 5 ppw intermittently last smoked    Vaping Use    Vaping Use: Never used   Substance and Sexual Activity    Alcohol use:  Yes     Alcohol/week: 2 0 standard drinks     Types: 2 Glasses of wine per week     Comment: Maybe 2 glasses a month    Drug use: No    Sexual activity: Not Currently     Partners: Male     Birth control/protection: Post-menopausal     Comment: I'm 74   Other Topics Concern    Not on file   Social History Narrative    Caffeine use     Social Determinants of Health     Financial Resource Strain: Not on file   Food Insecurity: Not on file   Transportation Needs: Not on file   Physical Activity: Not on file   Stress: Not on file   Social Connections: Not on file   Intimate Partner Violence: Not on file Housing Stability: Not on file      Family History   Problem Relation Age of Onset    Aneurysm Mother         cerebral    Ovarian cancer Mother 67    Cancer Mother     Cirrhosis Father         alcoholic    Aneurysm Brother         abdominal aortic; cerebral    Diabetes Son     No Known Problems Daughter     No Known Problems Maternal Grandmother     No Known Problems Maternal Grandfather     No Known Problems Paternal Grandmother     No Known Problems Paternal Grandfather     No Known Problems Son     No Known Problems Son     No Known Problems Son     No Known Problems Maternal Aunt     No Known Problems Maternal Aunt     No Known Problems Maternal Aunt     No Known Problems Maternal Aunt     No Known Problems Paternal Aunt     No Known Problems Paternal Aunt     No Known Problems Paternal Aunt     No Known Problems Paternal Aunt     No Known Problems Paternal Aunt     Cancer Brother 79        bladder cancer     Past Surgical History:   Procedure Laterality Date    AUGMENTATION MAMMAPLASTY Bilateral 1998    breast surgery- with prosthetic implant 1998; pre-pectoral salline    BREAST BIOPSY Left 1974    BREAST BIOPSY Left 1994    BREAST EXCISIONAL BIOPSY Left 1993    CARDIAC CATHETERIZATION      procedure summary    CHOLECYSTECTOMY      onset 2003    HEMORRHOID SURGERY      ROTATOR CUFF REPAIR Bilateral     onset 2010    TUBAL LIGATION      onset 1980       Current Outpatient Medications:     Ascorbic Acid (VITAMIN C) 1000 MG tablet, Take 1 tablet by mouth daily, Disp: , Rfl:     atorvastatin (LIPITOR) 20 mg tablet, TAKE 1 TABLET DAILY, Disp: 90 tablet, Rfl: 3    Biotin 5000 MCG CAPS, Take 1 capsule by mouth daily, Disp: , Rfl:     cholecalciferol (VITAMIN D3) 1,000 units tablet, Take 2,000 Units by mouth daily , Disp: , Rfl:     CINNAMON PO, Take by mouth, Disp: , Rfl:     Diclofenac Sodium (VOLTAREN) 1 %, APPLY 2 GRAMS TO THE AFFECTED AREA(S) BY TOPICAL ROUTE 4 TIMES PER DAY, Disp: , Rfl:     diphenhydrAMINE-acetaminophen (TYLENOL PM)  MG TABS, Take 1 tablet by mouth daily at bedtime as needed for sleep, Disp: , Rfl:     doxycycline hyclate (VIBRA-TABS) 100 mg tablet, Take by mouth as needed  , Disp: , Rfl:     esomeprazole (NEXIUM) 40 MG capsule, Take 1 capsule by mouth daily, Disp: , Rfl:     fluticasone (FLONASE) 50 mcg/act nasal spray, 2 sprays into each nostril daily, Disp: 3 Bottle, Rfl: 2    levothyroxine (Synthroid) 125 mcg tablet, Take 1 tablet (125 mcg total) by mouth daily Take on an empty stomach, Disp: 90 tablet, Rfl: 3    meclizine (ANTIVERT) 25 mg tablet, Take by mouth as needed  , Disp: , Rfl:     metoprolol tartrate (LOPRESSOR) 25 mg tablet, Take one tablet as needed for palpitations, Disp: 30 tablet, Rfl: 6    metroNIDAZOLE (METROGEL) 0 75 % gel, as needed , Disp: , Rfl: 3    Multiple Vitamin (MULTIVITAMIN) capsule, Take 1 capsule by mouth daily, Disp: , Rfl:     psyllium (METAMUCIL) 58 6 % powder, Take 1 packet by mouth daily, Disp: , Rfl:   Allergies   Allergen Reactions    Hydrochlorothiazide Palpitations    Meloxicam Rash       Labs:  Appointment on 06/01/2022   Component Date Value    TSH 3RD GENERATON 06/01/2022 2 370    Office Visit on 02/05/2022   Component Date Value     RAPID STREP A 02/05/2022 Negative     Throat Culture 02/05/2022 Negative for beta-hemolytic Streptococcus    Orders Only on 02/03/2022   Component Date Value    SARS-CoV-2 02/03/2022 Negative    Appointment on 12/10/2021   Component Date Value    Sodium 12/10/2021 141     Potassium 12/10/2021 3 8     Chloride 12/10/2021 108     CO2 12/10/2021 27     ANION GAP 12/10/2021 6     BUN 12/10/2021 15     Creatinine 12/10/2021 0 67     Glucose, Fasting 12/10/2021 109 (A)    Calcium 12/10/2021 9 2     AST 12/10/2021 13     ALT 12/10/2021 23     Alkaline Phosphatase 12/10/2021 52     Total Protein 12/10/2021 6 6     Albumin 12/10/2021 3 6     Total Bilirubin 12/10/2021 0 66     eGFR 12/10/2021 87     Cholesterol 12/10/2021 164     Triglycerides 12/10/2021 96     HDL, Direct 12/10/2021 61     LDL Calculated 12/10/2021 84     Hemoglobin A1C 12/10/2021 6 1 (A)    EAG 12/10/2021 128      Imaging: DXA bone density spine hip and pelvis    Result Date: 6/1/2022  Narrative: CENTRAL  DXA SCAN CLINICAL HISTORY:   78year old post-menopausal  female risk factors include height loss, Nexium medication  Previously on Actonel  TECHNIQUE: Bone densitometry was performed using a Horizon A bone densitometer  Regions of interest appear properly placed  There are no obvious fractures or other confounding variables which could limit the study  Degenerative changes in the lumbar spine may spuriously elevate bone mineral density  COMPARISON:  9/4/2019 RESULTS: LUMBAR SPINE:  L2-L4: BMD 0 890 gm/cm2 T-score -1 7 Z-score 0 8 LEFT TOTAL HIP: BMD 0 870 gm/cm2 T-score -0 6 Z-score 1 2 LEFT FEMORAL NECK: BMD 0 745 gm/cm2 T-score -0 9 Z-score 1 1     Impression: 1  Based on the [facility] classification, the T-score of -1 7 at the lumbar spine is consistent with low bone mineral density  2   Since the prior study, there has been a significant decrease of the BMD in the lumbar spine of 0 051 gm/cm2 or 5 4%  In the left hip, the BMD has decreased 1 8%, not a significant change  This lumbar decrease  does exceed our own least significant change and, therefore, is statistically significant within 95% confidence level  3   Any secondary causes of low bone mineral density should be excluded prior to treatment, if clinically indicated  4   A daily intake of at least 1200 mg calcium and 800 to 1000 IU of Vitamin D, as well as weight bearing and muscle strengthening exercise, fall prevention and avoidance of tobacco and excessive alcohol intake as basic preventive measures are suggested  5   Repeat DXA  in 18 - 24 months, on the same machine, as clinically indicated   The 10 year risk of hip fracture is 1 2%, with the 10 year risk of major osteoporotic fracture being 8 8%, as calculated by the WHO fracture risk assessment tool (FRAX)  The current NOF guidelines recommend treating patients with FRAX 10 year risk score  of >3% for hip fracture and >20% for major osteoporotic fracture  Please note, the calculated FRAX score may not be accurate and may actually be lower if there has been a prior history of osteoporosis therapy  WHO CLASSIFICATION: Normal (a T-score of -1 0 or higher) Low bone mineral density (a T-score of less than -1 0 but higher than -2 5) Osteoporosis (a T-score of -2 5 or less) Severe osteoporosis (a T-score of -2 5 or less with a fragility fracture)   Workstation performed: [id number]       Review of Systems:  Review of Systems   Eyes: Negative for visual disturbance  Respiratory: Positive for shortness of breath  Cardiovascular: Positive for chest pain and palpitations  Allergic/Immunologic: Negative for immunocompromised state  Neurological: Negative for dizziness and syncope  Hematological: Does not bruise/bleed easily  Psychiatric/Behavioral: The patient is nervous/anxious  Physical Exam:  Physical Exam  Vitals reviewed  Constitutional:       General: She is not in acute distress  Appearance: Normal appearance  She is obese  She is not ill-appearing, toxic-appearing or diaphoretic  Neck:      Vascular: No carotid bruit  Cardiovascular:      Rate and Rhythm: Normal rate and regular rhythm  Pulses: Normal pulses  Heart sounds: Normal heart sounds  No murmur heard  No friction rub  No gallop  Comments: Occasional extra systoles, these were symptomatic during the examination  Pulmonary:      Effort: Pulmonary effort is normal  No respiratory distress  Breath sounds: Normal breath sounds  No stridor  No wheezing, rhonchi or rales  Musculoskeletal:      Right lower leg: No edema  Left lower leg: No edema     Skin:     General: Skin is warm and dry  Capillary Refill: Capillary refill takes less than 2 seconds  Neurological:      Mental Status: She is alert  Psychiatric:         Mood and Affect: Mood normal          Discussion/Summary:  Palpitations  Recent 2 week loop recorder revealed normal sinus rhythm with short bursts of supraventricular tachycardia self-terminated mostly asymptomatic, she did have symptoms without associated arrhythmias as well  Continue current beta-blocker dose  Patient had a catheterization 2 years ago for persistent significant symptoms including chest pain coronaries were normal right heart pressures, left ventricular end-diastolic pressure left ventriculogram were normal as well  Regular exercise and weight management recommended  Reassured was given to the patient  EKG is abnormal but unchanged    This note was completed in part utilizing [employer] direct voice recognition software  Grammatical errors, random word insertion, spelling mistakes, and incomplete sentences may be an occasional consequence of the system secondary to software limitations, ambient noise and hardware issues  At the time of dictation, efforts were made to edit, clarify and /or correct errors  Please read the chart carefully and recognize, using context, where substitutions have occurred  If you have any questions or concerns about the context, text or information contained within the body of this dictation, please contact myself, the provider, for further clarification

## 2022-06-21 ENCOUNTER — OFFICE VISIT (OUTPATIENT)
Dept: VASCULAR SURGERY | Facility: CLINIC | Age: 77
End: 2022-06-21
Payer: MEDICARE

## 2022-06-21 VITALS
HEIGHT: 66 IN | HEART RATE: 89 BPM | WEIGHT: 211 LBS | BODY MASS INDEX: 33.91 KG/M2 | DIASTOLIC BLOOD PRESSURE: 72 MMHG | SYSTOLIC BLOOD PRESSURE: 124 MMHG

## 2022-06-21 DIAGNOSIS — I89.0 LYMPHEDEMA: Primary | ICD-10-CM

## 2022-06-21 PROCEDURE — 99203 OFFICE O/P NEW LOW 30 MIN: CPT | Performed by: NURSE PRACTITIONER

## 2022-06-21 NOTE — ASSESSMENT & PLAN NOTE
46-year-old female with HLD, hypothyroid, DM, obesity, chronic longstanding bilateral lower extremity lymphedema, R>L  Presents the office for evaluation of lymphedema and discuss compression pump  -Stage I/II lymphedema well controlled with pneumatic compression pumps, lymph press    Medical solutions  -No issues with cellulitis, wounds or weeping   -Current pumps are outdated with exposed electrical wire and malfunctioning  -Will submit for pneumatic compression pumps  -Continue compression   -Continue exercise routine   -Continue moisturizers  -Follow up on an as-needed basis, any issues notify the office

## 2022-06-21 NOTE — PROGRESS NOTES
Assessment/Plan:    Lymphedema  72-year-old female with HLD, hypothyroid, DM, obesity, chronic longstanding bilateral lower extremity lymphedema, R>L  Presents the office for evaluation of lymphedema and discuss compression pump  -Stage I/II lymphedema well controlled with pneumatic compression pumps, lymph press  Medical solutions  -No issues with cellulitis, wounds or weeping   -Current pumps are outdated with exposed electrical wire and malfunctioning  -Will submit for pneumatic compression pumps  -Continue compression   -Continue exercise routine   -Continue moisturizers  -Follow up on an as-needed basis, any issues notify the office       Diagnoses and all orders for this visit:    Lymphedema  -     Pneumatic compression pumps          Subjective:      Patient ID: Shilo Marley is a 78 y o  female  Pt is here to eval for new lymph pumps  Pt c/o eliezer  swelling and heaviness in legs  Pt is taking Atorvastatin  Ordering Provider:  Breonna Aranda (NPI: [id number])  [facility]  [address] [place] [address]  Phone: [phone]  Fax: [phone]      Patient Information  MRN: [id number]   Shilo Marley  1945  [address] [place] [id number] [phone]     Insurance Information  Payor: MEDICARE / Plan: MEDICARE A AND B / Product Type: Medicare A & B Fee for Service /      [id number] - (Medicare )     Patient Height and Weight 5' 6" (1 676 m)    Wt Readings from Last 1 Encounters:   06/21/22 95 7 kg (211 lb)       Compression Lymphedema Pump Prescription Form      [x] Patient utilized Compression Garment ?  30 mmHg distally OR Gradient Wrap System    Appliance:     Legs:    [x] Right    [x] Left   Arms:    [] Right    [] Left     []Chest garment    []Abdominal garment     Length of need: 99 months    Protocol:  [x] Std: 40 mmHg, TID/ BID,  60 minutes  [] Other:   Pressures: __ mmHg    Frequency: __ / Day   Minutes/ Session: __ minutes    Patient History and Prognosis:  [x] Patient was diagnosed for the chronic disorder with reported on-set __2013  Prior patient of Dr Dominik Lam   [x] Attempts at elevation and compression have not improved patients' condition and is now at risk of lymphatic disorder progressing to the next stage/ grade  [x] Patient's physical condition/ range of motion limited for exercise  [] Patient/ Caregiver experienced difficulty applying 30 mmHg distal compression garments  [] Patient compression stocking/ wrap tolerance limited due to pain/ reduced circulation  [x] Patient advised to reduce salt intake and adhere to a daily regiment of elevation, compression, and lymphatic exercises  [x] Patient's severe condition will not improve without further treatment interventions  [] Patient has noted skin changes such as marked hyperkeratosis with hyperplasia and hyperpigmentation, papillomatosis cutis lymphostatica, elephantiasis, and/ or skin breakdown with persisting lymphorrhea    Symptoms/ Observation/ Evaluation/ Plan of Care for Lymphedema / Venous Compression Pumps     Conservative Care ? 4 weeks for severe lymphedema (check all that apply):  Patient instructions for DAILY use of conservative therapies;  [x] Elevate extremities daily and nightly to reduce swelling  [x] Exercise and ambulate / range of motion (ROM) daily to increase fluid flow and reduce swelling  [x] Wear 30-mmHg distal compression garments / wraps daily to reduce / control swelling  [x] Manual lymph drainage (MLD)  [x] On-set date of lymphedema: 2013      Initial Measurements      Body Part Right Left   Ankle / Forearm 27 cm 26 cm   Calf / Elbow  42 cm 40 cm   Knee / Bicep  47 cm 44 cm   Mid-Thigh / Axilla  49 cm 46 cm           HPI  56-year-old female with obesity, HLD, DM, longstanding bilateral lower extremity lymphedema, right greater than left presents for vascular evaluation  Prior patient of Dr Dominik Lam and Dakotah Rosa    Patient has longstanding history of bilateral lower extremity lymphedema dating back to 2013  She has been using lymphedema pumps with excellent response  More recently electrical wires became exposed on the compression pump  She is using electrical tape to cover wires  Lymphedema pump stops half way through session  She is compliant with compression and moisturizers  She exercises daily with recumbent bike  The following portions of the patient's history were reviewed and updated as appropriate: allergies, current medications, past family history, past medical history, past social history, past surgical history and problem list   ROS reviewed     Review of Systems   Constitutional: Negative  HENT: Negative  Eyes: Negative  Respiratory: Negative  Cardiovascular: Positive for leg swelling  Gastrointestinal: Negative  Endocrine: Negative  Genitourinary: Negative  Musculoskeletal: Negative  Skin: Negative  Allergic/Immunologic: Negative  Neurological: Negative  Hematological: Negative  Psychiatric/Behavioral: Negative  Objective:  I have reviewed and made appropriate changes to the review of systems input by the medical assistant      Vitals:    06/21/22 0854   BP: 124/72   BP Location: Left arm   Patient Position: Sitting   Cuff Size: Large   Pulse: 89   Weight: 95 7 kg (211 lb)   Height: 5' 6" (1 676 m)       Patient Active Problem List   Diagnosis    Lymphedema    Fatigue    Hyperlipidemia    Hypothyroidism    Class 1 obesity due to excess calories with serious comorbidity and body mass index (BMI) of 33 0 to 33 9 in adult    Type 2 diabetes mellitus without complication, without long-term current use of insulin (HCC)    Atypical chest pain    Right lower quadrant abdominal pain    Pelvic pain    Family history of ovarian cancer    Wellness examination    Carotid artery plaque    Dense breast tissue on mammogram    Fibrocystic breast changes    Dupuytren's contracture    Diverticular disease of colon    Gastro-esophageal reflux disease with esophagitis    History of adenomatous polyp of colon    Hypothyroidism due to Hashimoto's thyroiditis    Impaired fasting glucose    Nephrolithiasis    Essential hypertension    Class 1 obesity due to excess calories without serious comorbidity with body mass index (BMI) of 31 0 to 31 9 in adult    Sinusitis    Encounter for screening mammogram for breast cancer    Medicare annual wellness visit, subsequent    Dyspnea on exertion    Insomnia    Oral ulceration    Right flank pain    Exposure to COVID-19 virus    Edema    Thyroid nodule    Osteoarthritis    SVT (supraventricular tachycardia) (HCC)       Past Surgical History:   Procedure Laterality Date    AUGMENTATION MAMMAPLASTY Bilateral 1998    breast surgery- with prosthetic implant 1998; pre-pectoral salline    BREAST BIOPSY Left 1974    BREAST BIOPSY Left 1994    BREAST EXCISIONAL BIOPSY Left 1993    CARDIAC CATHETERIZATION      procedure summary    CHOLECYSTECTOMY      onset 2003    HEMORRHOID SURGERY      ROTATOR CUFF REPAIR Bilateral     onset 2010    TUBAL LIGATION      onset 1980       Family History   Problem Relation Age of Onset    Aneurysm Mother         cerebral    Ovarian cancer Mother 67    Cancer Mother     Cirrhosis Father         alcoholic    Aneurysm Brother         abdominal aortic; cerebral    Diabetes Son     No Known Problems Daughter     No Known Problems Maternal Grandmother     No Known Problems Maternal Grandfather     No Known Problems Paternal Grandmother     No Known Problems Paternal Grandfather     No Known Problems Son     No Known Problems Son     No Known Problems Son     No Known Problems Maternal Aunt     No Known Problems Maternal Aunt     No Known Problems Maternal Aunt     No Known Problems Maternal Aunt     No Known Problems Paternal Aunt     No Known Problems Paternal Aunt     No Known Problems Paternal Aunt     No Known Problems Paternal Aunt     No Known Problems Paternal Aunt     Cancer Brother 79        bladder cancer       Social History     Socioeconomic History    Marital status: /Civil Union     Spouse name: Not on file    Number of children: Not on file    Years of education: 15    Highest education level: Not on file   Occupational History    Not on file   Tobacco Use    Smoking status: Former Smoker     Packs/day: 0 25     Years: 17 00     Pack years: 4 25     Types: Cigarettes     Quit date:      Years since quittin 4    Smokeless tobacco: Never Used    Tobacco comment: 0 5 ppw intermittently last smoked    Vaping Use    Vaping Use: Never used   Substance and Sexual Activity    Alcohol use:  Yes     Alcohol/week: 2 0 standard drinks     Types: 2 Glasses of wine per week     Comment: Maybe 2 glasses a month    Drug use: No    Sexual activity: Not Currently     Partners: Male     Birth control/protection: Post-menopausal     Comment: I'm 74   Other Topics Concern    Not on file   Social History Narrative    Caffeine use     Social Determinants of Health     Financial Resource Strain: Not on file   Food Insecurity: Not on file   Transportation Needs: Not on file   Physical Activity: Not on file   Stress: Not on file   Social Connections: Not on file   Intimate Partner Violence: Not on file   Housing Stability: Not on file       Allergies   Allergen Reactions    Hydrochlorothiazide Palpitations    Meloxicam Rash         Current Outpatient Medications:     Ascorbic Acid (VITAMIN C) 1000 MG tablet, Take 1 tablet by mouth daily, Disp: , Rfl:     atorvastatin (LIPITOR) 20 mg tablet, TAKE 1 TABLET DAILY, Disp: 90 tablet, Rfl: 3    Biotin 5000 MCG CAPS, Take 1 capsule by mouth daily, Disp: , Rfl:     cholecalciferol (VITAMIN D3) 1,000 units tablet, Take 2,000 Units by mouth daily , Disp: , Rfl:     CINNAMON PO, Take by mouth, Disp: , Rfl:     Diclofenac Sodium (VOLTAREN) 1 %, APPLY 2 GRAMS TO THE AFFECTED AREA(S) BY TOPICAL ROUTE 4 TIMES PER DAY, Disp: , Rfl:     esomeprazole (NexIUM) 40 MG capsule, Take 1 capsule by mouth daily, Disp: , Rfl:     levothyroxine (Synthroid) 125 mcg tablet, Take 1 tablet (125 mcg total) by mouth daily Take on an empty stomach, Disp: 90 tablet, Rfl: 3    meclizine (ANTIVERT) 25 mg tablet, Take by mouth as needed  , Disp: , Rfl:     metoprolol tartrate (LOPRESSOR) 25 mg tablet, Take one tablet as needed for palpitations, Disp: 30 tablet, Rfl: 6    Multiple Vitamin (MULTIVITAMIN) capsule, Take 1 capsule by mouth daily, Disp: , Rfl:     diphenhydrAMINE-acetaminophen (TYLENOL PM)  MG TABS, Take 1 tablet by mouth daily at bedtime as needed for sleep (Patient not taking: Reported on 6/21/2022), Disp: , Rfl:     doxycycline hyclate (VIBRA-TABS) 100 mg tablet, Take by mouth as needed   (Patient not taking: Reported on 6/21/2022), Disp: , Rfl:     fluticasone (FLONASE) 50 mcg/act nasal spray, 2 sprays into each nostril daily (Patient not taking: Reported on 6/21/2022), Disp: 3 Bottle, Rfl: 2    metroNIDAZOLE (METROGEL) 0 75 % gel, as needed  (Patient not taking: Reported on 6/21/2022), Disp: , Rfl: 3    psyllium (METAMUCIL) 58 6 % powder, Take 1 packet by mouth daily (Patient not taking: Reported on 6/21/2022), Disp: , Rfl:       /72 (BP Location: Left arm, Patient Position: Sitting, Cuff Size: Large)   Pulse 89   Ht 5' 6" (1 676 m)   Wt 95 7 kg (211 lb)   BMI 34 06 kg/m²          Physical Exam  Vitals and nursing note reviewed  Constitutional:       Appearance: Normal appearance  HENT:      Head: Normocephalic and atraumatic  Eyes:      Extraocular Movements: Extraocular movements intact  Cardiovascular:      Pulses:           Dorsalis pedis pulses are 2+ on the right side and 2+ on the left side  Heart sounds: Normal heart sounds     Pulmonary:      Effort: Pulmonary effort is normal       Breath sounds: Normal breath sounds  Abdominal:      General: Bowel sounds are normal       Palpations: Abdomen is soft  Musculoskeletal:      Comments: Stage I/II  Bilateral lower extremity soft nonpitting swelling, right greater than left  No weeping or ulcerations  Neurological:      General: No focal deficit present  Mental Status: She is alert and oriented to person, place, and time     Psychiatric:         Mood and Affect: Mood normal          Behavior: Behavior normal

## 2022-08-03 DIAGNOSIS — Z12.31 ENCOUNTER FOR SCREENING MAMMOGRAM FOR MALIGNANT NEOPLASM OF BREAST: Primary | ICD-10-CM

## 2022-08-05 RX ORDER — NYSTATIN AND TRIAMCINOLONE ACETONIDE 100000; 1 [USP'U]/G; MG/G
OINTMENT TOPICAL 2 TIMES DAILY PRN
COMMUNITY
Start: 2022-07-01 | End: 2022-08-22 | Stop reason: ALTCHOICE

## 2022-08-08 ENCOUNTER — APPOINTMENT (OUTPATIENT)
Dept: LAB | Age: 77
End: 2022-08-08
Payer: MEDICARE

## 2022-08-08 LAB
ALBUMIN SERPL BCP-MCNC: 3.4 G/DL (ref 3.5–5)
ALP SERPL-CCNC: 42 U/L (ref 46–116)
ALT SERPL W P-5'-P-CCNC: 21 U/L (ref 12–78)
ANION GAP SERPL CALCULATED.3IONS-SCNC: 4 MMOL/L (ref 4–13)
AST SERPL W P-5'-P-CCNC: 19 U/L (ref 5–45)
BILIRUB SERPL-MCNC: 0.5 MG/DL (ref 0.2–1)
BUN SERPL-MCNC: 13 MG/DL (ref 5–25)
CALCIUM ALBUM COR SERPL-MCNC: 9.6 MG/DL (ref 8.3–10.1)
CALCIUM SERPL-MCNC: 9.1 MG/DL (ref 8.3–10.1)
CHLORIDE SERPL-SCNC: 107 MMOL/L (ref 96–108)
CHOLEST SERPL-MCNC: 166 MG/DL
CO2 SERPL-SCNC: 27 MMOL/L (ref 21–32)
CREAT SERPL-MCNC: 0.59 MG/DL (ref 0.6–1.3)
CREAT UR-MCNC: 21.5 MG/DL
EST. AVERAGE GLUCOSE BLD GHB EST-MCNC: 131 MG/DL
GFR SERPL CREATININE-BSD FRML MDRD: 89 ML/MIN/1.73SQ M
GLUCOSE P FAST SERPL-MCNC: 110 MG/DL (ref 65–99)
HBA1C MFR BLD: 6.2 %
HDLC SERPL-MCNC: 49 MG/DL
LDLC SERPL CALC-MCNC: 83 MG/DL (ref 0–100)
MICROALBUMIN UR-MCNC: <5 MG/L (ref 0–20)
MICROALBUMIN/CREAT 24H UR: <23 MG/G CREATININE (ref 0–30)
POTASSIUM SERPL-SCNC: 3.9 MMOL/L (ref 3.5–5.3)
PROT SERPL-MCNC: 6.2 G/DL (ref 6.4–8.4)
SODIUM SERPL-SCNC: 138 MMOL/L (ref 135–147)
TRIGL SERPL-MCNC: 172 MG/DL

## 2022-08-08 PROCEDURE — 83036 HEMOGLOBIN GLYCOSYLATED A1C: CPT

## 2022-08-08 PROCEDURE — 82570 ASSAY OF URINE CREATININE: CPT

## 2022-08-08 PROCEDURE — 82043 UR ALBUMIN QUANTITATIVE: CPT

## 2022-08-08 PROCEDURE — 80061 LIPID PANEL: CPT

## 2022-08-08 PROCEDURE — 36415 COLL VENOUS BLD VENIPUNCTURE: CPT

## 2022-08-08 PROCEDURE — 80053 COMPREHEN METABOLIC PANEL: CPT

## 2022-08-09 ENCOUNTER — OFFICE VISIT (OUTPATIENT)
Dept: PODIATRY | Facility: CLINIC | Age: 77
End: 2022-08-09
Payer: MEDICARE

## 2022-08-09 VITALS
BODY MASS INDEX: 34.42 KG/M2 | HEIGHT: 66 IN | HEART RATE: 105 BPM | WEIGHT: 214.2 LBS | DIASTOLIC BLOOD PRESSURE: 85 MMHG | SYSTOLIC BLOOD PRESSURE: 133 MMHG

## 2022-08-09 DIAGNOSIS — M20.22 HALLUX RIGIDUS OF LEFT FOOT: Primary | ICD-10-CM

## 2022-08-09 PROCEDURE — 20550 NJX 1 TENDON SHEATH/LIGAMENT: CPT

## 2022-08-09 RX ORDER — TRIAMCINOLONE ACETONIDE 40 MG/ML
20 INJECTION, SUSPENSION INTRA-ARTICULAR; INTRAMUSCULAR ONCE
Status: COMPLETED | OUTPATIENT
Start: 2022-08-09 | End: 2022-08-09

## 2022-08-09 RX ORDER — LIDOCAINE HYDROCHLORIDE 10 MG/ML
1 INJECTION, SOLUTION EPIDURAL; INFILTRATION; INTRACAUDAL; PERINEURAL ONCE
Status: COMPLETED | OUTPATIENT
Start: 2022-08-09 | End: 2022-08-09

## 2022-08-09 RX ADMIN — LIDOCAINE HYDROCHLORIDE 1 ML: 10 INJECTION, SOLUTION EPIDURAL; INFILTRATION; INTRACAUDAL; PERINEURAL at 16:14

## 2022-08-09 RX ADMIN — TRIAMCINOLONE ACETONIDE 20 MG: 40 INJECTION, SUSPENSION INTRA-ARTICULAR; INTRAMUSCULAR at 16:14

## 2022-08-09 NOTE — PROGRESS NOTES
Patient presents with recurrence of pain in the left great toe joint due to hallux rigidus  Patient had a cortisone injection in this joint in April of 2022  She states that she had considerable relief until the past 2-3 weeks  Another injection is desired  Patient has little interest in surgery  On exam, mild discomfort with palpation lateral aspect left 1st MPJ  Treatment:  Injected left foot with 0 5 cc Kenalog 40 along with 1 cc 1% xylocaine and 1 cc 0 5% Marcaine  Reappoint p r n  Foot injection     Date/Time 8/9/2022 4:16 PM     Performed by  Sai Barraza DPM     Authorized by Sai Barraza DPM      Universal Protocol   Consent: Verbal consent obtained  Risks and benefits: risks, benefits and alternatives were discussed  Consent given by: patient  Patient understanding: patient states understanding of the procedure being performed  Patient identity confirmed: verbally with patient        Local anesthesia used: yes     Anesthesia   Local anesthesia used: yes  Local Anesthetic: lidocaine 1% without epinephrine     Procedure Details   Procedure Notes: Injected left foot with 0 5 cc Kenalog 40 along with 1 cc 1% xylocaine

## 2022-08-10 ENCOUNTER — OFFICE VISIT (OUTPATIENT)
Dept: INTERNAL MEDICINE CLINIC | Facility: CLINIC | Age: 77
End: 2022-08-10
Payer: MEDICARE

## 2022-08-10 VITALS
OXYGEN SATURATION: 96 % | BODY MASS INDEX: 33.75 KG/M2 | HEART RATE: 93 BPM | DIASTOLIC BLOOD PRESSURE: 82 MMHG | WEIGHT: 210 LBS | HEIGHT: 66 IN | SYSTOLIC BLOOD PRESSURE: 146 MMHG

## 2022-08-10 DIAGNOSIS — E66.09 CLASS 1 OBESITY DUE TO EXCESS CALORIES WITH SERIOUS COMORBIDITY AND BODY MASS INDEX (BMI) OF 33.0 TO 33.9 IN ADULT: ICD-10-CM

## 2022-08-10 DIAGNOSIS — E78.2 MIXED HYPERLIPIDEMIA: ICD-10-CM

## 2022-08-10 DIAGNOSIS — E11.9 TYPE 2 DIABETES MELLITUS WITHOUT COMPLICATION, WITHOUT LONG-TERM CURRENT USE OF INSULIN (HCC): ICD-10-CM

## 2022-08-10 DIAGNOSIS — R10.31 RIGHT GROIN PAIN: ICD-10-CM

## 2022-08-10 DIAGNOSIS — R32 URINARY INCONTINENCE, UNSPECIFIED TYPE: ICD-10-CM

## 2022-08-10 DIAGNOSIS — Z00.00 MEDICARE ANNUAL WELLNESS VISIT, SUBSEQUENT: Primary | ICD-10-CM

## 2022-08-10 PROBLEM — I82.4Y1 DEEP VEIN THROMBOSIS (DVT) OF PROXIMAL VEIN OF RIGHT LOWER EXTREMITY, UNSPECIFIED CHRONICITY (HCC): Status: ACTIVE | Noted: 2022-08-10

## 2022-08-10 PROCEDURE — G0439 PPPS, SUBSEQ VISIT: HCPCS | Performed by: INTERNAL MEDICINE

## 2022-08-10 PROCEDURE — 99213 OFFICE O/P EST LOW 20 MIN: CPT | Performed by: INTERNAL MEDICINE

## 2022-08-10 RX ORDER — MULTIVITAMIN WITH IRON
250 TABLET ORAL DAILY
COMMUNITY

## 2022-08-10 NOTE — PATIENT INSTRUCTIONS
Medicare Preventive Visit Patient Instructions  Thank you for completing your Welcome to Medicare Visit or Medicare Annual Wellness Visit today  Your next wellness visit will be due in one year (8/11/2023)  The screening/preventive services that you may require over the next 5-10 years are detailed below  Some tests may not apply to you based off risk factors and/or age  Screening tests ordered at today's visit but not completed yet may show as past due  Also, please note that scanned in results may not display below  Preventive Screenings:  Service Recommendations Previous Testing/Comments   Colorectal Cancer Screening  * Colonoscopy    * Fecal Occult Blood Test (FOBT)/Fecal Immunochemical Test (FIT)  * Fecal DNA/Cologuard Test  * Flexible Sigmoidoscopy Age: 39-70 years old   Colonoscopy: every 10 years (may be performed more frequently if at higher risk)  OR  FOBT/FIT: every 1 year  OR  Cologuard: every 3 years  OR  Sigmoidoscopy: every 5 years  Screening may be recommended earlier than age 39 if at higher risk for colorectal cancer  Also, an individualized decision between you and your healthcare provider will decide whether screening between the ages of 74-80 would be appropriate  Colonoscopy: 11/24/2020  FOBT/FIT: Not on file  Cologuard: Not on file  Sigmoidoscopy: Not on file          Breast Cancer Screening Age: 36 years old  Frequency: every 1-2 years  Not required if history of left and right mastectomy Mammogram: 08/14/2021        Cervical Cancer Screening Between the ages of 21-29, pap smear recommended once every 3 years  Between the ages of 33-67, can perform pap smear with HPV co-testing every 5 years     Recommendations may differ for women with a history of total hysterectomy, cervical cancer, or abnormal pap smears in past  Pap Smear: Not on file        Hepatitis C Screening Once for adults born between [facility]  More frequently in patients at high risk for Hepatitis C Hep C Antibody: 03/21/2017        Diabetes Screening 1-2 times per year if you're at risk for diabetes or have pre-diabetes Fasting glucose: 110 mg/dL (8/8/2022)  A1C: 6 2 % (8/8/2022)      Cholesterol Screening Once every 5 years if you don't have a lipid disorder  May order more often based on risk factors  Lipid panel: 08/08/2022          Other Preventive Screenings Covered by Medicare:  1  Abdominal Aortic Aneurysm (AAA) Screening: covered once if your at risk  You're considered to be at risk if you have a family history of AAA  2  Lung Cancer Screening: covers low dose CT scan once per year if you meet all of the following conditions: (1) Age 50-69; (2) No signs or symptoms of lung cancer; (3) Current smoker or have quit smoking within the last 15 years; (4) You have a tobacco smoking history of at least 20 pack years (packs per day multiplied by number of years you smoked); (5) You get a written order from a healthcare provider  3  Glaucoma Screening: covered annually if you're considered high risk: (1) You have diabetes OR (2) Family history of glaucoma OR (3)  aged 48 and older OR (3)  American aged 72 and older  3  Osteoporosis Screening: covered every 2 years if you meet one of the following conditions: (1) You're estrogen deficient and at risk for osteoporosis based off medical history and other findings; (2) Have a vertebral abnormality; (3) On glucocorticoid therapy for more than 3 months; (4) Have primary hyperparathyroidism; (5) On osteoporosis medications and need to assess response to drug therapy  · Last bone density test (DXA Scan): 06/01/2022   5  HIV Screening: covered annually if you're between the age of 15-65  Also covered annually if you are younger than 13 and older than 72 with risk factors for HIV infection  For pregnant patients, it is covered up to 3 times per pregnancy      Immunizations:  Immunization Recommendations   Influenza Vaccine Annual influenza vaccination during flu season is recommended for all persons aged >= 6 months who do not have contraindications   Pneumococcal Vaccine   * Pneumococcal conjugate vaccine = PCV13 (Prevnar 13), PCV15 (Vaxneuvance), PCV20 (Prevnar 20)  * Pneumococcal polysaccharide vaccine = PPSV23 (Pneumovax) Adults 25-60 years old: 1-3 doses may be recommended based on certain risk factors  Adults 72 years old: 1-2 doses may be recommended based off what pneumonia vaccine you previously received   Hepatitis B Vaccine 3 dose series if at intermediate or high risk (ex: diabetes, end stage renal disease, liver disease)   Tetanus (Td) Vaccine - COST NOT COVERED BY MEDICARE PART B Following completion of primary series, a booster dose should be given every 10 years to maintain immunity against tetanus  Td may also be given as tetanus wound prophylaxis  Tdap Vaccine - COST NOT COVERED BY MEDICARE PART B Recommended at least once for all adults  For pregnant patients, recommended with each pregnancy  Shingles Vaccine (Shingrix) - COST NOT COVERED BY MEDICARE PART B  2 shot series recommended in those aged 48 and above     Health Maintenance Due:      Topic Date Due    Breast Cancer Screening: Mammogram  08/14/2022    Colorectal Cancer Screening  11/24/2030    Hepatitis C Screening  Completed     Immunizations Due:      Topic Date Due    Influenza Vaccine (1) 09/01/2022     Advance Directives   What are advance directives? Advance directives are legal documents that state your wishes and plans for medical care  These plans are made ahead of time in case you lose your ability to make decisions for yourself  Advance directives can apply to any medical decision, such as the treatments you want, and if you want to donate organs  What are the types of advance directives? There are many types of advance directives, and each state has rules about how to use them  You may choose a combination of any of the following:  · Living will:   This is a written record of the treatment you want  You can also choose which treatments you do not want, which to limit, and which to stop at a certain time  This includes surgery, medicine, IV fluid, and tube feedings  · Durable power of  for healthcare [facility]): This is a written record that states who you want to make healthcare choices for you when you are unable to make them for yourself  This person, called a proxy, is usually a family member or a friend  You may choose more than 1 proxy  · Do not resuscitate (DNR) order:  A DNR order is used in case your heart stops beating or you stop breathing  It is a request not to have certain forms of treatment, such as CPR  A DNR order may be included in other types of advance directives  · Medical directive: This covers the care that you want if you are in a coma, near death, or unable to make decisions for yourself  You can list the treatments you want for each condition  Treatment may include pain medicine, surgery, blood transfusions, dialysis, IV or tube feedings, and a ventilator (breathing machine)  · Values history: This document has questions about your views, beliefs, and how you feel and think about life  This information can help others choose the care that you would choose  Why are advance directives important? An advance directive helps you control your care  Although spoken wishes may be used, it is better to have your wishes written down  Spoken wishes can be misunderstood, or not followed  Treatments may be given even if you do not want them  An advance directive may make it easier for your family to make difficult choices about your care  Weight Management   Why it is important to manage your weight:  Being overweight increases your risk of health conditions such as heart disease, high blood pressure, type 2 diabetes, and certain types of cancer  It can also increase your risk for osteoarthritis, sleep apnea, and other respiratory problems   Aim for a slow, steady weight loss  Even a small amount of weight loss can lower your risk of health problems  How to lose weight safely:  A safe and healthy way to lose weight is to eat fewer calories and get regular exercise  You can lose up about 1 pound a week by decreasing the number of calories you eat by 500 calories each day  Healthy meal plan for weight management:  A healthy meal plan includes a variety of foods, contains fewer calories, and helps you stay healthy  A healthy meal plan includes the following:  · Eat whole-grain foods more often  A healthy meal plan should contain fiber  Fiber is the part of grains, fruits, and vegetables that is not broken down by your body  Whole-grain foods are healthy and provide extra fiber in your diet  Some examples of whole-grain foods are whole-wheat breads and pastas, oatmeal, brown rice, and bulgur  · Eat a variety of vegetables every day  Include dark, leafy greens such as spinach, kale, veronica greens, and mustard greens  Eat yellow and orange vegetables such as carrots, sweet potatoes, and winter squash  · Eat a variety of fruits every day  Choose fresh or canned fruit (canned in its own juice or light syrup) instead of juice  Fruit juice has very little or no fiber  · Eat low-fat dairy foods  Drink fat-free (skim) milk or 1% milk  Eat fat-free yogurt and low-fat cottage cheese  Try low-fat cheeses such as mozzarella and other reduced-fat cheeses  · Choose meat and other protein foods that are low in fat  Choose beans or other legumes such as split peas or lentils  Choose fish, skinless poultry (chicken or turkey), or lean cuts of red meat (beef or pork)  Before you cook meat or poultry, cut off any visible fat  · Use less fat and oil  Try baking foods instead of frying them  Add less fat, such as margarine, sour cream, regular salad dressing and mayonnaise to foods  Eat fewer high-fat foods   Some examples of high-fat foods include french fries, doughnuts, ice cream, and cakes  · Eat fewer sweets  Limit foods and drinks that are high in sugar  This includes candy, cookies, regular soda, and sweetened drinks  Exercise:  Exercise at least 30 minutes per day on most days of the week  Some examples of exercise include walking, biking, dancing, and swimming  You can also fit in more physical activity by taking the stairs instead of the elevator or parking farther away from stores  Ask your healthcare provider about the best exercise plan for you  © Copyright [employer] 2018 Information is for End User's use only and may not be sold, redistributed or otherwise used for commercial purposes   All illustrations and images included in CareNotes® are the copyrighted property of [employer]  or [address] [place], [place] [address]

## 2022-08-10 NOTE — PROGRESS NOTES
Assessment and Plan:     Problem List Items Addressed This Visit        Endocrine    Type 2 diabetes mellitus without complication, without long-term current use of insulin ([facility] [address] )       Lab Results   Component Value Date    HGBA1C 6 2 (H) 08/08/2022   I will be ordering diabetic laboratories including comprehensive metabolic panel, hemoglobin A1c, urine microalbumin, lipid panel  I have counselled the pt to follow a healthy and balanced diet ,and recommend routine exercise  Relevant Orders    Comprehensive metabolic panel    Hemoglobin A1C    Lipid Panel with Direct LDL reflex    Microalbumin / creatinine urine ratio       Other    Hyperlipidemia     Hyperlipidemia controlled continue with current medical regiment recommend a low-cholesterol diet and recommend routine exercise we will continue to monitor the progress  Continue Lipitor 20 mg once daily         Class 1 obesity due to excess calories with serious comorbidity and body mass index (BMI) of 33 0 to 33 9 in adult     Obesity -I have counseled patient following healthy and balanced diet, I would like the patient to lose weight, I would like the patient exercise routinely; we will continue monitor the patient's progress  Relevant Orders    Ambulatory referral to clinical pharmacy    Medicare annual wellness visit, subsequent - Primary     Assessment and plan 1  Medicare subsequent annual wellness examination overall the patient is clinically stable and doing well, we encouraged the patient to follow a healthy and balanced diet  We recommend that the patient exercise routinely approximately 30 minutes 5 times per week   We have reviewed the patient's vaccines and have made recommendations for updates if necessary annual flu shot       We will be ordering screening laboratories which are age appropriate  Return to the office in   6 months   call if any problems           Urinary incontinence     Will have patient start physical therapy Relevant Orders    Ambulatory Referral to Physical Therapy    Right groin pain     Will check x-ray the right it rule osteoarthritis         Relevant Orders    XR hip/pelv 2-3 vws right if performed      RTO in 6 months call if any problems     Preventive health issues were discussed with patient, and age appropriate screening tests were ordered as noted in patient's After Visit Summary  Personalized health advice and appropriate referrals for health education or preventive services given if needed, as noted in patient's After Visit Summary  History of Present Illness:     Patient presents for a Medicare Wellness Visit    HPI 75-year old female coming in for a follow up office visit regarding obesity, type 2 diabetes, urinary incontinence, right groin pain, hyperlipidemia; The patient reports me compliant taking medications without untoward side effects the  The patient is here to review his medical condition, update me on the medical condition and the patient reports me no hospitalizations and no ER visits  Injuries no illnesses major problems here to review laboratories in detail reports mild urinary incontinence he has noticed some groin discomfort no bulging  Patient Care Team:  Tonie Garcia DO as PCP - MD Keyon Teresa MD Janeice Oak, MD Crecencio Malling, DO Lois Sep, MD Toniann Colas, MD Anette Hitch, MD Alona Neve, DPM     Review of Systems:     Review of Systems   Constitutional: Negative for activity change, appetite change and unexpected weight change  HENT: Negative for congestion  Eyes: Negative for visual disturbance  Respiratory: Negative for cough and shortness of breath  Cardiovascular: Negative for chest pain  Gastrointestinal: Negative for abdominal pain, diarrhea, nausea and vomiting  Neurological: Negative for dizziness, light-headedness and headaches          Problem List:     Patient Active Problem List   Diagnosis    Lymphedema    Fatigue    Hyperlipidemia    Hypothyroidism    Class 1 obesity due to excess calories with serious comorbidity and body mass index (BMI) of 33 0 to 33 9 in adult    Type 2 diabetes mellitus without complication, without long-term current use of insulin (HCC)    Atypical chest pain    Right lower quadrant abdominal pain    Pelvic pain    Family history of ovarian cancer    Wellness examination    Carotid artery plaque    Dense breast tissue on mammogram    Fibrocystic breast changes    Dupuytren's contracture    Diverticular disease of colon    Gastro-esophageal reflux disease with esophagitis    History of adenomatous polyp of colon    Hypothyroidism due to Hashimoto's thyroiditis    Impaired fasting glucose    Nephrolithiasis    Essential hypertension    Class 1 obesity due to excess calories without serious comorbidity with body mass index (BMI) of 31 0 to 31 9 in adult    Sinusitis    Encounter for screening mammogram for breast cancer    Medicare annual wellness visit, subsequent    Dyspnea on exertion    Insomnia    Oral ulceration    Right flank pain    Exposure to COVID-19 virus    Edema    Thyroid nodule    Osteoarthritis    SVT (supraventricular tachycardia) ([address] )    Deep vein thrombosis (DVT) of proximal vein of right lower extremity, unspecified chronicity ([address] )    Urinary incontinence    Right groin pain      Past Medical and Surgical History:     Past Medical History:   Diagnosis Date    Biliary dyskinesia     CAP (community acquired pneumonia)     last assessed 8/17/16    Diabetes mellitus ([address] ) Dec 2018    Type 2 no insulin    Disease of thyroid gland 2000    GERD (gastroesophageal reflux disease)     Hyperlipidemia     Palpitations     Shortness of breath      Past Surgical History:   Procedure Laterality Date    AUGMENTATION MAMMAPLASTY Bilateral 1998    breast surgery- with prosthetic implant 1998; pre-pectoral salline    BREAST BIOPSY Left     BREAST BIOPSY Left 1994    BREAST EXCISIONAL BIOPSY Left     CARDIAC CATHETERIZATION      procedure summary    CHOLECYSTECTOMY      onset     HEMORRHOID SURGERY      ROTATOR CUFF REPAIR Bilateral     onset     TUBAL LIGATION      onset       Family History:     Family History   Problem Relation Age of Onset    Aneurysm Mother         cerebral    Ovarian cancer Mother 67    Cancer Mother     Cirrhosis Father         alcoholic    Aneurysm Brother         abdominal aortic; cerebral    Diabetes Son     No Known Problems Daughter     No Known Problems Maternal Grandmother     No Known Problems Maternal Grandfather     No Known Problems Paternal Grandmother     No Known Problems Paternal Grandfather     No Known Problems Son     No Known Problems Son     No Known Problems Son     No Known Problems Maternal Aunt     No Known Problems Maternal Aunt     No Known Problems Maternal Aunt     No Known Problems Maternal Aunt     No Known Problems Paternal Aunt     No Known Problems Paternal Aunt     No Known Problems Paternal Aunt     No Known Problems Paternal Aunt     No Known Problems Paternal Aunt     Cancer Brother 79        bladder cancer      Social History:     Social History     Socioeconomic History    Marital status: /Civil Union     Spouse name: None    Number of children: None    Years of education: 15    Highest education level: None   Occupational History    None   Tobacco Use    Smoking status: Former Smoker     Packs/day: 0 25     Years: 50 00     Pack years: 12 50     Types: Cigarettes     Start date: 58     Quit date: 2010     Years since quittin 6    Smokeless tobacco: Never Used    Tobacco comment: 0 5 ppw intermittently last smoked 2010   Vaping Use    Vaping Use: Never used   Substance and Sexual Activity    Alcohol use: Not Currently     Comment: Maybe 2 glasses a month    Drug use: No    Sexual activity: Not Currently     Partners: Male     Birth control/protection: Post-menopausal     Comment: I'm 69   Other Topics Concern    None   Social History Narrative    Caffeine use     Social Determinants of Health     Financial Resource Strain: Low Risk     Difficulty of Paying Living Expenses: Not hard at all   Food Insecurity: Not on file   Transportation Needs: No Transportation Needs    Lack of Transportation (Medical): No    Lack of Transportation (Non-Medical):  No   Physical Activity: Not on file   Stress: Not on file   Social Connections: Not on file   Intimate Partner Violence: Not on file   Housing Stability: Not on file      Medications and Allergies:     Current Outpatient Medications   Medication Sig Dispense Refill    Ascorbic Acid (VITAMIN C) 1000 MG tablet Take 1 tablet by mouth daily      atorvastatin (LIPITOR) 20 mg tablet TAKE 1 TABLET DAILY 90 tablet 3    Biotin 5000 MCG CAPS Take 1 capsule by mouth daily      cholecalciferol (VITAMIN D3) 1,000 units tablet Take 4,000 Units by mouth daily      CINNAMON PO Take by mouth      diphenhydrAMINE-acetaminophen (TYLENOL PM)  MG TABS Take 1 tablet by mouth daily at bedtime as needed for sleep      esomeprazole (NexIUM) 40 MG capsule Take 1 capsule by mouth daily      fluticasone (FLONASE) 50 mcg/act nasal spray 2 sprays into each nostril daily 3 Bottle 2    levothyroxine (Synthroid) 125 mcg tablet Take 1 tablet (125 mcg total) by mouth daily Take on an empty stomach 90 tablet 3    Magnesium 250 MG TABS Take 250 mg by mouth in the morning      metoprolol tartrate (LOPRESSOR) 25 mg tablet Take one tablet as needed for palpitations 30 tablet 6    metroNIDAZOLE (METROGEL) 0 75 % gel as needed  3    Multiple Vitamin (MULTIVITAMIN) capsule Take 1 capsule by mouth daily      Probiotic Product (ALIGN PO) Take 1 capsule by mouth in the morning      psyllium (METAMUCIL) 58 6 % powder Take 1 packet by mouth daily      Diclofenac Sodium (VOLTAREN) 1 % APPLY 2 GRAMS TO THE AFFECTED AREA(S) BY TOPICAL ROUTE 4 TIMES PER DAY (Patient not taking: Reported on 8/10/2022)      doxycycline hyclate (VIBRA-TABS) 100 mg tablet Take by mouth as needed   (Patient not taking: Reported on 8/10/2022)      meclizine (ANTIVERT) 25 mg tablet Take by mouth as needed   (Patient not taking: Reported on 8/10/2022)      nystatin-triamcinolone (MYCOLOG-II) ointment Apply topically 2 (two) times a day as needed (Patient not taking: Reported on 8/10/2022)       No current facility-administered medications for this visit  Allergies   Allergen Reactions    Hydrochlorothiazide Palpitations    Meloxicam Rash      Immunizations:     Immunization History   Administered Date(s) Administered    COVID-19 MODERNA VACC 0 5 ML IM 01/25/2021, 02/22/2021, 10/27/2021, 04/27/2022    H1N1, All Formulations 01/13/2010    INFLUENZA 09/19/2018, 08/19/2021    Influenza Split High Dose Preservative Free IM 09/17/2016, 08/21/2017, 08/30/2019    Influenza, high dose seasonal 0 7 mL 09/19/2018, 08/26/2020    Pneumococcal Conjugate 13-Valent 01/04/2017    Pneumococcal Polysaccharide PPV23 05/29/2012, 05/29/2012    Td (adult), adsorbed 01/01/2004    Tdap 08/16/2016    Zoster 08/27/2007, 05/08/2018, 07/15/2018    Zoster Vaccine Recombinant 05/08/2018, 07/15/2018      Health Maintenance:         Topic Date Due    Breast Cancer Screening: Mammogram  08/14/2022    Colorectal Cancer Screening  11/24/2030    Hepatitis C Screening  Completed         Topic Date Due    Influenza Vaccine (1) 09/01/2022      Medicare Screening Tests and Risk Assessments:     Leesa Núñez is here for her Subsequent Wellness visit  Health Risk Assessment:   Patient rates overall health as good  Patient feels that their physical health rating is slightly worse  Patient is very satisfied with their life  Eyesight was rated as same  Hearing was rated as same   Patient feels that their emotional and mental health rating is same  Patients states they are never, rarely angry  Patient states they are sometimes unusually tired/fatigued  Pain experienced in the last 7 days has been some  Patient's pain rating has been 5/10  Patient states that she has experienced no weight loss or gain in last 6 months  Limited with the feet, hip bursitis , menisus     Depression Screening:   PHQ-2 Score: 0      Fall Risk Screening: In the past year, patient has experienced: no history of falling in past year      Urinary Incontinence Screening:   Patient has leaked urine accidently in the last six months  Home Safety:  Patient does not have trouble with stairs inside or outside of their home  Patient has working smoke alarms and has working carbon monoxide detector  Home safety hazards include: none  Nutrition:   Current diet is Diabetic  Medications:   Patient is currently taking over-the-counter supplements  OTC medications include: see medication list  Patient is able to manage medications  Activities of Daily Living (ADLs)/Instrumental Activities of Daily Living (IADLs):   Walk and transfer into and out of bed and chair?: Yes  Dress and groom yourself?: Yes    Bathe or shower yourself?: Yes    Feed yourself? Yes  Do your laundry/housekeeping?: Yes  Manage your money, pay your bills and track your expenses?: Yes  Make your own meals?: Yes    Do your own shopping?: Yes    Previous Hospitalizations:   Any hospitalizations or ED visits within the last 12 months?: No      Advance Care Planning:   Living will: Yes    Durable POA for healthcare:  Yes    Advanced directive: Yes      Cognitive Screening:   Provider or family/friend/caregiver concerned regarding cognition?: No    PREVENTIVE SCREENINGS      Cardiovascular Screening:    General: Screening Not Indicated and History Lipid Disorder      Diabetes Screening:     General: Screening Not Indicated and History Diabetes      Colorectal Cancer Screening:     General: Screening Current      Breast Cancer Screening:     General: Screening Current      Cervical Cancer Screening:    General: Screening Not Indicated      Lung Cancer Screening:     General: Screening Not Indicated      Hepatitis C Screening:    General: Screening Current    Screening, Brief Intervention, and Referral to Treatment (SBIRT)    Screening  Typical number of drinks in a day: 0  Typical number of drinks in a week: 0  Interpretation: Low risk drinking behavior  Single Item Drug Screening:  How often have you used an illegal drug (including marijuana) or a prescription medication for non-medical reasons in the past year? never    Single Item Drug Screen Score: 0  Interpretation: Negative screen for possible drug use disorder    No exam data present     Physical Exam:     /82   Pulse 93   Ht 5' 5 71" (1 669 m)   Wt 95 3 kg (210 lb)   SpO2 96%   BMI 34 20 kg/m²     Physical Exam  Constitutional:       Appearance: She is well-developed  HENT:      Head: Normocephalic and atraumatic  Right Ear: External ear normal       Left Ear: External ear normal       Nose: Nose normal    Eyes:      Pupils: Pupils are equal, round, and reactive to light  Cardiovascular:      Rate and Rhythm: Normal rate and regular rhythm  Heart sounds: Normal heart sounds  No murmur heard  Pulmonary:      Effort: Pulmonary effort is normal       Breath sounds: Normal breath sounds  Abdominal:      General: There is no distension  Palpations: Abdomen is soft  Tenderness: There is no abdominal tenderness  There is no guarding        full range of motion of the right    Alis Diesel, DO

## 2022-08-14 PROBLEM — R10.31 RIGHT GROIN PAIN: Status: ACTIVE | Noted: 2022-08-14

## 2022-08-14 PROBLEM — R32 URINARY INCONTINENCE: Status: ACTIVE | Noted: 2022-08-14

## 2022-08-14 NOTE — ASSESSMENT & PLAN NOTE
Lab Results   Component Value Date    HGBA1C 6 2 (H) 08/08/2022   I will be ordering diabetic laboratories including comprehensive metabolic panel, hemoglobin A1c, urine microalbumin, lipid panel  I have counselled the pt to follow a healthy and balanced diet ,and recommend routine exercise

## 2022-08-14 NOTE — ASSESSMENT & PLAN NOTE
Hyperlipidemia controlled continue with current medical regiment recommend a low-cholesterol diet and recommend routine exercise we will continue to monitor the progress    Continue Lipitor 20 mg once daily

## 2022-08-14 NOTE — ASSESSMENT & PLAN NOTE
Assessment and plan 1  Medicare subsequent annual wellness examination overall the patient is clinically stable and doing well, we encouraged the patient to follow a healthy and balanced diet  We recommend that the patient exercise routinely approximately 30 minutes 5 times per week   We have reviewed the patient's vaccines and have made recommendations for updates if necessary annual flu shot       We will be ordering screening laboratories which are age appropriate  Return to the office in   6 months   call if any problems

## 2022-08-15 ENCOUNTER — HOSPITAL ENCOUNTER (OUTPATIENT)
Dept: RADIOLOGY | Age: 77
Discharge: HOME | End: 2022-08-15
Payer: MEDICARE

## 2022-08-15 VITALS — BODY MASS INDEX: 33.75 KG/M2 | HEIGHT: 66 IN | WEIGHT: 210 LBS

## 2022-08-15 DIAGNOSIS — Z12.31 ENCOUNTER FOR SCREENING MAMMOGRAM FOR MALIGNANT NEOPLASM OF BREAST: ICD-10-CM

## 2022-08-15 PROCEDURE — 77063 BREAST TOMOSYNTHESIS BI: CPT

## 2022-08-15 PROCEDURE — 77067 SCR MAMMO BI INCL CAD: CPT

## 2022-08-22 ENCOUNTER — APPOINTMENT (OUTPATIENT)
Dept: RADIOLOGY | Age: 77
End: 2022-08-22
Payer: MEDICARE

## 2022-08-22 ENCOUNTER — CLINICAL SUPPORT (OUTPATIENT)
Dept: INTERNAL MEDICINE CLINIC | Facility: CLINIC | Age: 77
End: 2022-08-22

## 2022-08-22 VITALS — BODY MASS INDEX: 34.42 KG/M2 | WEIGHT: 211.4 LBS

## 2022-08-22 DIAGNOSIS — E66.09 CLASS 1 OBESITY DUE TO EXCESS CALORIES WITH SERIOUS COMORBIDITY AND BODY MASS INDEX (BMI) OF 33.0 TO 33.9 IN ADULT: ICD-10-CM

## 2022-08-22 DIAGNOSIS — R10.31 RIGHT GROIN PAIN: ICD-10-CM

## 2022-08-22 PROCEDURE — 73502 X-RAY EXAM HIP UNI 2-3 VIEWS: CPT

## 2022-08-22 NOTE — PROGRESS NOTES
[address] [facility], PharmD, BCACP    ASSESSMENT/PLAN                                                                                     Diabetes, Obesity: most recent A1c below goal <7% per ADA guidelines  No home readings to review  May benefit from GLP1 to assist with weight loss as weight loss with GLP1 is greater than with Plenity  Patient has not been working on lifestyle modifications, room for modifications  Prefer to avoid phentermine for weight loss given SVT  History of kidney stones likely related to taking too much calcium; none since  Thyroid US showed no need for continued monitoring in 2021  Baseline Weight: 211 pounds  Current weight loss: - 0 pounds (-NA%)  Initiation or Continuation of Therapy: initiation  Comorbid CV Risk Factors: HLD, HTN, T2DM    Medications: Reviewed Plenity vs Trulicity vs Ozempic; patient would like to trial Ozempic  If PCP is in agreeance, will have patient start Ozempic titration  Will pend rx for PCP signature/concurrence; will have patient verify medication is affordable  If it is, will dispense medication sample to patient  Lifestyle: patient will try drinking a glass of water prior to meals  BMI Counseling: Body mass index is 34 42 kg/m²  The BMI is above normal  Nutrition recommendations include consuming healthier snacks  Pharmacotherapy was ordered for patient to aid in weight loss  Follow-Up: patient to contact PharmD after determining Ozempic affordability       SUBJECTIVE                                                                                                              Medication Adherence: Medication list reviewed with patient, reports the following discrepancies/problems:  diphenhydrAMINE-acetaminophen Tabs: uses to fall asleep a couple times per week, denies daytime fatigue day after  doxycycline hyclate: uses only for skin flair up per derm, has not needed for a couple years  Meclizine:  Has not needed for a couple years  metoprolol tartrate: has not needed for ~1 month  MetroNIDAZOLE: has not needed for a couple years  multivitamin  nystatin-triamcinolone: used for skin infection but resolved  Psyllium: takes daily  Biofreeze:  Daily as needed pain    Kidney Stone History: yes, in 2000 related to excess calcium supplement (was using lots of Tums); none since  2  Lifestyle:  not currently doing anything to lose weight, has torn meniscus so has limited mobility  Inquires about Plenity due to concerns with GI adverse drug reaction related to Ozempic  Reports she has diabetes but has not required medication for treatment previously  Review of Systems   Constitutional: Negative for activity change, appetite change and unexpected weight change  Gastrointestinal: Negative for constipation, diarrhea, nausea and vomiting  OBJECTIVE                                                                                                        Wt Readings from Last 5 Encounters:   08/22/22 95 9 kg (211 lb 6 4 oz)   08/15/22 95 3 kg (210 lb)   08/10/22 95 3 kg (210 lb)   08/09/22 97 2 kg (214 lb 3 2 oz)   06/21/22 95 7 kg (211 lb)     Lab Results   Component Value Date    HGBA1C 6 2 (H) 08/08/2022     Pharmacist Tracking Tool  Reason For Outreach: Embedded Pharmacist  Demographics:  Intervention Method:  In Person  Type of Intervention: New  Topics Addressed: Diabetes and Obesity  Pharmacologic Interventions: Medication Initiation and Med Rec  Non-Pharmacologic Interventions: Care coordination, Chart update, Cost, Disease state education and Care Gap  Time:  Direct Patient Care: 25 mins   Care Coordination: 15 mins  Recommendation Recipient: Patient/Caregiver  Outcome: Accepted

## 2022-08-26 DIAGNOSIS — J01.90 ACUTE NON-RECURRENT SINUSITIS, UNSPECIFIED LOCATION: ICD-10-CM

## 2022-08-26 RX ORDER — FLUTICASONE PROPIONATE 50 MCG
SPRAY, SUSPENSION (ML) NASAL
Qty: 48 G | Refills: 3 | Status: SHIPPED | OUTPATIENT
Start: 2022-08-26

## 2022-08-29 ENCOUNTER — TELEPHONE (OUTPATIENT)
Dept: INTERNAL MEDICINE CLINIC | Facility: CLINIC | Age: 77
End: 2022-08-29

## 2022-08-29 NOTE — TELEPHONE ENCOUNTER
Diane Mac has been approved from 7/27/22-8/23/23  LM on VM for pharmacy and advised they CB with any questions

## 2022-08-29 NOTE — TELEPHONE ENCOUNTER
Pharmacy calling in regards to the denial for the ozempic  Requesting an alternative medication          Please advise

## 2022-09-02 DIAGNOSIS — E78.5 HYPERLIPIDEMIA, UNSPECIFIED HYPERLIPIDEMIA TYPE: ICD-10-CM

## 2022-09-02 RX ORDER — ATORVASTATIN CALCIUM 20 MG/1
TABLET, FILM COATED ORAL
Qty: 90 TABLET | Refills: 3 | Status: SHIPPED | OUTPATIENT
Start: 2022-09-02

## 2022-10-06 ENCOUNTER — DOCUMENTATION (OUTPATIENT)
Dept: INTERNAL MEDICINE CLINIC | Facility: CLINIC | Age: 77
End: 2022-10-06

## 2022-10-06 RX ORDER — MELATONIN 5 MG
1 TABLET,DISINTEGRATING ORAL AS NEEDED
COMMUNITY

## 2022-10-10 DIAGNOSIS — E03.9 HYPOTHYROIDISM, UNSPECIFIED TYPE: ICD-10-CM

## 2022-10-10 RX ORDER — LEVOTHYROXINE SODIUM 0.12 MG/1
TABLET ORAL
Qty: 90 TABLET | Refills: 3 | Status: SHIPPED | OUTPATIENT
Start: 2022-10-10

## 2022-10-12 PROBLEM — J32.9 SINUSITIS: Status: RESOLVED | Noted: 2019-08-20 | Resolved: 2022-10-12

## 2022-11-01 ENCOUNTER — OFFICE VISIT (OUTPATIENT)
Dept: AUDIOLOGY | Age: 77
End: 2022-11-01

## 2022-11-01 DIAGNOSIS — H90.3 SENSORY HEARING LOSS, BILATERAL: Primary | ICD-10-CM

## 2022-11-01 NOTE — PROGRESS NOTES
Progress Note    Name:  Hortensia Arriaga  :  1945  Age:  78 y o  Date of Evaluation: 22     Patient contacted the clinic requesting wax filters  Wax filters will be placed at the  for   No charge-In warranty      Talia Lyons   Clinical Audiologist

## 2022-11-09 ENCOUNTER — OFFICE VISIT (OUTPATIENT)
Dept: AUDIOLOGY | Age: 77
End: 2022-11-09

## 2022-11-09 DIAGNOSIS — H90.3 SENSORY HEARING LOSS, BILATERAL: Primary | ICD-10-CM

## 2022-11-09 NOTE — PROGRESS NOTES
Hearing Aid Visit:    Name:  Miguel Burt  :  1945  Age:  78 y o  Date of Evaluation: 22     Kasie Aragon Annabel is being seen for a hearing aid visit  Patient is fit with [employer]  hearing aid(s)  Right serial [id number]  Left serial [id number]  Warranty date: 2024 (Loss/Damage and repair)        Patient reports she has to push the earmolds in in the afternoon, because they feel like they are slipping out  Patient wanted to switch to domes  Action:  Counseled patient that it is typical to need to push the earmolds into the ear throughout the day  Patient wants to keep the earmolds for now but will contact the office if the issue gets worse  Cleaned and checked hearing aids  Replaced the filters  Dispensed two packs of prowax filters  Recommendations: Follow up Talia Webster , CCC-A  Clinical Audiologist

## 2023-02-13 ENCOUNTER — OFFICE VISIT (OUTPATIENT)
Dept: PODIATRY | Facility: CLINIC | Age: 78
End: 2023-02-13

## 2023-02-13 VITALS
HEART RATE: 92 BPM | SYSTOLIC BLOOD PRESSURE: 130 MMHG | HEIGHT: 67 IN | BODY MASS INDEX: 33.43 KG/M2 | DIASTOLIC BLOOD PRESSURE: 81 MMHG | WEIGHT: 213 LBS

## 2023-02-13 DIAGNOSIS — M20.22 HALLUX RIGIDUS OF LEFT FOOT: Primary | ICD-10-CM

## 2023-02-13 RX ORDER — TRIAMCINOLONE ACETONIDE 40 MG/ML
20 INJECTION, SUSPENSION INTRA-ARTICULAR; INTRAMUSCULAR ONCE
Status: COMPLETED | OUTPATIENT
Start: 2023-02-13 | End: 2023-02-13

## 2023-02-13 RX ORDER — LIDOCAINE HYDROCHLORIDE 10 MG/ML
1 INJECTION, SOLUTION EPIDURAL; INFILTRATION; INTRACAUDAL; PERINEURAL ONCE
Status: COMPLETED | OUTPATIENT
Start: 2023-02-13 | End: 2023-02-13

## 2023-02-13 RX ADMIN — LIDOCAINE HYDROCHLORIDE 1 ML: 10 INJECTION, SOLUTION EPIDURAL; INFILTRATION; INTRACAUDAL; PERINEURAL at 15:39

## 2023-02-13 RX ADMIN — TRIAMCINOLONE ACETONIDE 20 MG: 40 INJECTION, SUSPENSION INTRA-ARTICULAR; INTRAMUSCULAR at 15:40

## 2023-02-13 NOTE — PROGRESS NOTES
Patient presents for assessment of left foot  Patient has known hallux rigidus  She has been doing well with cortisone injections every 5 to 6 months  She states that she needs another one today in the left foot  On exam, range of motion left first MPJ restricted to 5 degrees of dorsiflexion  Pain elicited with palpation of the lateral aspect of the first MPJ  Treatment: Injected lateral aspect left first MPJ with 0 5 cc Kenalog 40 along with 1 cc 1% Xylocaine  Foot injection     Date/Time 2/13/2023 3:40 PM     Performed by  Whitney Jorge DPM     Authorized by Whitney Jorge DPM      Universal Protocol   Consent: Verbal consent obtained  Risks and benefits: risks, benefits and alternatives were discussed  Consent given by: patient  Patient understanding: patient states understanding of the procedure being performed  Patient identity confirmed: verbally with patient        Local anesthesia used: yes     Anesthesia   Local anesthesia used: yes  Local Anesthetic: lidocaine 1% without epinephrine     Procedure Details   Procedure Notes: Injected left foot with 0 5 cc Kenalog 40 along with 1 cc 1% Xylocaine

## 2023-02-17 ENCOUNTER — RA CDI HCC (OUTPATIENT)
Dept: OTHER | Facility: HOSPITAL | Age: 78
End: 2023-02-17

## 2023-02-17 NOTE — PROGRESS NOTES
[address]  coding opportunities       Chart reviewed, no opportunity found: CHART REVIEWED, NO OPPORTUNITY FOUND        Patients Insurance     Medicare Insurance: Medicare

## 2023-02-21 ENCOUNTER — APPOINTMENT (OUTPATIENT)
Dept: LAB | Age: 78
End: 2023-02-21

## 2023-02-21 DIAGNOSIS — E11.9 TYPE 2 DIABETES MELLITUS WITHOUT COMPLICATION, WITHOUT LONG-TERM CURRENT USE OF INSULIN (HCC): ICD-10-CM

## 2023-02-21 DIAGNOSIS — R00.2 PALPITATIONS: ICD-10-CM

## 2023-02-21 LAB
ALBUMIN SERPL BCP-MCNC: 3.7 G/DL (ref 3.5–5)
ALP SERPL-CCNC: 47 U/L (ref 46–116)
ALT SERPL W P-5'-P-CCNC: 22 U/L (ref 12–78)
ANION GAP SERPL CALCULATED.3IONS-SCNC: 5 MMOL/L (ref 4–13)
AST SERPL W P-5'-P-CCNC: 14 U/L (ref 5–45)
BILIRUB SERPL-MCNC: 0.62 MG/DL (ref 0.2–1)
BUN SERPL-MCNC: 13 MG/DL (ref 5–25)
CALCIUM SERPL-MCNC: 9.2 MG/DL (ref 8.3–10.1)
CHLORIDE SERPL-SCNC: 105 MMOL/L (ref 96–108)
CHOLEST SERPL-MCNC: 168 MG/DL
CO2 SERPL-SCNC: 26 MMOL/L (ref 21–32)
CREAT SERPL-MCNC: 0.59 MG/DL (ref 0.6–1.3)
GFR SERPL CREATININE-BSD FRML MDRD: 89 ML/MIN/1.73SQ M
GLUCOSE P FAST SERPL-MCNC: 108 MG/DL (ref 65–99)
HDLC SERPL-MCNC: 60 MG/DL
LDLC SERPL CALC-MCNC: 86 MG/DL (ref 0–100)
POTASSIUM SERPL-SCNC: 4 MMOL/L (ref 3.5–5.3)
PROT SERPL-MCNC: 6.7 G/DL (ref 6.4–8.4)
SODIUM SERPL-SCNC: 136 MMOL/L (ref 135–147)
TRIGL SERPL-MCNC: 111 MG/DL

## 2023-02-22 LAB
EST. AVERAGE GLUCOSE BLD GHB EST-MCNC: 131 MG/DL
HBA1C MFR BLD: 6.2 %

## 2023-02-22 RX ORDER — DOXYCYCLINE HYCLATE 100 MG
TABLET ORAL
COMMUNITY
Start: 2022-12-20

## 2023-02-24 ENCOUNTER — OFFICE VISIT (OUTPATIENT)
Dept: INTERNAL MEDICINE CLINIC | Facility: CLINIC | Age: 78
End: 2023-02-24

## 2023-02-24 VITALS
HEART RATE: 82 BPM | HEIGHT: 67 IN | BODY MASS INDEX: 32.96 KG/M2 | OXYGEN SATURATION: 97 % | SYSTOLIC BLOOD PRESSURE: 140 MMHG | WEIGHT: 210 LBS | DIASTOLIC BLOOD PRESSURE: 80 MMHG

## 2023-02-24 DIAGNOSIS — E11.9 TYPE 2 DIABETES MELLITUS WITHOUT COMPLICATION, WITHOUT LONG-TERM CURRENT USE OF INSULIN (HCC): Primary | ICD-10-CM

## 2023-02-24 DIAGNOSIS — M65.30 TRIGGER FINGER, UNSPECIFIED FINGER, UNSPECIFIED LATERALITY: ICD-10-CM

## 2023-02-24 DIAGNOSIS — K21.00 GASTROESOPHAGEAL REFLUX DISEASE WITH ESOPHAGITIS, UNSPECIFIED WHETHER HEMORRHAGE: ICD-10-CM

## 2023-02-24 DIAGNOSIS — I89.0 LYMPHEDEMA: ICD-10-CM

## 2023-02-24 DIAGNOSIS — E78.2 MIXED HYPERLIPIDEMIA: ICD-10-CM

## 2023-02-24 DIAGNOSIS — E66.09 CLASS 1 OBESITY DUE TO EXCESS CALORIES WITH SERIOUS COMORBIDITY AND BODY MASS INDEX (BMI) OF 33.0 TO 33.9 IN ADULT: ICD-10-CM

## 2023-02-24 DIAGNOSIS — J44.9 CHRONIC OBSTRUCTIVE PULMONARY DISEASE, UNSPECIFIED COPD TYPE (HCC): ICD-10-CM

## 2023-02-24 DIAGNOSIS — I47.1 SVT (SUPRAVENTRICULAR TACHYCARDIA) (HCC): ICD-10-CM

## 2023-02-24 DIAGNOSIS — G25.0 BENIGN ESSENTIAL TREMOR: ICD-10-CM

## 2023-02-24 DIAGNOSIS — E03.8 HYPOTHYROIDISM DUE TO HASHIMOTO'S THYROIDITIS: ICD-10-CM

## 2023-02-24 DIAGNOSIS — E06.3 HYPOTHYROIDISM DUE TO HASHIMOTO'S THYROIDITIS: ICD-10-CM

## 2023-02-24 PROBLEM — I82.4Y1 DEEP VEIN THROMBOSIS (DVT) OF PROXIMAL VEIN OF RIGHT LOWER EXTREMITY, UNSPECIFIED CHRONICITY (HCC): Status: RESOLVED | Noted: 2022-08-10 | Resolved: 2023-02-24

## 2023-02-24 PROBLEM — I47.10 SVT (SUPRAVENTRICULAR TACHYCARDIA): Status: RESOLVED | Noted: 2022-06-08 | Resolved: 2023-02-24

## 2023-02-24 NOTE — ASSESSMENT & PLAN NOTE
Clinically stable and doing well continue the current medical regiment will continue monitor    Continue Nexium 40 mg once daily

## 2023-02-24 NOTE — PROGRESS NOTES
Diabetic Foot Exam    Patient's shoes and socks removed  Right Foot/Ankle   Right Foot Inspection  Skin Exam: skin normal and skin intact  No dry skin, no warmth, no callus, no erythema, no maceration, no abnormal color, no pre-ulcer, no ulcer and no callus  Toe Exam: ROM and strength within normal limits  Sensory   Monofilament testing: intact    Vascular  Capillary refills: < 3 seconds          Left Foot/Ankle  Left Foot Inspection  Skin Exam: skin normal and skin intact  No dry skin, no warmth, no erythema, no maceration, normal color, no pre-ulcer, no ulcer and no callus  Toe Exam: ROM and strength within normal limits  Sensory   Monofilament testing: intact    Vascular  Capillary refills: < 3 seconds          Assessment/Plan:    Gastro-esophageal reflux disease with esophagitis  Clinically stable and doing well continue the current medical regiment will continue monitor  Continue Nexium 40 mg once daily    Type 2 diabetes mellitus without complication, without long-term current use of insulin ([facility])    Lab Results   Component Value Date    HGBA1C 6 2 (H) 02/21/2023   I have counselled the pt to follow a healthy and balanced diet ,and recommend routine exercise  I will be ordering diabetic laboratories including comprehensive metabolic panel, hemoglobin A1c, urine microalbumin, lipid panel  Weight loss encouraged patient to join weight watchers and will be riding a recumbent bicycle    Chronic obstructive pulmonary disease, unspecified COPD type ([facility])  Stable    Class 1 obesity due to excess calories with serious comorbidity and body mass index (BMI) of 33 0 to 33 9 in adult  Obesity -I have counseled patient following healthy and balanced diet, I would like the patient to lose weight, I would like the patient exercise routinely; we will continue monitor the patient's progress    Patient would like to start 8 Mirtha Castillo will meet with pharmacist Rony prior to starting Rx provided    Hyperlipidemia  Hyperlipidemia controlled continue with current medical regiment recommend a low-cholesterol diet and recommend routine exercise we will continue to monitor the progress  Lymphedema  Currently stable and doing well continue to observe    Trigger finger  Very mild at this point in time would like the patient to work on hand exercises at this point and patient would like to hold off on orthopedic hand specialist consult but will observe if any change or worsening to notify me  Benign essential tremor  Currently very mild we will observe         Problem List Items Addressed This Visit        Digestive    Gastro-esophageal reflux disease with esophagitis     Clinically stable and doing well continue the current medical regiment will continue monitor  Continue Nexium 40 mg once daily            Endocrine    Type 2 diabetes mellitus without complication, without long-term current use of insulin ([facility] ) - Primary       Lab Results   Component Value Date    HGBA1C 6 2 (H) 02/21/2023   I have counselled the pt to follow a healthy and balanced diet ,and recommend routine exercise  I will be ordering diabetic laboratories including comprehensive metabolic panel, hemoglobin A1c, urine microalbumin, lipid panel    Weight loss encouraged patient to join weight watchers and will be riding a recumbent bicycle         Relevant Medications    semaglutide, 0 25 or 0 5 mg/dose, (Ozempic) 2 mg/1 5 mL injection pen    Other Relevant Orders    Ambulatory referral to clinical pharmacy    Comprehensive metabolic panel    Hemoglobin A1C    Hypothyroidism due to Hashimoto's thyroiditis       Respiratory    Chronic obstructive pulmonary disease, unspecified COPD type ([facility] )     Stable            Cardiovascular and Mediastinum    RESOLVED: SVT (supraventricular tachycardia) (HCC)       Nervous and Auditory    Benign essential tremor     Currently very mild we will observe            Musculoskeletal and Integument Trigger finger     Very mild at this point in time would like the patient to work on hand exercises at this point and patient would like to hold off on orthopedic hand specialist consult but will observe if any change or worsening to notify me  Other    Lymphedema     Currently stable and doing well continue to observe         Hyperlipidemia     Hyperlipidemia controlled continue with current medical regiment recommend a low-cholesterol diet and recommend routine exercise we will continue to monitor the progress  Class 1 obesity due to excess calories with serious comorbidity and body mass index (BMI) of 33 0 to 33 9 in adult     Obesity -I have counseled patient following healthy and balanced diet, I would like the patient to lose weight, I would like the patient exercise routinely; we will continue monitor the patient's progress  Patient would like to start 8 Mirtha Castillo will meet with pharmacist Rony prior to starting Rx provided            Return to office 6  months  call if any problems  Subjective:      Patient ID: Yannick Pizarro is a 78 y o  female  HPI 75-year old female coming in for a follow up office visit regarding type 2 diabetes, hypothyroidism, GERD, hyperlipidemia, lymphedema, obesity, trigger finger, benign essential tremor; the patient reports me compliant taking medications without untoward side effects the  The patient is here to review his medical condition, update me on the medical condition and the patient reports me no hospitalizations and no ER visits  Reports to me her diet could be better reports in the limited activity got recombinant bike reports pain in the right wrist and slight tremor campo in is difficult  Some time cant control the the pen and at times finger locking  Patient is here to review laboratories  She does report to me locking of her fingers at times intermittently and also reports to me a mild  hand tremor      The following portions of the patient's history were reviewed and updated as appropriate: allergies, current medications, past family history, past medical history, past social history, past surgical history and problem list     Review of Systems   Constitutional: Negative for activity change, appetite change and unexpected weight change  HENT: Negative for congestion and postnasal drip  Eyes: Negative for visual disturbance  Respiratory: Negative for cough and shortness of breath  Cardiovascular: Negative for chest pain  Gastrointestinal: Negative for abdominal pain, diarrhea, nausea and vomiting  Musculoskeletal:        Trigger finger   Neurological: Positive for tremors  Negative for dizziness, light-headedness and headaches  Hematological: Negative for adenopathy  Objective:    Return in about 6 months (around 8/24/2023)  No results found        Allergies   Allergen Reactions   • Hydrochlorothiazide Palpitations   • Meloxicam Rash       Past Medical History:   Diagnosis Date   • Biliary dyskinesia    • CAP (community acquired pneumonia)     last assessed 8/17/16   • Deep vein thrombosis (DVT) of proximal vein of right lower extremity, unspecified chronicity ([facility] [address] ) 8/10/2022   • Diabetes mellitus ([facility] [address] ) 12/2018    Type 2 no insulin   • Disease of thyroid gland 2000   • GERD (gastroesophageal reflux disease)    • Hyperlipidemia    • Palpitations    • Pneumonia    • Shortness of breath    • SVT (supraventricular tachycardia) ([facility] [address] ) 6/8/2022     Past Surgical History:   Procedure Laterality Date   • AUGMENTATION MAMMAPLASTY Bilateral 1998    breast surgery- with prosthetic implant 1998; pre-pectoral salline   • BREAST BIOPSY Left 1974   • BREAST BIOPSY Left 1994   • BREAST EXCISIONAL BIOPSY Left 1993   • CARDIAC CATHETERIZATION      procedure summary   • CHOLECYSTECTOMY      onset 2003   • HEMORRHOID SURGERY     • ROTATOR CUFF REPAIR Bilateral     onset 2010   • TUBAL LIGATION      onset 1980     Current Outpatient Medications on File Prior to Visit   Medication Sig Dispense Refill   • Ascorbic Acid (VITAMIN C) 1000 MG tablet Take 1 tablet by mouth daily     • atorvastatin (LIPITOR) 20 mg tablet TAKE 1 TABLET DAILY 90 tablet 3   • Biotin 5000 MCG CAPS Take 1 capsule by mouth daily     • cholecalciferol (VITAMIN D3) 1,000 units tablet Take 4,000 Units by mouth daily     • CINNAMON PO Take by mouth     • diphenhydrAMINE-acetaminophen (TYLENOL PM)  MG TABS Take 1 tablet by mouth daily at bedtime as needed for sleep     • doxycycline hyclate (VIBRA-TABS) 100 mg tablet TAKE 1 TABLET BY MOUTH AN HOUR BEFORE DINNER WITH WATER ONLY, REMAIN UPRIGHT FOR ONE HOUR AFTERWARDS     • esomeprazole (NexIUM) 40 MG capsule Take 1 capsule by mouth daily     • fluticasone (FLONASE) 50 mcg/act nasal spray USE 2 SPRAYS IN EACH NOSTRIL DAILY 48 g 3   • levothyroxine 125 mcg tablet TAKE 1 TABLET DAILY ON AN EMPTY STOMACH 90 tablet 3   • Magnesium 250 MG TABS Take 250 mg by mouth in the morning     • Melatonin-Theanine 10-5 5 MG TABS Take 1 tablet by mouth as needed     • Menthol, Topical Analgesic, (BIOFREEZE EX) Apply 1 application topically daily as needed (pain)     • metoprolol tartrate (LOPRESSOR) 25 mg tablet Take one tablet as needed for palpitations 90 tablet 2   • Multiple Vitamin (MULTIVITAMIN) capsule Take 1 capsule by mouth daily     • Probiotic Product (ALIGN PO) Take 1 capsule by mouth in the morning     • psyllium (METAMUCIL) 58 6 % powder Take 1 packet by mouth daily     • [DISCONTINUED] naproxen sodium (ALEVE) 220 MG tablet Take by mouth       No current facility-administered medications on file prior to visit       Family History   Problem Relation Age of Onset   • Aneurysm Mother         cerebral   • Ovarian cancer Mother 67   • Cancer Mother    • Cirrhosis Father         alcoholic   • Alcohol abuse Father    • Aneurysm Brother         abdominal aortic; cerebral   • Diabetes Son    • No Known Problems Daughter    • No Known Problems Maternal Grandmother    • No Known Problems Maternal Grandfather    • No Known Problems Paternal Grandmother    • No Known Problems Paternal Grandfather    • No Known Problems Son    • No Known Problems Son    • No Known Problems Son    • No Known Problems Maternal Aunt    • No Known Problems Maternal Aunt    • No Known Problems Maternal Aunt    • No Known Problems Maternal Aunt    • No Known Problems Paternal Aunt    • No Known Problems Paternal Aunt    • No Known Problems Paternal Aunt    • No Known Problems Paternal Aunt    • No Known Problems Paternal Aunt    • Cancer Brother         bladder cancer     Social History     Socioeconomic History   • Marital status: /Civil Union     Spouse name: Not on file   • Number of children: Not on file   • Years of education: 15   • Highest education level: Not on file   Occupational History   • Not on file   Tobacco Use   • Smoking status: Former     Packs/day: 0 25     Years: 17 00     Pack years: 4 25     Types: Cigarettes     Start date: 58     Quit date: 2010     Years since quittin 1   • Smokeless tobacco: Never   • Tobacco comments:     0 5 ppw intermittently last smoked    Vaping Use   • Vaping Use: Never used   Substance and Sexual Activity   • Alcohol use: Yes     Alcohol/week: 2 0 standard drinks     Types: 2 Glasses of wine per week     Comment: Maybe 2 glasses a month   • Drug use: No   • Sexual activity: Not Currently     Partners: Male     Birth control/protection: Post-menopausal     Comment: I'm 77   Other Topics Concern   • Not on file   Social History Narrative    Caffeine use     Social Determinants of Health     Financial Resource Strain: Low Risk    • Difficulty of Paying Living Expenses: Not hard at all   Food Insecurity: Not on file   Transportation Needs: No Transportation Needs   • Lack of Transportation (Medical): No   • Lack of Transportation (Non-Medical):  No   Physical Activity: Inactive   • Days of Exercise per Week: 0 days   • Minutes of Exercise per Session: 0 min   Stress: Not on file   Social Connections: Not on file   Intimate Partner Violence: Not on file   Housing Stability: Not on file     Vitals:    02/24/23 0747   BP: 140/80   BP Location: Left arm   Patient Position: Sitting   Cuff Size: Large   Pulse: 82   SpO2: 97%   Weight: 95 3 kg (210 lb)   Height: 5' 6 5" (1 689 m)     Results for orders placed or performed in visit on 02/21/23   Comprehensive metabolic panel   Result Value Ref Range    Sodium 136 135 - 147 mmol/L    Potassium 4 0 3 5 - 5 3 mmol/L    Chloride 105 96 - 108 mmol/L    CO2 26 21 - 32 mmol/L    ANION GAP 5 4 - 13 mmol/L    BUN 13 5 - 25 mg/dL    Creatinine 0 59 (L) 0 60 - 1 30 mg/dL    Glucose, Fasting 108 (H) 65 - 99 mg/dL    Calcium 9 2 8 3 - 10 1 mg/dL    AST 14 5 - 45 U/L    ALT 22 12 - 78 U/L    Alkaline Phosphatase 47 46 - 116 U/L    Total Protein 6 7 6 4 - 8 4 g/dL    Albumin 3 7 3 5 - 5 0 g/dL    Total Bilirubin 0 62 0 20 - 1 00 mg/dL    eGFR 89 ml/min/1 73sq m   Hemoglobin A1C   Result Value Ref Range    Hemoglobin A1C 6 2 (H) Normal 3 8-5 6%; PreDiabetic 5 7-6 4%; Diabetic >=6 5%; Glycemic control for adults with diabetes <7 0% %     mg/dl   Lipid Panel with Direct LDL reflex   Result Value Ref Range    Cholesterol 168 See Comment mg/dL    Triglycerides 111 See Comment mg/dL    HDL, Direct 60 >=50 mg/dL    LDL Calculated 86 0 - 100 mg/dL     Weight (last 2 days)     Date/Time Weight    02/24/23 0747 95 3 (210)        Body mass index is 33 39 kg/m²  BP      Temp      Pulse     Resp      SpO2        Vitals:    02/24/23 0747   Weight: 95 3 kg (210 lb)     Vitals:    02/24/23 0747   Weight: 95 3 kg (210 lb)       /80 (BP Location: Left arm, Patient Position: Sitting, Cuff Size: Large)   Pulse 82   Ht 5' 6 5" (1 689 m)   Wt 95 3 kg (210 lb)   SpO2 97%   BMI 33 39 kg/m²          Physical Exam  Vitals and nursing note reviewed     Constitutional:       General: She is not in acute distress  Appearance: Normal appearance  She is well-developed  She is obese  She is not ill-appearing, toxic-appearing or diaphoretic  HENT:      Head: Normocephalic  Eyes:      General: No scleral icterus  Right eye: No discharge  Left eye: No discharge  Conjunctiva/sclera: Conjunctivae normal       Pupils: Pupils are equal, round, and reactive to light  Cardiovascular:      Rate and Rhythm: Normal rate and regular rhythm  Heart sounds: Normal heart sounds  No murmur heard  No friction rub  No gallop  Pulmonary:      Effort: No respiratory distress  Breath sounds: Normal breath sounds  No wheezing or rales  Abdominal:      General: Bowel sounds are normal  There is no distension  Palpations: Abdomen is soft  There is no mass  Tenderness: There is no abdominal tenderness  There is no guarding or rebound  Musculoskeletal:         General: No deformity  Cervical back: Neck supple  Feet:      Right foot:      Skin integrity: No ulcer, skin breakdown, erythema, warmth, callus or dry skin  Left foot:      Skin integrity: No ulcer, skin breakdown, erythema, warmth, callus or dry skin  Lymphadenopathy:      Cervical: No cervical adenopathy  Neurological:      Mental Status: She is alert        Coordination: Coordination normal

## 2023-02-24 NOTE — ASSESSMENT & PLAN NOTE
Very mild at this point in time would like the patient to work on hand exercises at this point and patient would like to hold off on orthopedic hand specialist consult but will observe if any change or worsening to notify me

## 2023-02-24 NOTE — ASSESSMENT & PLAN NOTE
Lab Results   Component Value Date    HGBA1C 6 2 (H) 02/21/2023   I have counselled the pt to follow a healthy and balanced diet ,and recommend routine exercise  I will be ordering diabetic laboratories including comprehensive metabolic panel, hemoglobin A1c, urine microalbumin, lipid panel    Weight loss encouraged patient to join weight watchers and will be riding a recumbent bicycle

## 2023-02-24 NOTE — ASSESSMENT & PLAN NOTE
Obesity -I have counseled patient following healthy and balanced diet, I would like the patient to lose weight, I would like the patient exercise routinely; we will continue monitor the patient's progress    Patient would like to start 8 Mirtha Castillo will meet with pharmacist Rony prior to starting Rx provided

## 2023-02-27 DIAGNOSIS — E11.9 TYPE 2 DIABETES MELLITUS WITHOUT COMPLICATION, WITHOUT LONG-TERM CURRENT USE OF INSULIN (HCC): ICD-10-CM

## 2023-03-01 DIAGNOSIS — E11.9 TYPE 2 DIABETES MELLITUS WITHOUT COMPLICATION, WITHOUT LONG-TERM CURRENT USE OF INSULIN (HCC): ICD-10-CM

## 2023-03-08 PROBLEM — J44.9 CHRONIC OBSTRUCTIVE PULMONARY DISEASE, UNSPECIFIED COPD TYPE (HCC): Status: RESOLVED | Noted: 2023-02-24 | Resolved: 2023-03-08

## 2023-03-09 ENCOUNTER — CLINICAL SUPPORT (OUTPATIENT)
Dept: INTERNAL MEDICINE CLINIC | Facility: CLINIC | Age: 78
End: 2023-03-09

## 2023-03-09 VITALS — WEIGHT: 212 LBS | BODY MASS INDEX: 33.71 KG/M2

## 2023-03-09 DIAGNOSIS — E66.09 CLASS 1 OBESITY DUE TO EXCESS CALORIES WITH SERIOUS COMORBIDITY AND BODY MASS INDEX (BMI) OF 33.0 TO 33.9 IN ADULT: Primary | ICD-10-CM

## 2023-03-09 DIAGNOSIS — E11.9 TYPE 2 DIABETES MELLITUS WITHOUT COMPLICATION, WITHOUT LONG-TERM CURRENT USE OF INSULIN (HCC): ICD-10-CM

## 2023-03-09 PROBLEM — E66.811 CLASS 1 OBESITY DUE TO EXCESS CALORIES WITHOUT SERIOUS COMORBIDITY WITH BODY MASS INDEX (BMI) OF 31.0 TO 31.9 IN ADULT: Status: RESOLVED | Noted: 2019-08-20 | Resolved: 2023-03-09

## 2023-03-09 RX ORDER — VIT C/B6/B5/MAGNESIUM/HERB 173 50-5-6-5MG
1000 CAPSULE ORAL
COMMUNITY

## 2023-03-09 NOTE — ASSESSMENT & PLAN NOTE
Baseline Weight: 212 pounds (date: 3/9/2023)  Initiation or Continuation of Therapy: initiation    • Medications:   o Ozempic: patient administered first injection today

## 2023-03-09 NOTE — PROGRESS NOTES
[address] [place] [address], [facility]    ASSESSMENT/PLAN                                                                                     1  Class 1 obesity due to excess calories with serious comorbidity and body mass index (BMI) of 33 0 to 33 9 in adult  Assessment & Plan:  Baseline Weight: 212 pounds (date: 3/9/2023)  Initiation or Continuation of Therapy: initiation    Medications:   Ozempic: patient administered first injection today        2  Type 2 diabetes mellitus without complication, without long-term current use of insulin (HCC)  Assessment & Plan:    Lab Results   Component Value Date    HGBA1C 6 2 (H) 02/21/2023   Goal A1c <8% based on ADA guidelines  Most recent A1c below goal without treatment     Medications:  Ozempic: patient was able to admin first injection during appointment  Injectable Rx storage and administration reviewed with patient today  Patient was able to correctly teach back the following steps:   Injection:  Needle/Pen:   Remove paper backing from needle  Attach needle to pen  Remove outer and inner needle shields  Dial the proper dose  Storage:  Avoid excessive heat/sunlight  Store unused pens in refrigerator  Store Pens in use at room temperature for no more than 56 days  Visually Inspection Prior to Administration  Proper disposal of sharps  Uses new needle for each injection  Holds needle into skin for at least 5 seconds                 Follow-Up: 6-8 weeks      SUBJECTIVE                                                                                                            Medication Adherence: Medication list reviewed with patient, reports the following discrepancies/problems:  Medication list updated    Takes mvi + supplements around 3pm  Centrum Silver    Brought in ozempic for education; received 3 month supply    2   Medication Efficacy:    Review of Systems   Gastrointestinal:        Heartburn controlled on ppi     Does not check home blood sugar readings       OBJECTIVE                                                                                                      Vitals:    03/09/23 1428   Weight: 96 2 kg (212 lb)     Lab Results   Component Value Date    SODIUM 136 02/21/2023    K 4 0 02/21/2023    EGFR 89 02/21/2023    CREATININE 0 59 (L) 02/21/2023    GLUF 108 (H) 02/21/2023    [id number] 815 05/11/2018    MICROALBCRE <23 08/08/2022       Lab Results   Component Value Date    HGBA1C 6 2 (H) 02/21/2023    HGBA1C 6 2 (H) 08/08/2022    HGBA1C 6 1 (H) 12/10/2021       Pharmacist Tracking Tool  Reason For Outreach: Embedded Pharmacist  Demographics:  Intervention Method:  In Person  Type of Intervention: Follow-Up  Topics Addressed: Diabetes and Obesity  Pharmacologic Interventions: Medication Initiation  Non-Pharmacologic Interventions: Care coordination and Medication/Device education  Time:  Direct Patient Care: 25 mins   Care Coordination: 15 mins  Recommendation Recipient: Patient/Caregiver  Outcome: Accepted

## 2023-03-09 NOTE — ASSESSMENT & PLAN NOTE
Lab Results   Component Value Date    HGBA1C 6 2 (H) 02/21/2023   Goal A1c <8% based on ADA guidelines  Most recent A1c below goal without treatment     • Medications:  o Ozempic: patient was able to admin first injection during appointment  • Injectable Rx storage and administration reviewed with patient today   Patient was able to correctly teach back the following steps:   o Injection:  - Needle/Pen:   • Remove paper backing from needle  • Attach needle to pen  • Remove outer and inner needle shields  • Dial the proper dose  o Storage:  - Avoid excessive heat/sunlight  - Store unused pens in refrigerator  - Store Pens in use at room temperature for no more than 56 days  o Visually Inspection Prior to Administration  o Proper disposal of sharps  o Uses new needle for each injection  o Holds needle into skin for at least 5 seconds

## 2023-03-10 ENCOUNTER — PATIENT MESSAGE (OUTPATIENT)
Dept: INTERNAL MEDICINE CLINIC | Facility: CLINIC | Age: 78
End: 2023-03-10

## 2023-03-16 ENCOUNTER — OFFICE VISIT (OUTPATIENT)
Dept: PODIATRY | Facility: CLINIC | Age: 78
End: 2023-03-16

## 2023-03-16 VITALS
SYSTOLIC BLOOD PRESSURE: 115 MMHG | DIASTOLIC BLOOD PRESSURE: 74 MMHG | HEIGHT: 66 IN | BODY MASS INDEX: 32.95 KG/M2 | WEIGHT: 205 LBS | HEART RATE: 102 BPM

## 2023-03-16 DIAGNOSIS — M72.2 PLANTAR FASCIITIS: Primary | ICD-10-CM

## 2023-03-16 DIAGNOSIS — M79.672 LEFT FOOT PAIN: ICD-10-CM

## 2023-03-16 RX ORDER — LIDOCAINE HYDROCHLORIDE 10 MG/ML
1 INJECTION, SOLUTION EPIDURAL; INFILTRATION; INTRACAUDAL; PERINEURAL ONCE
Status: COMPLETED | OUTPATIENT
Start: 2023-03-16 | End: 2023-03-16

## 2023-03-16 RX ORDER — TRIAMCINOLONE ACETONIDE 40 MG/ML
20 INJECTION, SUSPENSION INTRA-ARTICULAR; INTRAMUSCULAR ONCE
Status: COMPLETED | OUTPATIENT
Start: 2023-03-16 | End: 2023-03-16

## 2023-03-16 RX ORDER — BUPIVACAINE HYDROCHLORIDE 5 MG/ML
1 INJECTION, SOLUTION EPIDURAL; INTRACAUDAL ONCE
Status: COMPLETED | OUTPATIENT
Start: 2023-03-16 | End: 2023-03-16

## 2023-03-16 RX ADMIN — TRIAMCINOLONE ACETONIDE 20 MG: 40 INJECTION, SUSPENSION INTRA-ARTICULAR; INTRAMUSCULAR at 11:30

## 2023-03-16 RX ADMIN — BUPIVACAINE HYDROCHLORIDE 1 ML: 5 INJECTION, SOLUTION EPIDURAL; INTRACAUDAL at 11:29

## 2023-03-16 RX ADMIN — LIDOCAINE HYDROCHLORIDE 1 ML: 10 INJECTION, SOLUTION EPIDURAL; INFILTRATION; INTRACAUDAL; PERINEURAL at 11:30

## 2023-03-16 NOTE — PROGRESS NOTES
Patient presents with left heel pain  Patient has had pain years back for Planter fasciitis and responded well to cortisone injection and insoles  She has not had difficulty for many years  Patient was treated last month for pain secondary to hallux rigidus  She responded very well to cortisone injection  On exam, pain with palpation central aspect left heel at fascial insertion into calcaneus  Treatment: Injected left heel with 0 5 cc Kenalog 40 along with 1 cc 1% Xylocaine and 1 cc 0 5% Marcaine  Foot injection     Date/Time 3/16/2023 11:23 AM     Performed by  Milana Harp DPM     Authorized by Milana Harp DPM      Universal Protocol   Consent: Verbal consent obtained  Risks and benefits: risks, benefits and alternatives were discussed  Consent given by: patient  Patient understanding: patient states understanding of the procedure being performed  Patient identity confirmed: verbally with patient        Local anesthesia used: yes     Anesthesia   Local anesthesia used: yes  Local Anesthetic: lidocaine 1% without epinephrine and bupivacaine 0 5% without epinephrine     Procedure Details   Procedure Notes: Injected left heel with 0 5 cc Kenalog 40 along with 1 cc 1% Xylocaine and 1 cc 0 5% Marcaine

## 2023-03-28 ENCOUNTER — PATIENT MESSAGE (OUTPATIENT)
Dept: INTERNAL MEDICINE CLINIC | Facility: CLINIC | Age: 78
End: 2023-03-28

## 2023-03-28 NOTE — PATIENT COMMUNICATION
Pharmacist Tracking Tool  Reason For Outreach: Embedded Pharmacist  Demographics:  Intervention Method: [employer] Message  Type of Intervention: Follow-Up  Topics Addressed: Diabetes  Pharmacologic Interventions: Prevent or Manage ADRIA  Non-Pharmacologic Interventions: N/A  Time:  Direct Patient Care: 5 mins  Care Coordination: 5 mins  Recommendation Recipient: Patient/Caregiver  Outcome: Accepted

## 2023-04-25 ENCOUNTER — PATIENT MESSAGE (OUTPATIENT)
Dept: INTERNAL MEDICINE CLINIC | Facility: CLINIC | Age: 78
End: 2023-04-25

## 2023-04-26 NOTE — PATIENT COMMUNICATION
Pharmacist Tracking Tool  Reason For Outreach: Embedded Pharmacist  Demographics:  Intervention Method: NPShart Message  Type of Intervention: Follow-Up  Topics Addressed: Infectious Disease   Pharmacologic Interventions: N/A  Non-Pharmacologic Interventions: Immunization  Time:  Direct Patient Care: 5 mins  Care Coordination: 5 mins  Recommendation Recipient: N/A  Outcome: N/A

## 2023-05-02 ENCOUNTER — TELEMEDICINE (OUTPATIENT)
Dept: INTERNAL MEDICINE CLINIC | Facility: CLINIC | Age: 78
End: 2023-05-02

## 2023-05-02 DIAGNOSIS — R00.0 TACHYCARDIA: ICD-10-CM

## 2023-05-02 DIAGNOSIS — U07.1 COVID-19: Primary | ICD-10-CM

## 2023-05-02 RX ORDER — GUAIFENESIN 600 MG/1
1200 TABLET, EXTENDED RELEASE ORAL EVERY 12 HOURS SCHEDULED
Qty: 20 TABLET | Refills: 9 | Status: SHIPPED | OUTPATIENT
Start: 2023-05-02

## 2023-05-02 RX ORDER — NIRMATRELVIR AND RITONAVIR 300-100 MG
3 KIT ORAL 2 TIMES DAILY
Qty: 30 TABLET | Refills: 0 | Status: SHIPPED | OUTPATIENT
Start: 2023-05-02 | End: 2023-05-07

## 2023-05-02 NOTE — PATIENT INSTRUCTIONS
Please stop taking Lipitor while on Paxlovid  Please take some guaifenesin 1200 mg by mouth every 12 hours  Take Flonase daily  Take 1000 mg of tylenol every 8 hours up to 3 times a day as needed for headache and fever  Please call in 2 days with your Blood pressure, heart rate, Temperature and oxygen level

## 2023-05-02 NOTE — PROGRESS NOTES
COVID-19 Outpatient Progress Note    Assessment/Plan:    Problem List Items Addressed This Visit        Other    Tachycardia     Patient has hx of intermittient tachycardia and has metoprolol tartrate 25 mg prn for tachycardia  HR at home ranged from 107 - 132  Plan:  Take Metoprolol 12 5 mg po bid today and monitor HR and BP  Call in 2 days for f/u of vitals  Call sooner if s/s worsen  Other Visit Diagnoses     COVID-19    -  Primary    Relevant Medications    nirmatrelvir & ritonavir (Paxlovid, 300/100,) tablet therapy pack    guaiFENesin (MUCINEX) 600 mg 12 hr tablet         Disposition:     Patient meets criteria for PAXLOVID and they have been counseled appropriately according to EUA documentation released by the FDA  After discussion, patient agrees to treatment  Randy Labrum is an investigational medicine used to treat mild-to-moderate COVID-19 in adults and children (15years of age and older weighing at least 80 pounds (40 kg)) with positive results of direct SARS-CoV-2 viral testing, and who are at high risk for progression to severe COVID-19, including hospitalization or death  PAXLOVID is investigational because it is still being studied  There is limited information about the safety and effectiveness of using PAXLOVID to treat people with mild-to-moderate COVID-19  The FDA has authorized the emergency use of PAXLOVID for the treatment of mild-tomoderate COVID-19 in adults and children (15years of age and older weighing at least 80 pounds (40 kg)) with a positive test for the virus that causes COVID-19, and who are at high risk for progression to severe COVID-19, including hospitalization or death, under an EUA  What should I tell my healthcare provider before I take PAXLOVID?     Tell your healthcare provider if you:  - Have any allergies  - Have liver or kidney disease  - Are pregnant or plan to become pregnant  - Are breastfeeding a child  - Have any serious illnesses    Tell your healthcare provider about all the medicines you take, including prescription and over-the-counter medicines, vitamins, and herbal supplements  Some medicines may interact with PAXLOVID and may cause serious side effects  Keep a list of your medicines to show your healthcare provider and pharmacist when you get a new medicine  You can ask your healthcare provider or pharmacist for a list of medicines that interact with PAXLOVID  Do not start taking a new medicine without telling your healthcare provider  Your healthcare provider can tell you if it is safe to take PAXLOVID with other medicines  Tell your healthcare provider if you are taking combined hormonal contraceptive  PAXLOVID may affect how your birth control pills work  Females who are able to become pregnant should use another effective alternative form of contraception or an additional barrier method of contraception  Talk to your healthcare provider if you have any questions about contraceptive methods that might be right for you  How do I take PAXLOVID? PAXLOVID consists of 2 medicines: nirmatrelvir and ritonavir  - Take 2 pink tablets of nirmatrelvir with 1 white tablet of ritonavir by mouth 2 times each day (in the morning and in the evening) for 5 days  For each dose, take all 3 tablets at the same time  - If you have kidney disease, talk to your healthcare provider  You may need a different dose  - Swallow the tablets whole  Do not chew, break, or crush the tablets  - Take PAXLOVID with or without food  - Do not stop taking PAXLOVID without talking to your healthcare provider, even if you feel better  - If you miss a dose of PAXLOVID within 8 hours of the time it is usually taken, take it as soon as you remember  If you miss a dose by more than 8 hours, skip the missed dose and take the next dose at your regular time  Do not take 2 doses of PAXLOVID at the same time    - If you take too much PAXLOVID, call your healthcare provider or go to the nearest hospital emergency room right away  - If you are taking a ritonavir- or cobicistat-containing medicine to treat hepatitis C or Human Immunodeficiency Virus (HIV), you should continue to take your medicine as prescribed by your healthcare provider   - Talk to your healthcare provider if you do not feel better or if you feel worse after 5 days  Who should generally not take PAXLOVID? Do not take PAXLOVID if:  You are allergic to nirmatrelvir, ritonavir, or any of the ingredients in PAXLOVID  You are taking any of the following medicines:  - Alfuzosin  - Pethidine, piroxicam, propoxyphene  - Ranolazine  - Amiodarone, dronedarone, flecainide, propafenone, quinidine  - Colchicine  - Lurasidone, pimozide, clozapine  - Dihydroergotamine, ergotamine, methylergonovine  - Lovastatin, simvastatin  - Sildenafil (Revatio®) for pulmonary arterial hypertension (PAH)  - Triazolam, oral midazolam  - Apalutamide  - Carbamazepine, phenobarbital, phenytoin  - Rifampin  - St  Haydens Wort (hypericum perforatum)    What are the important possible side effects of PAXLOVID? Possible side effects of PAXLOVID are:  - Liver Problems  Tell your healthcare provider right away if you have any of these signs and symptoms of liver problems: loss of appetite, yellowing of your skin and the whites of eyes (jaundice), dark-colored urine, pale colored stools and itchy skin, stomach area (abdominal) pain  - Resistance to HIV Medicines  If you have untreated HIV infection, PAXLOVID may lead to some HIV medicines not working as well in the future  - Other possible side effects include: altered sense of taste, diarrhea, high blood pressure, or muscle aches    These are not all the possible side effects of PAXLOVID  Not many people have taken PAXLOVID  Serious and unexpected side effects may happen  Thurl Stake is still being studied, so it is possible that all of the risks are not known at this time      What other treatment choices are there? Like Iesha Unger may allow for the emergency use of other medicines to treat people with COVID-19  Go to https://Secure-NOK/ for information on the emergency use of other medicines that are authorized by FDA to treat people with COVID-19  Your healthcare provider may talk with you about clinical trials for which you may be eligible  It is your choice to be treated or not to be treated with PAXLOVID  Should you decide not to receive it or for your child not to receive it, it will not change your standard medical care  What if I am pregnant or breastfeeding? There is no experience treating pregnant women or breastfeeding mothers with PAXLOVID  For a mother and unborn baby, the benefit of taking PAXLOVID may be greater than the risk from the treatment  If you are pregnant, discuss your options and specific situation with your healthcare provider  It is recommended that you use effective barrier contraception or do not have sexual activity while taking PAXLOVID  If you are breastfeeding, discuss your options and specific situation with your healthcare provider  How do I report side effects with PAXLOVID? Contact your healthcare provider if you have any side effects that bother you or do not go away  Report side effects to FDA MedWatch at www fda gov/medwatch or call 8-672-XDK5328 or you can report side effects to [facility]  at the contact information provided below  Website Fax number Telephone number   [place] [phone]     How should I store [address]? Store PAXLOVID tablets at room temperature between 68°F to 77°F (20°C to 25°C)      Full fact sheet for patients, parents, and caregivers can be found at: Tosha singleton    I have spent a total time of 24 minutes on the day of the encounter for this patient including discussing diagnostic results, discussing prognosis, risks and benefits of treatment options and importance of treatment compliance  Encounter provider: Mae Quevedo MD     Provider located at: [address]  [address] [facility] [address]     Recent Visits  No visits were found meeting these conditions  Showing recent visits within past 7 days and meeting all other requirements  Today's Visits  Date Type Provider Dept   05/02/23 Telemedicine Mae Quevedo MD [facility] today's visits and meeting all other requirements  Future Appointments  No visits were found meeting these conditions  Showing future appointments within next 150 days and meeting all other requirements     This virtual check-in was done via [address] and patient was informed that this is a secure, HIPAA-compliant platform  She agrees to proceed  Patient agrees to participate in a virtual check in via telephone or video visit instead of presenting to the office to address urgent/immediate medical needs  Patient is aware this is a billable service  She acknowledged consent and understanding of privacy and security of the video platform  The patient has agreed to participate and understands they can discontinue the visit at any time  After connecting through [facility], the patient was identified by name and date of birth  Zinamiley Dowd was informed that this was a telemedicine visit and that the exam was being conducted confidentially over secure lines  My office door was closed  Marylen Peat acknowledged consent and understanding of privacy and security of the telemedicine visit  I informed the patient that I have reviewed her record in Epic and presented the opportunity for her to ask any questions regarding the visit today  The patient agreed to participate      Verification of patient location:  Patient is located in the following state in which I hold an active license: [place]    Subjective:   Olga Enriquez is a 70 y o  female who is concerned about COVID-19  Patient's symptoms include fever, chills, fatigue, malaise, nasal congestion, rhinorrhea, sore throat, anosmia, cough (dry), myalgias and headache  Patient denies loss of taste, shortness of breath, chest tightness, abdominal pain, nausea, vomiting and diarrhea  COVID-19 vaccination status: Fully vaccinated with booster    Lab Results   Component Value Date    SARSCOV2 Negative 02/03/2022    [facility] Not Detected 12/09/2020       Review of Systems   Constitutional: Positive for chills, fatigue and fever  Negative for activity change, appetite change and unexpected weight change  HENT: Positive for congestion, rhinorrhea and sore throat  Eyes: Negative for visual disturbance  Respiratory: Positive for cough (dry)  Negative for chest tightness and shortness of breath  Cardiovascular: Negative for chest pain and palpitations  Gastrointestinal: Negative for abdominal pain, constipation, diarrhea, nausea and vomiting  Genitourinary: Negative for difficulty urinating  Musculoskeletal: Positive for myalgias  Negative for arthralgias, joint swelling and neck pain  Skin: Negative for rash  Neurological: Positive for headaches  Negative for dizziness, weakness and numbness  Hematological: Does not bruise/bleed easily  Psychiatric/Behavioral: Negative for agitation, behavioral problems and confusion       Current Outpatient Medications on File Prior to Visit   Medication Sig    Ascorbic Acid (VITAMIN C) 1000 MG tablet Take 1 tablet by mouth daily    atorvastatin (LIPITOR) 20 mg tablet TAKE 1 TABLET DAILY    Biotin 5000 MCG CAPS Take 1 capsule by mouth daily    cholecalciferol (VITAMIN D3) 1,000 units tablet Take 2,000 Units by mouth daily    CINNAMON PO Take 2,000 mg by mouth daily in the early morning    diphenhydrAMINE-acetaminophen (TYLENOL PM)  MG TABS Take 1 tablet by mouth daily at bedtime as needed for sleep    doxycycline hyclate (VIBRA-TABS) 100 mg tablet TAKE 1 TABLET BY MOUTH AN HOUR BEFORE DINNER WITH WATER ONLY, REMAIN UPRIGHT FOR ONE HOUR AFTERWARDS    esomeprazole (NexIUM) 40 MG capsule Take 1 capsule by mouth daily    fluticasone (FLONASE) 50 mcg/act nasal spray USE 2 SPRAYS IN EACH NOSTRIL DAILY    levothyroxine 125 mcg tablet TAKE 1 TABLET DAILY ON AN EMPTY STOMACH    Magnesium 250 MG TABS Take 250 mg by mouth in the morning    MECLIZINE HCL PO Take 25 mg by mouth daily as needed    Melatonin-Theanine 10-5 5 MG TABS Take 1 tablet by mouth as needed    Menthol, Topical Analgesic, (BIOFREEZE EX) Apply 1 application topically daily as needed (pain)    metoprolol tartrate (LOPRESSOR) 25 mg tablet Take one tablet as needed for palpitations    metroNIDAZOLE (NORITRATE) 1 % cream Apply 1 application  topically daily    Multiple Vitamin (MULTIVITAMIN) capsule Take 1 capsule by mouth daily    Probiotic Product (ALIGN PO) Take 1 capsule by mouth in the morning    psyllium (METAMUCIL) 58 6 % powder Take 1 packet by mouth daily    semaglutide, 0 25 or 0 5 mg/dose, (Ozempic) 2 mg/1 5 mL injection pen Inject 0 19 mL (0 25 mg total) under the skin every 7 days    Turmeric 500 MG CAPS Take 1,000 mg by mouth daily in the early morning    [DISCONTINUED] naproxen sodium (ALEVE) 220 MG tablet Take by mouth       Objective: There were no vitals taken for this visit  Physical Exam  Constitutional:       Appearance: She is obese  She is not ill-appearing  Comments: HR of 107 on watch  SpO2 97 % on room air at home  Eyes:      General: No scleral icterus  Pulmonary:      Effort: Pulmonary effort is normal       Comments: No conversational dyspnea  Neurological:      General: No focal deficit present  Mental Status: She is alert and oriented to person, place, and time     Psychiatric:         Mood and Affect: Mood normal  Nutrition Assessment and Intervention:     Reviewed food recall journal      Physical Activity Assessment and Intervention:    Activity journal reviewed      Emotional and Mental Well-being, Sleep, Connectedness Assessment and Intervention:    Sleep/stress assessment performed    Depression and anxiety screening performed and reviewed      Tobacco and Toxic Substance Assessment and Intervention:     Tobacco use screening performed    Alcohol and drug use screening performed      Therapeutic Lifestyle Change Visit:     One-on-one comprehensive counseling, coaching, and health behavior change visit completed        Mary Masterson MD

## 2023-05-02 NOTE — ASSESSMENT & PLAN NOTE
Patient has hx of intermittient tachycardia and has metoprolol tartrate 25 mg prn for tachycardia  HR at home ranged from 107 - 132  Plan:  Take Metoprolol 12 5 mg po bid today and monitor HR and BP  Call in 2 days for f/u of vitals  Call sooner if s/s worsen

## 2023-05-04 ENCOUNTER — TELEPHONE (OUTPATIENT)
Dept: INTERNAL MEDICINE CLINIC | Facility: CLINIC | Age: 78
End: 2023-05-04

## 2023-05-04 NOTE — TELEPHONE ENCOUNTER
Patient is calling in today with an update for you:   Blood pressure this morning was 127/76, heart rate was 91, oxygen 97, and temperature 98 3      Please advise

## 2023-05-05 ENCOUNTER — TELEPHONE (OUTPATIENT)
Dept: INTERNAL MEDICINE CLINIC | Facility: CLINIC | Age: 78
End: 2023-05-05

## 2023-05-05 NOTE — TELEPHONE ENCOUNTER
I spoke with Dr Benítez Gip he requested the patient complete her vital signs and if elevated she would need to go to the ER otherwise she can stop the paxlovid  The patient was advised  BP      151/72  Pulse  80  O2      97  Temp 98  The patient will stop the Paxlovid and check her vitals over the weekend  If she worsens she will go to the ER

## 2023-05-05 NOTE — TELEPHONE ENCOUNTER
Your patients PCP and she needed this address prior to weekend  Patient had a virtual with resident on 5/2/23 because she was covid positive and she was given paxlovid  Today she feels like she wants to jump out of skin  No pain but feels nervious and she just wants to jump out of skin       Please advise

## 2023-05-09 NOTE — TELEPHONE ENCOUNTER
I spoke with the patient to obtain an update  Right before lunch she had a temp of 100 7  She feels as if she is getting a cold she has a runny nose, no other symptoms,  No shortness of breath  Vital signs are   BP  132/66  P  90  O2  95  Current temp 99 2    The patient is taking tylenol for temps

## 2023-05-10 ENCOUNTER — TELEMEDICINE (OUTPATIENT)
Dept: INTERNAL MEDICINE CLINIC | Facility: CLINIC | Age: 78
End: 2023-05-10

## 2023-05-10 DIAGNOSIS — U07.1 COVID-19: ICD-10-CM

## 2023-05-10 DIAGNOSIS — J01.30 ACUTE NON-RECURRENT SPHENOIDAL SINUSITIS: Primary | ICD-10-CM

## 2023-05-10 RX ORDER — AMOXICILLIN AND CLAVULANATE POTASSIUM 875; 125 MG/1; MG/1
1 TABLET, FILM COATED ORAL EVERY 12 HOURS SCHEDULED
Qty: 20 TABLET | Refills: 0 | Status: SHIPPED | OUTPATIENT
Start: 2023-05-10 | End: 2023-05-16

## 2023-05-10 NOTE — PROGRESS NOTES
COVID-19 Outpatient Progress Note    Assessment/Plan:    Problem List Items Addressed This Visit        Respiratory    Acute non-recurrent sphenoidal sinusitis - Primary     Rx for Augmentin 875 mg twice daily x10 days, Flonase 2 sprays each nostril once daily x10 days plenty fluids and rest         Relevant Medications    amoxicillin-clavulanate (Augmentin) 875-125 mg per tablet       Other    COVID-19     She has been on the treatment  She is now on day #9 of symptoms she did discontinue paxlovid after today secondary to severe anxiety her symptoms at this point time are consistent with a secondary sinus infection/sphenoid sinus infection plenty of fluids, rest may use Tylenol as directed  Rx for Augmentin 875 mg twice daily x10 days use Flonase 2 sprays each nostril once daily for 10 days if symptoms are not improving within 72 hours please contact the office for an in office evaluation  If any shortness of breath or high persistent temperature please go immediately to the ER RTO as scheduled call if any change, worse or symptoms do not jahaira           Disposition:     I have spent a total time of 20 minutes on the day of the encounter for this patient including risks and benefits of treatment options, instructions for management, impressions and counseling/coordination of care  Encounter provider: Jason Guan DO     Provider located at: [address] [address] [address]     Recent Visits  Date Type Provider Dept   05/10/23 [id number] [facility], [facility]   05/05/23 Telephone Jason Guan [facility]   05/04/23 Telephone Jason Guan DO [facility] recent visits within past 7 days and meeting all other requirements  Future Appointments  No visits were found meeting these conditions    Showing future appointments within next 150 days and meeting all other requirements     This virtual check-in was done via [address] and patient was informed that this is a secure, HIPAA-compliant platform  She agrees to proceed  Patient agrees to participate in a virtual check in via telephone or video visit instead of presenting to the office to address urgent/immediate medical needs  Patient is aware this is a billable service  She acknowledged consent and understanding of privacy and security of the video platform  The patient has agreed to participate and understands they can discontinue the visit at any time  After connecting through [facility], the patient was identified by name and date of birth  Greg Bojorquez was informed that this was a telemedicine visit and that the exam was being conducted confidentially over secure lines  My office door was closed  No one else was in the room  Greg Bojorquez acknowledged consent and understanding of privacy and security of the telemedicine visit  I informed the patient that I have reviewed her record in Epic and presented the opportunity for her to ask any questions regarding the visit today  The patient agreed to participate  Verification of patient location:  Patient is located in the following state in which I hold an active license: [place]    Subjective:   Greg Bojorquez is a 70 y o  female who has been screened for COVID-19  Symptom change since last report: unchanged  Patient's symptoms include fever, chills, nasal congestion and cough  Patient denies shortness of breath  COVID-19 vaccination status: Fully vaccinated with booster    Patient has developed pressure behind her left eye and maxillary sinus patient is on day #9 of symptoms unfortunately she did not tolerate paxlovid secondary to severe anxiety she needed to discontinue after 2 days of the medication  The patient does report to me congestion postnasal drip sinus pressure no shortness of breath low-grade temperature 99 9      Lab Results Component Value Date    SARSCOV2 Negative 02/03/2022    SARSCOV2 Not Detected 12/09/2020       Review of Systems   Constitutional: Positive for chills and fever  HENT: Positive for congestion  Respiratory: Positive for cough  Negative for shortness of breath  Current Outpatient Medications on File Prior to Visit   Medication Sig   • Ascorbic Acid (VITAMIN C) 1000 MG tablet Take 1 tablet by mouth daily   • atorvastatin (LIPITOR) 20 mg tablet TAKE 1 TABLET DAILY   • Biotin 5000 MCG CAPS Take 1 capsule by mouth daily   • cholecalciferol (VITAMIN D3) 1,000 units tablet Take 2,000 Units by mouth daily   • CINNAMON PO Take 2,000 mg by mouth daily in the early morning   • diphenhydrAMINE-acetaminophen (TYLENOL PM)  MG TABS Take 1 tablet by mouth daily at bedtime as needed for sleep   • doxycycline hyclate (VIBRA-TABS) 100 mg tablet TAKE 1 TABLET BY MOUTH AN HOUR BEFORE DINNER WITH WATER ONLY, REMAIN UPRIGHT FOR ONE HOUR AFTERWARDS   • esomeprazole (NexIUM) 40 MG capsule Take 1 capsule by mouth daily   • fluticasone (FLONASE) 50 mcg/act nasal spray USE 2 SPRAYS IN EACH NOSTRIL DAILY   • guaiFENesin (MUCINEX) 600 mg 12 hr tablet Take 2 tablets (1,200 mg total) by mouth every 12 (twelve) hours   • levothyroxine 125 mcg tablet TAKE 1 TABLET DAILY ON AN EMPTY STOMACH   • Magnesium 250 MG TABS Take 250 mg by mouth in the morning   • MECLIZINE HCL PO Take 25 mg by mouth daily as needed   • Melatonin-Theanine 10-5 5 MG TABS Take 1 tablet by mouth as needed   • Menthol, Topical Analgesic, (BIOFREEZE EX) Apply 1 application topically daily as needed (pain)   • metoprolol tartrate (LOPRESSOR) 25 mg tablet Take one tablet as needed for palpitations   • metroNIDAZOLE (NORITRATE) 1 % cream Apply 1 application   topically daily   • Multiple Vitamin (MULTIVITAMIN) capsule Take 1 capsule by mouth daily   • Probiotic Product (ALIGN PO) Take 1 capsule by mouth in the morning   • psyllium (METAMUCIL) 58 6 % powder Take 1 packet by mouth daily   • semaglutide, 0 25 or 0 5 mg/dose, (Ozempic) 2 mg/1 5 mL injection pen Inject 0 19 mL (0 25 mg total) under the skin every 7 days   • Turmeric 500 MG CAPS Take 1,000 mg by mouth daily in the early morning   • [DISCONTINUED] naproxen sodium (ALEVE) 220 MG tablet Take by mouth       Objective: There were no vitals taken for this visit         Physical Exam no respiratory distress  Flores Washington DO

## 2023-05-11 PROBLEM — J01.30 ACUTE NON-RECURRENT SPHENOIDAL SINUSITIS: Status: ACTIVE | Noted: 2023-05-11

## 2023-05-11 PROBLEM — U07.1 COVID-19: Status: ACTIVE | Noted: 2023-05-11

## 2023-05-11 NOTE — ASSESSMENT & PLAN NOTE
Rx for Augmentin 875 mg twice daily x10 days, Flonase 2 sprays each nostril once daily x10 days plenty fluids and rest

## 2023-05-11 NOTE — ASSESSMENT & PLAN NOTE
She has been on the treatment  She is now on day #9 of symptoms she did discontinue paxlovid after today secondary to severe anxiety her symptoms at this point time are consistent with a secondary sinus infection/sphenoid sinus infection plenty of fluids, rest may use Tylenol as directed  Rx for Augmentin 875 mg twice daily x10 days use Flonase 2 sprays each nostril once daily for 10 days if symptoms are not improving within 72 hours please contact the office for an in office evaluation    If any shortness of breath or high persistent temperature please go immediately to the ER RTO as scheduled call if any change, worse or symptoms do not jahaira

## 2023-05-12 ENCOUNTER — TELEPHONE (OUTPATIENT)
Dept: INTERNAL MEDICINE CLINIC | Facility: CLINIC | Age: 78
End: 2023-05-12

## 2023-05-13 ENCOUNTER — HOSPITAL ENCOUNTER (INPATIENT)
Facility: HOSPITAL | Age: 78
LOS: 3 days | Discharge: HOME | End: 2023-05-16
Attending: EMERGENCY MEDICINE | Admitting: FAMILY MEDICINE

## 2023-05-13 ENCOUNTER — NURSE TRIAGE (OUTPATIENT)
Dept: OTHER | Facility: OTHER | Age: 78
End: 2023-05-13

## 2023-05-13 ENCOUNTER — APPOINTMENT (EMERGENCY)
Dept: RADIOLOGY | Facility: HOSPITAL | Age: 78
End: 2023-05-13

## 2023-05-13 ENCOUNTER — APPOINTMENT (EMERGENCY)
Dept: CT IMAGING | Facility: HOSPITAL | Age: 78
End: 2023-05-13

## 2023-05-13 DIAGNOSIS — I65.22 LEFT CAROTID STENOSIS: ICD-10-CM

## 2023-05-13 DIAGNOSIS — Q21.12 PFO (PATENT FORAMEN OVALE): ICD-10-CM

## 2023-05-13 DIAGNOSIS — R41.89 COGNITIVE CHANGE: Primary | ICD-10-CM

## 2023-05-13 DIAGNOSIS — R47.01 EXPRESSIVE APHASIA: ICD-10-CM

## 2023-05-13 DIAGNOSIS — I25.3 ATRIAL SEPTAL ANEURYSM: ICD-10-CM

## 2023-05-13 DIAGNOSIS — I63.30 CEREBRAL THROMBOSIS WITH CEREBRAL INFARCTION (HCC): ICD-10-CM

## 2023-05-13 DIAGNOSIS — I63.9 ACUTE CVA (CEREBROVASCULAR ACCIDENT) (HCC): Primary | ICD-10-CM

## 2023-05-13 DIAGNOSIS — I65.22 ATHEROSCLEROSIS OF LEFT CAROTID ARTERY: ICD-10-CM

## 2023-05-13 DIAGNOSIS — I65.29 CAROTID STENOSIS: ICD-10-CM

## 2023-05-13 PROBLEM — R00.2 PALPITATIONS: Status: ACTIVE | Noted: 2023-05-13

## 2023-05-13 LAB
ALBUMIN SERPL BCP-MCNC: 4.2 G/DL (ref 3.5–5)
ALP SERPL-CCNC: 37 U/L (ref 34–104)
ALT SERPL W P-5'-P-CCNC: 16 U/L (ref 7–52)
ANION GAP SERPL CALCULATED.3IONS-SCNC: 9 MMOL/L (ref 4–13)
AST SERPL W P-5'-P-CCNC: 19 U/L (ref 13–39)
BASOPHILS # BLD AUTO: 0.05 THOUSANDS/ÂΜL (ref 0–0.1)
BASOPHILS NFR BLD AUTO: 1 % (ref 0–1)
BILIRUB SERPL-MCNC: 0.45 MG/DL (ref 0.2–1)
BILIRUB UR QL STRIP: NEGATIVE
BUN SERPL-MCNC: 10 MG/DL (ref 5–25)
CALCIUM SERPL-MCNC: 9.3 MG/DL (ref 8.4–10.2)
CARDIAC TROPONIN I PNL SERPL HS: 3 NG/L
CHLORIDE SERPL-SCNC: 102 MMOL/L (ref 96–108)
CLARITY UR: CLEAR
CO2 SERPL-SCNC: 25 MMOL/L (ref 21–32)
COLOR UR: COLORLESS
CREAT SERPL-MCNC: 0.52 MG/DL (ref 0.6–1.3)
EOSINOPHIL # BLD AUTO: 0.07 THOUSAND/ÂΜL (ref 0–0.61)
EOSINOPHIL NFR BLD AUTO: 1 % (ref 0–6)
ERYTHROCYTE [DISTWIDTH] IN BLOOD BY AUTOMATED COUNT: 14.6 % (ref 11.6–15.1)
GFR SERPL CREATININE-BSD FRML MDRD: 93 ML/MIN/1.73SQ M
GLUCOSE SERPL-MCNC: 113 MG/DL (ref 65–140)
GLUCOSE SERPL-MCNC: 150 MG/DL (ref 65–140)
GLUCOSE UR STRIP-MCNC: NEGATIVE MG/DL
HCT VFR BLD AUTO: 41.9 % (ref 34.8–46.1)
HGB BLD-MCNC: 13.5 G/DL (ref 11.5–15.4)
HGB UR QL STRIP.AUTO: NEGATIVE
HOLD SPECIMEN: NORMAL
IMM GRANULOCYTES # BLD AUTO: 0.03 THOUSAND/UL (ref 0–0.2)
IMM GRANULOCYTES NFR BLD AUTO: 0 % (ref 0–2)
KETONES UR STRIP-MCNC: ABNORMAL MG/DL
LACTATE SERPL-SCNC: 0.9 MMOL/L (ref 0.5–2)
LEUKOCYTE ESTERASE UR QL STRIP: NEGATIVE
LYMPHOCYTES # BLD AUTO: 1.21 THOUSANDS/ÂΜL (ref 0.6–4.47)
LYMPHOCYTES NFR BLD AUTO: 16 % (ref 14–44)
MAGNESIUM SERPL-MCNC: 1.8 MG/DL (ref 1.9–2.7)
MCH RBC QN AUTO: 28.5 PG (ref 26.8–34.3)
MCHC RBC AUTO-ENTMCNC: 32.2 G/DL (ref 31.4–37.4)
MCV RBC AUTO: 88 FL (ref 82–98)
MONOCYTES # BLD AUTO: 0.45 THOUSAND/ÂΜL (ref 0.17–1.22)
MONOCYTES NFR BLD AUTO: 6 % (ref 4–12)
NEUTROPHILS # BLD AUTO: 6.02 THOUSANDS/ÂΜL (ref 1.85–7.62)
NEUTS SEG NFR BLD AUTO: 76 % (ref 43–75)
NITRITE UR QL STRIP: NEGATIVE
NRBC BLD AUTO-RTO: 0 /100 WBCS
PH UR STRIP.AUTO: 7 [PH]
PLATELET # BLD AUTO: 346 THOUSANDS/UL (ref 149–390)
PMV BLD AUTO: 10.4 FL (ref 8.9–12.7)
POTASSIUM SERPL-SCNC: 3.9 MMOL/L (ref 3.5–5.3)
PROT SERPL-MCNC: 6.4 G/DL (ref 6.4–8.4)
PROT UR STRIP-MCNC: NEGATIVE MG/DL
RBC # BLD AUTO: 4.74 MILLION/UL (ref 3.81–5.12)
SODIUM SERPL-SCNC: 136 MMOL/L (ref 135–147)
SP GR UR STRIP.AUTO: 1 (ref 1–1.03)
T4 FREE SERPL-MCNC: 1.33 NG/DL (ref 0.76–1.46)
TSH SERPL DL<=0.05 MIU/L-ACNC: 0.29 UIU/ML (ref 0.45–4.5)
UROBILINOGEN UR STRIP-ACNC: <2 MG/DL
WBC # BLD AUTO: 7.83 THOUSAND/UL (ref 4.31–10.16)

## 2023-05-13 RX ORDER — MAGNESIUM SULFATE HEPTAHYDRATE 40 MG/ML
2 INJECTION, SOLUTION INTRAVENOUS ONCE
Status: DISCONTINUED | OUTPATIENT
Start: 2023-05-13 | End: 2023-05-13

## 2023-05-13 RX ORDER — ACETAMINOPHEN,DIPHENHYDRAMINE HCL 500; 25 MG/1; MG/1
1 TABLET, FILM COATED ORAL
Status: DISCONTINUED | OUTPATIENT
Start: 2023-05-13 | End: 2023-05-14 | Stop reason: CLARIF

## 2023-05-13 RX ORDER — ATORVASTATIN CALCIUM 40 MG/1
40 TABLET, FILM COATED ORAL
Status: DISCONTINUED | OUTPATIENT
Start: 2023-05-13 | End: 2023-05-16 | Stop reason: HOSPADM

## 2023-05-13 RX ORDER — LEVOTHYROXINE SODIUM 0.12 MG/1
125 TABLET ORAL
Status: DISCONTINUED | OUTPATIENT
Start: 2023-05-14 | End: 2023-05-16 | Stop reason: HOSPADM

## 2023-05-13 RX ORDER — ASPIRIN 81 MG/1
81 TABLET, CHEWABLE ORAL DAILY
Status: DISCONTINUED | OUTPATIENT
Start: 2023-05-14 | End: 2023-05-14

## 2023-05-13 RX ORDER — LANOLIN ALCOHOL/MO/W.PET/CERES
6 CREAM (GRAM) TOPICAL
Status: DISCONTINUED | OUTPATIENT
Start: 2023-05-13 | End: 2023-05-14

## 2023-05-13 RX ORDER — CLOPIDOGREL BISULFATE 75 MG/1
300 TABLET ORAL ONCE
Status: COMPLETED | OUTPATIENT
Start: 2023-05-13 | End: 2023-05-13

## 2023-05-13 RX ORDER — PANTOPRAZOLE SODIUM 40 MG/1
40 TABLET, DELAYED RELEASE ORAL
Status: DISCONTINUED | OUTPATIENT
Start: 2023-05-14 | End: 2023-05-16 | Stop reason: HOSPADM

## 2023-05-13 RX ORDER — UREA 10 %
250 LOTION (ML) TOPICAL EVERY MORNING
Status: DISCONTINUED | OUTPATIENT
Start: 2023-05-14 | End: 2023-05-16 | Stop reason: HOSPADM

## 2023-05-13 RX ORDER — LANOLIN ALCOHOL/MO/W.PET/CERES
3 CREAM (GRAM) TOPICAL
Status: DISCONTINUED | OUTPATIENT
Start: 2023-05-13 | End: 2023-05-14

## 2023-05-13 RX ORDER — ENOXAPARIN SODIUM 100 MG/ML
40 INJECTION SUBCUTANEOUS DAILY
Status: DISCONTINUED | OUTPATIENT
Start: 2023-05-14 | End: 2023-05-14

## 2023-05-13 RX ORDER — ATORVASTATIN CALCIUM 40 MG/1
40 TABLET, FILM COATED ORAL EVERY EVENING
Status: DISCONTINUED | OUTPATIENT
Start: 2023-05-13 | End: 2023-05-13

## 2023-05-13 RX ORDER — INSULIN LISPRO 100 [IU]/ML
1-6 INJECTION, SOLUTION INTRAVENOUS; SUBCUTANEOUS
Status: DISCONTINUED | OUTPATIENT
Start: 2023-05-13 | End: 2023-05-16 | Stop reason: HOSPADM

## 2023-05-13 RX ORDER — MELATONIN 5 MG
1 TABLET,DISINTEGRATING ORAL DAILY PRN
Status: DISCONTINUED | OUTPATIENT
Start: 2023-05-13 | End: 2023-05-13

## 2023-05-13 RX ORDER — ACETAMINOPHEN 325 MG/1
975 TABLET ORAL ONCE
Status: COMPLETED | OUTPATIENT
Start: 2023-05-13 | End: 2023-05-13

## 2023-05-13 RX ORDER — LANOLIN ALCOHOL/MO/W.PET/CERES
400 CREAM (GRAM) TOPICAL ONCE
Status: COMPLETED | OUTPATIENT
Start: 2023-05-13 | End: 2023-05-13

## 2023-05-13 RX ORDER — MELATONIN
2000 DAILY
Status: DISCONTINUED | OUTPATIENT
Start: 2023-05-14 | End: 2023-05-16 | Stop reason: HOSPADM

## 2023-05-13 RX ORDER — ASPIRIN 325 MG
325 TABLET ORAL ONCE
Status: COMPLETED | OUTPATIENT
Start: 2023-05-13 | End: 2023-05-13

## 2023-05-13 RX ORDER — ATORVASTATIN CALCIUM 40 MG/1
80 TABLET, FILM COATED ORAL
Status: DISCONTINUED | OUTPATIENT
Start: 2023-05-13 | End: 2023-05-13

## 2023-05-13 RX ORDER — CLOPIDOGREL BISULFATE 75 MG/1
75 TABLET ORAL DAILY
Status: DISCONTINUED | OUTPATIENT
Start: 2023-05-14 | End: 2023-05-14

## 2023-05-13 RX ADMIN — IOHEXOL 60 ML: 350 INJECTION, SOLUTION INTRAVENOUS at 15:17

## 2023-05-13 RX ADMIN — ASPIRIN 325 MG ORAL TABLET 325 MG: 325 PILL ORAL at 17:25

## 2023-05-13 RX ADMIN — CLOPIDOGREL BISULFATE 300 MG: 75 TABLET ORAL at 17:24

## 2023-05-13 RX ADMIN — METOPROLOL TARTRATE 25 MG: 25 TABLET, FILM COATED ORAL at 20:02

## 2023-05-13 RX ADMIN — MAGNESIUM OXIDE TAB 400 MG (241.3 MG ELEMENTAL MG) 400 MG: 400 (241.3 MG) TAB at 16:12

## 2023-05-13 RX ADMIN — ATORVASTATIN CALCIUM 40 MG: 40 TABLET, FILM COATED ORAL at 18:33

## 2023-05-13 RX ADMIN — Medication 6 MG: at 23:56

## 2023-05-13 RX ADMIN — SODIUM CHLORIDE 500 ML: 0.9 INJECTION, SOLUTION INTRAVENOUS at 12:44

## 2023-05-13 RX ADMIN — ACETAMINOPHEN 975 MG: 325 TABLET ORAL at 18:32

## 2023-05-13 NOTE — ED PROVIDER NOTES
"History  Chief Complaint   Patient presents with   • Altered Mental Status     Pt to ED with c/o feeling increased confusion since being dx with covid 11 days ago  Pt states \"I feel like I can't get things right\"     This is a 68-year-old female coming into the ED for evaluation of difficulty speaking, confusion for the past day and a half, starting around 8 PM on May 11, Thursday  Her  at bedside states this was a relatively abrupt change  She tested positive for COVID-19 11 days ago, she took Paxlovid for approximately 3 doses and then started to have some side effects including tingling and shaking and her doctor recommended stopping it  She denies any cough, shortness of breath, fevers or chills at this point  She denies any difficulty walking  She states she has a poor appetite  Her main complaint is difficulty getting words out, states the wrong words come out frequently, which is frustrating her, according to her   Patient is a difficult time expressing her self and the majority of the history is obtained from her  at bedside  History provided by:  Patient   used: No    Altered Mental Status      Prior to Admission Medications   Prescriptions Last Dose Informant Patient Reported? Taking?    Ascorbic Acid (VITAMIN C) 1000 MG tablet 5/12/2023  Yes Yes   Sig: Take 1 tablet by mouth daily   Biotin 5000 MCG CAPS 5/12/2023  Yes Yes   Sig: Take 1 capsule by mouth daily   CINNAMON PO Past Month  Yes Yes   Sig: Take 2,000 mg by mouth daily in the early morning   MECLIZINE HCL PO More than a month  Yes No   Sig: Take 25 mg by mouth daily as needed   Magnesium 250 MG TABS 5/13/2023  Yes Yes   Sig: Take 250 mg by mouth in the morning   Melatonin-Theanine 10-5 5 MG TABS 5/12/2023  Yes Yes   Sig: Take 1 tablet by mouth as needed   Menthol, Topical Analgesic, (BIOFREEZE EX) Past Week  Yes Yes   Sig: Apply 1 application topically daily as needed (pain)   Multiple Vitamin " (MULTIVITAMIN) capsule 5/12/2023  Yes Yes   Sig: Take 1 capsule by mouth daily   Probiotic Product (ALIGN PO) 5/12/2023  Yes Yes   Sig: Take 1 capsule by mouth in the morning   Turmeric 500 MG CAPS Not Taking  Yes No   Sig: Take 1,000 mg by mouth daily in the early morning   Patient not taking: Reported on 5/13/2023   amoxicillin-clavulanate (Augmentin) 875-125 mg per tablet 5/13/2023  No Yes   Sig: Take 1 tablet by mouth every 12 (twelve) hours for 10 days   atorvastatin (LIPITOR) 20 mg tablet 5/13/2023  No Yes   Sig: TAKE 1 TABLET DAILY   cholecalciferol (VITAMIN D3) 1,000 units tablet 5/12/2023  Yes Yes   Sig: Take 2,000 Units by mouth daily   diphenhydrAMINE-acetaminophen (TYLENOL PM)  MG TABS Past Week  Yes Yes   Sig: Take 1 tablet by mouth daily at bedtime as needed for sleep   doxycycline hyclate (VIBRA-TABS) 100 mg tablet Not Taking  Yes No   Sig: TAKE 1 TABLET BY MOUTH AN HOUR BEFORE DINNER WITH WATER ONLY, REMAIN UPRIGHT FOR ONE HOUR AFTERWARDS   Patient not taking: Reported on 5/13/2023   esomeprazole (NexIUM) 40 MG capsule 5/12/2023  Yes Yes   Sig: Take 1 capsule by mouth daily   fluticasone (FLONASE) 50 mcg/act nasal spray Past Week  No Yes   Sig: USE 2 SPRAYS IN EACH NOSTRIL DAILY   guaiFENesin (MUCINEX) 600 mg 12 hr tablet 5/12/2023  No Yes   Sig: Take 2 tablets (1,200 mg total) by mouth every 12 (twelve) hours   levothyroxine 125 mcg tablet 5/13/2023  No Yes   Sig: TAKE 1 TABLET DAILY ON AN EMPTY STOMACH   metoprolol tartrate (LOPRESSOR) 25 mg tablet Past Week  No Yes   Sig: Take one tablet as needed for palpitations   metroNIDAZOLE (NORITRATE) 1 % cream Past Month  Yes Yes   Sig: Apply 1 application   topically daily   psyllium (METAMUCIL) 58 6 % powder Past Week  Yes Yes   Sig: Take 1 packet by mouth daily   semaglutide, 0 25 or 0 5 mg/dose, (Ozempic) 2 mg/1 5 mL injection pen Past Month  No Yes   Sig: Inject 0 19 mL (0 25 mg total) under the skin every 7 days      Facility-Administered Medications: None       Past Medical History:   Diagnosis Date   • Biliary dyskinesia    • CAP (community acquired pneumonia)     last assessed 8/17/16   • Deep vein thrombosis (DVT) of proximal vein of right lower extremity, unspecified chronicity ([place] [address] ) 8/10/2022   • Diabetes mellitus ([place] [address] ) 12/2018    Type 2 no insulin   • Disease of thyroid gland 2000   • GERD (gastroesophageal reflux disease)    • Hyperlipidemia    • Palpitations    • Pneumonia    • Shortness of breath    • SVT (supraventricular tachycardia) ([place] [address] ) 6/8/2022       Past Surgical History:   Procedure Laterality Date   • AUGMENTATION MAMMAPLASTY Bilateral 1998    breast surgery- with prosthetic implant 1998; pre-pectoral salline   • BREAST BIOPSY Left 1974   • BREAST BIOPSY Left 1994   • BREAST EXCISIONAL BIOPSY Left 1993   • CARDIAC CATHETERIZATION      procedure summary   • CHOLECYSTECTOMY      onset 2003   • HEMORRHOID SURGERY     • ROTATOR CUFF REPAIR Bilateral     onset 2010   • TUBAL LIGATION      onset 1980       Family History   Problem Relation Age of Onset   • Aneurysm Mother         cerebral   • Ovarian cancer Mother 67   • Cancer Mother    • Cirrhosis Father         alcoholic   • Alcohol abuse Father    • Aneurysm Brother         abdominal aortic; cerebral   • Diabetes Son    • No Known Problems Daughter    • No Known Problems Maternal Grandmother    • No Known Problems Maternal Grandfather    • No Known Problems Paternal Grandmother    • No Known Problems Paternal Grandfather    • No Known Problems Son    • No Known Problems Son    • No Known Problems Son    • No Known Problems Maternal Aunt    • No Known Problems Maternal Aunt    • No Known Problems Maternal Aunt    • No Known Problems Maternal Aunt    • No Known Problems Paternal Aunt    • No Known Problems Paternal Aunt    • No Known Problems Paternal Aunt    • No Known Problems Paternal Aunt    • No Known Problems Paternal Aunt    • Cancer Brother         bladder cancer     I have reviewed and agree with the history as documented  E-Cigarette/Vaping   • E-Cigarette Use Never User      E-Cigarette/Vaping Substances   • Nicotine No    • THC No    • CBD No    • Flavoring No    • Other No    • Unknown No      Social History     Tobacco Use   • Smoking status: Former     Packs/day: 0 25     Years: 17 00     Pack years: 4 25     Types: Cigarettes     Start date: 58     Quit date: 2010     Years since quittin 3   • Smokeless tobacco: Never   • Tobacco comments:     0 5 ppw intermittently last smoked    Vaping Use   • Vaping Use: Never used   Substance Use Topics   • Alcohol use: Yes     Alcohol/week: 2 0 standard drinks     Types: 2 Glasses of wine per week     Comment: Maybe 2 glasses a month   • Drug use: No       Review of Systems   Constitutional: Positive for appetite change and fatigue  Neurological: Positive for speech difficulty  All other systems reviewed and are negative  Physical Exam  Physical Exam  Vitals and nursing note reviewed  Constitutional:       Appearance: Normal appearance  She is well-developed and normal weight  HENT:      Head: Normocephalic and atraumatic  Right Ear: External ear normal       Left Ear: External ear normal       Nose: Nose normal       Mouth/Throat:      Mouth: Mucous membranes are moist       Pharynx: Oropharynx is clear  Eyes:      General: No visual field deficit or scleral icterus  Extraocular Movements: Extraocular movements intact  Right eye: Normal extraocular motion and no nystagmus  Left eye: Normal extraocular motion and no nystagmus  Conjunctiva/sclera: Conjunctivae normal       Pupils: Pupils are equal, round, and reactive to light  Right eye: Pupil is round and reactive  Left eye: Pupil is round and reactive  Cardiovascular:      Rate and Rhythm: Normal rate and regular rhythm  Pulses: Normal pulses  Heart sounds: Normal heart sounds     Pulmonary:      Effort: Pulmonary effort is normal       Breath sounds: Normal breath sounds  Abdominal:      General: Abdomen is flat  There is no distension  Palpations: Abdomen is soft  Tenderness: There is no abdominal tenderness  Musculoskeletal:         General: Normal range of motion  Cervical back: Normal range of motion and neck supple  Skin:     General: Skin is warm and dry  Capillary Refill: Capillary refill takes less than 2 seconds  Coloration: Skin is not jaundiced  Neurological:      General: No focal deficit present  Mental Status: She is alert  Mental status is at baseline  She is disoriented and confused  GCS: GCS eye subscore is 4  GCS verbal subscore is 4  GCS motor subscore is 6  Cranial Nerves: No cranial nerve deficit, dysarthria or facial asymmetry  Sensory: No sensory deficit  Motor: No weakness  Coordination: Coordination normal       Deep Tendon Reflexes: Reflexes normal       Comments: Moderate expressive aphasia  Psychiatric:         Mood and Affect: Mood is anxious           Behavior: Behavior normal          Vital Signs  ED Triage Vitals   Temperature Pulse Respirations Blood Pressure SpO2   05/13/23 1123 05/13/23 1123 05/13/23 1123 05/13/23 1123 05/13/23 1123   98 7 °F (37 1 °C) (!) 110 18 (!) 174/82 97 %      Temp Source Heart Rate Source Patient Position - Orthostatic VS BP Location FiO2 (%)   05/13/23 1800 05/13/23 1455 05/13/23 1455 05/13/23 1455 --   Oral Monitor Lying Right arm       Pain Score       05/13/23 1832       7           Vitals:    05/13/23 1745 05/13/23 1800 05/13/23 1900 05/13/23 2000   BP: 156/72 (!) 179/99 152/71 147/67   Pulse: 81 82 91 91   Patient Position - Orthostatic VS: Lying Lying Lying Lying         Visual Acuity  Visual Acuity    Flowsheet Row Most Recent Value   L Pupil Size (mm) 3   R Pupil Size (mm) 3   L Pupil Shape Round   R Pupil Shape Round          ED Medications  Medications   aspirin chewable tablet 81 mg (has no administration in time range)   clopidogrel (PLAVIX) tablet 75 mg (has no administration in time range)   cholecalciferol (VITAMIN D3) tablet 2,000 Units (has no administration in time range)   diphenhydrAMINE-acetaminophen (TYLENOL PM)  MG per tablet 1 tablet (has no administration in time range)   magnesium gluconate (MAGONATE) tablet 250 mg (has no administration in time range)   Melatonin-Theanine 10-5 5 MG TABS 1 tablet (has no administration in time range)   pantoprazole (PROTONIX) EC tablet 40 mg (has no administration in time range)   metroNIDAZOLE (NORITRATE) 1 % cream 1 application  (has no administration in time range)   psyllium (METAMUCIL) 1 packet (has no administration in time range)   metoprolol tartrate (LOPRESSOR) tablet 25 mg (25 mg Oral Given 5/13/23 2002)   levothyroxine tablet 125 mcg (has no administration in time range)   enoxaparin (LOVENOX) subcutaneous injection 40 mg (has no administration in time range)   atorvastatin (LIPITOR) tablet 40 mg (40 mg Oral Not Given 5/13/23 1857)   insulin lispro (HumaLOG) 100 units/mL subcutaneous injection 1-6 Units (1 Units Subcutaneous Refused 5/13/23 2001)   sodium chloride 0 9 % bolus 500 mL (0 mL Intravenous Stopped 5/13/23 1343)   magnesium Oxide (MAG-OX) tablet 400 mg (400 mg Oral Given 5/13/23 1612)   iohexol (OMNIPAQUE) 350 MG/ML injection (SINGLE-DOSE) 100 mL (60 mL Intravenous Given 5/13/23 1517)   clopidogrel (PLAVIX) tablet 300 mg (300 mg Oral Given 5/13/23 1724)   aspirin tablet 325 mg (325 mg Oral Given 5/13/23 1725)   acetaminophen (TYLENOL) tablet 975 mg (975 mg Oral Given 5/13/23 1832)       Diagnostic Studies  Results Reviewed     Procedure Component Value Units Date/Time    Blood culture #1 [[id number]] Collected: 05/13/23 1240    Lab Status: Preliminary result Specimen: Blood from Hand, Right Updated: 05/13/23 2001     Blood Culture Received in Microbiology Lab  Culture in Progress      Blood culture #2 [[id number]] Collected: 05/13/23 1240    Lab Status: Preliminary result Specimen: Blood from Arm, Right Updated: 05/13/23 2001     Blood Culture Received in Microbiology Lab  Culture in Progress  Comprehensive metabolic panel [[id number]]  (Abnormal) Collected: 05/13/23 1134    Lab Status: Final result Specimen: Blood from Arm, Left Updated: 05/13/23 1452     Sodium 136 mmol/L      Potassium 3 9 mmol/L      Chloride 102 mmol/L      CO2 25 mmol/L      ANION GAP 9 mmol/L      BUN 10 mg/dL      Creatinine 0 52 mg/dL      Glucose 113 mg/dL      Calcium 9 3 mg/dL      AST 19 U/L      ALT 16 U/L      Alkaline Phosphatase 37 U/L      Total Protein 6 4 g/dL      Albumin 4 2 g/dL      Total Bilirubin 0 45 mg/dL      eGFR 93 ml/min/1 73sq m     Narrative:      [place] guidelines for Chronic Kidney Disease (CKD):   •  Stage 1 with normal or high GFR (GFR > 90 mL/min/1 73 square meters)  •  Stage 2 Mild CKD (GFR = 60-89 mL/min/1 73 square meters)  •  Stage 3A Moderate CKD (GFR = 45-59 mL/min/1 73 square meters)  •  Stage 3B Moderate CKD (GFR = 30-44 mL/min/1 73 square meters)  •  Stage 4 Severe CKD (GFR = 15-29 mL/min/1 73 square meters)  •  Stage 5 End Stage CKD (GFR <15 mL/min/1 73 square meters)  Note: GFR calculation is accurate only with a steady state creatinine    TSH, 3rd generation with Free T4 reflex [[id number]]  (Abnormal) Collected: 05/13/23 1134    Lab Status: Final result Specimen: Blood from Arm, Left Updated: 05/13/23 1452     TSH 3RD GENERATON 0 295 uIU/mL     Magnesium [[id number]]  (Abnormal) Collected: 05/13/23 1134    Lab Status: Final result Specimen: Blood from Arm, Left Updated: 05/13/23 1452     Magnesium 1 8 mg/dL     T4, free [[id number]] Collected: 05/13/23 1134    Lab Status:  In process Specimen: Blood from Arm, Left Updated: 05/13/23 1452    HS Troponin 0hr (reflex protocol) [[id number]]  (Normal) Collected: 05/13/23 1134    Lab Status: Final result Specimen: Blood from Arm, Left Updated: 05/13/23 1328     hs TnI 0hr 3 ng/L     Lactic acid, plasma (w/reflex if result > 2 0) [[id number]]  (Normal) Collected: 05/13/23 1240    Lab Status: Final result Specimen: Blood from Arm, Right Updated: 05/13/23 1313     LACTIC ACID 0 9 mmol/L     Narrative:      Result may be elevated if tourniquet was used during collection  CBC and differential [[id number]]  (Abnormal) Collected: 05/13/23 1134    Lab Status: Final result Specimen: Blood from Arm, Left Updated: 05/13/23 1309     WBC 7 83 Thousand/uL      RBC 4 74 Million/uL      Hemoglobin 13 5 g/dL      Hematocrit 41 9 %      MCV 88 fL      MCH 28 5 pg      MCHC 32 2 g/dL      RDW 14 6 %      MPV 10 4 fL      Platelets 344 Thousands/uL      nRBC 0 /100 WBCs      Neutrophils Relative 76 %      Immat GRANS % 0 %      Lymphocytes Relative 16 %      Monocytes Relative 6 %      Eosinophils Relative 1 %      Basophils Relative 1 %      Neutrophils Absolute 6 02 Thousands/µL      Immature Grans Absolute 0 03 Thousand/uL      Lymphocytes Absolute 1 21 Thousands/µL      Monocytes Absolute 0 45 Thousand/µL      Eosinophils Absolute 0 07 Thousand/µL      Basophils Absolute 0 05 Thousands/µL     Willard draw [[id number]] Collected: 05/13/23 1134    Lab Status: Final result Specimen: Blood from Arm, Left Updated: 05/13/23 1301    Narrative: The following orders were created for panel order Willard draw    Procedure                               Abnormality         Status                     ---------                               -----------         ------                     Derrell Friday Top on SZLU[[id number]]                           Final result               Gold top on SVPS[[id number]]                                 Final result               Green / Yellow tube on PVBU[[id number]]                      Final result               Green / Black tube on MPHQ[[id number]]                       Final result               Lavender Top 3 ml on WOZP[[id number]] Final result                 Please view results for these tests on the individual orders  UA w Reflex to Microscopic w Reflex to Culture [[id number]]  (Abnormal) Collected: 05/13/23 1242    Lab Status: Final result Specimen: Urine, Clean Catch Updated: 05/13/23 1256     Color, UA Colorless     Clarity, UA Clear     Specific [place], UA 1 003     pH, UA 7 0     Leukocytes, UA Negative     Nitrite, UA Negative     Protein, UA Negative mg/dl      Glucose, UA Negative mg/dl      Ketones, UA Trace mg/dl      Urobilinogen, UA <2 0 mg/dl      Bilirubin, UA Negative     Occult Blood, UA Negative                 CTA head and neck with and without contrast   Final Result by Poncho Cohn MD (05/13 1652)      No evidence of acute intracranial hemorrhage  Acute to subacute ischemia identified in the left temporal lobe and left frontal corona radiata  Proximal left cervical ICA 5 mm intraluminal thrombus  Left M3 MCA occlusion  I personally communicated this study with GALE BEY on 5/13/2023 4:50 PM                         Workstation performed: [id number]         XR chest 1 view portable   Final Result by Kamilla Ornelas MD (05/13 1946)      No acute cardiopulmonary disease                 Workstation performed: [id number]                    Procedures  Procedures         ED Course                  Stroke Assessment     Row Name 05/13/23 1224             NIH Stroke Scale    Interval Baseline      Level of Consciousness (1a ) 0      LOC Questions (1b ) 1      LOC Commands (1c ) 0      Best Gaze (2 ) 0      Visual (3 ) 0      Facial Palsy (4 ) 0      Motor Arm, Left (5a ) 0      Motor Arm, Right (5b ) 0      Motor Leg, Left (6a ) 0      Motor Leg, Right (6b ) 0      Limb Ataxia (7 ) 0      Sensory (8 ) 0      Best Language (9 ) 2      Dysarthria (10 ) 0      Extinction and Inattention (11 ) (Formerly Neglect) 0      Total 3              Flowsheet Row Most Recent Value Thrombolytic Decision Options    Thrombolytic Decision Patient not a candidate  Patient is not a candidate options Unclear time of onset outside appropriate time window  SBIRT 20yo+    Flowsheet Row Most Recent Value   Initial Alcohol Screen: US AUDIT-C     1  How often do you have a drink containing alcohol? 0 Filed at: 05/13/2023 1531   2  How many drinks containing alcohol do you have on a typical day you are drinking? 0 Filed at: 05/13/2023 1531   3a  Male UNDER 65: How often do you have five or more drinks on one occasion? 0 Filed at: 05/13/2023 1531   3b  FEMALE Any Age, or MALE 65+: How often do you have 4 or more drinks on one occassion? 0 Filed at: 05/13/2023 1531   Audit-C Score 0 Filed at: 05/13/2023 1531   GABINO: How many times in the past year have you    Used an illegal drug or used a prescription medication for non-medical reasons? Never Filed at: 05/13/2023 1531                    Medical Decision Making  70-year-old female with a day and a half of expressive aphasia  It was sudden onset 9 PM Thursday night  Stroke scale 3, out of the window for stroke alert  CTA head neck reveals subacute ischemia left MCA distribution with an M3 thrombus present  Case discussed with neurology, who recommends no acute intervention, admit for aspirin, Plavix load and stroke work-up  Acute CVA (cerebrovascular accident) [facility]): acute illness or injury  Expressive aphasia: acute illness or injury  Amount and/or Complexity of Data Reviewed  Labs: ordered  Radiology: ordered  Risk  OTC drugs  Prescription drug management  Decision regarding hospitalization            Disposition  Final diagnoses:   Acute CVA (cerebrovascular accident) [facility])   Expressive aphasia     Time reflects when diagnosis was documented in both MDM as applicable and the Disposition within this note     Time User Action Codes Description Comment    5/13/2023  5:26 PM Elizabeth Lott Duty Add [I63 9] Acute CVA (cerebrovascular accident) [facility])     5/13/2023  5:26 PM Stacy Eubanks Add [R47 01] Expressive aphasia       ED Disposition     ED Disposition   Admit    Condition   Stable    Date/Time   Sat May 13, 2023  5:26 PM    Comment   Case was discussed with Dr Angeline Knapp and the patient's admission status was agreed to be Admission Status: inpatient status to the service of Dr Angeline Knapp              Follow-up Information    None         Current Discharge Medication List      CONTINUE these medications which have NOT CHANGED    Details   amoxicillin-clavulanate (Augmentin) 875-125 mg per tablet Take 1 tablet by mouth every 12 (twelve) hours for 10 days  Qty: 20 tablet, Refills: 0    Associated Diagnoses: Acute non-recurrent sphenoidal sinusitis      Ascorbic Acid (VITAMIN C) 1000 MG tablet Take 1 tablet by mouth daily      atorvastatin (LIPITOR) 20 mg tablet TAKE 1 TABLET DAILY  Qty: 90 tablet, Refills: 3    Associated Diagnoses: Hyperlipidemia, unspecified hyperlipidemia type      Biotin 5000 MCG CAPS Take 1 capsule by mouth daily      cholecalciferol (VITAMIN D3) 1,000 units tablet Take 2,000 Units by mouth daily      CINNAMON PO Take 2,000 mg by mouth daily in the early morning      diphenhydrAMINE-acetaminophen (TYLENOL PM)  MG TABS Take 1 tablet by mouth daily at bedtime as needed for sleep      esomeprazole (NexIUM) 40 MG capsule Take 1 capsule by mouth daily      fluticasone (FLONASE) 50 mcg/act nasal spray USE 2 SPRAYS IN EACH NOSTRIL DAILY  Qty: 48 g, Refills: 3    Associated Diagnoses: Acute non-recurrent sinusitis, unspecified location      guaiFENesin (MUCINEX) 600 mg 12 hr tablet Take 2 tablets (1,200 mg total) by mouth every 12 (twelve) hours  Qty: 20 tablet, Refills: 9    Associated Diagnoses: COVID-19      levothyroxine 125 mcg tablet TAKE 1 TABLET DAILY ON AN EMPTY STOMACH  Qty: 90 tablet, Refills: 3    Associated Diagnoses: Hypothyroidism, unspecified type      Magnesium 250 MG TABS Take 250 mg by mouth in the morning      Melatonin-Theanine 10-5 5 MG TABS Take 1 tablet by mouth as needed      Menthol, Topical Analgesic, (BIOFREEZE EX) Apply 1 application topically daily as needed (pain)      metoprolol tartrate (LOPRESSOR) 25 mg tablet Take one tablet as needed for palpitations  Qty: 90 tablet, Refills: 2    Associated Diagnoses: Palpitations      metroNIDAZOLE (NORITRATE) 1 % cream Apply 1 application  topically daily      Multiple Vitamin (MULTIVITAMIN) capsule Take 1 capsule by mouth daily      Probiotic Product (ALIGN PO) Take 1 capsule by mouth in the morning      psyllium (METAMUCIL) 58 6 % powder Take 1 packet by mouth daily      semaglutide, 0 25 or 0 5 mg/dose, (Ozempic) 2 mg/1 5 mL injection pen Inject 0 19 mL (0 25 mg total) under the skin every 7 days  Qty: 3 mL, Refills: 3    Associated Diagnoses: Type 2 diabetes mellitus without complication, without long-term current use of insulin (HCC)      doxycycline hyclate (VIBRA-TABS) 100 mg tablet TAKE 1 TABLET BY MOUTH AN HOUR BEFORE DINNER WITH WATER ONLY, REMAIN UPRIGHT FOR ONE HOUR AFTERWARDS      MECLIZINE HCL PO Take 25 mg by mouth daily as needed      Turmeric 500 MG CAPS Take 1,000 mg by mouth daily in the early morning             No discharge procedures on file      PDMP Review     None          ED Provider  Electronically Signed by           Sallie Orta DO  05/13/23 2031

## 2023-05-13 NOTE — H&P
[facility]  H&P  Name: Emilio Grullon 70 y o  female I MRN: [id number]  Unit/Bed#: S -01 I Date of Admission: 5/13/2023   Date of Service: 5/13/2023 I Hospital Day: 0      Assessment/Plan   * CVA (cerebral vascular accident) [facility])  Assessment & Plan  Presenting with expressive aphasia and frontal headache since 4/11  Tested positive for COVID-19 11 days ago and thought her symptoms were secondary to that infection  No other neurological deficits  Upon arrival in ED, patient was hypertensive with CTA head/neck that showed a left MCA occlusion  However patient was not a candidate for tPA due to >36 hours since event  On exam patient was able to communicate however did have expressive aphasia  · Neurology consulted  · Lipitor 40 daily  · ASA/Plavix daily  · Neurochecks  · Echocardiogram  · Lipid profile/A1c  · Begin metoprolol 25 daily  · Telemetry  · Consider cardiology consult if any arrhythmia arises or persistent palpitations  · Speech therapy  · Holding off on PT/OT since patient does not have any other neuro deficits and is able to ambulate independently     Palpitations  Assessment & Plan  Patient takes metoprolol 25 as needed for palpitations and has not taken it in the past 3 weeks  She did have a Holter monitor 2022 that showed 17 episodes of SVTs but was asymptomatic during those episodes  During episodes she did have symptoms to monitor showed NSR and occasional PVCs  · Continue telemetry  · Cardiology consult for any arrhythmia shown  · Metoprolol 25 daily, does not require permissive hypertension since > 36 hours since event    Type 2 diabetes mellitus without complication, without long-term current use of insulin (HCC)  Assessment & Plan  Lab Results   Component Value Date    HGBA1C 6 2 (H) 02/21/2023       No results for input(s): POCGLU in the last 72 hours      Blood Sugar Average: Last 72 hrs:     SSI  Diabetic diet    Hypothyroidism  Assessment & Plan  Continue levothyroxine    Hyperlipidemia  Assessment & Plan  Continue increased Lipitor 40 daily    Lymphedema  Assessment & Plan  Bilateral lower extremity edema present on exam   Patient states that this is chronic and does not take any home diuretic  Patient oral mucosa was dry on exam and reports despite drinking excessive fluids she is always thirsty the past few days  · Echocardiogram  · Compression stockings    VTE Pharmacologic Prophylaxis: VTE Score: 9 High Risk (Score >/= 5) - Pharmacological DVT Prophylaxis Ordered: enoxaparin (Lovenox)  Sequential Compression Devices Ordered  Code Status: Level 1 - Full Code   Discussion with family: Updated  () at bedside  Anticipated Length of Stay: Patient will be admitted on an inpatient basis with an anticipated length of stay of greater than 2 midnights secondary to cva  Chief Complaint: Expressive aphasia    History of Present Illness:  Margarita Almonte is a 70 y o  female with a PMH of hypothyroidism, DM, palpitations, HLD who presents with expressive aphasia and mild frontal headache since 4/11  Patient recently tested positive for COVID-19 infection 11 days ago and thought her symptoms were secondary to that infection  Since Thursday, patient's aphasia and headache have persisted without any improvement or worsening  Patient did not have any other neurological deficits  Upon arrival in ED, patient was hypertensive with CTA head/neck that showed a left MCA occlusion  However patient was not a candidate for tPA due to >36 hours since event  On exam patient was able to communicate however did have expressive aphasia  Patient also understands that she is having the aphasia and seemed frustrated  There were no cranial or peripheral nerve deficits, and patient was able to eat and drink without trouble  Review of Systems:  Review of Systems   Constitutional: Negative  HENT: Positive for congestion  Eyes: Negative  Respiratory: Negative  Cardiovascular: Positive for palpitations and leg swelling  Negative for chest pain  Gastrointestinal: Negative  Musculoskeletal: Negative  Neurological: Positive for speech difficulty and headaches  Negative for tremors, seizures, syncope, facial asymmetry, weakness and numbness  Psychiatric/Behavioral: Negative  All other systems reviewed and are negative  Past Medical and Surgical History:   Past Medical History:   Diagnosis Date   • Biliary dyskinesia    • CAP (community acquired pneumonia)     last assessed 8/17/16   • Deep vein thrombosis (DVT) of proximal vein of right lower extremity, unspecified chronicity ([facility] ) 8/10/2022   • Diabetes mellitus ([place] [id number] ) 12/2018    Type 2 no insulin   • Disease of thyroid gland 2000   • GERD (gastroesophageal reflux disease)    • Hyperlipidemia    • Palpitations    • Pneumonia    • Shortness of breath    • SVT (supraventricular tachycardia) ([place] [id number] ) 6/8/2022       Past Surgical History:   Procedure Laterality Date   • AUGMENTATION MAMMAPLASTY Bilateral 1998    breast surgery- with prosthetic implant 1998; pre-pectoral salline   • BREAST BIOPSY Left 1974   • BREAST BIOPSY Left 1994   • BREAST EXCISIONAL BIOPSY Left 1993   • CARDIAC CATHETERIZATION      procedure summary   • CHOLECYSTECTOMY      onset 2003   • HEMORRHOID SURGERY     • ROTATOR CUFF REPAIR Bilateral     onset 2010   • TUBAL LIGATION      onset 1980       Meds/Allergies:  Prior to Admission medications    Medication Sig Start Date End Date Taking?  Authorizing Provider   amoxicillin-clavulanate (Augmentin) 875-125 mg per tablet Take 1 tablet by mouth every 12 (twelve) hours for 10 days 5/10/23 5/20/23 Yes Costa Alexandra DO   Ascorbic Acid (VITAMIN C) 1000 MG tablet Take 1 tablet by mouth daily 11/6/12  Yes Historical Provider, MD   atorvastatin (LIPITOR) 20 mg tablet TAKE 1 TABLET DAILY 9/2/22  Yes Costa Alexandra DO   Biotin 5000 MCG CAPS Take 1 capsule by mouth daily   Yes Historical Provider, MD   cholecalciferol (VITAMIN D3) 1,000 units tablet Take 2,000 Units by mouth daily   Yes Historical Provider, MD   CINNAMON PO Take 2,000 mg by mouth daily in the early morning   Yes Historical Provider, MD   diphenhydrAMINE-acetaminophen (TYLENOL PM)  MG TABS Take 1 tablet by mouth daily at bedtime as needed for sleep   Yes Historical Provider, MD   esomeprazole (NexIUM) 40 MG capsule Take 1 capsule by mouth daily 5/3/11  Yes Historical Provider, MD   fluticasone (FLONASE) 50 mcg/act nasal spray USE 2 SPRAYS IN EACH NOSTRIL DAILY 8/26/22  Yes Jimmy Bond DO   guaiFENesin (MUCINEX) 600 mg 12 hr tablet Take 2 tablets (1,200 mg total) by mouth every 12 (twelve) hours 5/2/23  Yes Heather Danielson MD   levothyroxine 125 mcg tablet TAKE 1 TABLET DAILY ON AN EMPTY STOMACH 10/10/22  Yes Jimmy Bond DO   Magnesium 250 MG TABS Take 250 mg by mouth in the morning   Yes Historical Provider, MD   Melatonin-Theanine 10-5 5 MG TABS Take 1 tablet by mouth as needed   Yes Historical Provider, MD   Menthol, Topical Analgesic, (BIOFREEZE EX) Apply 1 application topically daily as needed (pain)   Yes Historical Provider, MD   metoprolol tartrate (LOPRESSOR) 25 mg tablet Take one tablet as needed for palpitations 2/21/23  Yes Rupal Musa MD   metroNIDAZOLE (NORITRATE) 1 % cream Apply 1 application   topically daily   Yes Historical Provider, MD   Multiple Vitamin (MULTIVITAMIN) capsule Take 1 capsule by mouth daily   Yes Historical Provider, MD   Probiotic Product (ALIGN PO) Take 1 capsule by mouth in the morning   Yes Historical Provider, MD   psyllium (METAMUCIL) 58 6 % powder Take 1 packet by mouth daily   Yes Historical Provider, MD   semaglutide, 0 25 or 0 5 mg/dose, (Ozempic) 2 mg/1 5 mL injection pen Inject 0 19 mL (0 25 mg total) under the skin every 7 days 3/1/23  Yes Jimmy Bond DO   doxycycline hyclate (VIBRA-TABS) 100 mg tablet TAKE 1 TABLET BY MOUTH AN HOUR BEFORE DINNER WITH WATER ONLY, REMAIN UPRIGHT FOR ONE HOUR AFTERWARDS  Patient not taking: Reported on 22   Historical Provider, MD   MECLIZINE HCL PO Take 25 mg by mouth daily as needed    Historical Provider, MD   Turmeric 500 MG CAPS Take 1,000 mg by mouth daily in the early morning  Patient not taking: Reported on 2023    Historical Provider, MD   naproxen sodium (ALEVE) 220 MG tablet Take by mouth  19  Historical Provider, MD     I have reviewed home medications with patient personally  Allergies:    Allergies   Allergen Reactions   • Hydrochlorothiazide Palpitations   • Meloxicam Rash       Social History:  Marital Status: /Civil Union   Occupation:   Patient Pre-hospital Living Situation: Home  Patient Pre-hospital Level of Mobility: walks  Patient Pre-hospital Diet Restrictions: diabetic  Substance Use History:   Social History     Substance and Sexual Activity   Alcohol Use Yes   • Alcohol/week: 2 0 standard drinks   • Types: 2 Glasses of wine per week    Comment: Maybe 2 glasses a month     Social History     Tobacco Use   Smoking Status Former   • Packs/day: 0 25   • Years: 17 00   • Pack years: 4 25   • Types: Cigarettes   • Start date: 58   • Quit date: 2010   • Years since quittin 3   Smokeless Tobacco Never   Tobacco Comments    0 5 ppw intermittently last smoked      Social History     Substance and Sexual Activity   Drug Use No       Family History:  Family History   Problem Relation Age of Onset   • Aneurysm Mother         cerebral   • Ovarian cancer Mother 67   • Cancer Mother    • Cirrhosis Father         alcoholic   • Alcohol abuse Father    • Aneurysm Brother         abdominal aortic; cerebral   • Diabetes Son    • No Known Problems Daughter    • No Known Problems Maternal Grandmother    • No Known Problems Maternal Grandfather    • No Known Problems Paternal Grandmother    • No Known Problems Paternal Grandfather    • No Known Problems Son    • No Known Problems Son    • No Known Problems Son    • No Known Problems Maternal Aunt    • No Known Problems Maternal Aunt    • No Known Problems Maternal Aunt    • No Known Problems Maternal Aunt    • No Known Problems Paternal Aunt    • No Known Problems Paternal Aunt    • No Known Problems Paternal Aunt    • No Known Problems Paternal Aunt    • No Known Problems Paternal Aunt    • Cancer Brother         bladder cancer       Physical Exam:     Vitals:   Blood Pressure: 152/71 (05/13/23 1900)  Pulse: 91 (05/13/23 1900)  Temperature: 98 6 °F (37 °C) (05/13/23 1900)  Temp Source: Oral (05/13/23 1900)  Respirations: 18 (05/13/23 1900)  SpO2: 94 % (05/13/23 1900)    Physical Exam  Vitals and nursing note reviewed  Constitutional:       General: She is not in acute distress  Appearance: Normal appearance  She is obese  She is not ill-appearing or diaphoretic  Cardiovascular:      Rate and Rhythm: Normal rate and regular rhythm  Pulses: Normal pulses  Heart sounds: Normal heart sounds  Pulmonary:      Effort: Pulmonary effort is normal       Breath sounds: Normal breath sounds  Abdominal:      General: Bowel sounds are normal       Palpations: Abdomen is soft  Musculoskeletal:      Right lower leg: Edema present  Left lower leg: Edema present  Skin:     General: Skin is warm  Neurological:      Mental Status: She is alert  Cranial Nerves: No cranial nerve deficit  Motor: No weakness        Comments: Aphasia   Psychiatric:         Mood and Affect: Mood normal          Behavior: Behavior normal           Additional Data:     Lab Results:  Results from last 7 days   Lab Units 05/13/23  1134   WBC Thousand/uL 7 83   HEMOGLOBIN g/dL 13 5   HEMATOCRIT % 41 9   PLATELETS Thousands/uL 346   NEUTROS PCT % 76*   LYMPHS PCT % 16   MONOS PCT % 6   EOS PCT % 1     Results from last 7 days   Lab Units 05/13/23  1134   SODIUM mmol/L 136   POTASSIUM mmol/L 3 9   CHLORIDE mmol/L 102   CO2 mmol/L 25   BUN mg/dL 10   CREATININE mg/dL 0 52*   ANION GAP mmol/L 9   CALCIUM mg/dL 9 3   ALBUMIN g/dL 4 2   TOTAL BILIRUBIN mg/dL 0 45   ALK PHOS U/L 37   ALT U/L 16   AST U/L 19   GLUCOSE RANDOM mg/dL 113         Results from last 7 days   Lab Units 05/13/23  1933   POC GLUCOSE mg/dl 150*         Results from last 7 days   Lab Units 05/13/23  1240   LACTIC ACID mmol/L 0 9       Lines/Drains:  Invasive Devices     Peripheral Intravenous Line  Duration           Peripheral IV 05/13/23 Left Antecubital <1 day                    Imaging: Reviewed radiology reports from this admission including: CT head  CTA head and neck with and without contrast   Final Result by Kyle Polo MD (05/13 1652)      No evidence of acute intracranial hemorrhage  Acute to subacute ischemia identified in the left temporal lobe and left frontal corona radiata  Proximal left cervical ICA 5 mm intraluminal thrombus  Left M3 MCA occlusion  I personally communicated this study with GALE BEY on 5/13/2023 4:50 PM                         Workstation performed: [id number]         XR chest 1 view portable    (Results Pending)       EKG and Other Studies Reviewed on Admission:   · EKG: Sinus Tachycardia    ** Please Note: This note has been constructed using a voice recognition system   **

## 2023-05-13 NOTE — TELEPHONE ENCOUNTER
Patient's  spoke with PCP and was advised to take her to the ED  Reason for Disposition  • Caller has already spoken with the PCP and has no further questions      Protocols used: NO CONTACT OR DUPLICATE CONTACT CALL-ADULT-

## 2023-05-13 NOTE — TELEPHONE ENCOUNTER
"Regarding: Congnitive Issues After Covid Diagnosis  ----- Message from Christina Tang sent at 5/13/2023 10:23 AM EDT -----  \"My wife had tested positive for Covid about a week ago  However on Thursday, Friday, and today, I have noticed some significant cognitive changes  She seems very confused, anxious, and is having a hard time expressing herself  She has also been having headaches, fatigue, and is very thirsty  \"    "

## 2023-05-13 NOTE — PROGRESS NOTES
I received a phone call from the patient's  she has been diagnosed with COVID, he is calling because she has recently developed cognitive change, increased thirst pulse ox normal blood pressure mildly elevated 150/80 I am concerned because of the cognitive change rule out dehydration rule out pneumonia we will have the patient seen at [address] [place] [address] ER ADT 21 ordered     will drive wife to the ER

## 2023-05-13 NOTE — ASSESSMENT & PLAN NOTE
Lab Results   Component Value Date    HGBA1C 6 2 (H) 02/21/2023       No results for input(s): POCGLU in the last 72 hours      Blood Sugar Average: Last 72 hrs:     SSI  Diabetic diet

## 2023-05-13 NOTE — ASSESSMENT & PLAN NOTE
Presenting with expressive aphasia and frontal headache since 4/11  Tested positive for COVID-19 11 days ago and thought her symptoms were secondary to that infection  No other neurological deficits  Upon arrival in ED, patient was hypertensive with CTA head/neck that showed a left MCA occlusion  However patient was not a candidate for tPA due to >36 hours since event  On exam patient was able to communicate however did have expressive aphasia     · Neurology consulted  · Lipitor 40 daily  · ASA/Plavix daily  · Neurochecks  · Echocardiogram  · Lipid profile/A1c  · Begin metoprolol 25 daily  · Telemetry  · Consider cardiology consult if any arrhythmia arises or persistent palpitations  · Speech therapy  · Holding off on PT/OT since patient does not have any other neuro deficits and is able to ambulate independently

## 2023-05-13 NOTE — DISCHARGE SUMMARY
[facility]  Discharge- Therese Rivera 1945, 70 y o  female MRN: [id number]  Unit/Bed#: S -01 Encounter: [id number]  Primary Care Provider: Amber Ramirez DO   Date and time admitted to hospital: 5/13/2023 11:32 AM    * CVA (cerebral vascular accident) [facility])  Assessment & Plan  Presenting with expressive aphasia and frontal headache since 4/11  Tested positive for COVID-19 11 days ago and thought her symptoms were secondary to that infection  No other neurological deficits  Upon arrival in ED, patient was hypertensive with CTA head/neck that showed a left MCA occlusion  However patient was not a candidate for tPA due to >36 hours since event  On exam patient was able to communicate however did have expressive aphasia  · Neurology consulted  · Lipitor 40 daily  · ASA/Plavix daily  · Neurochecks  · Echocardiogram  · Lipid profile/A1c  · Begin metoprolol 25 daily  · Telemetry  · Consider cardiology consult if any arrhythmia arises or persistent palpitations  · Speech therapy  · Holding off on PT/OT since patient does not have any other neuro deficits and is able to ambulate independently     Palpitations  Assessment & Plan  Patient takes metoprolol 25 as needed for palpitations and has not taken it in the past 3 weeks  She did have a Holter monitor 2022 that showed 17 episodes of SVTs but was asymptomatic during those episodes  During episodes she did have symptoms to monitor showed NSR and occasional PVCs  · Continue telemetry  · Cardiology consult for any arrhythmia shown  · Metoprolol 25 daily, does not require permissive hypertension since > 36 hours since event    Type 2 diabetes mellitus without complication, without long-term current use of insulin (HCC)  Assessment & Plan  Lab Results   Component Value Date    HGBA1C 6 2 (H) 02/21/2023       No results for input(s): POCGLU in the last 72 hours      Blood Sugar Average: Last 72 hrs:     SSI  Diabetic diet    Hypothyroidism  Assessment & Plan  Continue levothyroxine    Hyperlipidemia  Assessment & Plan  Continue increased Lipitor 40 daily    Lymphedema  Assessment & Plan  Bilateral lower extremity edema present on exam   Patient states that this is chronic and does not take any home diuretic  Patient oral mucosa was dry on exam and reports despite drinking excessive fluids she is always thirsty the past few days  · Echocardiogram  · Compression stockings    VTE Pharmacologic Prophylaxis: VTE Score: 9 High Risk (Score >/= 5) - Pharmacological DVT Prophylaxis Ordered: enoxaparin (Lovenox)  Sequential Compression Devices Ordered  Code Status: Level 1 - Full Code   Discussion with family: Updated  () at bedside  Anticipated Length of Stay: Patient will be admitted on an inpatient basis with an anticipated length of stay of greater than 2 midnights secondary to CVA  Chief Complaint: Expressive aphasia    History of Present Illness:  Yenifer Robbins is a 70 y o  female with a PMH of hypothyroidism, DM, palpitations, HLD who presents with expressive aphasia and mild frontal headache since 4/11  Patient recently tested positive for COVID-19 infection 11 days ago and thought her symptoms were secondary to that infection  Since Thursday, patient's aphasia and headache have persisted without any improvement or worsening  Patient did not have any other neurological deficits  Upon arrival in ED, patient was hypertensive with CTA head/neck that showed a left MCA occlusion  However patient was not a candidate for tPA due to >36 hours since event  On exam patient was able to communicate however did have expressive aphasia  Patient also understands that she is having the aphasia and seemed frustrated  There were no cranial or peripheral nerve deficits, and patient was able to eat and drink without trouble  Review of Systems:  Review of Systems   Constitutional: Negative  Respiratory: Negative  Cardiovascular: Positive for palpitations and leg swelling  Negative for chest pain  Gastrointestinal: Negative  Musculoskeletal: Negative  Skin: Negative  Neurological: Positive for speech difficulty and headaches  Negative for tremors, syncope, facial asymmetry, weakness and numbness  Psychiatric/Behavioral: Negative  Past Medical and Surgical History:   Past Medical History:   Diagnosis Date   • Biliary dyskinesia    • CAP (community acquired pneumonia)     last assessed 8/17/16   • Deep vein thrombosis (DVT) of proximal vein of right lower extremity, unspecified chronicity ([facility] ) 8/10/2022   • Diabetes mellitus ([place] [address] ) 12/2018    Type 2 no insulin   • Disease of thyroid gland 2000   • GERD (gastroesophageal reflux disease)    • Hyperlipidemia    • Palpitations    • Pneumonia    • Shortness of breath    • SVT (supraventricular tachycardia) ([place] [address] ) 6/8/2022       Past Surgical History:   Procedure Laterality Date   • AUGMENTATION MAMMAPLASTY Bilateral 1998    breast surgery- with prosthetic implant 1998; pre-pectoral salline   • BREAST BIOPSY Left 1974   • BREAST BIOPSY Left 1994   • BREAST EXCISIONAL BIOPSY Left 1993   • CARDIAC CATHETERIZATION      procedure summary   • CHOLECYSTECTOMY      onset 2003   • HEMORRHOID SURGERY     • ROTATOR CUFF REPAIR Bilateral     onset 2010   • TUBAL LIGATION      onset 1980       Meds/Allergies:  Prior to Admission medications    Medication Sig Start Date End Date Taking?  Authorizing Provider   amoxicillin-clavulanate (Augmentin) 875-125 mg per tablet Take 1 tablet by mouth every 12 (twelve) hours for 10 days 5/10/23 5/20/23 Yes Ayesha Patricia DO   Ascorbic Acid (VITAMIN C) 1000 MG tablet Take 1 tablet by mouth daily 11/6/12  Yes Historical Provider, MD   atorvastatin (LIPITOR) 20 mg tablet TAKE 1 TABLET DAILY 9/2/22  Yes Ayesha Patricia DO   Biotin 5000 MCG CAPS Take 1 capsule by mouth daily   Yes Historical Provider, MD cholecalciferol (VITAMIN D3) 1,000 units tablet Take 2,000 Units by mouth daily   Yes Historical Provider, MD   CINNAMON PO Take 2,000 mg by mouth daily in the early morning   Yes Historical Provider, MD   diphenhydrAMINE-acetaminophen (TYLENOL PM)  MG TABS Take 1 tablet by mouth daily at bedtime as needed for sleep   Yes Historical Provider, MD   esomeprazole (NexIUM) 40 MG capsule Take 1 capsule by mouth daily 5/3/11  Yes Historical Provider, MD   fluticasone (FLONASE) 50 mcg/act nasal spray USE 2 SPRAYS IN EACH NOSTRIL DAILY 8/26/22  Yes Willow Daniel, DO   guaiFENesin (MUCINEX) 600 mg 12 hr tablet Take 2 tablets (1,200 mg total) by mouth every 12 (twelve) hours 5/2/23  Yes Amarilis Brantley MD   levothyroxine 125 mcg tablet TAKE 1 TABLET DAILY ON AN EMPTY STOMACH 10/10/22  Yes Willow Daniel, DO   Magnesium 250 MG TABS Take 250 mg by mouth in the morning   Yes Historical Provider, MD   Melatonin-Theanine 10-5 5 MG TABS Take 1 tablet by mouth as needed   Yes Historical Provider, MD   Menthol, Topical Analgesic, (BIOFREEZE EX) Apply 1 application topically daily as needed (pain)   Yes Historical Provider, MD   metoprolol tartrate (LOPRESSOR) 25 mg tablet Take one tablet as needed for palpitations 2/21/23  Yes Luba Price MD   metroNIDAZOLE (NORITRATE) 1 % cream Apply 1 application   topically daily   Yes Historical Provider, MD   Multiple Vitamin (MULTIVITAMIN) capsule Take 1 capsule by mouth daily   Yes Historical Provider, MD   Probiotic Product (ALIGN PO) Take 1 capsule by mouth in the morning   Yes Historical Provider, MD   psyllium (METAMUCIL) 58 6 % powder Take 1 packet by mouth daily   Yes Historical Provider, MD   semaglutide, 0 25 or 0 5 mg/dose, (Ozempic) 2 mg/1 5 mL injection pen Inject 0 19 mL (0 25 mg total) under the skin every 7 days 3/1/23  Yes Willow Daniel, DO   doxycycline hyclate (VIBRA-TABS) 100 mg tablet TAKE 1 TABLET BY MOUTH AN HOUR BEFORE DINNER WITH WATER ONLY, REMAIN UPRIGHT FOR ONE HOUR AFTERWARDS  Patient not taking: Reported on 22   Historical Provider, MD   MECLIZINE HCL PO Take 25 mg by mouth daily as needed    Historical Provider, MD   Turmeric 500 MG CAPS Take 1,000 mg by mouth daily in the early morning  Patient not taking: Reported on 2023    Historical Provider, MD   naproxen sodium (ALEVE) 220 MG tablet Take by mouth  19  Historical Provider, MD     I have reviewed home medications with patient personally  Allergies:    Allergies   Allergen Reactions   • Hydrochlorothiazide Palpitations   • Meloxicam Rash       Social History:  Marital Status: /Civil Union   Occupation:   Patient Pre-hospital Living Situation: Home  Patient Pre-hospital Level of Mobility: walks  Patient Pre-hospital Diet Restrictions: diabetic  Substance Use History:   Social History     Substance and Sexual Activity   Alcohol Use Yes   • Alcohol/week: 2 0 standard drinks   • Types: 2 Glasses of wine per week    Comment: Maybe 2 glasses a month     Social History     Tobacco Use   Smoking Status Former   • Packs/day: 0 25   • Years: 17 00   • Pack years: 4 25   • Types: Cigarettes   • Start date:    • Quit date: 2010   • Years since quittin 3   Smokeless Tobacco Never   Tobacco Comments    0 5 ppw intermittently last smoked      Social History     Substance and Sexual Activity   Drug Use No       Family History:  Family History   Problem Relation Age of Onset   • Aneurysm Mother         cerebral   • Ovarian cancer Mother 67   • Cancer Mother    • Cirrhosis Father         alcoholic   • Alcohol abuse Father    • Aneurysm Brother         abdominal aortic; cerebral   • Diabetes Son    • No Known Problems Daughter    • No Known Problems Maternal Grandmother    • No Known Problems Maternal Grandfather    • No Known Problems Paternal Grandmother    • No Known Problems Paternal Grandfather    • No Known Problems Son    • No Known Problems Son    • No Known Problems Son    • No Known Problems Maternal Aunt    • No Known Problems Maternal Aunt    • No Known Problems Maternal Aunt    • No Known Problems Maternal Aunt    • No Known Problems Paternal Aunt    • No Known Problems Paternal Aunt    • No Known Problems Paternal Aunt    • No Known Problems Paternal Aunt    • No Known Problems Paternal Aunt    • Cancer Brother         bladder cancer       Physical Exam:     Vitals:   Blood Pressure: (!) 179/99 (05/13/23 1800)  Pulse: 82 (05/13/23 1800)  Temperature: 98 7 °F (37 1 °C) (05/13/23 1800)  Temp Source: Oral (05/13/23 1800)  Respirations: 18 (05/13/23 1800)  SpO2: 94 % (05/13/23 1800)    Physical Exam  Vitals and nursing note reviewed  Constitutional:       General: She is not in acute distress  Appearance: Normal appearance  She is obese  She is not ill-appearing or diaphoretic  HENT:      Mouth/Throat:      Mouth: Mucous membranes are dry  Eyes:      Extraocular Movements: Extraocular movements intact  Pupils: Pupils are equal, round, and reactive to light  Neck:      Vascular: No carotid bruit  Cardiovascular:      Rate and Rhythm: Normal rate and regular rhythm  Pulses: Normal pulses  Heart sounds: Normal heart sounds  Pulmonary:      Effort: Pulmonary effort is normal       Breath sounds: Normal breath sounds  Abdominal:      General: Bowel sounds are normal       Palpations: Abdomen is soft  Musculoskeletal:      Right lower leg: Edema present  Left lower leg: Edema present  Skin:     General: Skin is warm  Neurological:      Mental Status: She is alert  Cranial Nerves: No cranial nerve deficit  Sensory: No sensory deficit  Motor: No weakness        Comments: Aphasia   Psychiatric:         Behavior: Behavior normal           Additional Data:     Lab Results:  Results from last 7 days   Lab Units 05/13/23  1134   WBC Thousand/uL 7 83   HEMOGLOBIN g/dL 13 5   HEMATOCRIT % 41 9   PLATELETS Thousands/uL 346   NEUTROS PCT % 76*   LYMPHS PCT % 16   MONOS PCT % 6   EOS PCT % 1     Results from last 7 days   Lab Units 05/13/23  1134   SODIUM mmol/L 136   POTASSIUM mmol/L 3 9   CHLORIDE mmol/L 102   CO2 mmol/L 25   BUN mg/dL 10   CREATININE mg/dL 0 52*   ANION GAP mmol/L 9   CALCIUM mg/dL 9 3   ALBUMIN g/dL 4 2   TOTAL BILIRUBIN mg/dL 0 45   ALK PHOS U/L 37   ALT U/L 16   AST U/L 19   GLUCOSE RANDOM mg/dL 113                 Results from last 7 days   Lab Units 05/13/23  1240   LACTIC ACID mmol/L 0 9       Lines/Drains:  Invasive Devices     Peripheral Intravenous Line  Duration           Peripheral IV 05/13/23 Left Antecubital <1 day                    Imaging: Reviewed radiology reports from this admission including: CT head  CTA head and neck with and without contrast   Final Result by Megan Huitron MD (05/13 1652)      No evidence of acute intracranial hemorrhage  Acute to subacute ischemia identified in the left temporal lobe and left frontal corona radiata  Proximal left cervical ICA 5 mm intraluminal thrombus  Left M3 MCA occlusion  I personally communicated this study with GALE BEY on 5/13/2023 4:50 PM                         Workstation performed: [id number]         XR chest 1 view portable    (Results Pending)       EKG and Other Studies Reviewed on Admission:   · EKG: Sinus Tachycardia    ** Please Note: This note has been constructed using a voice recognition system   **

## 2023-05-13 NOTE — ASSESSMENT & PLAN NOTE
Patient takes metoprolol 25 as needed for palpitations and has not taken it in the past 3 weeks  She did have a Holter monitor 2022 that showed 17 episodes of SVTs but was asymptomatic during those episodes  During episodes she did have symptoms to monitor showed NSR and occasional PVCs     · Continue telemetry  · Cardiology consult for any arrhythmia shown  · Metoprolol 25 daily, does not require permissive hypertension since > 36 hours since event

## 2023-05-13 NOTE — ED NOTES
Spoke with Brice in lab regarding outstanding labs  He states he will take care of it       Patty Aldrich, SALMA  05/13/23 [id number]

## 2023-05-13 NOTE — ASSESSMENT & PLAN NOTE
Bilateral lower extremity edema present on exam   Patient states that this is chronic and does not take any home diuretic  Patient oral mucosa was dry on exam and reports despite drinking excessive fluids she is always thirsty the past few days  · Echocardiogram  · Compression stockings

## 2023-05-14 ENCOUNTER — APPOINTMENT (INPATIENT)
Dept: MRI IMAGING | Facility: HOSPITAL | Age: 78
End: 2023-05-14

## 2023-05-14 LAB
ALBUMIN SERPL BCP-MCNC: 3.6 G/DL (ref 3.5–5)
ALP SERPL-CCNC: 29 U/L (ref 34–104)
ALT SERPL W P-5'-P-CCNC: 14 U/L (ref 7–52)
ANION GAP SERPL CALCULATED.3IONS-SCNC: 6 MMOL/L (ref 4–13)
APTT PPP: 30 SECONDS (ref 23–37)
APTT PPP: 63 SECONDS (ref 23–37)
AST SERPL W P-5'-P-CCNC: 16 U/L (ref 13–39)
ATRIAL RATE: 102 BPM
BASOPHILS # BLD AUTO: 0.04 THOUSANDS/ÂΜL (ref 0–0.1)
BASOPHILS NFR BLD AUTO: 1 % (ref 0–1)
BILIRUB SERPL-MCNC: 0.51 MG/DL (ref 0.2–1)
BUN SERPL-MCNC: 9 MG/DL (ref 5–25)
CALCIUM SERPL-MCNC: 8.5 MG/DL (ref 8.4–10.2)
CHLORIDE SERPL-SCNC: 106 MMOL/L (ref 96–108)
CHOLEST SERPL-MCNC: 134 MG/DL
CO2 SERPL-SCNC: 27 MMOL/L (ref 21–32)
CREAT SERPL-MCNC: 0.43 MG/DL (ref 0.6–1.3)
EOSINOPHIL # BLD AUTO: 0.17 THOUSAND/ÂΜL (ref 0–0.61)
EOSINOPHIL NFR BLD AUTO: 3 % (ref 0–6)
ERYTHROCYTE [DISTWIDTH] IN BLOOD BY AUTOMATED COUNT: 14.7 % (ref 11.6–15.1)
ERYTHROCYTE [DISTWIDTH] IN BLOOD BY AUTOMATED COUNT: 14.9 % (ref 11.6–15.1)
EST. AVERAGE GLUCOSE BLD GHB EST-MCNC: 120 MG/DL
GFR SERPL CREATININE-BSD FRML MDRD: 99 ML/MIN/1.73SQ M
GLUCOSE SERPL-MCNC: 103 MG/DL (ref 65–140)
GLUCOSE SERPL-MCNC: 103 MG/DL (ref 65–140)
GLUCOSE SERPL-MCNC: 112 MG/DL (ref 65–140)
GLUCOSE SERPL-MCNC: 117 MG/DL (ref 65–140)
GLUCOSE SERPL-MCNC: 123 MG/DL (ref 65–140)
GLUCOSE SERPL-MCNC: 123 MG/DL (ref 65–140)
GLUCOSE SERPL-MCNC: 128 MG/DL (ref 65–140)
GLUCOSE SERPL-MCNC: 133 MG/DL (ref 65–140)
HBA1C MFR BLD: 5.8 %
HCT VFR BLD AUTO: 37.3 % (ref 34.8–46.1)
HCT VFR BLD AUTO: 41.2 % (ref 34.8–46.1)
HDLC SERPL-MCNC: 40 MG/DL
HGB BLD-MCNC: 12 G/DL (ref 11.5–15.4)
HGB BLD-MCNC: 13.2 G/DL (ref 11.5–15.4)
IMM GRANULOCYTES # BLD AUTO: 0.02 THOUSAND/UL (ref 0–0.2)
IMM GRANULOCYTES NFR BLD AUTO: 0 % (ref 0–2)
INR PPP: 0.99 (ref 0.84–1.19)
LDLC SERPL CALC-MCNC: 72 MG/DL (ref 0–100)
LYMPHOCYTES # BLD AUTO: 1.65 THOUSANDS/ÂΜL (ref 0.6–4.47)
LYMPHOCYTES NFR BLD AUTO: 29 % (ref 14–44)
MCH RBC QN AUTO: 28.4 PG (ref 26.8–34.3)
MCH RBC QN AUTO: 28.6 PG (ref 26.8–34.3)
MCHC RBC AUTO-ENTMCNC: 32 G/DL (ref 31.4–37.4)
MCHC RBC AUTO-ENTMCNC: 32.2 G/DL (ref 31.4–37.4)
MCV RBC AUTO: 89 FL (ref 82–98)
MCV RBC AUTO: 89 FL (ref 82–98)
MONOCYTES # BLD AUTO: 0.47 THOUSAND/ÂΜL (ref 0.17–1.22)
MONOCYTES NFR BLD AUTO: 8 % (ref 4–12)
NEUTROPHILS # BLD AUTO: 3.35 THOUSANDS/ÂΜL (ref 1.85–7.62)
NEUTS SEG NFR BLD AUTO: 59 % (ref 43–75)
NRBC BLD AUTO-RTO: 0 /100 WBCS
P AXIS: 53 DEGREES
PLATELET # BLD AUTO: 280 THOUSANDS/UL (ref 149–390)
PLATELET # BLD AUTO: 338 THOUSANDS/UL (ref 149–390)
PMV BLD AUTO: 9.3 FL (ref 8.9–12.7)
PMV BLD AUTO: 9.6 FL (ref 8.9–12.7)
POTASSIUM SERPL-SCNC: 3.5 MMOL/L (ref 3.5–5.3)
PR INTERVAL: 180 MS
PROT SERPL-MCNC: 5.4 G/DL (ref 6.4–8.4)
PROTHROMBIN TIME: 13.3 SECONDS (ref 11.6–14.5)
QRS AXIS: -24 DEGREES
QRSD INTERVAL: 76 MS
QT INTERVAL: 342 MS
QTC INTERVAL: 445 MS
RBC # BLD AUTO: 4.2 MILLION/UL (ref 3.81–5.12)
RBC # BLD AUTO: 4.64 MILLION/UL (ref 3.81–5.12)
SODIUM SERPL-SCNC: 139 MMOL/L (ref 135–147)
T WAVE AXIS: 43 DEGREES
TRIGL SERPL-MCNC: 111 MG/DL
VENTRICULAR RATE: 102 BPM
WBC # BLD AUTO: 5.7 THOUSAND/UL (ref 4.31–10.16)
WBC # BLD AUTO: 8.54 THOUSAND/UL (ref 4.31–10.16)

## 2023-05-14 RX ORDER — ACETAMINOPHEN 325 MG/1
975 TABLET ORAL EVERY 8 HOURS PRN
Status: DISCONTINUED | OUTPATIENT
Start: 2023-05-14 | End: 2023-05-16 | Stop reason: HOSPADM

## 2023-05-14 RX ORDER — ACETAMINOPHEN 325 MG/1
488 TABLET ORAL
Status: DISCONTINUED | OUTPATIENT
Start: 2023-05-14 | End: 2023-05-14

## 2023-05-14 RX ORDER — HEPARIN SODIUM 10000 [USP'U]/100ML
3-24 INJECTION, SOLUTION INTRAVENOUS
Status: DISCONTINUED | OUTPATIENT
Start: 2023-05-14 | End: 2023-05-16

## 2023-05-14 RX ORDER — DIPHENHYDRAMINE HCL 25 MG
25 TABLET ORAL
Status: DISCONTINUED | OUTPATIENT
Start: 2023-05-14 | End: 2023-05-16 | Stop reason: HOSPADM

## 2023-05-14 RX ORDER — LANOLIN ALCOHOL/MO/W.PET/CERES
6 CREAM (GRAM) TOPICAL
Status: DISCONTINUED | OUTPATIENT
Start: 2023-05-14 | End: 2023-05-16 | Stop reason: HOSPADM

## 2023-05-14 RX ADMIN — ACETAMINOPHEN 975 MG: 325 TABLET ORAL at 09:31

## 2023-05-14 RX ADMIN — CLOPIDOGREL BISULFATE 75 MG: 75 TABLET ORAL at 09:21

## 2023-05-14 RX ADMIN — LEVOTHYROXINE SODIUM 125 MCG: 125 TABLET ORAL at 05:10

## 2023-05-14 RX ADMIN — HEPARIN SODIUM 15 UNITS/KG/HR: 10000 INJECTION, SOLUTION INTRAVENOUS at 12:39

## 2023-05-14 RX ADMIN — Medication 2000 UNITS: at 09:21

## 2023-05-14 RX ADMIN — ASPIRIN 81 MG: 81 TABLET, CHEWABLE ORAL at 09:21

## 2023-05-14 RX ADMIN — Medication 250 MG: at 09:21

## 2023-05-14 RX ADMIN — ENOXAPARIN SODIUM 40 MG: 40 INJECTION SUBCUTANEOUS at 09:21

## 2023-05-14 RX ADMIN — ATORVASTATIN CALCIUM 40 MG: 40 TABLET, FILM COATED ORAL at 16:43

## 2023-05-14 RX ADMIN — Medication 1 PACKET: at 09:22

## 2023-05-14 RX ADMIN — PANTOPRAZOLE SODIUM 40 MG: 40 TABLET, DELAYED RELEASE ORAL at 05:10

## 2023-05-14 RX ADMIN — METOPROLOL TARTRATE 25 MG: 25 TABLET, FILM COATED ORAL at 09:21

## 2023-05-14 NOTE — CONSULTS
NEUROLOGY RESIDENCY CONSULT NOTE     Name: Nellie Evangelista   Age & Sex: 70 y o  female   MRN: [id number]  Unit/Bed#: S -01   Encounter: [id number]  Length of Stay: 1    ASSESSMENT & PLAN     * CVA (cerebral vascular accident) [facility])  Assessment & Plan  Assessment:  Nellie Evangelista is a 70 y o  female  With hx of hypothyroidism, DM, palpitations, HLD, COVID 12 days ago  Initial presenting deficits were expressive aphasia and mild frontal headaches since 4/11  The patient has a pmhx significant for Hypothyroidism, DM, palpitations, HLD, COVID  CTA head and neck showed left MCA occlusion and proximal left cervical ICA 5mm intraluminal thrombus and acute to subacute ischemia in left temporal lobe and left frontal corona radiata  Workup:   In the ED, Presenting BP Blood Pressure: (!) 174/82  EKG on presentation to the ER showed sinus tachycardia  CT Head showed: Acute to subacute infarct on left temporal lobe and left frontal corona radiata  CTA head and neck showed: left cervical 5mm thrombus and left M3 MCA occlusion      Lab Results   Component Value Date    HGBA1C 6 2 (H) 02/21/2023     No results found for: TSH  Lab Results   Component Value Date    CHOLESTEROL 134 05/14/2023    TRIG 111 05/14/2023    HDL 40 (L) 05/14/2023    LDLCALC 72 05/14/2023       Risk factors: diabetes, hyperlipidemia and COVID 12 days ago      Impression: Patient most likely has an Left embolic infarct due to left M3 occlusion which potentially stems from the left ICA thrombus which is partially occlusive and potentially mobile    Plan:  - Discussed with Dr Esau Liang  - Recommend admit to hospital on acute ischemic stroke pathway  - MRI brain without contrast  - Continue Neuro checks  - Recommend normotension  - Maintain glucose <180, SSI for coverage if indicated  - Repeat CTH if GCS drops 2pts in 1hr  - PT/OT/ST/PM&R Evaluation  - ANDREW ordered  - Vascular surgery consult ordered due to concern for partial occlusion and mobile nature of thrombus on left carotid artery  - Patient placed on heparin drip due to mobile nature   - Atorvastatin 40 mg q d   - Aspirin and plavix loaded and then DCed as we would place patient on heparin drip stroke protocol  - Recommend stepdown level 2 closer monitoring if patient's stroke is large  - Continue monitoring on telemetry  - Rest of care per primary team          Clive Saxena will need follow up in in 4 weeks with neurovascular Attending  She will not require outpatient neurological testing  Pending for discharge: ANDREW, MRI, Heparin Drip    SUBJECTIVE     Reason for Consult / Principal Problem: Aphasia  Hx and PE limited by: Aphasia    HPI: Clive Saxena is a 70 y  o female with hx of Hypothyroidism, DM, palpitations, HLD, COVID 12 days ago who presents with expressive aphasia and mild frontal headache since 4/11  Symptoms initially thought secondary to the infection  Aphasia and headache persisted without any improvement  Patient had no other neurolgic deficits  CTA head and neck showed left MCA occlusion and proximal left cervical ICA 5mm intraluminal thrombus and acute to subacute ischemia in left temporal lobe and left frontal corona radiata  No aspirin/plavix on baseline  Patient was loaded with aspirin and plavix in the ED  Patient had some difficulty expressing her history but she was able to tell that she had expressive aphasia and mild frontal headaches since 4/11 which was thought be in setting of COVID but the aphasia never really resolved and remained stable and thus patient came to the ED  She otherwise denies numbness, weakness, visual deficits, ambulatory dysfunction nor strokes in the past      Patient is fully vaccinated for COVID    Inpatient consult to Neurology  Consult performed by: Beny Chapman MD  Consult ordered by: Kishore Cooper MD          Historical Information   Past Medical History:   Diagnosis Date   • Biliary dyskinesia    • CAP (community acquired pneumonia)     last assessed 16   • Deep vein thrombosis (DVT) of proximal vein of right lower extremity, unspecified chronicity ([facility] ) 8/10/2022   • Diabetes mellitus ([facility] ) 2018    Type 2 no insulin   • Disease of thyroid gland    • GERD (gastroesophageal reflux disease)    • Hyperlipidemia    • Palpitations    • Pneumonia    • Shortness of breath    • SVT (supraventricular tachycardia) ([facility] ) 2022     Past Surgical History:   Procedure Laterality Date   • AUGMENTATION MAMMAPLASTY Bilateral     breast surgery- with prosthetic implant ; pre-pectoral salline   • BREAST BIOPSY Left    • BREAST BIOPSY Left    • BREAST EXCISIONAL BIOPSY Left    • CARDIAC CATHETERIZATION      procedure summary   • CHOLECYSTECTOMY      onset    • HEMORRHOID SURGERY     • ROTATOR CUFF REPAIR Bilateral     onset    • TUBAL LIGATION      onset      Social History   Social History     Substance and Sexual Activity   Alcohol Use Yes   • Alcohol/week: 2 0 standard drinks   • Types: 2 Glasses of wine per week    Comment: Maybe 2 glasses a month     Social History     Substance and Sexual Activity   Drug Use No     E-Cigarette/Vaping   • E-Cigarette Use Never User      E-Cigarette/Vaping Substances   • Nicotine No    • THC No    • CBD No    • Flavoring No    • Other No    • Unknown No      Social History     Tobacco Use   Smoking Status Former   • Packs/day: 0 25   • Years: 17 00   • Pack years: 4 25   • Types: Cigarettes   • Start date: 58   • Quit date: 2010   • Years since quittin 3   Smokeless Tobacco Never   Tobacco Comments    0 5 ppw intermittently last smoked      Family History:   Family History   Problem Relation Age of Onset   • Aneurysm Mother         cerebral   • Ovarian cancer Mother 67   • Cancer Mother    • Cirrhosis Father         alcoholic   • Alcohol abuse Father    • Aneurysm Brother         abdominal aortic; cerebral   • Diabetes Son    • No Known Problems Daughter • No Known Problems Maternal Grandmother    • No Known Problems Maternal Grandfather    • No Known Problems Paternal Grandmother    • No Known Problems Paternal Grandfather    • No Known Problems Son    • No Known Problems Son    • No Known Problems Son    • No Known Problems Maternal Aunt    • No Known Problems Maternal Aunt    • No Known Problems Maternal Aunt    • No Known Problems Maternal Aunt    • No Known Problems Paternal Aunt    • No Known Problems Paternal Aunt    • No Known Problems Paternal Aunt    • No Known Problems Paternal Aunt    • No Known Problems Paternal Aunt    • Cancer Brother         bladder cancer     Meds/Allergies   current meds:   Current Facility-Administered Medications   Medication Dose Route Frequency   • acetaminophen (TYLENOL) tablet 975 mg  975 mg Oral Q8H PRN   • atorvastatin (LIPITOR) tablet 40 mg  40 mg Oral Daily With Dinner   • cholecalciferol (VITAMIN D3) tablet 2,000 Units  2,000 Units Oral Daily   • diphenhydrAMINE (BENADRYL) tablet 25 mg  25 mg Oral HS PRN   • heparin (porcine) 25,000 units in 0 45% NaCl 250 mL infusion (premix)  3-24 Units/kg/hr (Order-Specific) Intravenous Titrated   • insulin lispro (HumaLOG) 100 units/mL subcutaneous injection 1-6 Units  1-6 Units Subcutaneous 4 times day   • levothyroxine tablet 125 mcg  125 mcg Oral Early Morning   • magnesium gluconate (MAGONATE) tablet 250 mg  250 mg Oral QAM   • melatonin tablet 6 mg  6 mg Oral HS PRN   • metoprolol tartrate (LOPRESSOR) tablet 25 mg  25 mg Oral Daily   • metroNIDAZOLE (NORITRATE) 1 % cream 1 application  1 application  Topical Daily PRN   • pantoprazole (PROTONIX) EC tablet 40 mg  40 mg Oral Early Morning   • psyllium (METAMUCIL) 1 packet  1 packet Oral Daily    and PTA meds:   Prior to Admission Medications   Prescriptions Last Dose Informant Patient Reported? Taking?    Ascorbic Acid (VITAMIN C) 1000 MG tablet 5/12/2023  Yes Yes   Sig: Take 1 tablet by mouth daily   Biotin 5000 MCG CAPS 5/12/2023  Yes Yes   Sig: Take 1 capsule by mouth daily   CINNAMON PO Past Month  Yes Yes   Sig: Take 2,000 mg by mouth daily in the early morning   MECLIZINE HCL PO More than a month  Yes No   Sig: Take 25 mg by mouth daily as needed   Magnesium 250 MG TABS 5/13/2023  Yes Yes   Sig: Take 250 mg by mouth in the morning   Melatonin-Theanine 10-5 5 MG TABS 5/12/2023  Yes Yes   Sig: Take 1 tablet by mouth as needed   Menthol, Topical Analgesic, (BIOFREEZE EX) Past Week  Yes Yes   Sig: Apply 1 application topically daily as needed (pain)   Multiple Vitamin (MULTIVITAMIN) capsule 5/12/2023  Yes Yes   Sig: Take 1 capsule by mouth daily   Probiotic Product (ALIGN PO) 5/12/2023  Yes Yes   Sig: Take 1 capsule by mouth in the morning   Turmeric 500 MG CAPS Not Taking  Yes No   Sig: Take 1,000 mg by mouth daily in the early morning   Patient not taking: Reported on 5/13/2023   amoxicillin-clavulanate (Augmentin) 875-125 mg per tablet 5/13/2023  No Yes   Sig: Take 1 tablet by mouth every 12 (twelve) hours for 10 days   atorvastatin (LIPITOR) 20 mg tablet 5/13/2023  No Yes   Sig: TAKE 1 TABLET DAILY   cholecalciferol (VITAMIN D3) 1,000 units tablet 5/12/2023  Yes Yes   Sig: Take 2,000 Units by mouth daily   diphenhydrAMINE-acetaminophen (TYLENOL PM)  MG TABS Past Week  Yes Yes   Sig: Take 1 tablet by mouth daily at bedtime as needed for sleep   doxycycline hyclate (VIBRA-TABS) 100 mg tablet Not Taking  Yes No   Sig: TAKE 1 TABLET BY MOUTH AN HOUR BEFORE DINNER WITH WATER ONLY, REMAIN UPRIGHT FOR ONE HOUR AFTERWARDS   Patient not taking: Reported on 5/13/2023   esomeprazole (NexIUM) 40 MG capsule 5/12/2023  Yes Yes   Sig: Take 1 capsule by mouth daily   fluticasone (FLONASE) 50 mcg/act nasal spray Past Week  No Yes   Sig: USE 2 SPRAYS IN EACH NOSTRIL DAILY   guaiFENesin (MUCINEX) 600 mg 12 hr tablet 5/12/2023  No Yes   Sig: Take 2 tablets (1,200 mg total) by mouth every 12 (twelve) hours   levothyroxine 125 mcg tablet 2023  No Yes   Sig: TAKE 1 TABLET DAILY ON AN EMPTY STOMACH   metoprolol tartrate (LOPRESSOR) 25 mg tablet Past Week  No Yes   Sig: Take one tablet as needed for palpitations   metroNIDAZOLE (NORITRATE) 1 % cream Past Month  Yes Yes   Sig: Apply 1 application  topically daily   psyllium (METAMUCIL) 58 6 % powder Past Week  Yes Yes   Sig: Take 1 packet by mouth daily   semaglutide, 0 25 or 0 5 mg/dose, (Ozempic) 2 mg/1 5 mL injection pen Past Month  No Yes   Sig: Inject 0 19 mL (0 25 mg total) under the skin every 7 days      Facility-Administered Medications: None     Allergies   Allergen Reactions   • Hydrochlorothiazide Palpitations   • Meloxicam Rash       Review of previous medical records was  completed  Review of Systems   Constitutional: Negative for chills  HENT: Negative for sinus pain  Eyes: Negative for pain  Respiratory: Negative for shortness of breath  Cardiovascular: Negative for leg swelling  Gastrointestinal: Negative for diarrhea  Genitourinary: Negative for menstrual problem  Skin: Negative for rash  Neurological: Positive for speech difficulty and headaches  Negative for weakness and numbness  Psychiatric/Behavioral: The patient is not nervous/anxious  OBJECTIVE     Patient ID: Ramon Silva is a 70 y o  female  Vitals:   Vitals:    23 0500 23 0740 23 1100 23 1510   BP: 113/67 116/68 122/66 136/67   BP Location: Right arm      Pulse: 76 77 80 76   Resp: 16   18   Temp: 98 °F (36 7 °C) 98 2 °F (36 8 °C)  98 3 °F (36 8 °C)   TempSrc: Oral      SpO2: 95% 93% 92% 93%      There is no height or weight on file to calculate BMI       Intake/Output Summary (Last 24 hours) at 2023 1639  Last data filed at 2023 0900  Gross per 24 hour   Intake 430 ml   Output --   Net 430 ml       Temperature:   Temp (24hrs), Av 4 °F (36 9 °C), Min:98 °F (36 7 °C), Max:98 9 °F (37 2 °C)    Temperature: 98 3 °F (36 8 °C)    Invasive Devices: Invasive Devices     Peripheral Intravenous Line  Duration           Peripheral IV 05/13/23 Left Antecubital 1 day                Physical Exam  Eyes:      Extraocular Movements: EOM normal       Pupils: Pupils are equal, round, and reactive to light  Neurological:      Motor: Motor strength is normal       Gait: Gait is intact  Deep Tendon Reflexes:      Reflex Scores:       Bicep reflexes are 2+ on the right side and 2+ on the left side  Brachioradialis reflexes are 2+ on the right side and 2+ on the left side  Patellar reflexes are 2+ on the right side and 2+ on the left side  Achilles reflexes are 1+ on the right side and 1+ on the left side  Psychiatric:         Speech: Speech normal           Neurologic Exam     Mental Status   Oriented to person  Oriented to place  Disoriented to year and date  Attention: normal  Concentration: normal    Speech: speech is normal   Level of consciousness: alert  Unable to name object  Unable to read  Unable to repeat  Expressive> receptive aphasia  Patient developed frustration trying to communicate and was having word substitutions     Cranial Nerves     CN II   Visual fields full to confrontation  CN III, IV, VI   Pupils are equal, round, and reactive to light  Extraocular motions are normal      CN V   Facial sensation intact  CN VII   Facial expression full, symmetric  CN XI   CN XI normal      CN XII   CN XII normal      Motor Exam   Muscle bulk: normal  Overall muscle tone: normal    Strength   Strength 5/5 throughout  Sensory Exam   Light touch normal      Gait, Coordination, and Reflexes     Gait  Gait: normal    Reflexes   Right brachioradialis: 2+  Left brachioradialis: 2+  Right biceps: 2+  Left biceps: 2+  Right patellar: 2+  Left patellar: 2+  Right achilles: 1+  Left achilles: 1+  Right plantar: normal  Left plantar: normal       LABORATORY DATA     Labs: I have personally reviewed pertinent reports      Results from last 7 days   Lab Units 05/14/23  1242 05/14/23  0509 05/13/23  1134   WBC Thousand/uL 8 54 5 70 7 83   HEMOGLOBIN g/dL 13 2 12 0 13 5   HEMATOCRIT % 41 2 37 3 41 9   PLATELETS Thousands/uL 338 280 346   NEUTROS PCT %  --  59 76*   MONOS PCT %  --  8 6      Results from last 7 days   Lab Units 05/14/23  0509 05/13/23  1134   POTASSIUM mmol/L 3 5 3 9   CHLORIDE mmol/L 106 102   CO2 mmol/L 27 25   BUN mg/dL 9 10   CREATININE mg/dL 0 43* 0 52*   CALCIUM mg/dL 8 5 9 3   ALK PHOS U/L 29* 37   ALT U/L 14 16   AST U/L 16 19     Results from last 7 days   Lab Units 05/13/23  1134   MAGNESIUM mg/dL 1 8*          Results from last 7 days   Lab Units 05/14/23  1242   INR  0 99   PTT seconds 30     Results from last 7 days   Lab Units 05/13/23  1240   LACTIC ACID mmol/L 0 9           IMAGING & DIAGNOSTIC TESTING     Radiology Results: I have personally reviewed pertinent films in PACS  CTA head and neck with and without contrast   Final Result by Kiko Ojeda MD (05/13 1652)      No evidence of acute intracranial hemorrhage  Acute to subacute ischemia identified in the left temporal lobe and left frontal corona radiata  Proximal left cervical ICA 5 mm intraluminal thrombus  Left M3 MCA occlusion  I personally communicated this study with GALE BEY on 5/13/2023 4:50 PM                         Workstation performed: [id number]         XR chest 1 view portable   Final Result by Breonna Acevedo MD (05/13 1946)      No acute cardiopulmonary disease  Workstation performed: [id number]         MRI brain wo contrast    (Results Pending)       Other Diagnostic Testing: I have personally reviewed pertinent reports        ACTIVE MEDICATIONS     Current Facility-Administered Medications   Medication Dose Route Frequency   • acetaminophen (TYLENOL) tablet 975 mg  975 mg Oral Q8H PRN   • atorvastatin (LIPITOR) tablet 40 mg  40 mg Oral Daily With Dinner   • cholecalciferol (VITAMIN D3) tablet 2,000 Units  2,000 Units Oral Daily   • diphenhydrAMINE (BENADRYL) tablet 25 mg  25 mg Oral HS PRN   • heparin (porcine) 25,000 units in 0 45% NaCl 250 mL infusion (premix)  3-24 Units/kg/hr (Order-Specific) Intravenous Titrated   • insulin lispro (HumaLOG) 100 units/mL subcutaneous injection 1-6 Units  1-6 Units Subcutaneous 4 times day   • levothyroxine tablet 125 mcg  125 mcg Oral Early Morning   • magnesium gluconate (MAGONATE) tablet 250 mg  250 mg Oral QAM   • melatonin tablet 6 mg  6 mg Oral HS PRN   • metoprolol tartrate (LOPRESSOR) tablet 25 mg  25 mg Oral Daily   • metroNIDAZOLE (NORITRATE) 1 % cream 1 application  1 application  Topical Daily PRN   • pantoprazole (PROTONIX) EC tablet 40 mg  40 mg Oral Early Morning   • psyllium (METAMUCIL) 1 packet  1 packet Oral Daily       Prior to Admission medications    Medication Sig Start Date End Date Taking?  Authorizing Provider   amoxicillin-clavulanate (Augmentin) 875-125 mg per tablet Take 1 tablet by mouth every 12 (twelve) hours for 10 days 5/10/23 5/20/23 Yes Supa Wood DO   Ascorbic Acid (VITAMIN C) 1000 MG tablet Take 1 tablet by mouth daily 11/6/12  Yes Historical Provider, MD   atorvastatin (LIPITOR) 20 mg tablet TAKE 1 TABLET DAILY 9/2/22  Yes Supa Wood DO   Biotin 5000 MCG CAPS Take 1 capsule by mouth daily   Yes Historical Provider, MD   cholecalciferol (VITAMIN D3) 1,000 units tablet Take 2,000 Units by mouth daily   Yes Historical Provider, MD   CINNAMON PO Take 2,000 mg by mouth daily in the early morning   Yes Historical Provider, MD   diphenhydrAMINE-acetaminophen (TYLENOL PM)  MG TABS Take 1 tablet by mouth daily at bedtime as needed for sleep   Yes Historical Provider, MD   esomeprazole (NexIUM) 40 MG capsule Take 1 capsule by mouth daily 5/3/11  Yes Historical Provider, MD   fluticasone (FLONASE) 50 mcg/act nasal spray USE 2 SPRAYS IN [facility] NOSTRIL DAILY 8/26/22  Yes Supa Wood DO   guaiFENesin ([place]) 169 mg 12 hr tablet Take 2 tablets (1,200 mg total) by mouth every 12 (twelve) hours 5/2/23  Yes Jamie López MD   levothyroxine 125 mcg tablet TAKE 1 TABLET DAILY ON AN EMPTY STOMACH 10/10/22  Yes Kevin Morales DO   Magnesium 250 MG TABS Take 250 mg by mouth in the morning   Yes Historical Provider, MD   Melatonin-Theanine 10-5 5 MG TABS Take 1 tablet by mouth as needed   Yes Historical Provider, MD   Menthol, Topical Analgesic, (BIOFREEZE EX) Apply 1 application topically daily as needed (pain)   Yes Historical Provider, MD   metoprolol tartrate (LOPRESSOR) 25 mg tablet Take one tablet as needed for palpitations 2/21/23  Yes Leandra Florian MD   metroNIDAZOLE (NORITRATE) 1 % cream Apply 1 application   topically daily   Yes Historical Provider, MD   Multiple Vitamin (MULTIVITAMIN) capsule Take 1 capsule by mouth daily   Yes Historical Provider, MD   Probiotic Product (ALIGN PO) Take 1 capsule by mouth in the morning   Yes Historical Provider, MD   psyllium (METAMUCIL) 58 6 % powder Take 1 packet by mouth daily   Yes Historical Provider, MD   semaglutide, 0 25 or 0 5 mg/dose, (Ozempic) 2 mg/1 5 mL injection pen Inject 0 19 mL (0 25 mg total) under the skin every 7 days 3/1/23  Yes Kevin Morales DO   doxycycline hyclate (VIBRA-TABS) 100 mg tablet TAKE 1 TABLET BY MOUTH AN HOUR BEFORE DINNER WITH WATER ONLY, REMAIN UPRIGHT FOR ONE HOUR AFTERWARDS  Patient not taking: Reported on 5/13/2023 12/20/22   Historical Provider, MD   MECLIZINE HCL PO Take 25 mg by mouth daily as needed    Historical Provider, MD   Turmeric 500 MG CAPS Take 1,000 mg by mouth daily in the early morning  Patient not taking: Reported on 5/13/2023    Historical Provider, MD   naproxen sodium (ALEVE) 220 MG tablet Take by mouth  6/27/19  Historical Provider, MD         CODE STATUS & ADVANCED DIRECTIVES     Code Status: Level 1 - Full Code  Advance Directive and Living Will:      Power of : Yes  POLST:        VTE Pharmacologic Prophylaxis: Heparin Drip  VTE Mechanical Prophylaxis: sequential compression device    ==  MD [facility] Res[facility], PGY-3

## 2023-05-14 NOTE — PLAN OF CARE
Summary   Patient presents with moderate mixed receptive and expressive aphasia c/b reduced naming, ability to follow simple commands/ID objects/pictures, ability to repeat words/sentences, as well as simple reading/writing tasks  Conversation about familiar subjects is possible with help from the listener  There are frequent failures to convey the idea, but the patient shares the burden of communication  Recommendation:  Patient would benefit from ongoing speech therapy at least 2x weekly- consider outpatient therapy at [address] [place] [address]  Utilize simple 1 step commands- provide visual models/aids  Provide visual choices  Avoid open-ended questions  Ask simple Y/N questions

## 2023-05-14 NOTE — ASSESSMENT & PLAN NOTE
Presenting with expressive aphasia and frontal headache since 4/11  Tested positive for COVID-19 11 days ago and thought her symptoms were secondary to that infection  No other neurological deficits  Upon arrival in ED, patient was hypertensive with CTA head/neck that showed a left MCA occlusion  However patient was not a candidate for tPA due to >36 hours since event  On exam patient was able to communicate however did have expressive aphasia  CTA:    Acute to subacute ischemia identified in the left temporal lobe and left frontal corona radiata  Proximal left cervical ICA 5 mm intraluminal thrombus   Left M3 MCA occlusion      · Neurology consulted  · MRI brain without contrast pending  · Continue neurochecks  · Normotensive goal,  · Neurology ordered vascular surgery consult for left carotid artery thrombus  · Continue aspirin and Plavix, atorvastatin  · Continue telemetry  · Heparin drip for thrombus

## 2023-05-14 NOTE — Clinical Note
"PT orders received  Chart reviewed  Per Chart review \"Holding off on PT/OT since patient does not have any other neuro deficits and is able to ambulate independently \" Will complete PT orders at this time  If there is a change in status or need for PT eval, please place new orders     "

## 2023-05-14 NOTE — ASSESSMENT & PLAN NOTE
Assessment:  Gina Wilkerson is a 70 y o  female  With hx of hypothyroidism, DM, palpitations, HLD, COVID 12 days ago  Initial presenting deficits were expressive aphasia and mild frontal headaches since 4/11  The patient has a pmhx significant for Hypothyroidism, DM, palpitations, HLD, COVID  CTA head and neck showed left MCA occlusion and proximal left cervical ICA 5mm intraluminal thrombus and acute to subacute ischemia in left temporal lobe and left frontal corona radiata  Workup: In the ED, Presenting BP Blood Pressure: (!) 174/82  EKG on presentation to the ER showed sinus tachycardia  CT Head showed: Acute to subacute infarct on left temporal lobe and left frontal corona radiata  CTA head and neck showed: left cervical 5mm thrombus and left M3 MCA occlusion  MRI brain wo contrast: Late acute to early subacute infarction left frontal, left temporal, left parietal> left occipital   ANDREW: EF 65%, small patent foramen ovale and moderately sized atrial septal aneurysm      Lab Results   Component Value Date    HGBA1C 6 2 (H) 02/21/2023     No results found for: TSH  Lab Results   Component Value Date    CHOLESTEROL 134 05/14/2023    TRIG 111 05/14/2023    HDL 40 (L) 05/14/2023    LDLCALC 72 05/14/2023       Risk factors: diabetes, hyperlipidemia and COVID 12 days ago      Impression: Patient most likely has an Left embolic infarct due to left M3 occlusion which potentially stems from the left ICA thrombus which is partially occlusive and mobile  Left ICA thrombus most likely cardiac in origin with increased risk factors for cardioembolism from moderately sized atrial septal aneurysm and small PFO  Agreed with Cardiology concerning the stroke as ultimately embolic in etiology      Plan:  - Discussed with Dr Jeffries  - Continue stroke pathway  - Continue Neuro checks  - Recommend normotension  - Maintain glucose <180, SSI for coverage if indicated  - Repeat CTH if GCS drops 2pts in 1hr  - PT/OT/ST/PM&R Evaluation  - Vascular surgery consult recommended medical management and vascular surgery followup  - On heparin drip- ordered CT head to make sure no hemorrhage   - If no hemorrhage, stop heparin drip and recommend starting eliquis 5mg BID due to most likely cardioembolic nature of stroke and thrombus (There are some studies concerning mobile thrombus medical management although unfortunately insufficient to differentiate between using AC vs AP agents and treatment seems to align with ultimate origin of thrombus whether atheroembolic vs cardioembolic and due to ultimate cardioembolic nature of the stroke, we will recommend eliquis 5mg BID)  - Atorvastatin 40 mg q d   - Continue monitoring on telemetry  - Rest of care per primary team    - Recommend followup with neurology, vascular surgery (concerning carotid mobile thrombus), and cardiology (concerning PFO and LINQ recorder)

## 2023-05-14 NOTE — PROGRESS NOTES
[facility]  Progress Note  Name: Bill Benitez  MRN: [id number]  Unit/Bed#: S -01 I Date of Admission: 5/13/2023   Date of Service: 5/14/2023 I Hospital Day: 1    Assessment/Plan   Palpitations  Assessment & Plan  Patient takes metoprolol 25 as needed for palpitations and has not taken it in the past 3 weeks  She did have a Holter monitor 2022 that showed 17 episodes of SVTs but was asymptomatic during those episodes  During episodes she did have symptoms to monitor showed NSR and occasional PVCs  · Continue telemetry  · Cardiology consult for any arrhythmia shown  · Metoprolol 25 daily, does not require permissive hypertension since > 36 hours since event    Type 2 diabetes mellitus without complication, without long-term current use of insulin [facility])  Assessment & Plan  Lab Results   Component Value Date    HGBA1C 5 8 (H) 05/14/2023       Recent Labs     05/14/23  0958 05/14/23  1147 05/14/23  1505 05/14/23  1539   POCGLU 133 123 128 112       Blood Sugar Average: Last 72 hrs:     SSI  Diabetic diet    Hypothyroidism  Assessment & Plan  Continue levothyroxine    Hyperlipidemia  Assessment & Plan  Continue increased Lipitor 40 daily    Lymphedema  Assessment & Plan  Bilateral lower extremity edema present on exam   Patient states that this is chronic and does not take any home diuretic  Patient oral mucosa was dry on exam and reports despite drinking excessive fluids she is always thirsty the past few days  · Echocardiogram  · Compression stockings    * CVA (cerebral vascular accident) [facility])  Assessment & Plan  Presenting with expressive aphasia and frontal headache since 4/11  Tested positive for COVID-19 11 days ago and thought her symptoms were secondary to that infection  No other neurological deficits  Upon arrival in ED, patient was hypertensive with CTA head/neck that showed a left MCA occlusion    However patient was not a candidate for tPA due to >36 hours since event  On exam patient was able to communicate however did have expressive aphasia  CTA:    Acute to subacute ischemia identified in the left temporal lobe and left frontal corona radiata  Proximal left cervical ICA 5 mm intraluminal thrombus  Left M3 MCA occlusion      · Neurology consulted  · MRI brain without contrast pending  · Continue neurochecks  · Normotensive goal,  · Neurology ordered vascular surgery consult for left carotid artery thrombus  · Continue aspirin and Plavix, atorvastatin  · Continue telemetry  · Heparin drip for thrombus            VTE Pharmacologic Prophylaxis: VTE Score: 9 High Risk (Score >/= 5) - Pharmacological DVT Prophylaxis Ordered: heparin drip  Sequential Compression Devices Ordered  Patient Centered Rounds: I performed bedside rounds with nursing staff today  Discussions with Specialists or Other Care Team Provider: Neuro    Education and Discussions with Family / Patient: Updated  () at bedside  Current Length of Stay: 1 day(s)  Current Patient Status: Inpatient   Discharge Plan: Anticipate discharge in 24-48 hrs to Pending PT OT    Code Status: Level 1 - Full Code    Subjective:   Patient is still dysarthric and has problems with word finding and speech  Does not have any numbness, weakness  Can walk well  No other subjective complaints noted    Objective:     Vitals:   Temp (24hrs), Av 4 °F (36 9 °C), Min:98 °F (36 7 °C), Max:98 9 °F (37 2 °C)    Temp:  [98 °F (36 7 °C)-98 9 °F (37 2 °C)] 98 3 °F (36 8 °C)  HR:  [76-92] 76  Resp:  [16-19] 18  BP: (113-179)/(62-99) 136/67  SpO2:  [91 %-96 %] 93 %  There is no height or weight on file to calculate BMI  Input and Output Summary (last 24 hours): Intake/Output Summary (Last 24 hours) at 2023 1613  Last data filed at 2023 0900  Gross per 24 hour   Intake 430 ml   Output --   Net 430 ml       Physical Exam:   Physical Exam  Vitals and nursing note reviewed  Constitutional:       General: She is not in acute distress  Appearance: She is well-developed  HENT:      Head: Normocephalic and atraumatic  Nose: Nose normal       Mouth/Throat:      Mouth: Mucous membranes are moist    Eyes:      Conjunctiva/sclera: Conjunctivae normal    Cardiovascular:      Rate and Rhythm: Normal rate and regular rhythm  Heart sounds: No murmur heard  Pulmonary:      Effort: Pulmonary effort is normal  No respiratory distress  Breath sounds: Normal breath sounds  Abdominal:      Palpations: Abdomen is soft  Tenderness: There is no abdominal tenderness  Musculoskeletal:      Cervical back: Neck supple  Right lower leg: No edema  Left lower leg: No edema  Skin:     General: Skin is warm and dry  Capillary Refill: Capillary refill takes less than 2 seconds  Neurological:      Mental Status: She is alert and oriented to person, place, and time  Cranial Nerves: No cranial nerve deficit  Sensory: No sensory deficit  Motor: No weakness        Coordination: Coordination normal       Gait: Gait normal       Deep Tendon Reflexes: Reflexes normal       Comments: Patient does have problems with speech and was found to be dysarthric   Psychiatric:         Mood and Affect: Mood normal           Additional Data:     Labs:  Results from last 7 days   Lab Units 05/14/23  1242 05/14/23  0509   WBC Thousand/uL 8 54 5 70   HEMOGLOBIN g/dL 13 2 12 0   HEMATOCRIT % 41 2 37 3   PLATELETS Thousands/uL 338 280   NEUTROS PCT %  --  59   LYMPHS PCT %  --  29   MONOS PCT %  --  8   EOS PCT %  --  3     Results from last 7 days   Lab Units 05/14/23  0509   SODIUM mmol/L 139   POTASSIUM mmol/L 3 5   CHLORIDE mmol/L 106   CO2 mmol/L 27   BUN mg/dL 9   CREATININE mg/dL 0 43*   ANION GAP mmol/L 6   CALCIUM mg/dL 8 5   ALBUMIN g/dL 3 6   TOTAL BILIRUBIN mg/dL 0 51   ALK PHOS U/L 29*   ALT U/L 14   AST U/L 16   GLUCOSE RANDOM mg/dL 103     Results from last 7 days   Lab Units 05/14/23  1242   INR  0 99     Results from last 7 days   Lab Units 05/14/23  1539 05/14/23  1505 05/14/23  1147 05/14/23  0958 05/14/23  0740 05/13/23  1933   POC GLUCOSE mg/dl 112 128 123 133 103 150*     Results from last 7 days   Lab Units 05/14/23  0509   HEMOGLOBIN A1C % 5 8*     Results from last 7 days   Lab Units 05/13/23  1240   LACTIC ACID mmol/L 0 9       Lines/Drains:  Invasive Devices     Peripheral Intravenous Line  Duration           Peripheral IV 05/13/23 Left Antecubital 1 day                  Telemetry:  Telemetry Orders (From admission, onward)             24 Hour Telemetry Monitoring  Continuous x 24 Hours (Telem)        Expiring   Question:  Reason for 24 Hour Telemetry  Answer:  TIA/Suspected CVA/ Confirmed CVA                 Telemetry Reviewed: Normal Sinus Rhythm  Indication for Continued Telemetry Use: Acute CVA             Imaging: Reviewed radiology reports from this admission including: CT head    Recent Cultures (last 7 days):   Results from last 7 days   Lab Units 05/13/23  1240   BLOOD CULTURE  Received in Microbiology Lab  Culture in Progress  Received in Microbiology Lab  Culture in Progress         Last 24 Hours Medication List:   Current Facility-Administered Medications   Medication Dose Route Frequency Provider Last Rate   • acetaminophen  975 mg Oral Q8H PRN Epifanio Garcia MD     • atorvastatin  40 mg Oral Daily With Nenita Dorsey MD     • cholecalciferol  2,000 Units Oral Daily Kole Mantilla MD     • diphenhydrAMINE  25 mg Oral HS PRN Kole Mantilla MD     • heparin (porcine)  3-24 Units/kg/hr (Order-Specific) Intravenous Titrated Lizett Kimbrough MD 15 Units/kg/hr (05/14/23 1239)   • insulin lispro  1-6 Units Subcutaneous 4 times day Kole Mantilla MD     • levothyroxine  125 mcg Oral Early Morning Kole Mantilla MD     • magnesium gluconate  250 mg Oral QAM Kole Mantilla MD     • melatonin  6 mg Oral HS PRN Roxana Bustamante MD     • metoprolol tartrate  25 mg Oral Daily Shadia Coffman MD     • metroNIDAZOLE  1 application  Topical Daily PRN Shadia Coffman MD     • pantoprazole  40 mg Oral Early Morning Shadia Coffman MD     • psyllium  1 packet Oral Daily Shadia Coffman MD          Today, Patient Was Seen By: Angel Vanegas MD    **Please Note: This note may have been constructed using a voice recognition system  **

## 2023-05-14 NOTE — SPEECH THERAPY NOTE
Speech-Language Pathology Bedside Swallow Evaluation        Patient Name: Lisa Ferrer    ITJGC'U Date: 5/14/2023     Problem List  Patient Active Problem List   Diagnosis   • Lymphedema   • Fatigue   • Hyperlipidemia   • Hypothyroidism   • Class 1 obesity due to excess calories with serious comorbidity and body mass index (BMI) of 33 0 to 33 9 in adult   • Type 2 diabetes mellitus without complication, without long-term current use of insulin (HCC)   • Atypical chest pain   • Right lower quadrant abdominal pain   • Pelvic pain   • Family history of ovarian cancer   • Wellness examination   • Carotid artery plaque   • Dense breast tissue on mammogram   • Fibrocystic breast changes   • Dupuytren's contracture   • Diverticular disease of colon   • Gastro-esophageal reflux disease with esophagitis   • History of adenomatous polyp of colon   • Hypothyroidism due to Hashimoto's thyroiditis   • Impaired fasting glucose   • Nephrolithiasis   • Essential hypertension   • Encounter for screening mammogram for breast cancer   • Medicare annual wellness visit, subsequent   • Dyspnea on exertion   • Insomnia   • Oral ulceration   • Right flank pain   • Exposure to COVID-19 virus   • Edema   • Thyroid nodule   • Osteoarthritis   • Urinary incontinence   • Right groin pain   • Trigger finger   • Benign essential tremor   • Tachycardia   • Acute non-recurrent sphenoidal sinusitis   • COVID-19   • CVA (cerebral vascular accident) ([facility] [address] )   • Palpitations       Past Medical History  Past Medical History:   Diagnosis Date   • Biliary dyskinesia    • CAP (community acquired pneumonia)     last assessed 8/17/16   • Deep vein thrombosis (DVT) of proximal vein of right lower extremity, unspecified chronicity ([facility] [address] ) 8/10/2022   • Diabetes mellitus [facility] 12/2018    Type 2 no insulin   • Disease of thyroid gland 2000   • GERD (gastroesophageal reflux disease)    • Hyperlipidemia    • Palpitations    • Pneumonia    • Shortness of breath    • SVT (supraventricular tachycardia) ([facility] [address] ) 6/8/2022       Past Surgical History  Past Surgical History:   Procedure Laterality Date   • AUGMENTATION MAMMAPLASTY Bilateral 1998    breast surgery- with prosthetic implant 1998; pre-pectoral salline   • BREAST BIOPSY Left 1974   • BREAST BIOPSY Left 1994   • BREAST EXCISIONAL BIOPSY Left 1993   • CARDIAC CATHETERIZATION      procedure summary   • CHOLECYSTECTOMY      onset 2003   • HEMORRHOID SURGERY     • ROTATOR CUFF REPAIR Bilateral     onset 2010   • TUBAL LIGATION      onset 1980         Current Medical Status  Pt is a 70 y o  female who presented to [address] [place] [address] with expressive aphasia and mild frontal headache since 4/11  Patient recently tested positive for COVID-19 infection 11 days ago and thought her symptoms were secondary to that infection  Since Thursday, patient's aphasia and headache have persisted without any improvement or worsening  Patient did not have any other neurological deficits  Upon arrival in ED, patient was hypertensive with CTA head/neck that showed a left MCA occlusion, acute-subacute ischemia in L temporal lobe and left frontal corona radiata as well as proximal L cervical ICA thrombus  However patient was not a candidate for tPA due to >36 hours since event   present throughout my visit  Patient denies any dysphagia or dysarthria  Admits to difficulty with word finding and confusion  Past medical history:   Please see H&P for details    Special Studies:  MRI brain pending    5/13 CTA head/neck: No evidence of acute intracranial hemorrhage     Acute to subacute ischemia identified in the left temporal lobe and left frontal corona radiata  Proximal left cervical ICA 5 mm intraluminal thrombus  Left M3 MCA occlusion  5/13 CXR: No acute cardiopulmonary disease      Social/Education/Vocational Hx:  Pt lives with family    Swallow Information   Current Risks for Dysphagia & Aspiration: CVA     Current Symptoms/Concerns: none reported    Current Diet: regular diet and thin liquids      Baseline Diet: regular diet and thin liquids      Baseline Assessment   Behavior/Cognition: alert    Speech/Language Status: able to participate in basic conversation and able to follow commands inconsistently + aphasia ( see additional note from today) No obvious dysarthria or apraxia    Patient Positioning: upright in bed    Swallow Mechanism Exam   Facial: symmetrical  Labial: WFL  Lingual: WFL  Velum: symmetrical  Mandible: adequate ROM  Dentition: adequate  Vocal quality:clear/adequate   Volitional Cough: strong/productive   Resp: RA    Consistencies Assessed and Performance   Consistencies Administered: thin liquids, puree and hard solids  Specific materials administered included    Oral Stage: WFL  Mastication was adequate with the materials administered today  Bolus formation and transfer were functional with nosignificant oral residue noted  No overt s/s reduced oral control  Pharyngeal Stage: [facility]  Swallowing initiation appeared prompt  Laryngeal rise was palpated and judged to be within functional limits  No coughing, throat clearing, change in vocal quality or respiratory status noted today  Esophageal Concerns: none reported    Summary   Pts oropharyngeal swallow function appears generally WFL at this time with the materials administered today  She does not appear to be at risk for aspiration at this time  No obvious dysarthria or apraxia  She does appear to present with a mixed aphasia- see additional note from today for formal evaluation      Recommendations: regular diet and thin liquids     Recommended Form of Meds: as tolerated     Results Reviewed with: patient and family     Plan  No further dysphagia treatment indicated      JOSE FRANCISCO Hwang S , [id number] [facility]  Speech Language Pathologist   Available via [address] [place] [address] #[id number]  [place] #[id number]

## 2023-05-14 NOTE — PHYSICAL THERAPY NOTE
"                                     Physical Therapy Cancellation Note           05/14/23 0925   PT Last Visit   PT Visit Date 05/14/23   Note Type   Note type Cancelled Session   Cancel Reasons Other   Additional Comments PT orders received  Chart reviewed  Per Chart review \"Holding off on PT/OT since patient does not have any other neuro deficits and is able to ambulate independently \" Will complete PT orders at this time  If there is a change in status or need for PT eval, please place new orders       Thiago Rose PT                                                                                  "

## 2023-05-14 NOTE — SPEECH THERAPY NOTE
Speech/Language Evaluation      Patient Name: Isidro CARTERQ Date: roblem List  Principal Problem:    CVA (cerebral vascular accident) [facility])  Active Problems:    Lymphedema    Hyperlipidemia    Hypothyroidism    Type 2 diabetes mellitus without complication, without long-term current use of insulin ([facility] [address] )    Palpitations      Past Medical History  Past Medical History:   Diagnosis Date   • Biliary dyskinesia    • CAP (community acquired pneumonia)     last assessed 8/17/16   • Deep vein thrombosis (DVT) of proximal vein of right lower extremity, unspecified chronicity ([facility] [address] ) 8/10/2022   • Diabetes mellitus ([facility] ) 12/2018    Type 2 no insulin   • Disease of thyroid gland 2000   • GERD (gastroesophageal reflux disease)    • Hyperlipidemia    • Palpitations    • Pneumonia    • Shortness of breath    • SVT (supraventricular tachycardia) ([facility] ) 6/8/2022       Past Surgical History  Past Surgical History:   Procedure Laterality Date   • AUGMENTATION MAMMAPLASTY Bilateral 1998    breast surgery- with prosthetic implant 1998; pre-pectoral salline   • BREAST BIOPSY Left 1974   • BREAST BIOPSY Left 1994   • BREAST EXCISIONAL BIOPSY Left 1993   • CARDIAC CATHETERIZATION      procedure summary   • CHOLECYSTECTOMY      onset 2003   • HEMORRHOID SURGERY     • ROTATOR CUFF REPAIR Bilateral     onset 2010   • TUBAL LIGATION      onset 1980         Summary   Patient presents with moderate mixed receptive and expressive aphasia c/b reduced naming, ability to follow simple commands/ID objects/pictures,, ability to repeat words/sentences, as well as simple reading/writing tasks  Conversation about familiar subjects is possible with help from the listener  There are frequent failures to convey the idea, but the patient shares the burden of communication  Recommendation:  Patient would benefit from ongoing speech therapy at least 2x weekly- consider outpatient therapy at [address] [place] [address]     Utilize simple 1 step commands- provide visual models/aids  Provide visual choices  Avoid open-ended questions  Ask simple Y/N questions  Short Term Goals:  Patient will complete picture/word matching with 80% acc given min verbal cues  Patient will complete automatic speech tasks with 80% acc given min-mod verbal cues  Patient will follow simple 1-2 step directives with 100% acc given min-mod verbal cues  Patient will name objects in 60% of opps given mod verbal cues  Patient will write letters/short words with 60% given mod verbal/visual cues  Long Term Goal: Patient will improve expressive and receptive language for improved overall communication  Current Medical:    See note from evaluation earlier today  General Cognitive Status:  Alert with adequate attention    The [employer] Diagnostic Aphasia Examination-Third Edition (BDAE-3) is a comprehensive standardized assessment designed to evaluate a broad range of language impairments that often arise as a consequence of organic brain dysfunction  The BDAE-3 is divided into five subtests, including conversational & expository speech, auditory comprehension, oral expression, reading, and writing  The results of the BDAE-3 are used to classify a patient’s language profiles into one of the localization-based classifications of aphasia: Broca’s, Wernicke’s, anomic, conduction, transcortical, transcortical motor, transcortical sensory, and global aphasia syndromes, although the test does not always provide a diagnosis or a therapeutic approach   The following results were obtained during the administration of the short form assessment:      SEVERITY RATIN/5 50%ile    FLUENCY:  -Phrase length (rating scale)   20%ile  -Melodic line (rating scale)   100%ile  -Grammatical form (rating scale)  30%ile    CONVERSATION/EXPOSITORY SPEECH:  -Simple social responses   100%ile    AUDITORY COMPREHENSION:  -Basic word discrimination  8 5/16 5%ile  -Commands    1/10 5%ile  -Complex ideational material  0/6 0%ile    ARTICULATION:  -Articulatory agility (rating scale) 4/7 40%ile    RECITATION:  -Automatized sequences  1/4 10%ile    REPETITION:  -Words     0/5 0%ile   -Sentences    0/2 30%ile    NAMING:  -Responsive naming   0/10 10%ile  -[place] Naming Test - short form dnt/15 -%ile  -Special categories   12/12 100%ile    PARAPHASIA:  -Rating from speech profile  2/7 20%ile    READING:   -Matching cases & scripts  4/4 100%ile  -Number matching   3/4 10%ile  -Picture-word matching  3/4 40%ile  -Oral word reading   12/15  40%ile  -Oral sentence reading  0/5 30%ile  -Oral sentence comprehension 0/3 10%ile  -Sentence/paragraph comprehension2/4 40%ile    WRITING:  -Form     8/14 10%ile  -Letter choice    8/21 5%ile  -Motor facility    12/14 40%ile  -Primer words    0/4 0%ile  -Regular phonics   0/2 30%ile  -Common irregular words  0/3 10%ile  -Written picture naming   1/4 30%ile  -Narrative writing   4/11 20%ile    Overall, Pt presents with a Severity Equivalent: Moderate- severe expressive/Receptive aphasia with a severity rating of 2 (of a possible 5 being fluent speech)    Results discussed with: patient and her         JOSE FRANCISCO Mahoney , [address] [facility]  Speech Language Pathologist   Available via [address] [place] [address] #[id number]  [place] #[id number]

## 2023-05-14 NOTE — ASSESSMENT & PLAN NOTE
Lab Results   Component Value Date    HGBA1C 5 8 (H) 05/14/2023       Recent Labs     05/14/23  0958 05/14/23  1147 05/14/23  1505 05/14/23  1539   POCGLU 133 123 128 112       Blood Sugar Average: Last 72 hrs:     SSI  Diabetic diet

## 2023-05-15 ENCOUNTER — ANESTHESIA EVENT (INPATIENT)
Dept: NON INVASIVE DIAGNOSTICS | Facility: HOSPITAL | Age: 78
End: 2023-05-15

## 2023-05-15 ENCOUNTER — ANESTHESIA (INPATIENT)
Dept: NON INVASIVE DIAGNOSTICS | Facility: HOSPITAL | Age: 78
End: 2023-05-15

## 2023-05-15 ENCOUNTER — APPOINTMENT (OUTPATIENT)
Dept: NON INVASIVE DIAGNOSTICS | Facility: HOSPITAL | Age: 78
End: 2023-05-15

## 2023-05-15 PROBLEM — Q21.12 PFO (PATENT FORAMEN OVALE): Status: ACTIVE | Noted: 2023-05-15

## 2023-05-15 PROBLEM — I63.30 CEREBRAL THROMBOSIS WITH CEREBRAL INFARCTION (HCC): Status: ACTIVE | Noted: 2023-05-13

## 2023-05-15 PROBLEM — I63.30 CEREBRAL THROMBOSIS WITH CEREBRAL INFARCTION (HCC): Status: ACTIVE | Noted: 2023-05-15

## 2023-05-15 PROBLEM — E87.6 HYPOKALEMIA: Status: ACTIVE | Noted: 2023-05-15

## 2023-05-15 LAB
ANION GAP SERPL CALCULATED.3IONS-SCNC: 9 MMOL/L (ref 4–13)
APTT PPP: 104 SECONDS (ref 23–37)
APTT PPP: 53 SECONDS (ref 23–37)
APTT PPP: 57 SECONDS (ref 23–37)
BUN SERPL-MCNC: 9 MG/DL (ref 5–25)
CALCIUM SERPL-MCNC: 8.8 MG/DL (ref 8.4–10.2)
CHLORIDE SERPL-SCNC: 103 MMOL/L (ref 96–108)
CO2 SERPL-SCNC: 27 MMOL/L (ref 21–32)
CREAT SERPL-MCNC: 0.48 MG/DL (ref 0.6–1.3)
GFR SERPL CREATININE-BSD FRML MDRD: 95 ML/MIN/1.73SQ M
GLUCOSE SERPL-MCNC: 104 MG/DL (ref 65–140)
GLUCOSE SERPL-MCNC: 105 MG/DL (ref 65–140)
GLUCOSE SERPL-MCNC: 108 MG/DL (ref 65–140)
GLUCOSE SERPL-MCNC: 119 MG/DL (ref 65–140)
GLUCOSE SERPL-MCNC: 123 MG/DL (ref 65–140)
GLUCOSE SERPL-MCNC: 98 MG/DL (ref 65–140)
POTASSIUM SERPL-SCNC: 3.3 MMOL/L (ref 3.5–5.3)
SL CV LV EF: 65
SODIUM SERPL-SCNC: 139 MMOL/L (ref 135–147)

## 2023-05-15 RX ORDER — LIDOCAINE HYDROCHLORIDE 20 MG/ML
INJECTION, SOLUTION EPIDURAL; INFILTRATION; INTRACAUDAL; PERINEURAL AS NEEDED
Status: DISCONTINUED | OUTPATIENT
Start: 2023-05-15 | End: 2023-05-15

## 2023-05-15 RX ORDER — PROPOFOL 10 MG/ML
INJECTION, EMULSION INTRAVENOUS AS NEEDED
Status: DISCONTINUED | OUTPATIENT
Start: 2023-05-15 | End: 2023-05-15

## 2023-05-15 RX ORDER — PROPOFOL 10 MG/ML
INJECTION, EMULSION INTRAVENOUS CONTINUOUS PRN
Status: DISCONTINUED | OUTPATIENT
Start: 2023-05-15 | End: 2023-05-15

## 2023-05-15 RX ORDER — POTASSIUM CHLORIDE 20 MEQ/1
40 TABLET, EXTENDED RELEASE ORAL ONCE
Status: COMPLETED | OUTPATIENT
Start: 2023-05-15 | End: 2023-05-15

## 2023-05-15 RX ORDER — SODIUM CHLORIDE 9 MG/ML
INJECTION, SOLUTION INTRAVENOUS CONTINUOUS PRN
Status: DISCONTINUED | OUTPATIENT
Start: 2023-05-15 | End: 2023-05-15

## 2023-05-15 RX ADMIN — Medication 2000 UNITS: at 09:26

## 2023-05-15 RX ADMIN — PANTOPRAZOLE SODIUM 40 MG: 40 TABLET, DELAYED RELEASE ORAL at 05:39

## 2023-05-15 RX ADMIN — POTASSIUM CHLORIDE 40 MEQ: 1500 TABLET, EXTENDED RELEASE ORAL at 09:26

## 2023-05-15 RX ADMIN — Medication 1 PACKET: at 09:26

## 2023-05-15 RX ADMIN — PROPOFOL 80 MG: 10 INJECTION, EMULSION INTRAVENOUS at 11:52

## 2023-05-15 RX ADMIN — HEPARIN SODIUM 15 UNITS/KG/HR: 10000 INJECTION, SOLUTION INTRAVENOUS at 22:51

## 2023-05-15 RX ADMIN — Medication 250 MG: at 09:26

## 2023-05-15 RX ADMIN — PROPOFOL 40 MG: 10 INJECTION, EMULSION INTRAVENOUS at 11:53

## 2023-05-15 RX ADMIN — LIDOCAINE HYDROCHLORIDE 100 MG: 20 INJECTION, SOLUTION EPIDURAL; INFILTRATION; INTRACAUDAL at 11:52

## 2023-05-15 RX ADMIN — PROPOFOL 80 MCG/KG/MIN: 10 INJECTION, EMULSION INTRAVENOUS at 11:52

## 2023-05-15 RX ADMIN — HEPARIN SODIUM 15 UNITS/KG/HR: 10000 INJECTION, SOLUTION INTRAVENOUS at 05:38

## 2023-05-15 RX ADMIN — ATORVASTATIN CALCIUM 40 MG: 40 TABLET, FILM COATED ORAL at 17:06

## 2023-05-15 RX ADMIN — PROPOFOL 20 MG: 10 INJECTION, EMULSION INTRAVENOUS at 11:55

## 2023-05-15 RX ADMIN — METOPROLOL TARTRATE 25 MG: 25 TABLET, FILM COATED ORAL at 09:26

## 2023-05-15 RX ADMIN — LEVOTHYROXINE SODIUM 125 MCG: 125 TABLET ORAL at 05:39

## 2023-05-15 RX ADMIN — SODIUM CHLORIDE: 0.9 INJECTION, SOLUTION INTRAVENOUS at 11:25

## 2023-05-15 NOTE — UTILIZATION REVIEW
"Initial Clinical Review    Admission: Date/Time/Statement:   Admission Orders (From admission, onward)     Ordered        05/13/23 1727  INPATIENT ADMISSION  Once                      Orders Placed This Encounter   Procedures   • INPATIENT ADMISSION     Standing Status:   Standing     Number of Occurrences:   1     Order Specific Question:   Level of Care     Answer:   Med Surg [16]     Order Specific Question:   Estimated length of stay     Answer:   More than 2 Midnights     Order Specific Question:   Certification     Answer:   I certify that inpatient services are medically necessary for this patient for a duration of greater than two midnights  See H&P and MD Progress Notes for additional information about the patient's course of treatment  ED Arrival Information     Expected   5/13/2023     Arrival   5/13/2023 11:11    Acuity   Urgent            Means of arrival   Walk-In    Escorted by   Family Member    Service   Hospitalist    Admission type   Emergency            Arrival complaint   cognitive changes           Chief Complaint   Patient presents with   • Altered Mental Status     Pt to ED with c/o feeling increased confusion since being dx with covid 11 days ago  Pt states \"I feel like I can't get things right\"       Initial Presentation: 70 y o  female  with a PMH of hypothyroidism, DM, palpitations, HLD who presents with expressive aphasia and mild frontal headache since 4/11  Patient recently tested positive for COVID-19 infection 11 days ago and thought her symptoms were secondary to that infection  Since Thursday, patient's aphasia and headache have persisted without any improvement or worsening  Patient did not have any other neurological deficits  Upon arrival in ED, patient was hypertensive with CTA head/neck that showed a left MCA occlusion  However patient was not a candidate for tPA due to >36 hours since event  On exam patient was able to communicate however did have expressive aphasia   " Patient also understands that she is having the aphasia and seemed frustrated  Plan: Inpatient admission for evaluation and treatment of CVA , palpitations, DM, hypothyroidism, HLD, lymphedema: Neurology consult, increase Lipitor to 40 mg daily, ASA/Plavix daily, neuro checks, Echo, begin metoprolol 25 mg daily, telemetry, diabetic diet, SSI, continue levothyroxine  Date: 5/14   Day 2:     Neurology consult: ischemic stroke pathway, MRI brain, neuro checks, normotension, ANDREW, Vascular surgery consult ordered due to concern for partial occlusion and mobile nature of thrombus on left carotid artery, heparin drip, atorvastatin, telemetry  Internal medicine: patient still having occasional expressive aphasia/word finding  Continue stroke work-up/pathway  For MRI of the brain without contrast  Continue neurochecks  Goal blood pressure is for normotension  For ANDREW  Vascular surgery consulted due to concerns for partial occlusion and mobile nature of thrombus on left carotid artery  Thus, patient was placed on heparin drip  Status post loading of aspirin and Plavix and then was discontinued as patient is on heparin drip  Vascular Surgery consult: no vascular surgical indication at this time, will need outpatient vascular follow-up, recommended continued medical management      ED Triage Vitals   Temperature Pulse Respirations Blood Pressure SpO2   05/13/23 1123 05/13/23 1123 05/13/23 1123 05/13/23 1123 05/13/23 1123   98 7 °F (37 1 °C) (!) 110 18 (!) 174/82 97 %      Temp Source Heart Rate Source Patient Position - Orthostatic VS BP Location FiO2 (%)   05/13/23 1800 05/13/23 1455 05/13/23 1455 05/13/23 1455 --   Oral Monitor Lying Right arm       Pain Score       05/13/23 1832       7          Wt Readings from Last 1 Encounters:   03/16/23 93 kg (205 lb)     Additional Vital Signs:     Date/Time Temp Pulse Resp BP MAP (mmHg) SpO2 O2 Device   05/15/23 07:47:42 98 5 °F (36 9 °C) 80 -- 143/65 91 95 % --   05/14/23 22:39:45 98 5 °F (36 9 °C) 74 18 137/69 92 98 % None (Room air)   05/14/23 18:11:14 98 3 °F (36 8 °C) 81 17 129/65 86 95 % --   05/14/23 15:10:32 98 3 °F (36 8 °C) 76 18 136/67 90 93 % --   05/14/23 1100 -- 80 -- 122/66 -- 92 % --   05/14/23 07:40:40 98 2 °F (36 8 °C) 77 -- 116/68 84 93 % --   05/14/23 0500 98 °F (36 7 °C) 76 16 113/67 82 95 % None (Room air)   05/14/23 0300 98 1 °F (36 7 °C) 84 18 124/62 83 94 % None (Room air)   05/14/23 0100 98 2 °F (36 8 °C) 82 17 133/70 91 92 % None (Room air)   05/13/23 2300 98 9 °F (37 2 °C) 87 17 127/68 88 91 % None (Room air)   05/13/23 2100 98 6 °F (37 °C) 83 18 133/75 92 92 % None (Room air)   05/13/23 2000 98 7 °F (37 1 °C) 91 19 147/67 94 94 % None (Room air)   05/13/23 1900 98 6 °F (37 °C) 91 18 152/71 98 94 % None (Room air)   05/13/23 1800 98 7 °F (37 1 °C) 82 18 179/99 Abnormal  127 94 % None (Room air)   05/13/23 1745 -- 81 16 156/72 103 96 % None (Room air)   05/13/23 1730 -- 81 16 170/74 107 94 % None (Room air)   05/13/23 1715 -- 81 16 169/70 111 96 % None (Room air)   05/13/23 1700 -- 92 16 177/81 Abnormal  117 94 % None (Room air)   05/13/23 1455 -- 92 16 162/79 -- 95 % None (Room air)     Pertinent Labs/Diagnostic Test Results:   MRI brain wo contrast   Final Result by Ras Anderson MD (05/14 1927)      Late acute or early subacute nonhemorrhagic infarction in the left MCA territory again involving the left frontal, temporal and parietal more than occipital lobes with corresponding thrombus in the distal left M2, M3 and M4 branches  Findings are    essentially unchanged when compared to the recent head CT and CT angiography of the head and neck performed on May 13, 2023  No significant mass effect  No herniation  The study was marked in [facility] for immediate notification        Workstation performed: [id number]         CTA head and neck with and without contrast   Final Result by Celeste Brewer MD (05/13 9449)      No evidence of acute intracranial hemorrhage  Acute to subacute ischemia identified in the left temporal lobe and left frontal corona radiata  Proximal left cervical ICA 5 mm intraluminal thrombus  Left M3 MCA occlusion  I personally communicated this study with GALE BEY on 5/13/2023 4:50 PM                         Workstation performed: [id number]         XR chest 1 view portable   Final Result by Herber Lorenz MD (05/13 1946)      No acute cardiopulmonary disease  Workstation performed: [id number]           5/15 ANDREW:  Interpretation Summary       •  Left Ventricle: Left ventricular cavity size is normal  The left ventricular ejection fraction is 65%  Systolic function is normal  Wall motion is normal   •  Left Atrium: There is no thrombus  •  Atrial Septum: There is a small and patent foramen ovale confirmed at rest and with provocation with predominant right to left shunting using saline contrast  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, mobile septal aneurysm  It is predominately within the left atrial cavity  •  Left Atrial Appendage: There is no thrombus  •  Mitral Valve: There is trace regurgitation      5/13 EKG:  Sinus tachycardia  Possible Lateral infarct , age undetermined  Cannot rule out Inferior infarct , age undetermined  Abnormal ECG  When compared with ECG of 29-OCT-2008 10:18,  Minimal criteria for Inferior infarct are now Present      Results from last 7 days   Lab Units 05/14/23  1242 05/14/23  0509 05/13/23  1134   WBC Thousand/uL 8 54 5 70 7 83   HEMOGLOBIN g/dL 13 2 12 0 13 5   HEMATOCRIT % 41 2 37 3 41 9   PLATELETS Thousands/uL 338 280 346   NEUTROS ABS Thousands/µL  --  3 35 6 02         Results from last 7 days   Lab Units 05/15/23  0539 05/14/23  0509 05/13/23  1134   SODIUM mmol/L 139 139 136   POTASSIUM mmol/L 3 3* 3 5 3 9   CHLORIDE mmol/L 103 106 102   CO2 mmol/L 27 27 25   ANION GAP mmol/L 9 6 9   BUN mg/dL 9 9 10   CREATININE mg/dL 0 48* 0 43* 0 52*   EGFR ml/min/1 73sq m 95 99 93   CALCIUM mg/dL 8 8 8 5 9 3   MAGNESIUM mg/dL  --   --  1 8*     Results from last 7 days   Lab Units 05/14/23  0509 05/13/23  1134   AST U/L 16 19   ALT U/L 14 16   ALK PHOS U/L 29* 37   TOTAL PROTEIN g/dL 5 4* 6 4   ALBUMIN g/dL 3 6 4 2   TOTAL BILIRUBIN mg/dL 0 51 0 45     Results from last 7 days   Lab Units 05/15/23  0935 05/15/23  0747 05/14/23  2135 05/14/23  1753 05/14/23  1539 05/14/23  1505 05/14/23  1147 05/14/23  0958 05/14/23  0740 05/13/23  1933   POC GLUCOSE mg/dl 105 108 117 123 112 128 123 133 103 150*     Results from last 7 days   Lab Units 05/15/23  0539 05/14/23  0509 05/13/23  1134   GLUCOSE RANDOM mg/dL 123 103 113         Results from last 7 days   Lab Units 05/14/23  0509   HEMOGLOBIN A1C % 5 8*   EAG mg/dl 120         Results from last 7 days   Lab Units 05/13/23  1134   HS TNI 0HR ng/L 3         Results from last 7 days   Lab Units 05/15/23  0539 05/15/23  0056 05/14/23  1627 05/14/23  1242   PROTIME seconds  --   --   --  13 3   INR   --   --   --  0 99   PTT seconds 53* 57* 63* 30     Results from last 7 days   Lab Units 05/13/23  1134   TSH 3RD GENERATON uIU/mL 0 295*         Results from last 7 days   Lab Units 05/13/23  1240   LACTIC ACID mmol/L 0 9           Results from last 7 days   Lab Units 05/13/23  1242   CLARITY UA  Clear   COLOR UA  Colorless   SPEC GRAV UA  1 003   PH UA  7 0   GLUCOSE UA mg/dl Negative   KETONES UA mg/dl Trace*   BLOOD UA  Negative   PROTEIN UA mg/dl Negative   NITRITE UA  Negative   BILIRUBIN UA  Negative   UROBILINOGEN UA (BE) mg/dl <2 0   LEUKOCYTES UA  Negative           Results from last 7 days   Lab Units 05/13/23  1240   BLOOD CULTURE  No Growth at 24 hrs  No Growth at 24 hrs                 ED Treatment:   Medication Administration from 05/13/2023 1050 to 05/13/2023 1808       Date/Time Order Dose Route Action     05/13/2023 1244 EDT sodium chloride 0 9 % bolus 500 mL 500 mL Intravenous New Bag     05/13/2023 1612 EDT magnesium Oxide (MAG-OX) tablet 400 mg 400 mg Oral Given     05/13/2023 1517 EDT iohexol (OMNIPAQUE) 350 MG/ML injection (SINGLE-DOSE) 100 mL 60 mL Intravenous Given     05/13/2023 1724 EDT clopidogrel (PLAVIX) tablet 300 mg 300 mg Oral Given     05/13/2023 1725 EDT aspirin tablet 325 mg 325 mg Oral Given        Past Medical History:   Diagnosis Date   • Biliary dyskinesia    • CAP (community acquired pneumonia)     last assessed 8/17/16   • Deep vein thrombosis (DVT) of proximal vein of right lower extremity, unspecified chronicity ([place] [address] ) 8/10/2022   • Diabetes mellitus ([place] [address] ) 12/2018    Type 2 no insulin   • Disease of thyroid gland 2000   • GERD (gastroesophageal reflux disease)    • Hyperlipidemia    • Palpitations    • Pneumonia    • Shortness of breath    • SVT (supraventricular tachycardia) ([place] [address] ) 6/8/2022     Present on Admission:  • Lymphedema  • Hyperlipidemia  • Hypothyroidism  • Type 2 diabetes mellitus without complication, without long-term current use of insulin ([facility])  • Cerebral thrombosis with cerebral infarction [facility])      Admitting Diagnosis: Expressive aphasia [R47 01]  Acute CVA (cerebrovascular accident) ([place] [address] ) [I63 9]  Age/Sex: 70 y o  female  Admission Orders:  Scheduled Medications:  atorvastatin, 40 mg, Oral, Daily With Dinner  cholecalciferol, 2,000 Units, Oral, Daily  insulin lispro, 1-6 Units, Subcutaneous, 4 times day  levothyroxine, 125 mcg, Oral, Early Morning  magnesium gluconate, 250 mg, Oral, QAM  metoprolol tartrate, 25 mg, Oral, Daily  pantoprazole, 40 mg, Oral, Early Morning  psyllium, 1 packet, Oral, Daily      Continuous IV Infusions:  heparin (porcine), 3-24 Units/kg/hr (Order-Specific), Intravenous, Titrated      PRN Meds:  acetaminophen, 975 mg, Oral, Q8H PRN  diphenhydrAMINE, 25 mg, Oral, HS PRN  melatonin, 6 mg, Oral, HS PRN  metroNIDAZOLE, 1 application  , Topical, Daily PRN        IP CONSULT TO NEUROLOGY  IP CONSULT TO VASCULAR SURGERY  IP CONSULT TO CASE MANAGEMENT    Network Utilization Review Department  ATTENTION: Please call with any questions or concerns to [phone] and carefully listen to the prompts so that you are directed to the right person  All voicemails are confidential   Estefani Russell all requests for admission clinical reviews, approved or denied determinations and any other requests to dedicated fax number below belonging to the campus where the patient is receiving treatment   List of dedicated fax numbers for the Facilities:  [address] DENIALS (Administrative/Medical Necessity) [phone]   [address] (Maternity/NICU/Pediatrics) [phone]   7 Gilma Donnelly [phone]   [facility] [phone]   [address] [address] Doc Anselmo [place] [address] [phone]   [facility] [address] [facility] [phone]

## 2023-05-15 NOTE — ASSESSMENT & PLAN NOTE
Horacio:  Atrial Septum: There is a small and patent foramen ovale confirmed at rest and with provocation with predominant right to left shunting using saline contrast  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, mobile septal aneurysm  It is predominately within the left atrial cavity      Cardiology was consulted  They advise outpatient consult with interventional cardiology  Possible LINQ recorder as outpatient to assess for atrial fibrillation as suspected etiology of stroke

## 2023-05-15 NOTE — PLAN OF CARE
Problem: NEUROSENSORY - ADULT  Goal: Achieves stable or improved neurological status  Description: INTERVENTIONS  - Monitor and report changes in neurological status  - Monitor vital signs such as temperature, blood pressure, glucose, and any other labs ordered   - Initiate measures to prevent increased intracranial pressure  - Monitor for seizure activity and implement precautions if appropriate      Outcome: Progressing  Goal: Remains free of injury related to seizures activity  Description: INTERVENTIONS  - Maintain airway, patient safety  and administer oxygen as ordered  - Monitor patient for seizure activity, document and report duration and description of seizure to physician/advanced practitioner  - If seizure occurs,  ensure patient safety during seizure  - Reorient patient post seizure  - Seizure pads on all 4 side rails  - Instruct patient/family to notify RN of any seizure activity including if an aura is experienced  - Instruct patient/family to call for assistance with activity based on nursing assessment  - Administer anti-seizure medications if ordered    Outcome: Progressing  Goal: Achieves maximal functionality and self care  Description: INTERVENTIONS  - Monitor swallowing and airway patency with patient fatigue and changes in neurological status  - Encourage and assist patient to increase activity and self care  - Encourage visually impaired, hearing impaired and aphasic patients to use assistive/communication devices  Outcome: Progressing     Problem: Nutrition/Hydration-ADULT  Goal: Nutrient/Hydration intake appropriate for improving, restoring or maintaining nutritional needs  Description: Monitor and assess patient's nutrition/hydration status for malnutrition  Collaborate with interdisciplinary team and initiate plan and interventions as ordered  Monitor patient's weight and dietary intake as ordered or per policy  Utilize nutrition screening tool and intervene as necessary   Determine patient's food preferences and provide high-protein, high-caloric foods as appropriate       INTERVENTIONS:  - Monitor oral intake, urinary output, labs, and treatment plans  - Assess nutrition and hydration status and recommend course of action  - Evaluate amount of meals eaten  - Assist patient with eating if necessary   - Allow adequate time for meals  - Recommend/ encourage appropriate diets, oral nutritional supplements, and vitamin/mineral supplements  - Order, calculate, and assess calorie counts as needed  - Recommend, monitor, and adjust tube feedings and TPN/PPN based on assessed needs  - Assess need for intravenous fluids  - Provide specific nutrition/hydration education as appropriate  - Include patient/family/caregiver in decisions related to nutrition  Outcome: Progressing

## 2023-05-15 NOTE — QUICK NOTE
Patient seen at the time of her ANDREW  ANDREW requested because of an acute CVA noted in the left frontal, temporal and parietal lobes with thrombus in the distal left M2/M3 and M4 branches  We will concern for embolic source and therefore a ANDREW was ordered  She has no prior history of embolic CVA or paroxysmal atrial fibrillation, although does carry history of SVT  She did wear an extended ambulatory Holter last year that showed several runs of nonsustained SVT but no evidence of atrial fibrillation  ANDREW results: Overall normal LV systolic function at 41%  No significant valvular disease was seen  Trace mitral regurgitation  There is evidence of an interatrial septal aneurysm with associated PFO with small right to left shunting with injection of agitated saline  Impression:    1  Acute/subacute CVA - Treatment per neurology and vascular surgery  However if they do feel this is embolic I would simply start anticoagulation given her PFO and risk of atrial fibrillation  I will leave treatment decision and timing up to neurology  2   Small PFO - Seen on ANDREW with associated interatrial septal aneurysm  We could consider PFO closure but this would be an outpatient consultation with interventional cardiology  If neurology feels this is embolic we will set up this appointment  3   History of SVT - I do feel that there is a higher likelihood of atrial fibrillation causing her CVA than the PFO  Therefore as I stated above, if neurology feels this is cardio embolic in nature I would also suggest outpatient placement of a LINQ recorder  Please call with any questions or if you need any assistance

## 2023-05-15 NOTE — ASSESSMENT & PLAN NOTE
Patient takes metoprolol 25 as needed for palpitations and has not taken it in the past 3 weeks  She did have a Holter monitor 2022 that showed 17 episodes of SVTs but was asymptomatic during those episodes    No events on tele, will continue for now as cardioembolic etiology of stroke seems likely  Cardiology on board--recommend possible LINQ recorder as outpatient  Metoprolol 25 daily 201

## 2023-05-15 NOTE — ANESTHESIA PREPROCEDURE EVALUATION
Procedure:  ANDREW    Relevant Problems   CARDIO   (+) Atypical chest pain   (+) Dyspnea on exertion   (+) Essential hypertension   (+) Hyperlipidemia      ENDO   (+) Hypothyroidism   (+) Hypothyroidism due to Hashimoto's thyroiditis   (+) Type 2 diabetes mellitus without complication, without long-term current use of insulin (HCC)      /RENAL   (+) Nephrolithiasis      MUSCULOSKELETAL   (+) Osteoarthritis      NEURO/PSYCH   (+) CVA (cerebral vascular accident) ([facility] [address] )   (+) History of adenomatous polyp of colon      PULMONARY   (+) Dyspnea on exertion        Physical Exam    Airway    Mallampati score: II         Dental       Cardiovascular  Cardiovascular exam normal    Pulmonary  Pulmonary exam normal     Other Findings      70year old female with PMH hypothyroidism, DM, palpitations, HLD admitted for stroke like symptoms with expressive aphasia that began on 4/11  Patient was diagnosed with covid on 4/11 so thought symptoms were due to that  Patient denies previous similar symptoms  Patient has difficulty giving history due to her expressive aphasia  Patient denies visual changes, extremity weakness, dizziness  Patient states that she has had a headache  Anesthesia Plan  ASA Score- 3     Anesthesia Type- IV sedation with anesthesia with ASA Monitors  Additional Monitors:   Airway Plan:           Plan Factors-Exercise tolerance (METS): >4 METS  Chart reviewed  EKG reviewed  Imaging results reviewed  Existing labs reviewed  Patient summary reviewed  Patient is not a current smoker  Patient not instructed to abstain from smoking on day of procedure  Induction- intravenous  Postoperative Plan-     Informed Consent- Anesthetic plan and risks discussed with patient  I personally reviewed this patient with the CRNA  Discussed and agreed on the Anesthesia Plan with the CRNA  Nae Reyna

## 2023-05-15 NOTE — ASSESSMENT & PLAN NOTE
Lab Results   Component Value Date    K 3 3 (L) 05/15/2023    K 3 5 05/14/2023    K 3 9 05/13/2023   Repleted today potassium chloride 40 meq PO  Recheck tomorrow am

## 2023-05-15 NOTE — PROGRESS NOTES
NEUROLOGY RESIDENCY PROGRESS NOTE     Name: Therese Rivera   Age & Sex: 70 y o  female   MRN: [id number]  Unit/Bed#: S -01   Encounter: [id number]    Therese Rivera will need follow up in in 4 weeks with neurovascular Attending  Pending for discharge: Heparin Drip, question of AC    ASSESSMENT & PLAN     * CVA (cerebral vascular accident) [facility])  Assessment & Plan  Assessment:  Therese Rivera is a 70 y o  female  With hx of hypothyroidism, DM, palpitations, HLD, COVID 12 days ago  Initial presenting deficits were expressive aphasia and mild frontal headaches since 4/11  The patient has a pmhx significant for Hypothyroidism, DM, palpitations, HLD, COVID  CTA head and neck showed left MCA occlusion and proximal left cervical ICA 5mm intraluminal thrombus and acute to subacute ischemia in left temporal lobe and left frontal corona radiata  Workup:   In the ED, Presenting BP Blood Pressure: (!) 174/82  EKG on presentation to the ER showed sinus tachycardia  CT Head showed: Acute to subacute infarct on left temporal lobe and left frontal corona radiata  CTA head and neck showed: left cervical 5mm thrombus and left M3 MCA occlusion  MRI brain wo contrast: Late acute to early subacute infarction left frontal, left temporal, left parietal> left occipital   ANDREW: EF 65%, small patent foramen ovale and moderately sized atrial septal aneurysm      Lab Results   Component Value Date    HGBA1C 6 2 (H) 02/21/2023     No results found for: TSH  Lab Results   Component Value Date    CHOLESTEROL 134 05/14/2023    TRIG 111 05/14/2023    HDL 40 (L) 05/14/2023    LDLCALC 72 05/14/2023       Risk factors: diabetes, hyperlipidemia and COVID 12 days ago      Impression: Patient most likely has an Left embolic infarct due to left M3 occlusion which potentially stems from the left ICA thrombus which is partially occlusive and potentially mobile along with increased risk in embolism from combination of moderately "sized atrial septal aneurysm + PFO(small)  PFO by itself is small so would not be a good candidate for stroke and most likely     Plan:  - Discussed with Dr Jeffries  - Recommend admit to hospital on acute ischemic stroke pathway  - MRI brain without contrast  - Continue Neuro checks  - Recommend normotension  - Maintain glucose <180, SSI for coverage if indicated  - Repeat CTH if GCS drops 2pts in 1hr  - PT/OT/ST/PM&R Evaluation  - Vascular surgery consult ordered due to concern for partial occlusion and mobile nature of thrombus on left carotid artery  - Continue on heparin drip due to mobile nature   - Atorvastatin 40 mg q d   - Aspirin and plavix loaded and then DCed as we would place patient on heparin drip stroke protocol ptt goal 50-70 continue  - Continue monitoring on telemetry  - Rest of care per primary team    - Cardiology consulted for Atrial septal aneurysm and PFO in conjunction found and there would be increased risk for stroke with these 2 findings   - Adjust AC based on Cardiology consult            SUBJECTIVE     Patient was seen and examined  No acute events overnight  Patient has continued expressive> receptive aphasia  Denies numbness, weakness, visual deficits, ambulatory dysfunction  Correction from previous history: It seems the onset of aphasia was 5/11 after the COVID infection and not 4/11 as previously documented and as previously stated by patient after confirming with spouse and patient in the room today  Review of Systems  12 point ROS has been completed  ROS is negative unless stated above  OBJECTIVE     Patient ID: Clive Saxena is a 70 y o  female      Vitals:    05/14/23 2239 05/15/23 0747 05/15/23 1146 05/15/23 1219   BP: 137/69 143/65 143/65 115/58   BP Location: Right arm      Pulse: 74 80 80 77   Resp: 18   22   Temp: 98 5 °F (36 9 °C) 98 5 °F (36 9 °C)  (!) 97 °F (36 1 °C)   TempSrc: Oral      SpO2: 98% 95%  92%   Weight:   93 kg (205 lb)    Height:   5' 6\" (1 676 m)  " Temperature:   Temp (24hrs), Av 1 °F (36 7 °C), Min:97 °F (36 1 °C), Max:98 5 °F (36 9 °C)    Temperature: (!) 97 °F (36 1 °C)      Physical Exam  Eyes:      Extraocular Movements: EOM normal       Pupils: Pupils are equal, round, and reactive to light  Neurological:      Motor: Motor strength is normal       Gait: Gait is intact  Deep Tendon Reflexes:      Reflex Scores:       Bicep reflexes are 2+ on the right side and 2+ on the left side  Brachioradialis reflexes are 2+ on the right side and 2+ on the left side  Patellar reflexes are 2+ on the right side and 2+ on the left side  Achilles reflexes are 1+ on the right side and 1+ on the left side  Psychiatric:         Speech: Speech normal           Neurologic Exam     Mental Status   Oriented to person  Oriented to place  Disoriented to year and date  Attention: normal  Concentration: normal    Speech: speech is normal   Level of consciousness: alert  Unable to name object  Unable to read  Unable to repeat  Expressive> receptive aphasia  Patient less frustrated compared to before  Cranial Nerves     CN II   Visual fields full to confrontation  CN III, IV, VI   Pupils are equal, round, and reactive to light  Extraocular motions are normal      CN V   Facial sensation intact  CN VII   Facial expression full, symmetric  CN XI   CN XI normal      CN XII   CN XII normal      Motor Exam   Muscle bulk: normal  Overall muscle tone: normal    Strength   Strength 5/5 throughout  Sensory Exam   Light touch normal      Gait, Coordination, and Reflexes     Gait  Gait: normal    Reflexes   Right brachioradialis: 2+  Left brachioradialis: 2+  Right biceps: 2+  Left biceps: 2+  Right patellar: 2+  Left patellar: 2+  Right achilles: 1+  Left achilles: 1+  Right plantar: normal  Left plantar: normal           LABORATORY DATA     Labs: I have personally reviewed pertinent reports      Results from last 7 days   Lab Units 05/14/23  1242 05/14/23  0509 05/13/23  1134   WBC Thousand/uL 8 54 5 70 7 83   HEMOGLOBIN g/dL 13 2 12 0 13 5   HEMATOCRIT % 41 2 37 3 41 9   PLATELETS Thousands/uL 338 280 346   NEUTROS PCT %  --  59 76*   MONOS PCT %  --  8 6      Results from last 7 days   Lab Units 05/15/23  0539 05/14/23  0509 05/13/23  1134   SODIUM mmol/L 139 139 136   POTASSIUM mmol/L 3 3* 3 5 3 9   CHLORIDE mmol/L 103 106 102   CO2 mmol/L 27 27 25   BUN mg/dL 9 9 10   CREATININE mg/dL 0 48* 0 43* 0 52*   CALCIUM mg/dL 8 8 8 5 9 3   ALK PHOS U/L  --  29* 37   ALT U/L  --  14 16   AST U/L  --  16 19     Results from last 7 days   Lab Units 05/13/23  1134   MAGNESIUM mg/dL 1 8*          Results from last 7 days   Lab Units 05/15/23  0539 05/15/23  0056 05/14/23  1627 05/14/23  1242   INR   --   --   --  0 99   PTT seconds 53* 57* 63* 30     Results from last 7 days   Lab Units 05/13/23  1240   LACTIC ACID mmol/L 0 9           IMAGING & DIAGNOSTIC TESTING     Radiology Results: I have personally reviewed pertinent films in PACS    MRI brain wo contrast   Final Result by Pablito Novak MD (05/14 1927)      Late acute or early subacute nonhemorrhagic infarction in the left MCA territory again involving the left frontal, temporal and parietal more than occipital lobes with corresponding thrombus in the distal left M2, M3 and M4 branches  Findings are    essentially unchanged when compared to the recent head CT and CT angiography of the head and neck performed on May 13, 2023  No significant mass effect  No herniation  The study was marked in [facility] for immediate notification  Workstation performed: [id number]         CTA head and neck with and without contrast   Final Result by Poncho Cohn MD (05/13 1652)      No evidence of acute intracranial hemorrhage  Acute to subacute ischemia identified in the left temporal lobe and left frontal corona radiata  Proximal left cervical ICA 5 mm intraluminal thrombus        Left M3 MCA occlusion  I personally communicated this study with GALE BEY on 5/13/2023 4:50 PM                         Workstation performed: [id number]         XR chest 1 view portable   Final Result by Alhaji Bettencourt MD (05/13 1946)      No acute cardiopulmonary disease  Workstation performed: [id number]             Other Diagnostic Testing: I have personally reviewed pertinent reports  ACTIVE MEDICATIONS     Current Facility-Administered Medications   Medication Dose Route Frequency   • acetaminophen (TYLENOL) tablet 975 mg  975 mg Oral Q8H PRN   • atorvastatin (LIPITOR) tablet 40 mg  40 mg Oral Daily With Dinner   • cholecalciferol (VITAMIN D3) tablet 2,000 Units  2,000 Units Oral Daily   • diphenhydrAMINE (BENADRYL) tablet 25 mg  25 mg Oral HS PRN   • heparin (porcine) 25,000 units in 0 45% NaCl 250 mL infusion (premix)  3-24 Units/kg/hr (Order-Specific) Intravenous Titrated   • insulin lispro (HumaLOG) 100 units/mL subcutaneous injection 1-6 Units  1-6 Units Subcutaneous 4 times day   • levothyroxine tablet 125 mcg  125 mcg Oral Early Morning   • magnesium gluconate (MAGONATE) tablet 250 mg  250 mg Oral QAM   • melatonin tablet 6 mg  6 mg Oral HS PRN   • metoprolol tartrate (LOPRESSOR) tablet 25 mg  25 mg Oral Daily   • metroNIDAZOLE (NORITRATE) 1 % cream 1 application  1 application  Topical Daily PRN   • pantoprazole (PROTONIX) EC tablet 40 mg  40 mg Oral Early Morning   • psyllium (METAMUCIL) 1 packet  1 packet Oral Daily       Prior to Admission medications    Medication Sig Start Date End Date Taking?  Authorizing Provider   amoxicillin-clavulanate (Augmentin) 875-125 mg per tablet Take 1 tablet by mouth every 12 (twelve) hours for 10 days 5/10/23 5/20/23 Yes Leslye Robertson,    Ascorbic Acid (VITAMIN C) 1000 MG tablet Take 1 tablet by mouth daily 11/6/12  Yes Historical Provider, MD   atorvastatin (LIPITOR) 20 mg tablet TAKE 1 TABLET DAILY 9/2/22  Yes Leslye Robertson DO   Biotin 5000 MCG CAPS Take 1 capsule by mouth daily   Yes Historical Provider, MD   cholecalciferol (VITAMIN D3) 1,000 units tablet Take 2,000 Units by mouth daily   Yes Historical Provider, MD   CINNAMON PO Take 2,000 mg by mouth daily in the early morning   Yes Historical Provider, MD   diphenhydrAMINE-acetaminophen (TYLENOL PM)  MG TABS Take 1 tablet by mouth daily at bedtime as needed for sleep   Yes Historical Provider, MD   esomeprazole (NexIUM) 40 MG capsule Take 1 capsule by mouth daily 5/3/11  Yes Historical Provider, MD   fluticasone (FLONASE) 50 mcg/act nasal spray USE 2 SPRAYS IN EACH NOSTRIL DAILY 8/26/22  Yes Ophelia Oliveros, DO   guaiFENesin (MUCINEX) 600 mg 12 hr tablet Take 2 tablets (1,200 mg total) by mouth every 12 (twelve) hours 5/2/23  Yes Mary Blas MD   levothyroxine 125 mcg tablet TAKE 1 TABLET DAILY ON AN EMPTY STOMACH 10/10/22  Yes Ophelia Oliveros,    Magnesium 250 MG TABS Take 250 mg by mouth in the morning   Yes Historical Provider, MD   Melatonin-Theanine 10-5 5 MG TABS Take 1 tablet by mouth as needed   Yes Historical Provider, MD   Menthol, Topical Analgesic, (BIOFREEZE EX) Apply 1 application topically daily as needed (pain)   Yes Historical Provider, MD   metoprolol tartrate (LOPRESSOR) 25 mg tablet Take one tablet as needed for palpitations 2/21/23  Yes Sendy Diana MD   metroNIDAZOLE (NORITRATE) 1 % cream Apply 1 application   topically daily   Yes Historical Provider, MD   Multiple Vitamin (MULTIVITAMIN) capsule Take 1 capsule by mouth daily   Yes Historical Provider, MD   Probiotic Product (ALIGN PO) Take 1 capsule by mouth in the morning   Yes Historical Provider, MD   psyllium (METAMUCIL) 58 6 % powder Take 1 packet by mouth daily   Yes Historical Provider, MD   semaglutide, 0 25 or 0 5 mg/dose, (Ozempic) 2 mg/1 5 mL injection pen Inject 0 19 mL (0 25 mg total) under the skin every 7 days 3/1/23  Yes Ophelia Oliveros,    doxycycline hyclate (VIBRA-TABS) 100 mg tablet TAKE 1 TABLET BY MOUTH AN HOUR BEFORE DINNER WITH WATER ONLY, REMAIN UPRIGHT FOR ONE HOUR AFTERWARDS  Patient not taking: Reported on 5/13/2023 12/20/22   Historical Provider, MD   MECLIZINE HCL PO Take 25 mg by mouth daily as needed    Historical Provider, MD   Turmeric 500 MG CAPS Take 1,000 mg by mouth daily in the early morning  Patient not taking: Reported on 5/13/2023    Historical Provider, MD   naproxen sodium (ALEVE) 220 MG tablet Take by mouth  6/27/19  Historical Provider, MD         VTE Pharmacologic Prophylaxis: Heparin Drip  VTE Mechanical Prophylaxis: sequential compression device    ==  MD Ivone Phillips [facility] Neurology Residency, PGY-3

## 2023-05-15 NOTE — ASSESSMENT & PLAN NOTE
Presenting 5/13/23 with expressive aphasia and frontal headache  Tested positive for COVID-19 11 days prior to presentation and thought her symptoms were secondary to that infection  No other neurological deficits  Upon arrival in ED, patient was hypertensive with CTA head/neck that showed a left MCA occlusion  However patient was not a candidate for tPA due to >36 hours since symptom onset  On exam patient was able to communicate however did have expressive aphasia  CTA:  Acute to subacute ischemia identified in the left temporal lobe and left frontal corona radiata  Proximal left cervical ICA 5 mm intraluminal thrombus  Left M3 MCA occlusion  MRI: Late acute or early subacute nonhemorrhagic infarction in the left MCA territory again involving the left frontal, temporal and parietal more than occipital lobes with corresponding thrombus in the distal left M2, M3 and M4 branches       · Neurology consulted  · Vascular surgery was consulted for Carotid thrombus and no surgical indication is necessary at this time  · ANDREW  · Continue neurochecks  · Normotensive goal,  · Continue aspirin, plavix, atorvastatin  · Continue telemetry  · Heparin drip for thrombus, may need eliquis or warfarin as outpatient  · Per cardiology may need PFO repair as outpatient, and LINQ recorder as outpatient to assess for atrial fibrillation

## 2023-05-15 NOTE — OCCUPATIONAL THERAPY NOTE
Occupational Therapy Screen     Patient Name: Lucia MCGARRY Date: 5/15/2023  Problem List  Principal Problem:    CVA (cerebral vascular accident) [facility])  Active Problems:    Lymphedema    Hyperlipidemia    Hypothyroidism    Type 2 diabetes mellitus without complication, without long-term current use of insulin ([facility] )    Palpitations    Hypokalemia    Cerebral thrombosis with cerebral infarction [facility])    Past Medical History  Past Medical History:   Diagnosis Date    Biliary dyskinesia     CAP (community acquired pneumonia)     last assessed 8/17/16    Deep vein thrombosis (DVT) of proximal vein of right lower extremity, unspecified chronicity ([facility] ) 8/10/2022    Diabetes mellitus ([facility] ) 12/2018    Type 2 no insulin    Disease of thyroid gland 2000    GERD (gastroesophageal reflux disease)     Hyperlipidemia     Palpitations     Pneumonia     Shortness of breath     SVT (supraventricular tachycardia) ([facility] ) 6/8/2022     Past Surgical History  Past Surgical History:   Procedure Laterality Date    AUGMENTATION MAMMAPLASTY Bilateral 1998    breast surgery- with prosthetic implant 1998; pre-pectoral salline    BREAST BIOPSY Left 1974    BREAST BIOPSY Left 1994    BREAST EXCISIONAL BIOPSY Left 1993    CARDIAC CATHETERIZATION      procedure summary    CHOLECYSTECTOMY      onset 2003    HEMORRHOID SURGERY      ROTATOR CUFF REPAIR Bilateral     onset 2010    TUBAL LIGATION      onset 1980         05/15/23 0838   OT Last Visit   OT Visit Date 05/15/23   Note Type   Note type Screen   Additional Comments OT orders received and chart review performed  Pt admitted with aphasia  MRI indicated: Acute to subacute ischemia identified in the left temporal lobe and left frontal corona radiata  Pt wiht residual aphasia  Contact made with pt who reports she has been (I) with ADLs/mobility with no AD this admission with assistance only for line management   Pt reports no concerns about safe DC to home and has adequate social support  As pt appears to be at functional baseline, pt will be DC from IPOT caseload  Please reconsult if functional status changes       PRABHA Kelly, OTR/L  [place] License [id number]  [address] [facility] [id number]

## 2023-05-15 NOTE — ASSESSMENT & PLAN NOTE
Lab Results   Component Value Date    HGBA1C 5 8 (H) 05/14/2023       Recent Labs     05/14/23  2135 05/15/23  0747 05/15/23  0935 05/15/23  1224   POCGLU 117 108 105 104       Blood Sugar Average: Last 72 hrs:   SSI with meals and at bed  Diabetic diet

## 2023-05-15 NOTE — ANESTHESIA POSTPROCEDURE EVALUATION
Post-Op Assessment Note    CV Status:  Stable  Pain Score: 0    Pain management: adequate     Mental Status:  Alert and awake   Hydration Status:  Euvolemic   PONV Controlled:  Controlled   Airway Patency:  Patent      Post Op Vitals Reviewed: Yes      Staff: CRNA         No notable events documented      BP   115/63   Temp      Pulse  78   Resp   18   SpO2   94%

## 2023-05-15 NOTE — CONSULTS
Consultation -Vascular Surgery  Raquel Qi 70 y o  female MRN: [id number]  Unit/Bed#: S -01 Encounter: [id number]            ASSESSMENT:  Patient is a 70year old female with PMH hypothyroidism, DM, palpitations, HLD with proximal left cervical ICA thrombus with late acute or early subacute nonhemorrhagic infarction in the left MCA territory  CTA head and neck: No evidence of acute intracranial hemorrhage  Acute to subacute ischemia identified in the left temporal lobe and left frontal corona radiata  Proximal left cervical ICA 5 mm intraluminal thrombus  Left M3 MCA occlusion  MRI brain:  again involving the left frontal, temporal and parietal more than occipital lobes with corresponding thrombus in the distal left M2, M3 and M4 branches  Findings are  essentially unchanged when compared to the recent head CT and CT angiography of the head and neck performed on May 13, 2023  Plan:  - no vascular surgical indication at this time, will need outpatient vascular follow-up  - recommended continued medical management  - per Neurology, outside of time window for neurointervention  - rest of care per primary team    HPI:  Patient is a 70year old female with PMH hypothyroidism, DM, palpitations, HLD admitted for stroke like symptoms with expressive aphasia  Patient was diagnosed with covid on 5/2 so thought symptoms were due to that  Patient's  states that she began having difficulty speaking 5/11  He contacted her PCP on 5/13 and recommended she present to the ED at that time  Patient denies previous similar symptoms  Patient has difficulty giving history due to her expressive aphasia  Patient denies visual changes, extremity weakness, dizziness  Patient states that she has had a headache  Review of Systems   Constitutional: Negative for fever  Respiratory: Negative for shortness of breath  Cardiovascular: Negative for chest pain     Gastrointestinal: Negative for abdominal pain    Musculoskeletal: Negative for back pain  Neurological: Positive for speech difficulty and headaches  All other systems reviewed and are negative        Historical Information   Past Medical History:   Diagnosis Date   • Biliary dyskinesia    • CAP (community acquired pneumonia)     last assessed 16   • Deep vein thrombosis (DVT) of proximal vein of right lower extremity, unspecified chronicity ([place] [address] ) 8/10/2022   • Diabetes mellitus ([place] [address] ) 2018    Type 2 no insulin   • Disease of thyroid gland    • GERD (gastroesophageal reflux disease)    • Hyperlipidemia    • Palpitations    • Pneumonia    • Shortness of breath    • SVT (supraventricular tachycardia) ([place] [address] ) 2022     Past Surgical History:   Procedure Laterality Date   • AUGMENTATION MAMMAPLASTY Bilateral     breast surgery- with prosthetic implant ; pre-pectoral salline   • BREAST BIOPSY Left    • BREAST BIOPSY Left    • BREAST EXCISIONAL BIOPSY Left    • CARDIAC CATHETERIZATION      procedure summary   • CHOLECYSTECTOMY      onset    • HEMORRHOID SURGERY     • ROTATOR CUFF REPAIR Bilateral     onset    • TUBAL LIGATION      onset      Social History   Social History     Substance and Sexual Activity   Alcohol Use Yes   • Alcohol/week: 2 0 standard drinks   • Types: 2 Glasses of wine per week    Comment: Maybe 2 glasses a month     Social History     Substance and Sexual Activity   Drug Use No     Social History     Tobacco Use   Smoking Status Former   • Packs/day: 0 25   • Years: 17 00   • Pack years: 4 25   • Types: Cigarettes   • Start date: 58   • Quit date: 2010   • Years since quittin 3   Smokeless Tobacco Never   Tobacco Comments    0 5 ppw intermittently last smoked      Family History   Problem Relation Age of Onset   • Aneurysm Mother         cerebral   • Ovarian cancer Mother 67   • Cancer Mother    • Cirrhosis Father         alcoholic   • Alcohol abuse Father    • Aneurysm Brother abdominal aortic; cerebral   • Diabetes Son    • No Known Problems Daughter    • No Known Problems Maternal Grandmother    • No Known Problems Maternal Grandfather    • No Known Problems Paternal Grandmother    • No Known Problems Paternal Grandfather    • No Known Problems Son    • No Known Problems Son    • No Known Problems Son    • No Known Problems Maternal Aunt    • No Known Problems Maternal Aunt    • No Known Problems Maternal Aunt    • No Known Problems Maternal Aunt    • No Known Problems Paternal Aunt    • No Known Problems Paternal Aunt    • No Known Problems Paternal Aunt    • No Known Problems Paternal Aunt    • No Known Problems Paternal Aunt    • Cancer Brother         bladder cancer       Meds/Allergies     Medications Prior to Admission   Medication   • amoxicillin-clavulanate (Augmentin) 875-125 mg per tablet   • Ascorbic Acid (VITAMIN C) 1000 MG tablet   • atorvastatin (LIPITOR) 20 mg tablet   • Biotin 5000 MCG CAPS   • cholecalciferol (VITAMIN D3) 1,000 units tablet   • CINNAMON PO   • diphenhydrAMINE-acetaminophen (TYLENOL PM)  MG TABS   • esomeprazole (NexIUM) 40 MG capsule   • fluticasone (FLONASE) 50 mcg/act nasal spray   • guaiFENesin (MUCINEX) 600 mg 12 hr tablet   • levothyroxine 125 mcg tablet   • Magnesium 250 MG TABS   • Melatonin-Theanine 10-5 5 MG TABS   • Menthol, Topical Analgesic, (BIOFREEZE EX)   • metoprolol tartrate (LOPRESSOR) 25 mg tablet   • metroNIDAZOLE (NORITRATE) 1 % cream   • Multiple Vitamin (MULTIVITAMIN) capsule   • Probiotic Product (ALIGN PO)   • psyllium (METAMUCIL) 58 6 % powder   • semaglutide, 0 25 or 0 5 mg/dose, (Ozempic) 2 mg/1 5 mL injection pen   • doxycycline hyclate (VIBRA-TABS) 100 mg tablet   • MECLIZINE HCL PO   • Turmeric 500 MG CAPS     Current Facility-Administered Medications   Medication Dose Route Frequency   • acetaminophen (TYLENOL) tablet 975 mg  975 mg Oral Q8H PRN   • atorvastatin (LIPITOR) tablet 40 mg  40 mg Oral Daily With Dinner   • cholecalciferol (VITAMIN D3) tablet 2,000 Units  2,000 Units Oral Daily   • diphenhydrAMINE (BENADRYL) tablet 25 mg  25 mg Oral HS PRN   • heparin (porcine) 25,000 units in 0 45% NaCl 250 mL infusion (premix)  3-24 Units/kg/hr (Order-Specific) Intravenous Titrated   • insulin lispro (HumaLOG) 100 units/mL subcutaneous injection 1-6 Units  1-6 Units Subcutaneous 4 times day   • levothyroxine tablet 125 mcg  125 mcg Oral Early Morning   • magnesium gluconate (MAGONATE) tablet 250 mg  250 mg Oral QAM   • melatonin tablet 6 mg  6 mg Oral HS PRN   • metoprolol tartrate (LOPRESSOR) tablet 25 mg  25 mg Oral Daily   • metroNIDAZOLE (NORITRATE) 1 % cream 1 application  1 application  Topical Daily PRN   • pantoprazole (PROTONIX) EC tablet 40 mg  40 mg Oral Early Morning   • psyllium (METAMUCIL) 1 packet  1 packet Oral Daily       Allergies   Allergen Reactions   • Hydrochlorothiazide Palpitations   • Meloxicam Rash       Objective     Blood pressure 143/65, pulse 80, temperature 98 5 °F (36 9 °C), resp  rate 18, SpO2 95 %, not currently breastfeeding  Intake/Output Summary (Last 24 hours) at 5/15/2023 0946  Last data filed at 5/14/2023 1800  Gross per 24 hour   Intake 780 ml   Output --   Net 780 ml       PHYSICAL EXAM  Physical Exam  Vitals reviewed  Constitutional:       Appearance: Normal appearance  HENT:      Head: Normocephalic and atraumatic  Right Ear: External ear normal       Left Ear: External ear normal       Nose: Nose normal       Mouth/Throat:      Mouth: Mucous membranes are moist    Eyes:      General: No visual field deficit  Pupils: Pupils are equal, round, and reactive to light  Cardiovascular:      Rate and Rhythm: Normal rate  Pulses: Normal pulses  Pulmonary:      Effort: Pulmonary effort is normal       Breath sounds: Normal breath sounds  Abdominal:      General: Bowel sounds are normal       Palpations: Abdomen is soft     Musculoskeletal:      Cervical back: Normal range of motion  Skin:     Capillary Refill: Capillary refill takes less than 2 seconds  Neurological:      Mental Status: She is alert and oriented to person, place, and time  Cranial Nerves: Dysarthria present  No facial asymmetry  Sensory: No sensory deficit  Motor: No weakness  Coordination: Coordination is intact  Coordination normal    Psychiatric:         Mood and Affect: Mood normal          Behavior: Behavior normal          Thought Content: Thought content normal          Judgment: Judgment normal            Lab Results:   I have personally reviewed pertinent lab results  , CBC:   Lab Results   Component Value Date    WBC 8 54 05/14/2023    HGB 13 2 05/14/2023    HCT 41 2 05/14/2023    MCV 89 05/14/2023     05/14/2023    MCH 28 4 05/14/2023    MCHC 32 0 05/14/2023    RDW 14 9 05/14/2023    MPV 9 6 05/14/2023   , CMP:   Lab Results   Component Value Date    SODIUM 139 05/15/2023    K 3 3 (L) 05/15/2023     05/15/2023    CO2 27 05/15/2023    BUN 9 05/15/2023    CREATININE 0 48 (L) 05/15/2023    CALCIUM 8 8 05/15/2023    EGFR 95 05/15/2023     Imaging: I have personally reviewed pertinent reports  Counseling / Coordination of Care  Total time spent today  20 minutes  Greater than 50% of total time was spent with the patient and / or family counseling and / or coordination of care           Vick Gonzalez PA-C  5/15/2023 9:46 AM

## 2023-05-15 NOTE — PROGRESS NOTES
[facility]  Progress Note  Name: Dara Carter  MRN: [id number]  Unit/Bed#: S -35 I Date of Admission: 5/13/2023   Date of Service: 5/15/2023 I Hospital Day: 2    Assessment/Plan   * Cerebral thrombosis with cerebral infarction [facility])  Assessment & Plan  Presenting 5/13/23 with expressive aphasia and frontal headache  Tested positive for COVID-19 11 days prior to presentation and thought her symptoms were secondary to that infection  No other neurological deficits  Upon arrival in ED, patient was hypertensive with CTA head/neck that showed a left MCA occlusion  However patient was not a candidate for tPA due to >36 hours since symptom onset  On exam patient was able to communicate however did have expressive aphasia  CTA:  Acute to subacute ischemia identified in the left temporal lobe and left frontal corona radiata  Proximal left cervical ICA 5 mm intraluminal thrombus  Left M3 MCA occlusion  MRI: Late acute or early subacute nonhemorrhagic infarction in the left MCA territory again involving the left frontal, temporal and parietal more than occipital lobes with corresponding thrombus in the distal left M2, M3 and M4 branches       · Neurology consulted  · Vascular surgery was consulted for Carotid thrombus and no surgical indication is necessary at this time  · HORACIO  · Continue neurochecks  · Normotensive goal,  · Continue aspirin, plavix, atorvastatin  · Continue telemetry  · Heparin drip for thrombus, may need eliquis or warfarin as outpatient  · Per cardiology may need PFO repair as outpatient, and LINQ recorder as outpatient to assess for atrial fibrillation  PFO (patent foramen ovale)  Assessment & Plan  Horacio:  Atrial Septum: There is a small and patent foramen ovale confirmed at rest and with provocation with predominant right to left shunting using saline contrast  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, mobile septal aneurysm    It is predominately within the left atrial cavity  Cardiology was consulted  They advise outpatient consult with interventional cardiology  Possible LINQ recorder as outpatient to assess for atrial fibrillation as suspected etiology of stroke    Hypokalemia  Assessment & Plan  Lab Results   Component Value Date    K 3 3 (L) 05/15/2023    K 3 5 05/14/2023    K 3 9 05/13/2023   Repleted today potassium chloride 40 meq PO  Recheck tomorrow am      Palpitations  Assessment & Plan  Patient takes metoprolol 25 as needed for palpitations and has not taken it in the past 3 weeks  She did have a Holter monitor 2022 that showed 17 episodes of SVTs but was asymptomatic during those episodes  No events on tele, will continue for now as cardioembolic etiology of stroke seems likely  Cardiology on board--recommend possible LINQ recorder as outpatient  Metoprolol 25 daily    Type 2 diabetes mellitus without complication, without long-term current use of insulin [facility])  Assessment & Plan  Lab Results   Component Value Date    HGBA1C 5 8 (H) 05/14/2023       Recent Labs     05/14/23  2135 05/15/23  0747 05/15/23  0935 05/15/23  1224   POCGLU 117 108 105 104       Blood Sugar Average: Last 72 hrs:   SSI with meals and at bed  Diabetic diet    Hyperlipidemia  Assessment & Plan  Continue increased Lipitor 40 daily    Hypothyroidism  Assessment & Plan  Continue levothyroxine    Lymphedema  Assessment & Plan  Bilateral lower extremity edema present on exam   Patient states that this is chronic and does not take any home diuretic  Patient oral mucosa was dry on exam and reports despite drinking excessive fluids she is always thirsty the past few days  · ANDREW  · Compression stockings    VTE Pharmacologic Prophylaxis: VTE Score: 9 High Risk (Score >/= 5) - Pharmacological DVT Prophylaxis Ordered: heparin drip  Sequential Compression Devices Ordered  Patient Centered Rounds: I performed bedside rounds with nursing staff today    Discussions with Specialists or Other Care Team Provider: Neuro, Vasc Surg, Cards    Education and Discussions with Family / Patient: Updated  () at bedside  Current Length of Stay: 2 day(s)  Current Patient Status: Inpatient   Discharge Plan: Anticipate discharge in 24-48 hrs to home  Code Status: Level 1 - Full Code    Subjective: This morning, Ms Pita Rizo is seen sitting up in bed, awake, alert, engaged with exam, in no acute distress  She reports feeling improved, though still complains of some degree of difficulty with word finding  She has no other complaints at this time and denies any numbness or weakness  Reports good appetite and p o  intake, and denies changes in her bowel or bladder habits  Objective:     Vitals:   Temp (24hrs), Av 2 °F (36 8 °C), Min:97 °F (36 1 °C), Max:98 5 °F (36 9 °C)    Temp:  [97 °F (36 1 °C)-98 5 °F (36 9 °C)] 98 5 °F (36 9 °C)  HR:  [74-88] 88  Resp:  [17-22] 20  BP: (115-149)/(58-79) 149/79  SpO2:  [92 %-98 %] 96 %  Body mass index is 33 09 kg/m²  Input and Output Summary (last 24 hours): Intake/Output Summary (Last 24 hours) at 5/15/2023 1655  Last data filed at 5/15/2023 1210  Gross per 24 hour   Intake 670 ml   Output --   Net 670 ml       Physical Exam:   Physical Exam  Vitals and nursing note reviewed  Constitutional:       General: She is not in acute distress  Appearance: Normal appearance  She is well-developed  She is obese  She is not ill-appearing, toxic-appearing or diaphoretic  HENT:      Head: Normocephalic and atraumatic  Eyes:      General: No scleral icterus  Right eye: No discharge  Left eye: No discharge  Conjunctiva/sclera: Conjunctivae normal    Cardiovascular:      Rate and Rhythm: Normal rate and regular rhythm  Pulses: Normal pulses  Heart sounds: Normal heart sounds  No murmur heard  No friction rub  No gallop     Pulmonary:      Effort: Pulmonary effort is normal  No respiratory distress  Breath sounds: Normal breath sounds  No stridor  No wheezing, rhonchi or rales  Chest:      Chest wall: No tenderness  Abdominal:      General: Bowel sounds are normal  There is no distension  Palpations: Abdomen is soft  There is no mass  Tenderness: There is no abdominal tenderness  There is no guarding or rebound  Hernia: No hernia is present  Musculoskeletal:      Right lower leg: No edema  Left lower leg: No edema  Skin:     General: Skin is warm and dry  Neurological:      General: No focal deficit present  Mental Status: She is alert        Comments: Mild expressive aphasia   Psychiatric:         Mood and Affect: Mood normal          Behavior: Behavior normal         Additional Data:     Labs:  Results from last 7 days   Lab Units 05/14/23  1242 05/14/23  0509   WBC Thousand/uL 8 54 5 70   HEMOGLOBIN g/dL 13 2 12 0   HEMATOCRIT % 41 2 37 3   PLATELETS Thousands/uL 338 280   NEUTROS PCT %  --  59   LYMPHS PCT %  --  29   MONOS PCT %  --  8   EOS PCT %  --  3     Results from last 7 days   Lab Units 05/15/23  0539 05/14/23  0509   SODIUM mmol/L 139 139   POTASSIUM mmol/L 3 3* 3 5   CHLORIDE mmol/L 103 106   CO2 mmol/L 27 27   BUN mg/dL 9 9   CREATININE mg/dL 0 48* 0 43*   ANION GAP mmol/L 9 6   CALCIUM mg/dL 8 8 8 5   ALBUMIN g/dL  --  3 6   TOTAL BILIRUBIN mg/dL  --  0 51   ALK PHOS U/L  --  29*   ALT U/L  --  14   AST U/L  --  16   GLUCOSE RANDOM mg/dL 123 103     Results from last 7 days   Lab Units 05/14/23  1242   INR  0 99     Results from last 7 days   Lab Units 05/15/23  1620 05/15/23  1224 05/15/23  0935 05/15/23  0747 05/14/23  2135 05/14/23  1753 05/14/23  1539 05/14/23  1505 05/14/23  1147 05/14/23  0958 05/14/23  0740 05/13/23  1933   POC GLUCOSE mg/dl 119 104 105 108 117 123 112 128 123 133 103 150*     Results from last 7 days   Lab Units 05/14/23  0509   HEMOGLOBIN A1C % 5 8*     Results from last 7 days   Lab Units 05/13/23  1240   LACTIC ACID mmol/L 0 9       Lines/Drains:  Invasive Devices     Peripheral Intravenous Line  Duration           Peripheral IV 05/15/23 Right Antecubital <1 day                  Telemetry:  Telemetry Orders (From admission, onward)             24 Hour Telemetry Monitoring  Continuous x 24 Hours (Telem)        Question:  Reason for 24 Hour Telemetry  Answer:  TIA/Suspected CVA/ Confirmed CVA                 Telemetry Reviewed: 15-beat run of NSVT  Indication for Continued Telemetry Use: Acute CVA             Imaging: Reviewed radiology reports from this admission including: MRI brain of 5/14    Recent Cultures (last 7 days):   Results from last 7 days   Lab Units 05/13/23  1240   BLOOD CULTURE  No Growth at 24 hrs  No Growth at 24 hrs  Last 24 Hours Medication List:   Current Facility-Administered Medications   Medication Dose Route Frequency Provider Last Rate   • acetaminophen  975 mg Oral Q8H PRN Darren Freeman MD     • atorvastatin  40 mg Oral Daily With Herrera Montano MD     • cholecalciferol  2,000 Units Oral Daily Elena Woods MD     • diphenhydrAMINE  25 mg Oral HS PRN Elena Woods MD     • heparin (porcine)  3-24 Units/kg/hr (Order-Specific) Intravenous Titrated Nikki Perez MD 15 Units/kg/hr (05/15/23 1134)   • insulin lispro  1-6 Units Subcutaneous 4 times day Elena Woods MD     • levothyroxine  125 mcg Oral Early Morning Elena Woods MD     • magnesium gluconate  250 mg Oral QAM Elena Woods MD     • melatonin  6 mg Oral HS PRN Karly Boyer MD     • metoprolol tartrate  25 mg Oral Daily Elena Woods MD     • metroNIDAZOLE  1 application  Topical Daily PRN Elena Woods MD     • pantoprazole  40 mg Oral Early Morning Elena Woods MD     • psyllium  1 packet Oral Daily Elena Woods MD          Today, Patient Was Seen By: Rosalind Gonzalez MD    **Please Note: This note may have been constructed using a voice recognition system  **

## 2023-05-15 NOTE — CASE MANAGEMENT
Case Management Assessment & Discharge Planning Note    Patient name Neyda DAVIS /S -01 MRN [id number]  : 1945 Date 5/15/2023       Current Admission Date: 2023  Current Admission Diagnosis:CVA (cerebral vascular accident) [facility])   Patient Active Problem List    Diagnosis Date Noted   • Hypokalemia 05/15/2023   • Cerebral thrombosis with cerebral infarction ([facility] ) 05/15/2023   • CVA (cerebral vascular accident) ([facility] ) 2023   • Palpitations 2023   • Acute non-recurrent sphenoidal sinusitis 2023   • COVID-19 2023   • Tachycardia 2023   • Trigger finger 2023   • Benign essential tremor 2023   • Urinary incontinence 2022   • Right groin pain 2022   • Osteoarthritis 2021   • Edema 2021   • Thyroid nodule 2021   • Exposure to COVID-19 virus 12/15/2020   • Right flank pain 2020   • Insomnia 2020   • Oral ulceration 2020   • Dyspnea on exertion 2020   • Medicare annual wellness visit, subsequent 2020   • Essential hypertension 2019   • Encounter for screening mammogram for breast cancer 2019   • Right lower quadrant abdominal pain 2019   • Pelvic pain 2019   • Family history of ovarian cancer 2019   • Wellness examination 2019   • Type 2 diabetes mellitus without complication, without long-term current use of insulin ([facility] ) 2018   • Atypical chest pain 2018   • Lymphedema 2018   • Fatigue 2018   • Hyperlipidemia 2018   • Hypothyroidism 2018   • Class 1 obesity due to excess calories with serious comorbidity and body mass index (BMI) of 33 0 to 33 9 in adult 2018   • Dupuytren's contracture 2017   • Fibrocystic breast changes 2016   • Diverticular disease of colon 2016   • Gastro-esophageal reflux disease with esophagitis 2016   • History of adenomatous polyp of colon 2016 • Dense breast tissue on mammogram 08/16/2016   • Hypothyroidism due to Hashimoto's thyroiditis 04/05/2016   • Carotid artery plaque 11/06/2012   • Impaired fasting glucose 11/06/2012   • Nephrolithiasis 11/06/2012      LOS (days): 2  Geometric Mean LOS (GMLOS) (days):   Days to GMLOS:     OBJECTIVE:    Risk of Unplanned Readmission Score: 12 59         Current admission status: Inpatient  Referral Reason: Stroke    Preferred Pharmacy:   [address] [address], [address]  [address] [place] [address]  Phone: [phone] Fax: 310 [facility] [address]  Phone: [phone] Fax: [phone]    Primary Care Provider: Gisela Quintero DO    Primary Insurance: [address] [place] [address]  Secondary Insurance:     ASSESSMENT:  [address] 26 Proxies    There are no active Health Care Proxies on file  Advance Directives  Does patient have a Health Care POA?: Yes  Does patient have Advance Directives?: Yes  Advance Directives: Power of  for finance, Power of  for health care, Living will  Primary Contact: spouse, Bill              Patient Information  Admitted from[de-identified] Home  Mental Status: Alert  During Assessment patient was accompanied by: Spouse  Assessment information provided by[de-identified] Patient  Primary Caregiver: Self  Support Systems: Self, Family members  [place] of Residence: [address] [place] [address] do you live in?: [facility] entry access options   Select all that apply : No steps to enter home  Type of Current Residence: 2 story home (but patient/spouse live on 1st floor)  Upon entering residence, is there a bedroom on the main floor (no further steps)?: Yes  Upon entering residence, is there a bathroom on the main floor (no further steps)?: Yes  In the last 12 months, was there a time when you were not able to pay the mortgage or rent on time?: No  In the last 12 months, how many places have you lived?: 1  In the last 12 months, was there a time when you did not have a steady place to sleep or slept in a shelter (including now)?: No  Homeless/housing insecurity resource given?: N/A  Living Arrangements: Lives w/ Spouse/significant other    Activities of Daily Living Prior to Admission  Functional Status: Independent  Completes ADLs independently?: Yes  Ambulates independently?: Yes  Does patient use assisted devices?: Yes  Assisted Devices (DME) used:  Shower Chair  Does patient currently own DME?: Yes  What DME does the patient currently own?: Shower Chair  Does patient have a history of Outpatient Therapy (PT/OT)?: Yes (bilateral shoulder surgery with out-patient therapy)  Does the patient have a history of Short-Term Rehab?: No  Does patient have a history of HHC?: No  Does patient currently have [facility]?: No         Patient Information Continued  Income Source: Pension/[facility]  Does patient have prescription coverage?: Yes  Within the past 12 months, you worried that your food would run out before you got the money to buy more : Never true  Within the past 12 months, the food you bought just didn't last and you didn't have money to get more : Never true  Food insecurity resource given?: N/A  Does patient receive dialysis treatments?: No  Does patient have a history of substance abuse?: No  Does patient have a history of Mental Health Diagnosis?: No         Means of Transportation  Means of Transport to Appts[de-identified] Family transport  In the past 12 months, has lack of transportation kept you from medical appointments or from getting medications?: No  In the past 12 months, has lack of transportation kept you from meetings, work, or from getting things needed for daily living?: No  Was application for public transport provided?: N/A        DISCHARGE DETAILS:    Discharge planning discussed with[de-identified] patient and spouse at bedside  Freedom of Choice: Yes (re: out-patient therapy)  Comments - Freedom of Choice: provided listing of [facility] out-patient therapy providers for anticipated need for ST follow-up  CM contacted family/caregiver?: Yes (spouse)  Were Treatment Team discharge recommendations reviewed with patient/caregiver?: Yes  Did patient/caregiver verbalize understanding of patient care needs?: N/A- going to facility  Were patient/caregiver advised of the risks associated with not following Treatment Team discharge recommendations?: Yes    Contacts  Patient Contacts: spouse, Bill  Relationship to Patient[de-identified] Family  Contact Method: In Person  Reason/Outcome: Continuity of Care, Emergency Contact, Referral, Discharge [address]         Is the patient interested in [facility] at discharge?: No    DME Referral Provided  Referral made for DME?: No    Other Referral/Resources/Interventions Provided:  Interventions: Outpatient ST  Referral Comments: Patient admitted due to CVA  CM consult received for stroke pathway  Met with patient and spouse at bedside to complete assessment  Spouse provided most information as patient aphasic  Reports that they live in a 2-story home but remain on the main level; no steps to enter  Patient's been independent with ADLs and mobility  ST recommending out-patient follow-up, so list of out-patient rehab locations within [facility] provided for their records  Patient does have a shower seat at home, but no other DME  Spouse reports that he's able to drive patient to appointments at discharge, and will be home to assist patient as needed  Spouse states that he has both medical and financial POA and patient does have a Living Will  PCP is Dr Nedra Sams, and primary pharmacy is [facility]  Spouse to transfer home once medically stable      Would you like to participate in our [address] service program?  : No - Declined    Treatment Team Recommendation: Home  Discharge Destination Plan[de-identified] Home  Transport at Discharge : Family IMM Given (Date):: 05/13/23

## 2023-05-15 NOTE — ASSESSMENT & PLAN NOTE
Bilateral lower extremity edema present on exam   Patient states that this is chronic and does not take any home diuretic  Patient oral mucosa was dry on exam and reports despite drinking excessive fluids she is always thirsty the past few days  · ANDREW  · Compression stockings

## 2023-05-16 ENCOUNTER — APPOINTMENT (INPATIENT)
Dept: CT IMAGING | Facility: HOSPITAL | Age: 78
End: 2023-05-16

## 2023-05-16 VITALS
WEIGHT: 205 LBS | OXYGEN SATURATION: 94 % | HEIGHT: 66 IN | BODY MASS INDEX: 32.95 KG/M2 | TEMPERATURE: 98.3 F | HEART RATE: 74 BPM | DIASTOLIC BLOOD PRESSURE: 58 MMHG | RESPIRATION RATE: 16 BRPM | SYSTOLIC BLOOD PRESSURE: 133 MMHG

## 2023-05-16 LAB
ANION GAP SERPL CALCULATED.3IONS-SCNC: 6 MMOL/L (ref 4–13)
APTT PPP: 102 SECONDS (ref 23–37)
APTT PPP: 70 SECONDS (ref 23–37)
APTT PPP: 83 SECONDS (ref 23–37)
BUN SERPL-MCNC: 12 MG/DL (ref 5–25)
CALCIUM SERPL-MCNC: 8.4 MG/DL (ref 8.4–10.2)
CHLORIDE SERPL-SCNC: 106 MMOL/L (ref 96–108)
CO2 SERPL-SCNC: 26 MMOL/L (ref 21–32)
CREAT SERPL-MCNC: 0.5 MG/DL (ref 0.6–1.3)
GFR SERPL CREATININE-BSD FRML MDRD: 94 ML/MIN/1.73SQ M
GLUCOSE SERPL-MCNC: 106 MG/DL (ref 65–140)
GLUCOSE SERPL-MCNC: 107 MG/DL (ref 65–140)
GLUCOSE SERPL-MCNC: 108 MG/DL (ref 65–140)
GLUCOSE SERPL-MCNC: 110 MG/DL (ref 65–140)
POTASSIUM SERPL-SCNC: 3.7 MMOL/L (ref 3.5–5.3)
SODIUM SERPL-SCNC: 138 MMOL/L (ref 135–147)

## 2023-05-16 RX ORDER — ATORVASTATIN CALCIUM 40 MG/1
40 TABLET, FILM COATED ORAL
Qty: 30 TABLET | Refills: 0 | Status: SHIPPED | OUTPATIENT
Start: 2023-05-16 | End: 2023-06-15

## 2023-05-16 RX ADMIN — Medication 1 PACKET: at 08:20

## 2023-05-16 RX ADMIN — LEVOTHYROXINE SODIUM 125 MCG: 125 TABLET ORAL at 05:44

## 2023-05-16 RX ADMIN — METOPROLOL TARTRATE 25 MG: 25 TABLET, FILM COATED ORAL at 08:20

## 2023-05-16 RX ADMIN — Medication 2000 UNITS: at 08:20

## 2023-05-16 RX ADMIN — PANTOPRAZOLE SODIUM 40 MG: 40 TABLET, DELAYED RELEASE ORAL at 05:44

## 2023-05-16 RX ADMIN — APIXABAN 5 MG: 5 TABLET, FILM COATED ORAL at 16:14

## 2023-05-16 RX ADMIN — Medication 250 MG: at 08:20

## 2023-05-16 RX ADMIN — ATORVASTATIN CALCIUM 40 MG: 40 TABLET, FILM COATED ORAL at 16:14

## 2023-05-16 NOTE — ASSESSMENT & PLAN NOTE
Patient takes metoprolol 25 as needed for palpitations and has not taken it in the past 3 weeks  She did have a Holter monitor 2022 that showed 17 episodes of SVTs but was asymptomatic during those episodes    No events on tele, will continue for now as cardioembolic etiology of stroke seems likely  Cardiology on board--recommend possible LINQ recorder as outpatient  Metoprolol 25 daily

## 2023-05-16 NOTE — CASE MANAGEMENT
Case Management Discharge Planning Note    Patient name Gina Wilkerson  Location S /S -01 MRN [id number]  : 1945 Date 2023       Current Admission Date: 2023  Current Admission Diagnosis:Cerebral thrombosis with cerebral infarction [facility])   Patient Active Problem List    Diagnosis Date Noted   • Hypokalemia 05/15/2023   • PFO (patent foramen ovale) 05/15/2023   • Cerebral thrombosis with cerebral infarction ([facility] [facility] ) 2023   • Palpitations 2023   • Acute non-recurrent sphenoidal sinusitis 2023   • COVID-19 2023   • Tachycardia 2023   • Trigger finger 2023   • Benign essential tremor 2023   • Urinary incontinence 2022   • Right groin pain 2022   • Osteoarthritis 2021   • Edema 2021   • Thyroid nodule 2021   • Exposure to COVID-19 virus 12/15/2020   • Right flank pain 2020   • Insomnia 2020   • Oral ulceration 2020   • Dyspnea on exertion 2020   • Medicare annual wellness visit, subsequent 2020   • Essential hypertension 2019   • Encounter for screening mammogram for breast cancer 2019   • Right lower quadrant abdominal pain 2019   • Pelvic pain 2019   • Family history of ovarian cancer 2019   • Wellness examination 2019   • Type 2 diabetes mellitus without complication, without long-term current use of insulin [facility] 2018   • Atypical chest pain 2018   • Lymphedema 2018   • Fatigue 2018   • Hyperlipidemia 2018   • Hypothyroidism 2018   • Class 1 obesity due to excess calories with serious comorbidity and body mass index (BMI) of 33 0 to 33 9 in adult 2018   • Dupuytren's contracture 2017   • Fibrocystic breast changes 2016   • Diverticular disease of colon 2016   • Gastro-esophageal reflux disease with esophagitis 2016   • History of adenomatous polyp of colon 2016   • Dense breast tissue on mammogram 08/16/2016   • Hypothyroidism due to Hashimoto's thyroiditis 04/05/2016   • Carotid artery plaque 11/06/2012   • Impaired fasting glucose 11/06/2012   • Nephrolithiasis 11/06/2012      LOS (days): 3  Geometric Mean LOS (GMLOS) (days): 2 90  Days to GMLOS:-0 1     OBJECTIVE:  Risk of Unplanned Readmission Score: 12 53         Current admission status: Inpatient   Preferred Pharmacy:   [address], [address]  [address] [place] [address]  Phone: [phone] Fax: 310 [facility], [address] [place] [address]  Phone: [phone] Fax: [phone]    Primary Care Provider: Leslye Robertson DO    Primary Insurance: [facility]  Secondary Insurance:     DISCHARGE DETAILS:    Discharge planning discussed with[de-identified] patietn and spouse at bedside  Freedom of Choice: Yes (re: out-patietn therapy)  Comments - Freedom of Choice: listing of [facility] out-patient locations provided  CM contacted family/caregiver?: Yes (spouse at bedside)  Were Treatment Team discharge recommendations reviewed with patient/caregiver?: Yes  Did patient/caregiver verbalize understanding of patient care needs?: Yes  Were patient/caregiver advised of the risks associated with not following Treatment Team discharge recommendations?: Yes    Contacts  Patient Contacts: spouse, Bill  Relationship to Patient[de-identified] Family  Contact Method: In Person  Reason/Outcome: Continuity of Care, Emergency Contact, Referral, Discharge [address]         Is the patient interested in [facility] at discharge?: No    DME Referral Provided  Referral made for DME?: No    Other Referral/Resources/Interventions Provided:  Interventions: Outpatient ST  Referral Comments: Request from MD to price-check Eliquis  Call made to [facility] and spoke with Юлия Burkett who reports cost comes to $45 60   Met with patient and spouse at bedside to review same; both aware of cost and confirmed affordability of same  Patient and spouse aware of anticipated d/c for today  IMM reviewed and copy provided for her records, but both confirm agreement w/ discharge  Spouse to transport home once d/c orders written  Would you like to participate in our [address] service program?  : No - Declined    Treatment Team Recommendation: Home  Discharge Destination Plan[de-identified] Home  Transport at Discharge : Family                             IMM Given (Date):: 05/16/23  IMM Given to[de-identified] Patient  IMM reviewed with patient and spouse, Armando Woodard, both agree with discharge determination

## 2023-05-16 NOTE — ASSESSMENT & PLAN NOTE
Lab Results   Component Value Date    K 3 7 05/16/2023    K 3 3 (L) 05/15/2023    K 3 5 05/14/2023   monitor as outpatient  Resolved

## 2023-05-16 NOTE — DISCHARGE SUMMARY
[facility]  Discharge- Raquel Messenger 1945, 70 y o  female MRN: [id number]  Unit/Bed#: S -01 Encounter: [id number]  Primary Care Provider: Kevin Morales DO   Date and time admitted to hospital: 5/13/2023 11:32 AM    * Cerebral thrombosis with cerebral infarction [facility])  Assessment & Plan  Presenting 5/13/23 with expressive aphasia and frontal headache  Tested positive for COVID-19 11 days prior to presentation and thought her symptoms were secondary to that infection  No other neurological deficits  Upon arrival in ED, patient was hypertensive with CTA head/neck that showed a left MCA occlusion  However patient was not a candidate for tPA due to >36 hours since symptom onset  On exam patient was able to communicate however did have expressive aphasia  CTA:  Acute to subacute ischemia identified in the left temporal lobe and left frontal corona radiata  Proximal left cervical ICA 5 mm intraluminal thrombus  Left M3 MCA occlusion  MRI: Late acute or early subacute nonhemorrhagic infarction in the left MCA territory again involving the left frontal, temporal and parietal more than occipital lobes with corresponding thrombus in the distal left M2, M3 and M4 branches     Repeat CTA shows no hemorrhage, patient is clear for dc per neuro  · Neurology cleared the patient for discharge  · Vascular surgery was consulted for Carotid thrombus and no surgical indication is necessary at this time, advised to follow as outpatient  · Per neurology no need antiplatelet  · Start eliquis 5 mg BID per neuro  · Per cardiology may need PFO repair as outpatient, and LINQ recorder as outpatient to assess for atrial fibrillation     · Advised to follow with cardiology, neurology, vascular and pcp      PFO (patent foramen ovale)  Assessment & Plan  Horacio:  Atrial Septum: There is a small and patent foramen ovale confirmed at rest and with provocation with predominant right to left shunting using saline contrast  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, mobile septal aneurysm  It is predominately within the left atrial cavity  Cardiology was consulted  They advise outpatient consult with interventional cardiology  Possible LINQ recorder as outpatient to assess for atrial fibrillation as suspected etiology of stroke    Hypokalemia  Assessment & Plan  Lab Results   Component Value Date    K 3 7 05/16/2023    K 3 3 (L) 05/15/2023    K 3 5 05/14/2023   monitor as outpatient  Resolved      Palpitations  Assessment & Plan  Patient takes metoprolol 25 as needed for palpitations and has not taken it in the past 3 weeks  She did have a Holter monitor 2022 that showed 17 episodes of SVTs but was asymptomatic during those episodes  No events on tele, will continue for now as cardioembolic etiology of stroke seems likely  Cardiology on board--recommend possible LINQ recorder as outpatient  Metoprolol 25 daily    Type 2 diabetes mellitus without complication, without long-term current use of insulin [facility])  Assessment & Plan  Lab Results   Component Value Date    HGBA1C 5 8 (H) 05/14/2023       Recent Labs     05/15/23  2059 05/16/23  0728 05/16/23  1150 05/16/23  1546   POCGLU 98 107 106 108       Blood Sugar Average: Last 72 hrs:   SSI with meals and at bed  Diabetic diet    Hypothyroidism  Assessment & Plan  Continue levothyroxine    Hyperlipidemia  Assessment & Plan  Continue increased Lipitor 40 daily    Lymphedema  Assessment & Plan  Bilateral lower extremity edema present on exam   Patient states that this is chronic and does not take any home diuretic  Patient oral mucosa was dry on exam and reports despite drinking excessive fluids she is always thirsty the past few days  · ANDREW  · Compression stockings            Medical Problems     Resolved Problems  Date Reviewed: 5/16/2023   None       Discharging Resident: Osvaldo Mitchell MD  Discharging Attending: No att  providers found  PCP: Micaela Manzano DO  Admission Date:   Admission Orders (From admission, onward)     Ordered        05/13/23 [address]  Once                      Discharge Date: 05/17/23    Consultations During Hospital Stay:  · Neurology, vascular, cardiology    Procedures Performed:   · ANDREW    Significant Findings / Test Results:   CTA head and neck with and without contrast    Result Date: 5/13/2023  Impression: No evidence of acute intracranial hemorrhage  Acute to subacute ischemia identified in the left temporal lobe and left frontal corona radiata  Proximal left cervical ICA 5 mm intraluminal thrombus  Left M3 MCA occlusion  I personally communicated this study with GALE BEY on 5/13/2023 4:50 PM  Workstation performed: [id number]     XR chest 1 view portable    Result Date: 5/13/2023  Impression: No acute cardiopulmonary disease  Workstation performed: [id number]     CT head wo contrast    Result Date: 5/16/2023  Impression: Evolving multifocal acute infarcts in left cerebral hemisphere involving frontal, parietal, posterior insular, temporal, and occipital lobes  No acute intracranial hemorrhage  Workstation performed: [id number]     MRI brain wo contrast    Result Date: 5/14/2023  Impression: Late acute or early subacute nonhemorrhagic infarction in the left MCA territory again involving the left frontal, temporal and parietal more than occipital lobes with corresponding thrombus in the distal left M2, M3 and M4 branches  Findings are essentially unchanged when compared to the recent head CT and CT angiography of the head and neck performed on May 13, 2023  No significant mass effect  No herniation  The study was marked in [facility] for immediate notification  Workstation performed: [id number]       No Chest XR results available for this patient         Incidental Findings:   · none     Test Results Pending at Discharge (will require follow up):  · none     Outpatient Tests Requested:  · CTA head and neck "after three weeks  Neurology will order for you call for scheduling    Complications:  none    Reason for Admission: Dysarthria    Hospital Course:   Ramon Silva is a 70 y o  female patient who originally presented to the hospital on 5/13/2023 due to dysarthria  She was CTA shows subacute ischemia identified in the left temporal lobe  Left M3 MCA occlusion was noted and a proximal left cervical ICA 5 mm thrombus was also noted patient was placed on stroke protocol  Neurology was consulted  MRI confirmed CT significant findings, which showed subacute nonhemorrhagic infarction of the left MCA territory involving left frontal, temporal and parietal areas  ANDREW was done which showed no left atrial appendage thrombus however noted a patent martinez ovale and moderately sized mobile septal aneurysm  Vascular was consulted regarding the ICA thrombus and they said no tPA or acute surgical intervention was necessary  Patient was placed on heparin drip  Cardiology was consulted for possible A-fib and PFO, they advised loop recorder as outpatient, and possible PFO repair as outpatient  Neurology repeated CTA head and neck which confirmed no hemorrhagic conversion  Patient was safely started on Eliquis prior to discharge  Discharge recommendations were given above  Advised to follow-up with vascular, neurology, cardiology, PCP  Neurology advised CTA after 3 weeks      Please see above list of diagnoses and related plan for additional information  Condition at Discharge: stable    Discharge Day Visit / Exam:   Subjective: Patient is doing well  Still has dysarthria however has improved a little bit    Otherwise no chest pain, no shortness of breath, no abdominal pain no lower leg edema  Vitals: Blood Pressure: 133/58 (05/16/23 1547)  Pulse: 74 (05/16/23 1547)  Temperature: 98 3 °F (36 8 °C) (05/16/23 1547)  Temp Source: Oral (05/15/23 1551)  Respirations: 16 (05/16/23 0790)  Height: 5' 6\" (167 6 cm) " (05/15/23 1146)  Weight - Scale: 93 kg (205 lb) (05/15/23 1146)  SpO2: 94 % (05/16/23 1547)  Exam:   Physical Exam  Vitals and nursing note reviewed  Constitutional:       General: She is not in acute distress  Appearance: She is well-developed  HENT:      Head: Normocephalic and atraumatic  Nose: Nose normal       Mouth/Throat:      Mouth: Mucous membranes are moist    Eyes:      Conjunctiva/sclera: Conjunctivae normal    Cardiovascular:      Rate and Rhythm: Normal rate and regular rhythm  Heart sounds: No murmur heard  Pulmonary:      Effort: Pulmonary effort is normal  No respiratory distress  Breath sounds: Normal breath sounds  Abdominal:      Palpations: Abdomen is soft  Tenderness: There is no abdominal tenderness  Musculoskeletal:      Cervical back: Neck supple  Right lower leg: No edema  Left lower leg: No edema  Skin:     General: Skin is warm and dry  Capillary Refill: Capillary refill takes less than 2 seconds  Neurological:      General: No focal deficit present  Mental Status: She is alert and oriented to person, place, and time  Comments: Dysarthria   Psychiatric:         Mood and Affect: Mood normal           Discussion with Family: Updated  () at bedside  Discharge instructions/Information to patient and family:   See after visit summary for information provided to patient and family  Provisions for Follow-Up Care:  See after visit summary for information related to follow-up care and any pertinent home health orders  Disposition:   Home    Planned Readmission: none    Discharge Medications:  See after visit summary for reconciled discharge medications provided to patient and/or family        **Please Note: This note may have been constructed using a voice recognition system**

## 2023-05-16 NOTE — ASSESSMENT & PLAN NOTE
Presenting 5/13/23 with expressive aphasia and frontal headache  Tested positive for COVID-19 11 days prior to presentation and thought her symptoms were secondary to that infection  No other neurological deficits  Upon arrival in ED, patient was hypertensive with CTA head/neck that showed a left MCA occlusion  However patient was not a candidate for tPA due to >36 hours since symptom onset  On exam patient was able to communicate however did have expressive aphasia  CTA:  Acute to subacute ischemia identified in the left temporal lobe and left frontal corona radiata  Proximal left cervical ICA 5 mm intraluminal thrombus  Left M3 MCA occlusion  MRI: Late acute or early subacute nonhemorrhagic infarction in the left MCA territory again involving the left frontal, temporal and parietal more than occipital lobes with corresponding thrombus in the distal left M2, M3 and M4 branches     Repeat CTA shows no hemorrhage, patient is clear for dc per neuro  · Neurology cleared the patient for discharge  · Vascular surgery was consulted for Carotid thrombus and no surgical indication is necessary at this time, advised to follow as outpatient  · Continue aspirin, plavix, atorvastatin as outpatient  · Start eliquis 5 mg BID per neuro  · Per cardiology may need PFO repair as outpatient, and LINQ recorder as outpatient to assess for atrial fibrillation     · Advised to follow with cardiology, neurology, vascular and pcp

## 2023-05-16 NOTE — ASSESSMENT & PLAN NOTE
Lab Results   Component Value Date    HGBA1C 5 8 (H) 05/14/2023       Recent Labs     05/15/23  1620 05/15/23  2059 05/16/23  0728 05/16/23  1150   POCGLU 119 98 107 106       Blood Sugar Average: Last 72 hrs:   SSI with meals and at bed  Diabetic diet

## 2023-05-16 NOTE — PROGRESS NOTES
NEUROLOGY RESIDENCY PROGRESS NOTE     Name: Jomar Rowley   Age & Sex: 70 y o  female   MRN: [id number]  Unit/Bed#: S -01   Encounter: [id number]    Jomar Rowley will need follow up in in 4 weeks with neurovascular Attending  Recommend CTA head and neck in 3 weeks for thrombus monitoring    Pending for discharge: Heparin drip to transition to eliquis    ASSESSMENT & PLAN     * Cerebral thrombosis with cerebral infarction [facility])  Assessment & Plan  Assessment:  Jomar Rowley is a 70 y o  female  With hx of hypothyroidism, DM, palpitations, HLD, COVID 12 days ago  Initial presenting deficits were expressive aphasia and mild frontal headaches since 4/11  The patient has a pmhx significant for Hypothyroidism, DM, palpitations, HLD, COVID  CTA head and neck showed left MCA occlusion and proximal left cervical ICA 5mm intraluminal thrombus and acute to subacute ischemia in left temporal lobe and left frontal corona radiata  Workup: In the ED, Presenting BP Blood Pressure: (!) 174/82  EKG on presentation to the ER showed sinus tachycardia  CT Head showed: Acute to subacute infarct on left temporal lobe and left frontal corona radiata  CTA head and neck showed: left cervical 5mm thrombus and left M3 MCA occlusion  MRI brain wo contrast: Late acute to early subacute infarction left frontal, left temporal, left parietal> left occipital   ANDREW: EF 65%, small patent foramen ovale and moderately sized atrial septal aneurysm      Lab Results   Component Value Date    HGBA1C 6 2 (H) 02/21/2023     No results found for: TSH  Lab Results   Component Value Date    CHOLESTEROL 134 05/14/2023    TRIG 111 05/14/2023    HDL 40 (L) 05/14/2023    LDLCALC 72 05/14/2023       Risk factors: diabetes, hyperlipidemia and COVID 12 days ago      Impression: Patient most likely has an Left embolic infarct due to left M3 occlusion which potentially stems from the left ICA thrombus which is partially occlusive and mobile  Left ICA thrombus most likely cardiac in origin with increased risk factors for cardioembolism from moderately sized atrial septal aneurysm and small PFO  Agreed with Cardiology concerning the stroke as ultimately embolic in etiology  Plan:  - Discussed with Dr Jeffries  - Continue stroke pathway  - Continue Neuro checks  - Recommend normotension  - Maintain glucose <180, SSI for coverage if indicated  - Repeat CTH if GCS drops 2pts in 1hr  - PT/OT/ST/PM&R Evaluation  - Vascular surgery consult recommended medical management and vascular surgery followup  - On heparin drip- ordered CT head to make sure no hemorrhage   - If no hemorrhage, stop heparin drip and recommend starting eliquis 5mg BID due to most likely cardioembolic nature of stroke and thrombus (There are some studies concerning mobile thrombus medical management although unfortunately insufficient to differentiate between using AC vs AP agents and treatment seems to align with ultimate origin of thrombus whether atheroembolic vs cardioembolic and due to ultimate cardioembolic nature of the stroke, we will recommend eliquis 5mg BID)  - Atorvastatin 40 mg q d   - Continue monitoring on telemetry  - Rest of care per primary team    - Recommend followup with neurology, vascular surgery (concerning carotid mobile thrombus), and cardiology (concerning PFO and LINQ recorder)            SUBJECTIVE     Patient was seen and examined  No acute events overnight  Speech is continuing to improve  Patient denies numbness, weakness, visual deficits, ambulatory dysfunction  Patient is eager to go home  Review of Systems  12 point ROS has been completed  ROS is negative unless stated above  OBJECTIVE     Patient ID: Del Doe is a 70 y o  female      Vitals:    05/15/23 1146 05/15/23 1219 05/15/23 1551 05/16/23 0729   BP: 143/65 115/58 149/79 144/80   BP Location:   Right arm    Pulse: 80 77 88 77   Resp:  22 20 16   Temp:  (!) 97 °F (36 1 °C) 98 5 °F (36 9 "°C) 98 4 °F (36 9 °C)   TempSrc:   Oral    SpO2:  92% 96% 94%   Weight: 93 kg (205 lb)      Height: 5' 6\" (1 676 m)         Temperature:   Temp (24hrs), Av 5 °F (36 9 °C), Min:98 4 °F (36 9 °C), Max:98 5 °F (36 9 °C)    Temperature: 98 4 °F (36 9 °C)      Physical Exam  Eyes:      Extraocular Movements: EOM normal       Pupils: Pupils are equal, round, and reactive to light  Neurological:      Motor: Motor strength is normal       Gait: Gait is intact  Deep Tendon Reflexes:      Reflex Scores:       Bicep reflexes are 2+ on the right side and 2+ on the left side  Brachioradialis reflexes are 2+ on the right side and 2+ on the left side  Patellar reflexes are 2+ on the right side and 2+ on the left side  Achilles reflexes are 1+ on the right side and 1+ on the left side  Psychiatric:         Speech: Speech normal           Neurologic Exam     Mental Status   Oriented to person  Oriented to place  Disoriented to year and date  Attention: normal  Concentration: normal    Speech: speech is normal   Level of consciousness: alert  Unable to name object  Unable to read  Unable to repeat  Expressive> receptive aphasia  Improving     Cranial Nerves     CN II   Visual fields full to confrontation  CN III, IV, VI   Pupils are equal, round, and reactive to light  Extraocular motions are normal      CN V   Facial sensation intact  CN VII   Facial expression full, symmetric  CN XI   CN XI normal      CN XII   CN XII normal      Motor Exam   Muscle bulk: normal  Overall muscle tone: normal    Strength   Strength 5/5 throughout       Sensory Exam   Light touch normal      Gait, Coordination, and Reflexes     Gait  Gait: normal    Reflexes   Right brachioradialis: 2+  Left brachioradialis: 2+  Right biceps: 2+  Left biceps: 2+  Right patellar: 2+  Left patellar: 2+  Right achilles: 1+  Left achilles: 1+  Right plantar: normal  Left plantar: normal       LABORATORY DATA     Labs: I " have personally reviewed pertinent reports  Results from last 7 days   Lab Units 05/14/23  1242 05/14/23  0509 05/13/23  1134   WBC Thousand/uL 8 54 5 70 7 83   HEMOGLOBIN g/dL 13 2 12 0 13 5   HEMATOCRIT % 41 2 37 3 41 9   PLATELETS Thousands/uL 338 280 346   NEUTROS PCT %  --  59 76*   MONOS PCT %  --  8 6      Results from last 7 days   Lab Units 05/16/23  0221 05/15/23  0539 05/14/23  0509 05/13/23  1134   SODIUM mmol/L 138 139 139 136   POTASSIUM mmol/L 3 7 3 3* 3 5 3 9   CHLORIDE mmol/L 106 103 106 102   CO2 mmol/L 26 27 27 25   BUN mg/dL 12 9 9 10   CREATININE mg/dL 0 50* 0 48* 0 43* 0 52*   CALCIUM mg/dL 8 4 8 8 8 5 9 3   ALK PHOS U/L  --   --  29* 37   ALT U/L  --   --  14 16   AST U/L  --   --  16 19     Results from last 7 days   Lab Units 05/13/23  1134   MAGNESIUM mg/dL 1 8*          Results from last 7 days   Lab Units 05/16/23  1041 05/16/23  0454 05/16/23  0221 05/14/23  1627 05/14/23  1242   INR   --   --   --   --  0 99   PTT seconds 70* 83* 102*   < > 30    < > = values in this interval not displayed  Results from last 7 days   Lab Units 05/13/23  1240   LACTIC ACID mmol/L 0 9           IMAGING & DIAGNOSTIC TESTING     Radiology Results: I have personally reviewed pertinent films in PACS    CT head wo contrast   Final Result by Lela Hitchcock MD (05/16 1443)      Evolving multifocal acute infarcts in left cerebral hemisphere involving frontal, parietal, posterior insular, temporal, and occipital lobes  No acute intracranial hemorrhage  Workstation performed: [id number]         MRI brain wo contrast   Final Result by Sarah Bruner MD (05/14 1927)      Late acute or early subacute nonhemorrhagic infarction in the left MCA territory again involving the left frontal, temporal and parietal more than occipital lobes with corresponding thrombus in the distal left M2, M3 and M4 branches   Findings are    essentially unchanged when compared to the recent head CT and CT angiography of the head and neck performed on May 13, 2023  No significant mass effect  No herniation  The study was marked in [facility] for immediate notification  Workstation performed: [id number]         CTA head and neck with and without contrast   Final Result by Tk Xiong MD (05/13 1652)      No evidence of acute intracranial hemorrhage  Acute to subacute ischemia identified in the left temporal lobe and left frontal corona radiata  Proximal left cervical ICA 5 mm intraluminal thrombus  Left M3 MCA occlusion  I personally communicated this study with GALE BEY on 5/13/2023 4:50 PM                         Workstation performed: [id number]         XR chest 1 view portable   Final Result by Brendon Garcia MD (05/13 1946)      No acute cardiopulmonary disease  Workstation performed: [id number]             Other Diagnostic Testing: I have personally reviewed pertinent reports        ACTIVE MEDICATIONS     Current Facility-Administered Medications   Medication Dose Route Frequency   • acetaminophen (TYLENOL) tablet 975 mg  975 mg Oral Q8H PRN   • atorvastatin (LIPITOR) tablet 40 mg  40 mg Oral Daily With Dinner   • cholecalciferol (VITAMIN D3) tablet 2,000 Units  2,000 Units Oral Daily   • diphenhydrAMINE (BENADRYL) tablet 25 mg  25 mg Oral HS PRN   • heparin (porcine) 25,000 units in 0 45% NaCl 250 mL infusion (premix)  3-24 Units/kg/hr (Order-Specific) Intravenous Titrated   • insulin lispro (HumaLOG) 100 units/mL subcutaneous injection 1-6 Units  1-6 Units Subcutaneous 4 times day   • levothyroxine tablet 125 mcg  125 mcg Oral Early Morning   • magnesium gluconate (MAGONATE) tablet 250 mg  250 mg Oral QAM   • melatonin tablet 6 mg  6 mg Oral HS PRN   • metoprolol tartrate (LOPRESSOR) tablet 25 mg  25 mg Oral Daily   • pantoprazole (PROTONIX) EC tablet 40 mg  40 mg Oral Early Morning   • psyllium (METAMUCIL) 1 packet  1 packet Oral Daily       Prior to Admission medications    Medication Sig Start Date End Date Taking?  Authorizing Provider   amoxicillin-clavulanate (Augmentin) 875-125 mg per tablet Take 1 tablet by mouth every 12 (twelve) hours for 10 days 5/10/23 5/20/23 Yes Leslye Robertson DO   apixaban (Eliquis) 5 mg Take 1 tablet (5 mg total) by mouth 2 (two) times a day 5/16/23 6/15/23 Yes Nathan Malin MD   Ascorbic Acid (VITAMIN C) 1000 MG tablet Take 1 tablet by mouth daily 11/6/12  Yes Historical Provider, MD   atorvastatin (LIPITOR) 20 mg tablet TAKE 1 TABLET DAILY 9/2/22  Yes Leslye Robertson DO   Biotin 5000 MCG CAPS Take 1 capsule by mouth daily   Yes Historical Provider, MD   cholecalciferol (VITAMIN D3) 1,000 units tablet Take 2,000 Units by mouth daily   Yes Historical Provider, MD   CINNAMON PO Take 2,000 mg by mouth daily in the early morning   Yes Historical Provider, MD   diphenhydrAMINE-acetaminophen (TYLENOL PM)  MG TABS Take 1 tablet by mouth daily at bedtime as needed for sleep   Yes Historical Provider, MD   esomeprazole (NexIUM) 40 MG capsule Take 1 capsule by mouth daily 5/3/11  Yes Historical Provider, MD   fluticasone (FLONASE) 50 mcg/act nasal spray USE 2 SPRAYS IN EACH NOSTRIL DAILY 8/26/22  Yes Leslye Robertson,    guaiFENesin (MUCINEX) 600 mg 12 hr tablet Take 2 tablets (1,200 mg total) by mouth every 12 (twelve) hours 5/2/23  Yes Marco A Main MD   levothyroxine 125 mcg tablet TAKE 1 TABLET DAILY ON AN EMPTY STOMACH 10/10/22  Yes Leslye Robertson DO   Magnesium 250 MG TABS Take 250 mg by mouth in the morning   Yes Historical Provider, MD   Melatonin-Theanine 10-5 5 MG TABS Take 1 tablet by mouth as needed   Yes Historical Provider, MD   Menthol, Topical Analgesic, (BIOFREEZE EX) Apply 1 application topically daily as needed (pain)   Yes Historical Provider, MD   metoprolol tartrate (LOPRESSOR) 25 mg tablet Take one tablet as needed for palpitations 2/21/23  Yes Johanna Lieberman MD   metroNIDAZOLE (Munira Cristina) 1 % cream Apply 1 application   topically daily   Yes Historical Provider, MD   Multiple Vitamin (MULTIVITAMIN) capsule Take 1 capsule by mouth daily   Yes Historical Provider, MD   Probiotic Product (ALIGN PO) Take 1 capsule by mouth in the morning   Yes Historical Provider, MD   psyllium (METAMUCIL) 58 6 % powder Take 1 packet by mouth daily   Yes Historical Provider, MD   semaglutide, 0 25 or 0 5 mg/dose, (Ozempic) 2 mg/1 5 mL injection pen Inject 0 19 mL (0 25 mg total) under the skin every 7 days 3/1/23  Yes Cassie Cuenca,    doxycycline hyclate (VIBRA-TABS) 100 mg tablet TAKE 1 TABLET BY MOUTH AN HOUR BEFORE DINNER WITH WATER ONLY, REMAIN UPRIGHT FOR ONE HOUR AFTERWARDS  Patient not taking: Reported on 5/13/2023 12/20/22   Historical Provider, MD   MECLIZINE HCL PO Take 25 mg by mouth daily as needed    Historical Provider, MD   Turmeric 500 MG CAPS Take 1,000 mg by mouth daily in the early morning  Patient not taking: Reported on 5/13/2023    Historical Provider, MD   naproxen sodium (ALEVE) 220 MG tablet Take by mouth  6/27/19  Historical Provider, MD         VTE Pharmacologic Prophylaxis: Heparin Drip  VTE Mechanical Prophylaxis: sequential compression device    ==  MD Ivone Chairez 73 Neurology Residency, PGY-3

## 2023-05-16 NOTE — PROGRESS NOTES
"Progress Note - Vascular Surgery   Jomar Rowley 70 y o  female MRN: [id number]  Unit/Bed#: S -01 Encounter: [id number]    Assessment:  Patient is a 70year old female with PMH hypothyroidism, DM, palpitations, HLD with proximal left cervical ICA thrombus with late acute or early subacute nonhemorrhagic infarction in the left MCA territory  Plan:  - no vascular surgical indication at this time, will need outpatient neuro-intervention followup  - recommended continued medical management with anticoagulation and antiplatelet therapy  - per Neurology, outside of time window for neurointervention  - rest of care per primary team    Subjective/Objective     Subjective:   No acute events overnight  Patient is asking if she is able to go home  Objective:    Blood pressure 144/80, pulse 77, temperature 98 4 °F (36 9 °C), resp  rate 16, height 5' 6\" (1 676 m), weight 93 kg (205 lb), SpO2 94 %, not currently breastfeeding  ,Body mass index is 33 09 kg/m²  Intake/Output Summary (Last 24 hours) at 5/16/2023 0854  Last data filed at 5/15/2023 1210  Gross per 24 hour   Intake 250 ml   Output --   Net 250 ml       Invasive Devices     Peripheral Intravenous Line  Duration           Peripheral IV 05/15/23 Right Antecubital 1 day                Physical Exam:   Vitals reviewed  Constitutional:       Appearance: Normal appearance  HENT:      Head: Normocephalic and atraumatic  Right Ear: External ear normal       Left Ear: External ear normal       Nose: Nose normal       Mouth/Throat:      Mouth: Mucous membranes are moist    Eyes:      General: No visual field deficit  Pupils: Pupils are equal, round, and reactive to light  Cardiovascular:      Rate and Rhythm: Normal rate  Pulses: Normal pulses  Pulmonary:      Effort: Pulmonary effort is normal       Breath sounds: Normal breath sounds  Abdominal:      General: Bowel sounds are normal       Palpations: Abdomen is soft   " Musculoskeletal:      Cervical back: Normal range of motion  Skin:     Capillary Refill: Capillary refill takes less than 2 seconds  Neurological:      Mental Status: She is alert and oriented to person, place, and time  Cranial Nerves: Dysarthria present  No facial asymmetry  Sensory: No sensory deficit  Motor: No weakness  Coordination: Coordination is intact  Coordination normal    Psychiatric:         Mood and Affect: Mood normal          Behavior: Behavior normal          Thought Content: Thought content normal          Judgment: Judgment normal      Results from last 7 days   Lab Units 05/14/23  1242 05/14/23  0509 05/13/23  1134   WBC Thousand/uL 8 54 5 70 7 83   HEMOGLOBIN g/dL 13 2 12 0 13 5   HEMATOCRIT % 41 2 37 3 41 9   PLATELETS Thousands/uL 338 280 346     Results from last 7 days   Lab Units 05/16/23  0221 05/15/23  0539 05/14/23  0509   POTASSIUM mmol/L 3 7 3 3* 3 5   CHLORIDE mmol/L 106 103 106   CO2 mmol/L 26 27 27   BUN mg/dL 12 9 9   CREATININE mg/dL 0 50* 0 48* 0 43*   CALCIUM mg/dL 8 4 8 8 8 5     Results from last 7 days   Lab Units 05/16/23  0454 05/16/23  0221 05/15/23  1730 05/14/23  1627 05/14/23  1242   INR   --   --   --   --  0 99   PTT seconds 83* 102* 104*   < > 30    < > = values in this interval not displayed

## 2023-05-16 NOTE — PLAN OF CARE
Problem: NEUROSENSORY - ADULT  Goal: Achieves stable or improved neurological status  Description: INTERVENTIONS  - Monitor and report changes in neurological status  - Monitor vital signs such as temperature, blood pressure, glucose, and any other labs ordered   - Initiate measures to prevent increased intracranial pressure  - Monitor for seizure activity and implement precautions if appropriate      Outcome: Progressing  Goal: Remains free of injury related to seizures activity  Description: INTERVENTIONS  - Maintain airway, patient safety  and administer oxygen as ordered  - Monitor patient for seizure activity, document and report duration and description of seizure to physician/advanced practitioner  - If seizure occurs,  ensure patient safety during seizure  - Reorient patient post seizure  - Seizure pads on all 4 side rails  - Instruct patient/family to notify RN of any seizure activity including if an aura is experienced  - Instruct patient/family to call for assistance with activity based on nursing assessment  - Administer anti-seizure medications if ordered    Outcome: Progressing  Goal: Achieves maximal functionality and self care  Description: INTERVENTIONS  - Monitor swallowing and airway patency with patient fatigue and changes in neurological status  - Encourage and assist patient to increase activity and self care  - Encourage visually impaired, hearing impaired and aphasic patients to use assistive/communication devices  Outcome: Progressing     Problem: Nutrition/Hydration-ADULT  Goal: Nutrient/Hydration intake appropriate for improving, restoring or maintaining nutritional needs  Description: Monitor and assess patient's nutrition/hydration status for malnutrition  Collaborate with interdisciplinary team and initiate plan and interventions as ordered  Monitor patient's weight and dietary intake as ordered or per policy  Utilize nutrition screening tool and intervene as necessary   Determine patient's food preferences and provide high-protein, high-caloric foods as appropriate       INTERVENTIONS:  - Monitor oral intake, urinary output, labs, and treatment plans  - Assess nutrition and hydration status and recommend course of action  - Evaluate amount of meals eaten  - Assist patient with eating if necessary   - Allow adequate time for meals  - Recommend/ encourage appropriate diets, oral nutritl supplements, and vitamin/mineral supplements  - Order, calculate, and assess calorie counts as needed  - Recommend, monitor, and adjust tube feedings and TPN/PPN based on assessed needs  - Assess need for intravenous fluids  - Provide specific nutrition/hydration education as appropriate  - Include patient/family/caregiver in decisions related to nutrition  Outcome: Progressing

## 2023-05-17 ENCOUNTER — TRANSITIONAL CARE MANAGEMENT (OUTPATIENT)
Dept: INTERNAL MEDICINE CLINIC | Facility: CLINIC | Age: 78
End: 2023-05-17

## 2023-05-17 ENCOUNTER — TELEPHONE (OUTPATIENT)
Dept: NEUROLOGY | Facility: CLINIC | Age: 78
End: 2023-05-17

## 2023-05-17 NOTE — ASSESSMENT & PLAN NOTE
Presenting 5/13/23 with expressive aphasia and frontal headache  Tested positive for COVID-19 11 days prior to presentation and thought her symptoms were secondary to that infection  No other neurological deficits  Upon arrival in ED, patient was hypertensive with CTA head/neck that showed a left MCA occlusion  However patient was not a candidate for tPA due to >36 hours since symptom onset  On exam patient was able to communicate however did have expressive aphasia  CTA:  Acute to subacute ischemia identified in the left temporal lobe and left frontal corona radiata  Proximal left cervical ICA 5 mm intraluminal thrombus  Left M3 MCA occlusion  MRI: Late acute or early subacute nonhemorrhagic infarction in the left MCA territory again involving the left frontal, temporal and parietal more than occipital lobes with corresponding thrombus in the distal left M2, M3 and M4 branches     Repeat CTA shows no hemorrhage, patient is clear for dc per neuro  · Neurology cleared the patient for discharge  · Vascular surgery was consulted for Carotid thrombus and no surgical indication is necessary at this time, advised to follow as outpatient  · Per neurology no need antiplatelet  · Start eliquis 5 mg BID per neuro  · Per cardiology may need PFO repair as outpatient, and LINQ recorder as outpatient to assess for atrial fibrillation     · Advised to follow with cardiology, neurology, vascular and pcp

## 2023-05-17 NOTE — UTILIZATION REVIEW
NOTIFICATION OF ADMISSION DISCHARGE   This is a Notification of Discharge from [address]  Please be advised that this patient has been discharge from our facility  Below you will find the admission and discharge date and time including the patient’s disposition  UTILIZATION REVIEW CONTACT:  Beau Lazo MA  Utilization   Network Utilization Review Department  Phone: [phone] x carefully listen to the prompts  All voicemails are confidential   Email: [email or web address]  [email or web address]     ADMISSION INFORMATION  PRESENTATION DATE: 5/13/2023 11:32 AM  OBERVATION ADMISSION DATE:   INPATIENT ADMISSION DATE: 5/13/23  5:27 PM   DISCHARGE DATE: 5/16/2023  5:04 PM   DISPOSITION:Home/Self Care    IMPORTANT INFORMATION:  Send all requests for admission clinical reviews, approved or denied determinations and any other requests to dedicated fax number below belonging to the campus where the patient is receiving treatment   List of dedicated fax numbers:  [address] DENIALS (Administrative/Medical Necessity) [phone]   [address] (Maternity/NICU/Pediatrics) [phone]   [facility] [phone]   [place] [address] [phone]   [address] [phone]   220 [facility] [phone]   90 [facility] [phone]   [facility] [phone]   [facility]  [phone]   [address] [facility] [phone]   412 [facility] [address] [facility] [phone]

## 2023-05-17 NOTE — ASSESSMENT & PLAN NOTE
Lab Results   Component Value Date    HGBA1C 5 8 (H) 05/14/2023       Recent Labs     05/15/23  2059 05/16/23  0728 05/16/23  1150 05/16/23  1546   POCGLU 98 107 106 108       Blood Sugar Average: Last 72 hrs:   SSI with meals and at bed  Diabetic diet

## 2023-05-17 NOTE — TELEPHONE ENCOUNTER
Patient has been scheduled with Dr Lorraine Morillo on 7/5/23 at [address] for a hospital follow up appointment  Cameron Robles will need follow up in in 4 weeks with neurovascular Attending  She will not require outpatient neurological testing

## 2023-05-18 ENCOUNTER — OFFICE VISIT (OUTPATIENT)
Dept: CARDIOLOGY CLINIC | Facility: CLINIC | Age: 78
End: 2023-05-18

## 2023-05-18 ENCOUNTER — RA CDI HCC (OUTPATIENT)
Dept: OTHER | Facility: HOSPITAL | Age: 78
End: 2023-05-18

## 2023-05-18 VITALS
SYSTOLIC BLOOD PRESSURE: 122 MMHG | WEIGHT: 190 LBS | DIASTOLIC BLOOD PRESSURE: 70 MMHG | OXYGEN SATURATION: 95 % | HEART RATE: 70 BPM | BODY MASS INDEX: 30.67 KG/M2

## 2023-05-18 DIAGNOSIS — I65.22 ATHEROSCLEROSIS OF LEFT CAROTID ARTERY: Primary | ICD-10-CM

## 2023-05-18 DIAGNOSIS — Q21.12 PFO (PATENT FORAMEN OVALE): ICD-10-CM

## 2023-05-18 DIAGNOSIS — E66.09 CLASS 1 OBESITY DUE TO EXCESS CALORIES WITH SERIOUS COMORBIDITY AND BODY MASS INDEX (BMI) OF 33.0 TO 33.9 IN ADULT: ICD-10-CM

## 2023-05-18 DIAGNOSIS — I63.30 CEREBRAL THROMBOSIS WITH CEREBRAL INFARCTION (HCC): ICD-10-CM

## 2023-05-18 DIAGNOSIS — I10 ESSENTIAL HYPERTENSION: ICD-10-CM

## 2023-05-18 LAB
BACTERIA BLD CULT: NORMAL
BACTERIA BLD CULT: NORMAL

## 2023-05-18 NOTE — PROGRESS NOTES
Cardiology Follow Up    [id number] [facility]  1945  [id number]  [address] [place] [address],[place] [address] CATH LAB  [address]  [address] [facility]  [phone]    1  Atherosclerosis of left carotid artery        2  Essential hypertension        3  Cerebral thrombosis with cerebral infarction ([address] )        4  PFO (patent foramen ovale)        5  Class 1 obesity due to excess calories with serious comorbidity and body mass index (BMI) of 33 0 to 33 9 in adult            Interval History: Cardiology follow-up  Not scheduled visit  Patient was admitted to the hospital last month with expressive aphasia  Multiple records reviewed, imaging was reviewed as well  She has suffered COVID-19 infection a week and a half prior to that  CTA revealed occlusion of the left MCA  She did not receive thrombolytic therapy  Because of the time delay  It was family  Thrombosed in the left internal carotid artery  Possibly the source of thrombosis  She was discharged on photocoagulation with factor Xa inhibitor  Transesophageal echocardiogram revealed normal left ventricular function, a very small patent foramen ovale with left-to-right shunting was noted  In the setting of a small atrial septal aneurysm  No intracardiac thrombus were noted or significant atherosclerosis in the aorta  Lipids during admission total cholesterol 134, HDL 40, LDL 72  Her statin therapy was increased  Since discharge, she is not improving significantly, she does have some degree of aphasia still present  Denies any bleeding issues on full anticoagulation      Patient Active Problem List   Diagnosis   • Lymphedema   • Fatigue   • Hyperlipidemia   • Hypothyroidism   • Class 1 obesity due to excess calories with serious comorbidity and body mass index (BMI) of 33 0 to 33 9 in adult   • Type 2 diabetes mellitus without complication, without long-term current use of insulin (HCC)   • Atypical chest pain   • Right lower quadrant abdominal pain   • Pelvic pain   • Family history of ovarian cancer   • Wellness examination   • Carotid artery plaque   • Dense breast tissue on mammogram   • Fibrocystic breast changes   • Dupuytren's contracture   • Diverticular disease of colon   • Gastro-esophageal reflux disease with esophagitis   • History of adenomatous polyp of colon   • Hypothyroidism due to Hashimoto's thyroiditis   • Impaired fasting glucose   • Nephrolithiasis   • Essential hypertension   • Encounter for screening mammogram for breast cancer   • Medicare annual wellness visit, subsequent   • Dyspnea on exertion   • Insomnia   • Oral ulceration   • Right flank pain   • Exposure to COVID-19 virus   • Edema   • Thyroid nodule   • Osteoarthritis   • Urinary incontinence   • Right groin pain   • Trigger finger   • Benign essential tremor   • Tachycardia   • Acute non-recurrent sphenoidal sinusitis   • COVID-19   • Cerebral thrombosis with cerebral infarction [facility])   • Palpitations   • Hypokalemia   • PFO (patent foramen ovale)     Past Medical History:   Diagnosis Date   • Biliary dyskinesia    • CAP (community acquired pneumonia)     last assessed 8/17/16   • Deep vein thrombosis (DVT) of proximal vein of right lower extremity, unspecified chronicity [facility] ) 8/10/2022   • Diabetes mellitus ([place] [id number] ) 12/2018    Type 2 no insulin   • Disease of thyroid gland 2000   • GERD (gastroesophageal reflux disease)    • Hyperlipidemia    • Palpitations    • Pneumonia    • Shortness of breath    • SVT (supraventricular tachycardia) [facility] ) 6/8/2022     Social History     Socioeconomic History   • Marital status: /Civil Union     Spouse name: Not on file   • Number of children: Not on file   • Years of education: 12   • Highest education level: Not on file   Occupational History   • Not on file   Tobacco Use   • Smoking status: Former     Packs/day: 0 25     Years: 17 00     Pack years: 4 25     Types: Cigarettes     Start date: 58     Quit date: 2010     Years since quittin 3   • Smokeless tobacco: Never   • Tobacco comments:     0 5 ppw intermittently last smoked 2010   Vaping Use   • Vaping Use: Never used   Substance and Sexual Activity   • Alcohol use: Yes     Alcohol/week: 2 0 standard drinks     Types: 2 Glasses of wine per week     Comment: Maybe 2 glasses a month   • Drug use: No   • Sexual activity: Not Currently     Partners: Male     Birth control/protection: Post-menopausal     Comment: I'm 77   Other Topics Concern   • Not on file   Social History Narrative    Caffeine use     Social Determinants of Health     Financial Resource Strain: Low Risk    • Difficulty of Paying Living Expenses: Not hard at all   Food Insecurity: No Food Insecurity   • Worried About Running Out of Food in the Last Year: Never true   • Ran Out of Food in the Last Year: Never true   Transportation Needs: No Transportation Needs   • Lack of Transportation (Medical): No   • Lack of Transportation (Non-Medical):  No   Physical Activity: Inactive   • Days of Exercise per Week: 0 days   • Minutes of Exercise per Session: 0 min   Stress: Not on file   Social Connections: Not on file   Intimate Partner Violence: Not on file   Housing Stability: Low Risk    • Unable to Pay for Housing in the Last Year: No   • Number of Places Lived in the Last Year: 1   • Unstable Housing in the Last Year: No      Family History   Problem Relation Age of Onset   • Aneurysm Mother         cerebral   • Ovarian cancer Mother 67   • Cancer Mother    • Cirrhosis Father         alcoholic   • Alcohol abuse Father    • Aneurysm Brother         abdominal aortic; cerebral   • Diabetes Son    • No Known Problems Daughter    • No Known Problems Maternal Grandmother    • No Known Problems Maternal Grandfather    • No Known Problems Paternal Grandmother    • No Known Problems Paternal Grandfather    • No Known Problems Son    • No Known Problems Son    • No Known Problems Son    • No Known Problems Maternal Aunt    • No Known Problems Maternal Aunt    • No Known Problems Maternal Aunt    • No Known Problems Maternal Aunt    • No Known Problems Paternal Aunt    • No Known Problems Paternal Aunt    • No Known Problems Paternal Aunt    • No Known Problems Paternal Aunt    • No Known Problems Paternal Aunt    • Cancer Brother         bladder cancer     Past Surgical History:   Procedure Laterality Date   • AUGMENTATION MAMMAPLASTY Bilateral 1998    breast surgery- with prosthetic implant 1998; pre-pectoral salline   • BREAST BIOPSY Left 1974   • BREAST BIOPSY Left 1994   • BREAST EXCISIONAL BIOPSY Left 1993   • CARDIAC CATHETERIZATION      procedure summary   • CHOLECYSTECTOMY      onset 2003   • HEMORRHOID SURGERY     • ROTATOR CUFF REPAIR Bilateral     onset 2010   • TUBAL LIGATION      onset 1980       Current Outpatient Medications:   •  apixaban (Eliquis) 5 mg, Take 1 tablet (5 mg total) by mouth 2 (two) times a day, Disp: 60 tablet, Rfl: 0  •  Ascorbic Acid (VITAMIN C) 1000 MG tablet, Take 1 tablet by mouth daily, Disp: , Rfl:   •  atorvastatin (LIPITOR) 40 mg tablet, Take 1 tablet (40 mg total) by mouth daily with dinner, Disp: 30 tablet, Rfl: 0  •  Biotin 5000 MCG CAPS, Take 1 capsule by mouth daily, Disp: , Rfl:   •  cholecalciferol (VITAMIN D3) 1,000 units tablet, Take 2,000 Units by mouth daily, Disp: , Rfl:   •  CINNAMON PO, Take 2,000 mg by mouth daily in the early morning, Disp: , Rfl:   •  diphenhydrAMINE-acetaminophen (TYLENOL PM)  MG TABS, Take 1 tablet by mouth daily at bedtime as needed for sleep, Disp: , Rfl:   •  esomeprazole (NexIUM) 40 MG capsule, Take 1 capsule by mouth daily, Disp: , Rfl:   •  fluticasone (FLONASE) 50 mcg/act nasal spray, USE 2 SPRAYS IN EACH NOSTRIL DAILY, Disp: 48 g, Rfl: 3  •  levothyroxine 125 mcg tablet, TAKE 1 TABLET DAILY ON AN EMPTY STOMACH, Disp: 90 tablet, Rfl: 3  •  Magnesium 250 MG TABS, Take 250 mg by mouth in the morning, Disp: , Rfl:   •  MECLIZINE HCL PO, Take 25 mg by mouth daily as needed, Disp: , Rfl:   •  Melatonin-Theanine 10-5 5 MG TABS, Take 1 tablet by mouth as needed, Disp: , Rfl:   •  Menthol, Topical Analgesic, (BIOFREEZE EX), Apply 1 application topically daily as needed (pain), Disp: , Rfl:   •  metoprolol tartrate (LOPRESSOR) 25 mg tablet, Take one tablet as needed for palpitations, Disp: 90 tablet, Rfl: 2  •  Multiple Vitamin (MULTIVITAMIN) capsule, Take 1 capsule by mouth daily, Disp: , Rfl:   •  Probiotic Product (ALIGN PO), Take 1 capsule by mouth in the morning, Disp: , Rfl:   •  psyllium (METAMUCIL) 58 6 % powder, Take 1 packet by mouth daily, Disp: , Rfl:   •  semaglutide, 0 25 or 0 5 mg/dose, (Ozempic) 2 mg/1 5 mL injection pen, Inject 0 19 mL (0 25 mg total) under the skin every 7 days, Disp: 3 mL, Rfl: 3  Allergies   Allergen Reactions   • Hydrochlorothiazide Palpitations   • Meloxicam Rash       Labs:  Admission on 05/13/2023, Discharged on 05/16/2023   Component Date Value   • Extra Tube 05/13/2023 Hold for add-ons  • Extra Tube 05/13/2023 Hold for add-ons      • Extra Tube 05/13/2023 on hold    • Ventricular Rate 05/13/2023 102    • Atrial Rate 05/13/2023 102    • IL Interval 05/13/2023 180    • QRSD Interval 05/13/2023 76    • QT Interval 05/13/2023 342    • QTC Interval 05/13/2023 445    • P Axis 05/13/2023 53    • QRS Axis 05/13/2023 -24    • T Wave Axis 05/13/2023 43    • WBC 05/13/2023 7 83    • RBC 05/13/2023 4 74    • Hemoglobin 05/13/2023 13 5    • Hematocrit 05/13/2023 41 9    • MCV 05/13/2023 88    • MCH 05/13/2023 28 5    • MCHC 05/13/2023 32 2    • RDW 05/13/2023 14 6    • MPV 05/13/2023 10 4    • Platelets 63/47/3211 346    • nRBC 05/13/2023 0    • Neutrophils Relative 05/13/2023 76 (H)    • Immat GRANS % 05/13/2023 0    • Lymphocytes Relative 05/13/2023 16    • Monocytes Relative 05/13/2023 6    • Eosinophils Relative 05/13/2023 1    • Basophils Relative 05/13/2023 1    • Neutrophils Absolute 05/13/2023 6 02    • Immature Grans Absolute 05/13/2023 0 03    • Lymphocytes Absolute 05/13/2023 1 21    • Monocytes Absolute 05/13/2023 0 45    • Eosinophils Absolute 05/13/2023 0 07    • Basophils Absolute 05/13/2023 0 05    • Sodium 05/13/2023 136    • Potassium 05/13/2023 3 9    • Chloride 05/13/2023 102    • CO2 05/13/2023 25    • ANION GAP 05/13/2023 9    • BUN 05/13/2023 10    • Creatinine 05/13/2023 0 52 (L)    • Glucose 05/13/2023 113    • Calcium 05/13/2023 9 3    • AST 05/13/2023 19    • ALT 05/13/2023 16    • Alkaline Phosphatase 05/13/2023 37    • Total Protein 05/13/2023 6 4    • Albumin 05/13/2023 4 2    • Total Bilirubin 05/13/2023 0 45    • eGFR 05/13/2023 93    • TSH 3RD GENERATON 05/13/2023 0 295 (L)    • LACTIC ACID 05/13/2023 0 9    • Color, UA 05/13/2023 Colorless    • Clarity, UA 05/13/2023 Clear    • Specific Gravity, UA 05/13/2023 1 003    • pH, UA 05/13/2023 7 0    • Leukocytes, UA 05/13/2023 Negative    • Nitrite, UA 05/13/2023 Negative    • Protein, UA 05/13/2023 Negative    • Glucose, UA 05/13/2023 Negative    • Ketones, UA 05/13/2023 Trace (A)    • Urobilinogen, UA 05/13/2023 <2 0    • Bilirubin, UA 05/13/2023 Negative    • Occult Blood, UA 05/13/2023 Negative    • Magnesium 05/13/2023 1 8 (L)    • hs TnI 0hr 05/13/2023 3    • Blood Culture 05/13/2023 No Growth After 4 Days  • Blood Culture 05/13/2023 No Growth After 4 Days      • Free T4 05/13/2023 1 33    • POC Glucose 05/13/2023 150 (H)    • Cholesterol 05/14/2023 134    • Triglycerides 05/14/2023 111    • HDL, Direct 05/14/2023 40 (L)    • LDL Calculated 05/14/2023 72    • Hemoglobin A1C 05/14/2023 5 8 (H)    • EAG 05/14/2023 120    • Sodium 05/14/2023 139    • Potassium 05/14/2023 3 5    • Chloride 05/14/2023 106    • CO2 05/14/2023 27    • ANION GAP 05/14/2023 6    • BUN 05/14/2023 9    • Creatinine 05/14/2023 0 43 (L)    • Glucose 05/14/2023 103    • Calcium 05/14/2023 8 5    • AST 05/14/2023 16    • ALT 05/14/2023 14    • Alkaline Phosphatase 05/14/2023 29 (L)    • Total Protein 05/14/2023 5 4 (L)    • Albumin 05/14/2023 3 6    • Total Bilirubin 05/14/2023 0 51    • eGFR 05/14/2023 99    • WBC 05/14/2023 5 70    • RBC 05/14/2023 4 20    • Hemoglobin 05/14/2023 12 0    • Hematocrit 05/14/2023 37 3    • MCV 05/14/2023 89    • MCH 05/14/2023 28 6    • MCHC 05/14/2023 32 2    • RDW 05/14/2023 14 7    • MPV 05/14/2023 9 3    • Platelets 33/94/0309 280    • nRBC 05/14/2023 0    • Neutrophils Relative 05/14/2023 59    • Immat GRANS % 05/14/2023 0    • Lymphocytes Relative 05/14/2023 29    • Monocytes Relative 05/14/2023 8    • Eosinophils Relative 05/14/2023 3    • Basophils Relative 05/14/2023 1    • Neutrophils Absolute 05/14/2023 3 35    • Immature Grans Absolute 05/14/2023 0 02    • Lymphocytes Absolute 05/14/2023 1 65    • Monocytes Absolute 05/14/2023 0 47    • Eosinophils Absolute 05/14/2023 0 17    • Basophils Absolute 05/14/2023 0 04    • POC Glucose 05/14/2023 103    • POC Glucose 05/14/2023 133    • POC Glucose 05/14/2023 123    • PTT 05/14/2023 30    • Protime 05/14/2023 13 3    • INR 05/14/2023 0 99    • WBC 05/14/2023 8 54    • RBC 05/14/2023 4 64    • Hemoglobin 05/14/2023 13 2    • Hematocrit 05/14/2023 41 2    • MCV 05/14/2023 89    • MCH 05/14/2023 28 4    • MCHC 05/14/2023 32 0    • RDW 05/14/2023 14 9    • Platelets 36/78/2471 338    • MPV 05/14/2023 9 6    • LV EF 05/15/2023 65    • PTT 05/14/2023 63 (H)    • POC Glucose 05/14/2023 128    • POC Glucose 05/14/2023 112    • POC Glucose 05/14/2023 123    • PTT 05/15/2023 57 (H)    • POC Glucose 05/14/2023 117    • Sodium 05/15/2023 139    • Potassium 05/15/2023 3 3 (L)    • Chloride 05/15/2023 103    • CO2 05/15/2023 27    • ANION GAP 05/15/2023 9    • BUN 05/15/2023 9    • Creatinine 05/15/2023 0 48 (L)    • Glucose 05/15/2023 123    • Calcium 05/15/2023 8 8    • eGFR 05/15/2023 95    • PTT 05/15/2023 53 (H)    • POC Glucose 05/15/2023 108    • POC Glucose 05/15/2023 105    • POC Glucose 05/15/2023 104    • PTT 05/15/2023 104 (H)    • POC Glucose 05/15/2023 119    • POC Glucose 05/15/2023 98    • Sodium 05/16/2023 138    • Potassium 05/16/2023 3 7    • Chloride 05/16/2023 106    • CO2 05/16/2023 26    • ANION GAP 05/16/2023 6    • BUN 05/16/2023 12    • Creatinine 05/16/2023 0 50 (L)    • Glucose 05/16/2023 110    • Calcium 05/16/2023 8 4    • eGFR 05/16/2023 94    • PTT 05/16/2023 102 (H)    • PTT 05/16/2023 83 (H)    • POC Glucose 05/16/2023 107    • PTT 05/16/2023 70 (H)    • POC Glucose 05/16/2023 106    • POC Glucose 05/16/2023 108    Appointment on 02/21/2023   Component Date Value   • Sodium 02/21/2023 136    • Potassium 02/21/2023 4 0    • Chloride 02/21/2023 105    • CO2 02/21/2023 26    • ANION GAP 02/21/2023 5    • BUN 02/21/2023 13    • Creatinine 02/21/2023 0 59 (L)    • Glucose, Fasting 02/21/2023 108 (H)    • Calcium 02/21/2023 9 2    • AST 02/21/2023 14    • ALT 02/21/2023 22    • Alkaline Phosphatase 02/21/2023 47    • Total Protein 02/21/2023 6 7    • Albumin 02/21/2023 3 7    • Total Bilirubin 02/21/2023 0 62    • eGFR 02/21/2023 89    • Hemoglobin A1C 02/21/2023 6 2 (H)    • EAG 02/21/2023 131    • Cholesterol 02/21/2023 168    • Triglycerides 02/21/2023 111    • HDL, Direct 02/21/2023 60    • LDL Calculated 02/21/2023 86      Imaging: CTA head and neck with and without contrast    Result Date: 5/13/2023  Narrative: CTA NECK AND BRAIN WITH AND WITHOUT CONTRAST INDICATION: AMS x 36 hours COMPARISON:   None  TECHNIQUE:  Routine CT imaging of the Brain without contrast   Post contrast imaging was performed after administration of iodinated contrast through the neck and brain  Post contrast axial 0 625 mm images timed to opacify the arterial system  3D rendering was performed on an independent workstation  MIP reconstructions performed  Coronal reconstructions were performed of the noncontrast portion of the brain   Radiation dose length product (DLP) for this visit:  2208 mGy-cm   This examination, like all CT scans performed in the [facility], was performed utilizing techniques to minimize radiation dose exposure, including the use of iterative reconstruction and automated exposure control  IV Contrast:  60 mL of iohexol (OMNIPAQUE) IMAGE QUALITY:   Diagnostic FINDINGS: NONCONTRAST BRAIN PARENCHYMA: No evidence of acute intracranial hemorrhage  Low-attenuation acute to subacute subacute ischemia suspected in the left temporal lobe posterior to the sylvian fissure and low attenuation in the left frontal corona radiata age-indeterminate ischemia  VENTRICLES AND EXTRA-AXIAL SPACES:  Normal for the patient's age  VISUALIZED ORBITS: Normal visualized orbits  PARANASAL SINUSES: Normal visualized paranasal sinuses  CERVICAL VASCULATURE AORTIC ARCH AND GREAT VESSELS: Mild atherosclerotic disease of the arch, proximal great vessels and visualized subclavian vessels  No significant stenosis  RIGHT VERTEBRAL ARTERY CERVICAL SEGMENT:  Normal origin  The vessel is normal in caliber throughout the neck  LEFT VERTEBRAL ARTERY CERVICAL SEGMENT:  Normal origin  The vessel is normal in caliber throughout the neck  RIGHT EXTRACRANIAL CAROTID SEGMENT:  Normal caliber common carotid artery  Normal bifurcation and cervical internal carotid artery  No stenosis or dissection  LEFT EXTRACRANIAL CAROTID SEGMENT:  Normal caliber common carotid artery  Proximal left cervical ICA intraluminal thrombus  Bifurcation and cervical internal carotid artery  No stenosis or dissection  NASCET criteria was used to determine the degree of internal carotid artery diameter stenosis  INTRACRANIAL VASCULATURE INTERNAL CAROTID ARTERIES:  Normal enhancement of the intracranial portions of the internal carotid arteries  Normal ophthalmic artery origins  Normal ICA terminus   ANTERIOR CIRCULATION:  Symmetric A1 segments and anterior cerebral arteries with normal enhancement  Normal anterior communicating artery  MIDDLE CEREBRAL ARTERY CIRCULATION: Left M3 occlusion  DISTAL VERTEBRAL ARTERIES:  Normal distal vertebral arteries  Posterior inferior cerebellar artery origins are normal  Normal vertebral basilar junction  BASILAR ARTERY:  Basilar artery is normal in caliber  Normal superior cerebellar arteries  POSTERIOR CEREBRAL ARTERIES: Both posterior cerebral arteries arises from the basilar tip  Both arteries demonstrate normal enhancement  Normal posterior communicating arteries  VENOUS STRUCTURES:  Normal  NON VASCULAR ANATOMY BONY STRUCTURES:  No acute osseous abnormality  SOFT TISSUES OF THE NECK:  Unremarkable  THORACIC INLET:  Normal      Impression: No evidence of acute intracranial hemorrhage  Acute to subacute ischemia identified in the left temporal lobe and left frontal corona radiata  Proximal left cervical ICA 5 mm intraluminal thrombus  Left M3 MCA occlusion  I personally communicated this study with GALE BEY on 5/13/2023 4:50 PM  Workstation performed: [id number]     XR chest 1 view portable    Result Date: 5/13/2023  Narrative: CHEST INDICATION:   AMS  COMPARISON: CXR 8/28/2020, CTA neck 3/13/2023, chest CT 4/15/2016  EXAM PERFORMED/VIEWS:  XR CHEST PORTABLE  FINDINGS: Cardiomediastinal silhouette normal  Lungs clear  No effusion or pneumothorax  Upper abdomen normal  Cholecystectomy  Bones normal for age  Impression: No acute cardiopulmonary disease  Workstation performed: [id number]     CT head wo contrast    Result Date: 5/16/2023  Narrative: CT BRAIN - WITHOUT CONTRAST INDICATION:   Stroke, follow up ruleout ICH under heparin drip  COMPARISON: MRI brain without contrast 5/14/2023  CTA head and neck with and without contrast 5/13/2023  TECHNIQUE:  CT examination of the brain was performed  Multiplanar 2D reformatted images were created from the source data  Radiation dose length product (DLP) for this visit:  858 mGy-cm     This examination, like all CT scans performed in the [facility], was performed utilizing techniques to minimize radiation dose exposure, including the use of iterative reconstruction and automated exposure control  IMAGE QUALITY:  Diagnostic  FINDINGS: PARENCHYMA: Evolving multifocal acute infarcts in left cerebral hemisphere involving frontal, parietal, posterior insular, temporal, and occipital lobes  No intracranial mass, mass effect or midline shift  No acute parenchymal hemorrhage  Arterial calcifications of carotid siphons  VENTRICLES AND EXTRA-AXIAL SPACES:  Normal for the patient's age  VISUALIZED ORBITS: Normal visualized orbits  PARANASAL SINUSES: Small bilateral maxillary mucous retention cyst  Undescended rotated tooth in right posterolateral maxillary sinus wall  CALVARIUM AND EXTRACRANIAL SOFT TISSUES:  Normal      Impression: Evolving multifocal acute infarcts in left cerebral hemisphere involving frontal, parietal, posterior insular, temporal, and occipital lobes  No acute intracranial hemorrhage  Workstation performed: [id number]     MRI brain wo contrast    Result Date: 5/14/2023  Narrative: MRI BRAIN WITHOUT CONTRAST INDICATION: stroke  COMPARISON:   Head CT and CT angiography of the head and neck from May 13, 2023 TECHNIQUE:  Multiplanar, multisequence imaging of the brain was performed  Imaging performed on 1 5T MRI IMAGE QUALITY:  Diagnostic  FINDINGS: BRAIN PARENCHYMA: There are focal areas of patchy restricted diffusion without hemorrhage in the left cerebral hemisphere involving the left frontal lobe, posterior insula and perisylvian region extending to the left posterior temporal and parietal lobes  Small focal areas of restricted diffusion are also noted in the deep left posterior periventricular white matter and lateral occipital lobe is also noted  There is corresponding decreased ADC signal and FLAIR hyperintensity consistent with late acute to early subacute infarction  VENTRICLES:  Normal for the patient's age  SELLA AND PITUITARY GLAND:  Normal  ORBITS:  Normal  PARANASAL SINUSES: Minimal mucosal thickening within both maxillary sinuses  Trace fluid signal within the mastoid air cells, bilaterally  VASCULATURE: There is susceptibility artifact within the posterior left sylvian fissure and posterior temporal and parietal sulci suggesting thrombus within the distal M2 and posterior M3 and M4 branches  Findings are unchanged when compared to the recent CTA study  CALVARIUM AND SKULL BASE:  Normal  EXTRACRANIAL SOFT TISSUES:  Normal      Impression: Late acute or early subacute nonhemorrhagic infarction in the left MCA territory again involving the left frontal, temporal and parietal more than occipital lobes with corresponding thrombus in the distal left M2, M3 and M4 branches  Findings are essentially unchanged when compared to the recent head CT and CT angiography of the head and neck performed on May 13, 2023  No significant mass effect  No herniation  The study was marked in [facility] for immediate notification  Workstation performed: [id number]     ANDREW    Result Date: 5/15/2023  Narrative: •  Left Ventricle: Left ventricular cavity size is normal  The left ventricular ejection fraction is 65%  Systolic function is normal  Wall motion is normal  •  Left Atrium: There is no thrombus  •  Atrial Septum: There is a small and patent foramen ovale confirmed at rest and with provocation with predominant right to left shunting using saline contrast  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, mobile septal aneurysm  It is predominately within the left atrial cavity  •  Left Atrial Appendage: There is no thrombus  •  Mitral Valve: There is trace regurgitation  Review of Systems:  Review of Systems   Constitutional: Positive for activity change  Negative for fatigue  HENT: Negative for hearing loss and nosebleeds  Eyes: Negative for visual disturbance     Respiratory: Negative for apnea, shortness of breath, wheezing and stridor  Cardiovascular: Negative for chest pain, palpitations and leg swelling  Gastrointestinal: Negative for abdominal pain, anal bleeding and blood in stool  Endocrine: Negative for cold intolerance  Genitourinary: Negative for hematuria  Musculoskeletal: Positive for gait problem  Negative for arthralgias and myalgias  Allergic/Immunologic: Negative for immunocompromised state  Neurological: Positive for speech difficulty and weakness  Negative for dizziness, syncope and light-headedness  Hematological: Does not bruise/bleed easily  Psychiatric/Behavioral: Negative for sleep disturbance  The patient is not nervous/anxious  Physical Exam:  Physical Exam  Vitals reviewed  Constitutional:       General: She is not in acute distress  Appearance: Normal appearance  She is not ill-appearing, toxic-appearing or diaphoretic  Eyes:      General: No scleral icterus  Neck:      Vascular: No carotid bruit  Cardiovascular:      Rate and Rhythm: Normal rate  Heart sounds: Murmur (soft systolic ejection murmur at the base) heard  No friction rub  No gallop  Pulmonary:      Effort: Pulmonary effort is normal  No respiratory distress  Breath sounds: Normal breath sounds  No stridor  No wheezing, rhonchi or rales  Musculoskeletal:      Right lower leg: No edema  Left lower leg: No edema  Skin:     General: Skin is warm and dry  Coloration: Skin is not jaundiced or pale  Findings: No bruising or erythema  Neurological:      Mental Status: She is alert  Psychiatric:         Mood and Affect: Mood normal          Discussion/Summary: Status post CVA, atherothrombotic, my feeling that this is related to her recent COVID-19 infection  In the possible site of ulcerated plaque in the left carotid  I will continue full anticoagulation on a permanent basis    And given that I do not favor implantable loop as this will not change her medical therapy  Additionally given her age group she is not a candidate for PFO closure, even if these were to be done, this would not change her anticoagulation regimen  I explained the rational to the patient and her  who understand and agree with current plan  Might consider adding low-dose aspirin to her regimen  Hold  CTA of the head is scheduled  Carotid  Ago revealed no significant obstructive disease, there was atherosclerosis on the left system  Heart catheterization 2020 revealed normal epicardial coronary arteries  This note was completed in part utilizing [employer] fluency direct voice recognition software  Grammatical errors, random word insertion, spelling mistakes, and incomplete sentences may be an occasional consequence of the system secondary to software limitations, ambient noise and hardware issues  At the time of dictation, efforts were made to edit, clarify and /or correct errors  Please read the chart carefully and recognize, using context, where substitutions have occurred  If you have any questions or concerns about the context, text or information contained within the body of this dictation, please contact myself, the provider, for further clarification

## 2023-05-18 NOTE — PROGRESS NOTES
"Speech-Language Pathology Initial Evaluation    Today's date: 2023   Patient’s name: Roscoe Lynch  : 1945  MRN: [id number]  Safety measures: Hx of CVA  Referring provider: Taina Ray MD    Encounter Diagnosis     ICD-10-CM    1  Aphasia  R47 01       2  Acute CVA (cerebrovascular accident) [facility])  I63 9 Ambulatory referral to Speech Therapy      3  Cerebral thrombosis with cerebral infarction [facility])  I63 30 Ambulatory referral to Speech Therapy        Visit tracking:  -Referring provider: Epic  -Billing guidelines: CMS  -Visit #1/10  -Insurance: [address] [employer] Medicare PPO  -RE due 23 or 10th visit    Subjective comments: Patient reports that she is alright, just tired  She is also dealing with headaches but she is taking Tylenol to deal with this  Her  reports that they have communicated this with her PCP  Patient is accompanied by her , Sue Jerry during today's evaluation  How did the patient hear about us? Physician    Patient's goal(s): She would like to be able to see her responses to people improve  Reason for referral: Decreased language skills  Prior functional status: Communication effective and appropriate in all situations  Clinically complex situations: Discharge from SNF or Hospital in the last 30 days    History: Patient is a 70 y o  female who was referred to outpatient skilled Speech Therapy services for a aphasia evaluation  Patient has a prior medical history of the following: diabetes mellitus, disease of the thyroid gland, GERD, pneumonia, CAP, palpations, SOB, SVT and bilary dyskinesia (see history for full list)  23 to 23 - Admitted to [address] [place] [address] due to acute CVA following a bout of COVID 11 days prior  Instrumental evaluation revealed the following per discharge report:     \"CTA: Acute to subacute ischemia identified in the left temporal lobe and left frontal corona radiata   Proximal left cervical ICA 5 mm " "intraluminal thrombus  Left M3 MCA occlusion  MRI: Late acute or early subacute nonhemorrhagic infarction in the left MCA territory again involving the left frontal, temporal and parietal more than occipital lobes with corresponding thrombus in the distal left M2, M3 and M4 branches  Repeat CTA shows no hemorrhage, patient is clear for dc per neuro\"    ST POC note from 5/14/23 - Shanti Barber SLP: \"Patient presents with moderate mixed receptive and expressive aphasia c/b reduced naming, ability to follow simple commands/ID objects/pictures, ability to repeat words/sentences, as well as simple reading/writing tasks  Conversation about familiar subjects is possible with help from the listener  There are frequent failures to convey the idea, but the patient shares the burden of communication  \"    During today's evaluation the patient reports the following: Her words get jumbled and she can't write or text anything  Her  feels that she has improved in receptive understand and a little bit better expressively  He feels that the expressive language is still difficult for her to formulate what she wants to say  Hearing: wears hearing aids  Vision: wears glasses    Home environment/lifestyle: Lives with her   Highest level of education: High school  Vocational status: Retired   She enjoys shopping  Mental status: Alert  Behavior status: Cooperative  Patient reported symptoms of: Not reported  Communication modalities: Verbal  Rehabilitation prognosis: Good rehab potential to reach the established goals    Assessments    The Western Aphasia Battery-Revised (WAB-R) is designed to evaluate a patient's language function following CVA, dementia, or other acquired neurological disorder  It measures a patient’s linguistic skills, such as speech content, fluency, auditory comprehension, repetition, naming, reading, and writing   The WAB-R also measures a patient’s nonlinguistic skills, " including drawing, calculation, block design, and apraxia  The purpose of this standardized assessment is to (1) determine the presence, severity, and type of aphasia; (2) measure the patient's level of performance to provide a baseline for detecting change over time; (3) provide a comprehensive assessment of the patient's language assets and deficits in order to guide treatment and management; and (4) infer the location and etiology of the lesion causing aphasia  The following results were obtained during the administration of the assessment:     PART 1: Score:   -Spontaneous Speech:    -Auditory Verbal Comprehension: 8 78/10   -Repetition: 5 2/10   -Naming & Word Findin 3/10     *Pt scores correlated most consistently with Conduction Aphasia  PART 2: Score:   -Reading Score:    -Writin    -Apraxia: DNT/10   -Constructional, Visuospatial, & Calculation: DNT10        Score:   *Aphasia Quotient (AQ): 68 56/100   *Language Quotient (LQ): 64 18/100   *Cortical Quotient (CQ): DNT/100     *Picture Description:  We're playing first with the dog and the little boy  Having a picnic  They are drinking something  Having a picnic  They're building salt  They're fishing  They're doing boating  We are playing on the sand with the kind of a snow  They've got the book  And the small sand  The house   front of the house  They're paying for the car by the truck  They've got the house  The car  The pole and the  The big try  They are putting something with the egg roll  4 7 0 they are playing boating  There is 470 in the snow glow  Goals    Short Term Goals    TALKING  1  Patient will complete naming of automatic tasks (e g , counting, listing days of the week/months of the year, etc ) with 100% accuracy to facilitate increased verbal expression skills, to be achieved in 4-6 weeks      2  Patient will name an appropriate word opposite (e g , hot and /cold/) with 80% accuracy to build expressive vocabulary for conversation, to be achieved in 4-6 weeks  3  Patient will name an appropriate word synonym (e g , street or /road/) with 80% accuracy to build expressive vocabulary for conversation, to be achieved in 4-6 weeks  4  Patient will name an appropriate category for given words (e g , apple, banana, pear = fruits / sun, bus, lemon = yellow things) with 80% accuracy to facilitate improved semantic organization, to be achieved in 4-6 weeks  5  Patient will provide an appropriate word for a sentence completion task (e g , open the ___) with 80% accuracy to facilitate increased expressive language skills, to be achieved in 4-6 weeks  6 Patient will provide an adequate response to confrontation naming task (i e , what is…) with 80% accuracy to facilitate increased expressive language skills, to be achieved in 4-6 weeks  7  Patient will name up to 5 features to describe a person/place/thing with 80% accuracy to facilitate improved utilization of circumlocution strategy, to be achieved in 4-6 weeks  8  Patient will complete picture description tasks using language organization strategies with 80% accuracy to facilitate improved utilization of circumlocution strategy, to be achieved in 4-6 weeks  9  Patient will increase the use of strategies for effective repair of misunderstandings during conversations 80% of the time with minimal cues, to be achieved in 4-6 weeks  LISTENING  10  Patient will follow 2-step verbal directions with 80% accuracy to facilitate increased auditory comprehension skills, to be achieved in 4-6 weeks  READING    11  Patient will facilitate functional reading skills by following two-step written directions (e g , close your eyes and pat your head) with 80% accuracy to facilitate increased reading comprehension skills, to be achieved in 4-6 weeks        WRITING    12  Patient will complete simple writing tasks at the sentence level with >90% acc to increase writing skills within FLE, to be achieved in 4-6 weeks  Long Term Goals    Patient will demonstrate improved expressive and receptive language skills during structured and unstructured tasks by discharge  Patient will improve ability to facilitate communication to meet needs including use of compensatory strategies to promote meaningful interactions for improved quality of life and maximize level of independence, by discharge  Patient will use functional communication skills for social interactions (e g , greetings, social etiquette, and short questions/simple sentences) with both familiar and unfamiliar partners with 90% success, by discharge  Impressions/Recommendations    Impressions:   - Based upon today's evaluation, utilizing the WAB Revised, the patient present with mild receptive language and moderate expressive language deficits  Patient demonstrates strengths in Yes/No discrimination, reading/reading comprehension and automatic writing (e g  name, address, numbers, and alphabet) and copy writing  Areas of difficulty include word finding, naming, speaking and writing in complete sentences, categories and 2 step directions  Conversational speech would be described as fragmented (due to word finding challenges), with intermittent paraphasias  The patient keeps answers simple  As the patient's CVA occurred within the past week, recommend an intense treatment course to aid in a more rapid resolution of symptoms  Therapy will target existing language deficits  Plan of care was discussed with the patient and her spouse and they are in agreement at this time       Recommendations:  -Patient would benefit from outpatient skilled Speech Therapy services: Speech-language therapy    -Frequency: 3x weekly  -Duration: 6-8 weeks    -Intervention certification from: 1/74/1319  -Intervention certification to: 0/10/9151    -Intervention comments:   60 minutes of aphasia evaluation time and 60 minutes of documentation time

## 2023-05-18 NOTE — PROGRESS NOTES
[facility]  coding opportunities       Chart reviewed, no opportunity found:   [address]        Patients Insurance     Medicare Insurance: [employer]

## 2023-05-19 ENCOUNTER — EVALUATION (OUTPATIENT)
Dept: SPEECH THERAPY | Facility: CLINIC | Age: 78
End: 2023-05-19

## 2023-05-19 DIAGNOSIS — I63.9 ACUTE CVA (CEREBROVASCULAR ACCIDENT) (HCC): ICD-10-CM

## 2023-05-19 DIAGNOSIS — R47.01 APHASIA: Primary | ICD-10-CM

## 2023-05-19 DIAGNOSIS — I63.30 CEREBRAL THROMBOSIS WITH CEREBRAL INFARCTION (HCC): ICD-10-CM

## 2023-05-22 ENCOUNTER — OFFICE VISIT (OUTPATIENT)
Dept: SPEECH THERAPY | Facility: CLINIC | Age: 78
End: 2023-05-22

## 2023-05-22 ENCOUNTER — OFFICE VISIT (OUTPATIENT)
Dept: INTERNAL MEDICINE CLINIC | Facility: CLINIC | Age: 78
End: 2023-05-22

## 2023-05-22 VITALS
OXYGEN SATURATION: 95 % | HEIGHT: 66 IN | DIASTOLIC BLOOD PRESSURE: 82 MMHG | BODY MASS INDEX: 30.31 KG/M2 | HEART RATE: 105 BPM | WEIGHT: 188.6 LBS | SYSTOLIC BLOOD PRESSURE: 138 MMHG

## 2023-05-22 DIAGNOSIS — I63.30 CEREBRAL THROMBOSIS WITH CEREBRAL INFARCTION (HCC): ICD-10-CM

## 2023-05-22 DIAGNOSIS — R47.01 APHASIA: Primary | ICD-10-CM

## 2023-05-22 DIAGNOSIS — I63.9 ACUTE CVA (CEREBROVASCULAR ACCIDENT) (HCC): ICD-10-CM

## 2023-05-22 DIAGNOSIS — I63.9 ACUTE CVA (CEREBROVASCULAR ACCIDENT) (HCC): Primary | ICD-10-CM

## 2023-05-22 DIAGNOSIS — R51.9 FRONTAL HEADACHE: ICD-10-CM

## 2023-05-22 NOTE — PROGRESS NOTES
"Daily Speech Treatment Note    Today's date: 2023  Patient’s name: Kiara Ross  : 1945  MRN: [id number]  Safety measures: h/o CVA, DM, HTN  Referring provider: Danny Davila MD    Encounter Diagnosis     ICD-10-CM    1  Aphasia  R47 01       2  Cerebral thrombosis with cerebral infarction (HCC)  I63 30       3  Acute CVA (cerebrovascular accident) ([address] )  I63 9         Visit tracking:  -Referring provider: Epic  -Billing guidelines: CMS  -Visit #2/10  -Insurance: [facility] Medicare PPO  -RE due 2023 or 10th visit    Subjective/Behavioral:  -Patient without any concerns upon presentation to today's appointment  Objective/Assessment:  -Patient's family member/caregiver was present during today's session     -Clinician spent the majority of today's session providing patient and caregiver with education on stroke recovery, strategies to improve word recall (e g , circumlocution), and practice with the iPad thor \"[facility]\"  A free account was made for patient to practice as part of her HEP (30-60 minutes per day was recommended with rest breaks as needed)  Clinician determined which levels of speech-language tasks were appropriate for patient at this time  Activities included in patient's recommended HEP include activities to target her expressive language (including semantic feature analysis activities, categorization), graphic expression (spelling of higher-level words, sequencing sentences), auditory comprehension (following multi-step directives, listening to stories and answering f/u questions), and reading comprehension (fill-in-the-blanks, answering questions following stories)  Short-term goals:  TALKING  1   Patient will complete naming of automatic tasks (e g , counting, listing days of the week/months of the year, etc ) with 100% accuracy to facilitate increased verbal expression skills, to be achieved in 4-6 weeks      2  Patient will name an appropriate word " opposite (e g , hot and /cold/) with 80% accuracy to build expressive vocabulary for conversation, to be achieved in 4-6 weeks      3  Patient will name an appropriate word synonym (e g , street or /road/) with 80% accuracy to build expressive vocabulary for conversation, to be achieved in 4-6 weeks      4  Patient will name an appropriate category for given words (e g , apple, banana, pear = fruits / sun, bus, lemon = yellow things) with 80% accuracy to facilitate improved semantic organization, to be achieved in 4-6 weeks   -See above       5  Patient will provide an appropriate word for a sentence completion task (e g , open the ___) with 80% accuracy to facilitate increased expressive language skills, to be achieved in 4-6 weeks      6 Patient will provide an adequate response to confrontation naming task (i e , what is…) with 80% accuracy to facilitate increased expressive language skills, to be achieved in 4-6 weeks      7  Patient will name up to 5 features to describe a person/place/thing with 80% accuracy to facilitate improved utilization of circumlocution strategy, to be achieved in 4-6 weeks   -See above      8  Patient will complete picture description tasks using language organization strategies with 80% accuracy to facilitate improved utilization of circumlocution strategy, to be achieved in 4-6 weeks      9  Patient will increase the use of strategies for effective repair of misunderstandings during conversations 80% of the time with minimal cues, to be achieved in 4-6 weeks    -See above  LISTENING  10   Patient will follow 2-step verbal directions with 80% accuracy to facilitate increased auditory comprehension skills, to be achieved in 4-6 weeks   -See above       READING  11  Patient will facilitate functional reading skills by following two-step written directions (e g , close your eyes and pat your head) with 80% accuracy to facilitate increased reading comprehension skills, to be achieved in 4-6 weeks   -See above  WRITING  12  Patient will complete simple writing tasks at the sentence level with >90% acc to increase writing skills within FLE, to be achieved in 4-6 weeks   -See above  Plan:  -Patient was provided with home exercises/activities to target goals in plan of care at the end of today's session   -Continue with current plan of care

## 2023-05-22 NOTE — PROGRESS NOTES
Assessment/Plan:     1  Acute CVA (cerebrovascular accident) ([facility] [address] )  -     apixaban (Eliquis) 5 mg; Take 1 tablet (5 mg total) by mouth 2 (two) times a day    2  Frontal headache        Continue to follow with speech  Eliquis refilled  Continue Eliquis as per cardiology/neurology recommendation  Commend driving after evaluation for capacity    Frontal Headache   Seems to be associated with CVA as it started at the same time, has shown improvement, headache free today  Neck exercises , heat pack and topical pain relief  Can continue to take tylenol for hA, if severe and persistent go to the ED immediately - patient and  understand        Emotional and Mental Well-being, Sleep, Connectedness Assessment and Intervention:    Sleep/stress assessment performed            Subjective:      Patient ID: Ramon Silva is a 70 y o  female  HPI   So 53-year-old female is here for follow-up visit of her hospitalization for CVA in [facility]   at bedside to provide additional history  She had COVID which seem to be the cause of her arthrothrombotic event leading to left IJ thrombosis with left MCA causing expressive aphasia  Due to delayed onset no tPA was done  She was switched from heparin drip to Eliquis  She is not a candidate for PFO closure due to her age which was discussed with cardiology and they were in agreement  It was recommended that she be on Eliquis lifelong  She states that the expressive aphasia has been getting better since May 13 when she had the stroke  She has seen minimal improvement since discharge regarding her expressive aphasia but has started speech therapy and has so shown some improvement since her stroke on May 13  Frontal headache has been constant daily however today morning she has had none which is a first for her  Med list updated  She has not taken up Ozempic due to GI upset  This was only taken for weight loss and is reasonable to discontinue          The following "portions of the patient's history were reviewed and updated as appropriate: allergies, current medications, past family history, past medical history, past social history, past surgical history, and problem list     Review of Systems   Constitutional: Positive for fatigue  Negative for appetite change, chills and fever  HENT: Negative for congestion and nosebleeds  Respiratory: Negative for cough and shortness of breath  Gastrointestinal: Negative for abdominal pain, blood in stool and constipation  Genitourinary: Negative for difficulty urinating, flank pain and frequency  Musculoskeletal: Negative for back pain and gait problem  Skin: Negative for color change  Neurological: Positive for headaches  Negative for dizziness, light-headedness and numbness  Psychiatric/Behavioral: Negative for confusion  The patient is nervous/anxious  Objective:      /82   Pulse 105   Ht 5' 6\" (1 676 m)   Wt 85 5 kg (188 lb 9 6 oz)   SpO2 95%   BMI 30 44 kg/m²          Physical Exam  Vitals and nursing note reviewed  Constitutional:       Appearance: Normal appearance  Comments: Patient continues to have expressive aphasia  Anxious due to the above    canonot spell world forward or backward   Calculations   Judgement intact        HENT:      Head: Normocephalic and atraumatic  Eyes:      General:         Right eye: No discharge  Left eye: No discharge  Cardiovascular:      Rate and Rhythm: Normal rate and regular rhythm  Pulses: Normal pulses  Heart sounds: Normal heart sounds  Pulmonary:      Effort: Pulmonary effort is normal       Breath sounds: Normal breath sounds  Abdominal:      General: Abdomen is flat  Musculoskeletal:      Right lower leg: Edema present  Left lower leg: Edema present  Skin:     General: Skin is warm  Neurological:      Mental Status: She is alert and oriented to person, place, and time     Psychiatric:         Behavior: Behavior " normal          TCM Call     Date and time call was made  5/17/2023 11:05 AM    Hospital care reviewed  Records not available    Patient was hospitialized at  [facility]    Date of Admission  05/13/23    Date of discharge  05/16/23    Diagnosis  Cerebral thrombosis with cerebral infarction [facility]    Disposition  Home    Were the patients medications reviewed and updated  No      TCM Call     Should patient be enrolled in anticoag monitoring? No    Scheduled for follow up?   Yes    Did you obtain your prescribed medications  Yes    Do you need help managing your prescriptions or medications  No    Is transportation to your appointment needed  No    Are you recieving any outpatient services  Yes    Are you recieving home care services  No    Have you fallen in the last 12 months  No    Interperter language line needed  No

## 2023-05-22 NOTE — PATIENT INSTRUCTIONS
Neck exercises , heat pack and topical pain relief  Can continue to take tylenol for hA, if severe go to the ED immediately    Continue eliquis 5 mg twice daily   Continue with speech therapy

## 2023-05-22 NOTE — PROGRESS NOTES
Daily Speech Treatment Note    Today's date: 2023  Patient’s name: Bri Cobian  : 1945  MRN: [id number]  Safety measures: h/o CVA, DM, HTN  Referring provider: Judah Michaud MD    Encounter Diagnosis     ICD-10-CM    1  Aphasia  R47 01       2  Cerebral thrombosis with cerebral infarction (HCC)  I63 30       3  Acute CVA (cerebrovascular accident) ([address] )  I63 9         Visit tracking:  -Referring provider: Epic  -Billing guidelines: CMS  -Visit #6/10  -Insurance: [facility] Medicare PPO  -RE due 2023 or 10th visit    Subjective/Behavioral:  -Patient reported that she has a f/u appointment with her eye doctor on Friday, as well as an upcoming CT scan and vascular appointment  Objective/Assessment:  -Reviewed patient's home exercises/activities completed since last appointment  Fill-in-the-letters activity completed in 35/40 opp, increasing to 100% acc with min cues from caregiver  Short-term goals:  TALKING  1  Patient will complete naming of automatic tasks (e g , counting, listing days of the week/months of the year, etc ) with 100% accuracy to facilitate increased verbal expression skills, to be achieved in 4-6 weeks      2  Patient will name an appropriate word opposite (e g , hot and /cold/) with 80% accuracy to build expressive vocabulary for conversation, to be achieved in 4-6 weeks   -Word finding ([address] 1): Patient presented with a list of words and asked to generate antonyms  Task completed in 36/48 opp (75% acc) independently, increasing to 48/48 opp (100% acc) with min-mod semantic and occasional min phonemic cues      3   Patient will name an appropriate word synonym (e g , street or /road/) with 80% accuracy to build expressive vocabulary for conversation, to be achieved in 4-6 weeks      4  Patient will name an appropriate category for given words (e g , apple, banana, pear = fruits / sun, bus, lemon = yellow things) with 80% accuracy to facilitate improved semantic organization, to be achieved in 4-6 weeks      5  Patient will provide an appropriate word for a sentence completion task (e g , open the ___) with 80% accuracy to facilitate increased expressive language skills, to be achieved in 4-6 weeks      6 Patient will provide an adequate response to confrontation naming task (i e , what is…) with 80% accuracy to facilitate increased expressive language skills, to be achieved in 4-6 weeks      7  Patient will name up to 5 features to describe a person/place/thing with 80% accuracy to facilitate improved utilization of circumlocution strategy, to be achieved in 4-6 weeks   -Word finding/semantic descriptors: The game Earlis Carbo was utilized to target word finding/circumlocution  Patient provided appropriate clues in 15/18 opp (83% acc)  Patient guessed clue words presented by clinician in 15/15 opp (100% acc)  Discussed strategies to assist with word finding abilities in daily life       8  Patient will complete picture description tasks using language organization strategies with 80% accuracy to facilitate improved utilization of circumlocution strategy, to be achieved in 4-6 weeks      9  Patient will increase the use of strategies for effective repair of misunderstandings during conversations 80% of the time with minimal cues, to be achieved in 4-6 weeks       LISTENING  10  Patient will follow 2-step verbal directions with 80% accuracy to facilitate increased auditory comprehension skills, to be achieved in 4-6 weeks      READING  11  Patient will facilitate functional reading skills by following two-step written directions (e g , close your eyes and pat your head) with 80% accuracy to facilitate increased reading comprehension skills, to be achieved in 4-6 weeks  -For HEP, clinician presented patient with time word problems to target reading comprehension and functional practice with time management      WRITING  12  Patient will complete simple writing tasks at the sentence level with >90% acc to increase writing skills within FLE, to be achieved in 4-6 weeks  Plan:  -Patient was provided with home exercises/activities to target goals in plan of care at the end of today's session   -Continue with current plan of care

## 2023-05-23 ENCOUNTER — TELEPHONE (OUTPATIENT)
Dept: NEUROLOGY | Facility: CLINIC | Age: 78
End: 2023-05-23

## 2023-05-23 NOTE — TELEPHONE ENCOUNTER
Post CVA Discharge Follow Up  Hospitalization: 5/13/23-5/16/23    Called patient and spoke with her spouse, Abdulaziz Hunt  He reports the patient is doing very well  Since discharge, he denies the patient experiencing any new or worsening stroke-like symptoms  Patient reports she continues to have the following symptoms: aphasia  He claims the aphasia has improved since discharge from the hospital      She is ambulating independently as well as preforming her own ADLs  Patient manages her own medications, appointments, and affairs  Reviewed appointments - patient successfully followed up with PCP on 5/22/23 and cardiologist on 5/18/23  She is scheduled to see neurology on 7/5/23  Repeat CTA is scheduled for 6/7/23  Reports the patient is receiving ST  Reviewed medications with him  Reports she is taking as prescribed with no medication side effects or signs of bleeding  During this call, we reviewed stroke type, symptoms, personal risk factors and management, medications, and resources  Offered to mail stroke education booklet, he is agreeable to this  Placed in mail  As for risk factors, he reports they periodically check her BP at home  He is aware target goal for BP <130/80  BP at cardiologist office on 5/18/23 was 122/70 and BP at PCP yesterday on 5/22/23 was 138/82  She is a non smoker  Encouraged for patient to follow a low salt / low cholesterol / diabetic friendly diet  All of his questions were addressed  At the conclusion of the conversation, he denies having any further questions or concerns

## 2023-05-23 NOTE — PROGRESS NOTES
Daily Speech Treatment Note    Today's date: 2023  Patient’s name: Lobo Nettles  : 1945  MRN: [id number]  Safety measures: h/o CVA, DM, HTN  Referring provider: Edwige Shore MD    Encounter Diagnosis     ICD-10-CM    1  Aphasia  R47 01       2  Cerebral thrombosis with cerebral infarction (HCC)  I63 30       3  Acute CVA (cerebrovascular accident) ([facility] [address] )  I63 9         Visit tracking:  -Referring provider: Epic  -Billing guidelines: CMS  -Visit #3/10  -Insurance: [facility] Medicare PPO  -RE due 2023 or 10th visit    Subjective/Behavioral:  -Patient and her  report that her headaches have subsided  She is doing okay today  She met with her PCP on Monday and he relieved some concerns she had following the stroke  She reports some difficulty with time planning (e g  pill management in regard to time)  However, she hasn't tested this recently due to stopping daily Tylenol  Her  reports that she is doing very well in the area of accomplishing daily tasks  She made a list for the store and she performed as she would pre-stroke  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  She believes they would not have known that she had a stroke recently  Objective/Assessment:  -Patient's family member/caregiver was present during today's session  Patient is performing challenging, higher level language tasks well  She reached a point of cognitive overload at 40 minutes  Discussed that this is perfectly normal following a recent stroke, her brain is healing and that taking a break when she feels this way is good  Short-term goals:  TALKING  1   Patient will complete naming of automatic tasks (e g , counting, listing days of the week/months of the year, etc ) with 100% accuracy to facilitate increased verbal expression skills, to be achieved in 4-6 weeks      2  Patient will name an appropriate word opposite (e g , hot and /cold/) with 80% accuracy to build expressive vocabulary for conversation, to be achieved in 4-6 weeks      3  Patient will name an appropriate word synonym (e g , street or /road/) with 80% accuracy to build expressive vocabulary for conversation, to be achieved in 4-6 weeks      4  Patient will name an appropriate category for given words (e g , apple, banana, pear = fruits / sun, bus, lemon = yellow things) with 80% accuracy to facilitate improved semantic organization, to be achieved in 4-6 weeks        5  Patient will provide an appropriate word for a sentence completion task (e g , open the ___) with 80% accuracy to facilitate increased expressive language skills, to be achieved in 4-6 weeks      6 Patient will provide an adequate response to confrontation naming task (i e , what is…) with 80% accuracy to facilitate increased expressive language skills, to be achieved in 4-6 weeks      7  Patient will name up to 5 features to describe a person/place/thing with 80% accuracy to facilitate improved utilization of circumlocution strategy, to be achieved in 4-6 weeks      8  Patient will complete picture description tasks using language organization strategies with 80% accuracy to facilitate improved utilization of circumlocution strategy, to be achieved in 4-6 weeks      9  Patient will increase the use of strategies for effective repair of misunderstandings during conversations 80% of the time with minimal cues, to be achieved in 4-6 weeks  Patient was educated on word finding strategies  A Word Retrieval Strategies handout was provided  LISTENING  10   Patient will follow 2-step verbal directions with 80% accuracy to facilitate increased auditory comprehension skills, to be achieved in 4-6 weeks        READING  11  Patient will facilitate functional reading skills by following two-step written directions (e g , close your eyes and pat your head) with 80% accuracy to facilitate increased reading comprehension skills, to be achieved in 4-6 weeks  Patient read and completed 2 step directions, completing with 95% accuracy, requiring moderate cueing  WRITING  12  Patient will complete simple writing tasks at the sentence level with >90% acc to increase writing skills within FLE, to be achieved in 4-6 weeks  Patient completed sentences with the first few words provided, adding at least 3 more words to the sentence  Task completed with 100% accuracy, provided moderate cueing (patient was able to self-correct when prompted to read sentences out loud)  At this point in the therapy session (40 minutes out of 45 total) she hit a cognitive wall  Discussed that it was perfectly normal with such a recent stroke to need a break  Recommended regular breaks at home when she feels she has reach a cognitive overload  The patient will complete the rest of the sentences for homework  Plan:  -Patient was provided with home exercises/activities to target goals in plan of care at the end of today's session   -Continue with current plan of care

## 2023-05-24 ENCOUNTER — OFFICE VISIT (OUTPATIENT)
Dept: SPEECH THERAPY | Facility: CLINIC | Age: 78
End: 2023-05-24

## 2023-05-24 DIAGNOSIS — I63.9 ACUTE CVA (CEREBROVASCULAR ACCIDENT) (HCC): ICD-10-CM

## 2023-05-24 DIAGNOSIS — I63.30 CEREBRAL THROMBOSIS WITH CEREBRAL INFARCTION (HCC): ICD-10-CM

## 2023-05-24 DIAGNOSIS — R47.01 APHASIA: Primary | ICD-10-CM

## 2023-05-24 PROBLEM — R51.9 FRONTAL HEADACHE: Status: ACTIVE | Noted: 2023-05-24

## 2023-05-26 ENCOUNTER — OFFICE VISIT (OUTPATIENT)
Dept: SPEECH THERAPY | Facility: CLINIC | Age: 78
End: 2023-05-26

## 2023-05-26 DIAGNOSIS — I63.30 CEREBRAL THROMBOSIS WITH CEREBRAL INFARCTION (HCC): ICD-10-CM

## 2023-05-26 DIAGNOSIS — I63.9 ACUTE CVA (CEREBROVASCULAR ACCIDENT) (HCC): ICD-10-CM

## 2023-05-26 DIAGNOSIS — R47.01 APHASIA: Primary | ICD-10-CM

## 2023-05-26 NOTE — PROGRESS NOTES
Daily Speech Treatment Note    Today's date: 2023  Patient’s name: Clive Saxena  : 1945  MRN: [id number]  Safety measures: h/o CVA, DM, HTN  Referring provider: Iman Vergara MD    Encounter Diagnosis     ICD-10-CM    1  Aphasia  R47 01       2  Cerebral thrombosis with cerebral infarction (HCC)  I63 30       3  Acute CVA (cerebrovascular accident) ([address] )  I63 9         Visit tracking:  -Referring provider: Epic  -Billing guidelines: CMS  -Visit #5/10   -Insurance: [facility] Medicare PPO  -RE due 2023 or 10th visit    Subjective/Behavioral:  -Patient reports that her family came over and grilled the whole meal for them over the weekend  She is continuing to work on the language apps that were recommended and she completed her homework as well  Objective/Assessment:  -Patient's family member/caregiver was present during today's session  The patient felt that the sentence scrambles were very helpful to her as they were a challenge and helped her to recognize grammatical errors  Short-term goals:  TALKING  1  Patient will complete naming of automatic tasks (e g , counting, listing days of the week/months of the year, etc ) with 100% accuracy to facilitate increased verbal expression skills, to be achieved in 4-6 weeks      2  Patient will name an appropriate word opposite (e g , hot and /cold/) with 80% accuracy to build expressive vocabulary for conversation, to be achieved in 4-6 weeks      3  Patient will name an appropriate word synonym (e g , street or /road/) with 80% accuracy to build expressive vocabulary for conversation, to be achieved in 4-6 weeks        4  Patient will name an appropriate category for given words (e g , apple, banana, pear = fruits / sun, bus, lemon = yellow things) with 80% accuracy to facilitate improved semantic organization, to be achieved in 4-6 weeks        5  Patient will provide an appropriate word for a sentence completion task (e g , open the ___) with 80% accuracy to facilitate increased expressive language skills, to be achieved in 4-6 weeks  Patient was provided with 6 and 7 words that make up a sentence  The task was completed with 100% accuracy, provided moderate cueing  Cueing decreased as exercise progressed  Patient benefited from reading words aloud and placing them within a typical context structurally      6 Patient will provide an adequate response to confrontation naming task (i e , what is…) with 80% accuracy to facilitate increased expressive language skills, to be achieved in 4-6 weeks      7  Patient will name up to 5 features to describe a person/place/thing with 80% accuracy to facilitate improved utilization of circumlocution strategy, to be achieved in 4-6 weeks      8  Patient will complete picture description tasks using language organization strategies with 80% accuracy to facilitate improved utilization of circumlocution strategy, to be achieved in 4-6 weeks      9  Patient will increase the use of strategies for effective repair of misunderstandings during conversations 80% of the time with minimal cues, to be achieved in 4-6 weeks  LISTENING  10  Patient will follow 2-step verbal directions with 80% accuracy to facilitate increased auditory comprehension skills, to be achieved in 4-6 weeks  READING  11  Patient will facilitate functional reading skills by following two-step written directions (e g , close your eyes and pat your head) with 80% accuracy to facilitate increased reading comprehension skills, to be achieved in 4-6 weeks  WRITING  12  Patient will complete simple writing tasks at the sentence level with >90% acc to increase writing skills within FLE, to be achieved in 4-6 weeks  Plan:  -Patient was provided with home exercises/activities to target goals in plan of care at the end of today's session   -Continue with current plan of care

## 2023-05-26 NOTE — PROGRESS NOTES
Daily Speech Treatment Note    Today's date: 2023  Patient’s name: Hamilton Vicente  : 1945  MRN: [id number]  Safety measures: h/o CVA, DM, HTN  Referring provider: Jessica Trivedi MD    Encounter Diagnosis     ICD-10-CM    1  Aphasia  R47 01       2  Cerebral thrombosis with cerebral infarction (HCC)  I63 30       3  Acute CVA (cerebrovascular accident) ([address] )  I63 9         Visit tracking:  -Referring provider: Epic  -Billing guidelines: CMS  -Visit #4/10   -Insurance: 19 [facility] Medicare PPO  -RE due 2023 or 10th visit    Subjective/Behavioral:  -Patient was very pleasant to work with and was extremely motivated during session! Objective/Assessment:  -Patient's family member/caregiver was present during today's session  Short-term goals:  TALKING  1  Patient will complete naming of automatic tasks (e g , counting, listing days of the week/months of the year, etc ) with 100% accuracy to facilitate increased verbal expression skills, to be achieved in 4-6 weeks      2  Patient will name an appropriate word opposite (e g , hot and /cold/) with 80% accuracy to build expressive vocabulary for conversation, to be achieved in 4-6 weeks      3  Patient will name an appropriate word synonym (e g , street or /road/) with 80% accuracy to build expressive vocabulary for conversation, to be achieved in 4-6 weeks  To target expressive vocabulary: Patient was presented with a target word in a sentence (e g  I was so angry that I lost the game) and was instructed to state one word that means the same (I e  Synonyms)  Patient completed this task in 10/ (83%) opportunities independently, increasing to / (100%) opportunities given semantic cues       4  Patient will name an appropriate category for given words (e g , apple, banana, pear = fruits / sun, bus, lemon = yellow things) with 80% accuracy to facilitate improved semantic organization, to be achieved in 4-6 weeks      To target categorization: Patient was presented with categories and was instructed to name three items for each  She completed this task over 20 categories in 56/60 (93%) opportunities independently, increasing to 60/60 (100%) opportunities given semantic cues  5  Patient will provide an appropriate word for a sentence completion task (e g , open the ___) with 80% accuracy to facilitate increased expressive language skills, to be achieved in 4-6 weeks      6 Patient will provide an adequate response to confrontation naming task (i e , what is…) with 80% accuracy to facilitate increased expressive language skills, to be achieved in 4-6 weeks      7  Patient will name up to 5 features to describe a person/place/thing with 80% accuracy to facilitate improved utilization of circumlocution strategy, to be achieved in 4-6 weeks      8  Patient will complete picture description tasks using language organization strategies with 80% accuracy to facilitate improved utilization of circumlocution strategy, to be achieved in 4-6 weeks      9  Patient will increase the use of strategies for effective repair of misunderstandings during conversations 80% of the time with minimal cues, to be achieved in 4-6 weeks  LISTENING  10  Patient will follow 2-step verbal directions with 80% accuracy to facilitate increased auditory comprehension skills, to be achieved in 4-6 weeks  READING  11  Patient will facilitate functional reading skills by following two-step written directions (e g , close your eyes and pat your head) with 80% accuracy to facilitate increased reading comprehension skills, to be achieved in 4-6 weeks  WRITING  12  Patient will complete simple writing tasks at the sentence level with >90% acc to increase writing skills within FLE, to be achieved in 4-6 weeks      To target word generation: Patient was presented with 10 letter tiles (5 vowels; 5 consonants) and was instructed to spell words of 3 or more letters in length  Patient completed this task over 3 sets of letters and spelled words in 29/30 (97%) opportunities independently, increasing to 30/30 (100%) opportunities given min verbal cues  Patient was then instructed to write sentences using each word  Patient completed this task in 25/30 (83%) opportunities independently, increasing to 30/30 (100%) opportunities given min verbal cues for spelling/grammar  Plan:  -Patient was provided with home exercises/activities to target goals in plan of care at the end of today's session   -Continue with current plan of care

## 2023-05-30 ENCOUNTER — OFFICE VISIT (OUTPATIENT)
Dept: SPEECH THERAPY | Facility: CLINIC | Age: 78
End: 2023-05-30

## 2023-05-30 DIAGNOSIS — I63.30 CEREBRAL THROMBOSIS WITH CEREBRAL INFARCTION (HCC): ICD-10-CM

## 2023-05-30 DIAGNOSIS — R47.01 APHASIA: Primary | ICD-10-CM

## 2023-05-30 DIAGNOSIS — I63.9 ACUTE CVA (CEREBROVASCULAR ACCIDENT) (HCC): ICD-10-CM

## 2023-05-31 ENCOUNTER — OFFICE VISIT (OUTPATIENT)
Dept: SPEECH THERAPY | Facility: CLINIC | Age: 78
End: 2023-05-31

## 2023-05-31 DIAGNOSIS — I63.9 ACUTE CVA (CEREBROVASCULAR ACCIDENT) (HCC): ICD-10-CM

## 2023-05-31 DIAGNOSIS — I63.30 CEREBRAL THROMBOSIS WITH CEREBRAL INFARCTION (HCC): ICD-10-CM

## 2023-05-31 DIAGNOSIS — R47.01 APHASIA: Primary | ICD-10-CM

## 2023-06-05 ENCOUNTER — OFFICE VISIT (OUTPATIENT)
Dept: SPEECH THERAPY | Facility: CLINIC | Age: 78
End: 2023-06-05
Payer: COMMERCIAL

## 2023-06-05 DIAGNOSIS — I63.9 ACUTE CVA (CEREBROVASCULAR ACCIDENT) (HCC): ICD-10-CM

## 2023-06-05 DIAGNOSIS — I63.30 CEREBRAL THROMBOSIS WITH CEREBRAL INFARCTION (HCC): ICD-10-CM

## 2023-06-05 DIAGNOSIS — R47.01 APHASIA: Primary | ICD-10-CM

## 2023-06-05 PROCEDURE — 92507 TX SP LANG VOICE COMM INDIV: CPT

## 2023-06-05 NOTE — PROGRESS NOTES
Daily Speech Treatment Note    Today's date: 2023  Patient’s name: Cesario Lal  : 1945  MRN: [id number]  Safety measures: h/o CVA, DM, HTN  Referring provider: Kaila Frank MD    Encounter Diagnosis     ICD-10-CM    1  Aphasia  R47 01       2  Cerebral thrombosis with cerebral infarction (HCC)  I63 30       3  Acute CVA (cerebrovascular accident) ([address] )  I63 9         Visit tracking:  -Referring provider: Epic  -Billing guidelines: CMS  -Visit #7/8 Tiffani Peahung req)  -Insurance: [address] [employer] Medicare PPO  -RE due 2023 or 10th visit    Subjective/Behavioral:  Patient was anxious during today's session and often tried to self regulate and relax when she had difficulty with her speech  Her  was a great support for her during the session  Objective/Assessment:  -Reviewed patient's home exercises/activities completed since last appointment  Fill-in-the-letters activity completed in  opp, increasing to 100% acc with min cues from caregiver  Short-term goals:  TALKING  1  Patient will complete naming of automatic tasks (e g , counting, listing days of the week/months of the year, etc ) with 100% accuracy to facilitate increased verbal expression skills, to be achieved in 4-6 weeks      2  Patient will name an appropriate word opposite (e g , hot and /cold/) with 80% accuracy to build expressive vocabulary for conversation, to be achieved in 4-6 weeks  3  Patient will name an appropriate word synonym (e g , street or /road/) with 80% accuracy to build expressive vocabulary for conversation, to be achieved in 4-6 weeks      4  Patient will name an appropriate category for given words (e g , apple, banana, pear = fruits / sun, bus, lemon = yellow things) with 80% accuracy to facilitate improved semantic organization, to be achieved in 4-6 weeks      5   Patient will provide an appropriate word for a sentence completion task (e g , open the ___) with 80% accuracy to facilitate increased expressive language skills, to be achieved in 4-6 weeks      6 Patient will provide an adequate response to confrontation naming task (i e , what is…) with 80% accuracy to facilitate increased expressive language skills, to be achieved in 4-6 weeks      7  Patient will name up to 5 features to describe a person/place/thing with 80% accuracy to facilitate improved utilization of circumlocution strategy, to be achieved in 4-6 weeks      8  Patient will complete picture description tasks using language organization strategies with 80% accuracy to facilitate improved utilization of circumlocution strategy, to be achieved in 4-6 weeks      9  Patient will increase the use of strategies for effective repair of misunderstandings during conversations 80% of the time with minimal cues, to be achieved in 4-6 weeks       LISTENING  10  Patient will follow 2-step verbal directions with 80% accuracy to facilitate increased auditory comprehension skills, to be achieved in 4-6 weeks      READING  11  Patient will facilitate functional reading skills by following two-step written directions (e g , close your eyes and pat your head) with 80% accuracy to facilitate increased reading comprehension skills, to be achieved in 4-6 weeks  WRITING  12  Patient will complete simple writing tasks at the sentence level with >90% acc to increase writing skills within FLE, to be achieved in 4-6 weeks  Filling in blanks (paragraphs) - patient was asked to read a short paragraph aloud and fill in missing words (no word bank)  Patient completed task with 70% acc overall, requiring min-mod cues to complete to 100% acc  Patient benefited from reading aloud after filling in words to self check her answers/make corrections  Filling in letter/sentence generation: patient completed filling in missing letters with 100% acc  She was asked to complete the first 5 word trials/generating sentences   Task completed in 3/5 opp; requiring mod verbal cues to complete 5/5    Plan:  -Patient was provided with home exercises/activities to target goals in plan of care at the end of today's session   -Continue with current plan of care

## 2023-06-07 ENCOUNTER — HOSPITAL ENCOUNTER (OUTPATIENT)
Dept: RADIOLOGY | Age: 78
Discharge: HOME | End: 2023-06-07
Payer: COMMERCIAL

## 2023-06-07 ENCOUNTER — EVALUATION (OUTPATIENT)
Dept: SPEECH THERAPY | Facility: CLINIC | Age: 78
End: 2023-06-07
Payer: COMMERCIAL

## 2023-06-07 DIAGNOSIS — R47.01 APHASIA: Primary | ICD-10-CM

## 2023-06-07 DIAGNOSIS — I63.30 CEREBRAL THROMBOSIS WITH CEREBRAL INFARCTION (HCC): ICD-10-CM

## 2023-06-07 DIAGNOSIS — I63.9 ACUTE CVA (CEREBROVASCULAR ACCIDENT) (HCC): ICD-10-CM

## 2023-06-07 DIAGNOSIS — I65.29 CAROTID STENOSIS: ICD-10-CM

## 2023-06-07 PROCEDURE — 70496 CT ANGIOGRAPHY HEAD: CPT

## 2023-06-07 PROCEDURE — 70498 CT ANGIOGRAPHY NECK: CPT

## 2023-06-07 PROCEDURE — 96105 ASSESSMENT OF APHASIA: CPT

## 2023-06-07 PROCEDURE — G1004 CDSM NDSC: HCPCS

## 2023-06-07 PROCEDURE — 92523 SPEECH SOUND LANG COMPREHEN: CPT

## 2023-06-07 RX ADMIN — IOHEXOL 100 ML: 350 INJECTION, SOLUTION INTRAVENOUS at 11:15

## 2023-06-07 NOTE — PROGRESS NOTES
Speech-Language Pathology Re-Evaluation    Today's date: 2023   Patient’s name: Tima Chavez  : 1945  MRN: [id number]  Safety measures: Hx of CVA  Referring provider: Dennis Romo MD    Encounter Diagnosis     ICD-10-CM    1  Aphasia  R47 01       2  Cerebral thrombosis with cerebral infarction (HCC)  I63 30       3  Acute CVA (cerebrovascular accident) ([address] )  I63 9         Visit tracking:  -Referring provider: Epic  -Billing guidelines: CMS  -Visit # [facility]: [address] [employer] Medicare PPO  -RE due     Subjective comments: Patient arrived with her  today in good spirits  She participated well during testing; she continues with mild anxiety during therapy  Patient's goal(s): She would like to be able to see her responses to people improve  Assessments    The [place] Diagnostic Aphasia Examination-Third Edition (BDAE-3) is a comprehensive standardized assessment designed to evaluate a broad range of language impairments that often arise as a consequence of organic brain dysfunction  The BDAE-3 is divided into five subtests, including conversational & expository speech, auditory comprehension, oral expression, reading, and writing  The results of the BDAE-3 are used to classify a patient’s language profiles into one of the localization-based classifications of aphasia: Broca’s, Wernicke’s, anomic, conduction, transcortical, transcortical motor, transcortical sensory, and global aphasia syndromes, although the test does not always provide a diagnosis or a therapeutic approach   The following results were obtained during the administration of the short form assessment:       Score: Percentile:   SEVERITY RATIN/5 90%ile        FLUENCY:     -Phrase length (rating scale) 7/7 100%ile   -Melodic line (rating scale) / 100%ile   -Grammatical form (rating scale) / 100%ile        CONVERSATION/EXPOSITORY SPEECH:    -Simple social responses / 50%ile AUDITORY COMPREHENSION:     -Basic word discrimination 15 5/16 80%ile   -Commands 10/10 100%ile   -Complex ideational material 5/6 70%ile        ARTICULATION:     -Articulatory agility (rating scale) 6/7 70%ile        RECITATION:     -Automatized sequences 4/4 100%ile        REPETITION:     -Words 5/5 100%ile    -Sentences 1/2 60%ile        NAMING:     -Responsive naming 10/10 100%ile   -[place] Naming Test - short 13/15 85%ile   -Special categories 12/12 100%ile        READING:     -Matching cases & scripts 4/4 100%ile   -Number matching 4/4 100%ile   -Picture-word matching 4/4 100%ile   -Oral word reading 15/15 100%ile   -Oral sentence reading 4/5 80%ile   -Oral sentence comprehension 3/3 100%ile   -Sentence/paragraph comprehension 4/4 100%ile        WRITING:     -Form 14/14 100%ile   -Letter choice 21/21 100%ile   -Motor facility 14/14 100%ile   -Primer words 4/4 100%ile   -Regular phonics 2/2 100%ile   -Common irregular words 3/3 100%ile   -Written picture naming  4/4 100%ile   -Narrative writing 8/11 75%ile       Overall, patient presents with a mild expressive/receptive aphasia with a severity rating of 4 (out of a possible 5 being fluent speech)  4 - Some obvious loss of fluency in speech or facility of comprehension, without significant limitation on ideas expressed or form of expression  The Test of Adult Language - Fourth Edition (TOAL-4) is a standardized, norm-referenced assessment measuring important communicative abilities in spoken and written language  The test in normed on individuals 12:0-24:11  Due to these age restrictions, these standardized scores should not be compared to standard or percentile rank  Objective measures were taken to complete a diagnostic impression and prognosis for this patient  The TOAL-4 has 6 subtests; their results are reported as percentile ranks and scaled scores   The results of the TOAL-4 are generally used for three purposes; (a) to identify adolescents and adults who score significantly below their peers and therefore might need help improving their language proficiency, (b) to determine areas of relative strength and weakness among language abilities, and (c) to serve as a research tool in studies investigating language problems in adolescents and adults  Due to time constraints, only portions of the standardized assessment were administered on this date of service  Subtest: Raw Score: Percentile:  Scaled Score: Descriptive Ratin  Word Opposites  1%tile 3 Very Poor   3  Spoken Analogies  2%tile 4 Poor   4  Word Similarities 3/40 <1%tile 2 Very Poor         Goals    Short Term Goals    TALKING  1  Patient will complete naming of automatic tasks (e g , counting, listing days of the week/months of the year, etc ) with 100% accuracy to facilitate increased verbal expression skills, to be achieved in 4-6 weeks  MET    2  Patient will name an appropriate word opposite (e g , hot and /cold/) with 80% accuracy to build expressive vocabulary for conversation, to be achieved in 4-6 weeks  PARTIALLY MET/ONGOING    3  Patient will name an appropriate word synonym (e g , street or /road/) with 80% accuracy to build expressive vocabulary for conversation, to be achieved in 4-6 weeks  PARTIALLY MET/ONGOING    4  Patient will name an appropriate category for given words (e g , apple, banana, pear = fruits / sun, bus, lemon = yellow things) with 80% accuracy to facilitate improved semantic organization, to be achieved in 4-6 weeks  MET    5  Patient will provide an appropriate word for a sentence completion task (e g , open the ___) with 80% accuracy to facilitate increased expressive language skills, to be achieved in 4-6 weeks  MET    6  Patient will provide an adequate response to confrontation naming task (i e , what is…) with 80% accuracy to facilitate increased expressive language skills, to be achieved in 4-6 weeks  MET    7   Patient will name up to 5 features to describe a person/place/thing with 80% accuracy to facilitate improved utilization of circumlocution strategy, to be achieved in 4-6 weeks  PARTIALLY MET/ONGOING    8  Patient will complete picture description tasks using language organization strategies with 80% accuracy to facilitate improved utilization of circumlocution strategy, to be achieved in 4-6 weeks  PARTIALLY MET/ONGOING    9  Patient will increase the use of strategies for effective repair of misunderstandings during conversations 80% of the time with minimal cues, to be achieved in 4-6 weeks  PARTIALLY MET/ONGOING    LISTENING  10  Patient will follow 2-step verbal directions with 80% accuracy to facilitate increased auditory comprehension skills, to be achieved in 4-6 weeks  MET    READING    11  Patient will facilitate functional reading skills by following two-step written directions (e g , close your eyes and pat your head) with 80% accuracy to facilitate increased reading comprehension skills, to be achieved in 4-6 weeks  MET      WRITING    12  Patient will complete simple writing tasks at the sentence level with >90% acc to increase writing skills within FLE, to be achieved in 4-6 weeks  PARTIALLY MET/ONGOING      Long Term Goals    Patient will demonstrate improved expressive and receptive language skills during structured and unstructured tasks by discharge  PARTIALLY MET/ONGOING    Patient will improve ability to facilitate communication to meet needs including use of compensatory strategies to promote meaningful interactions for improved quality of life and maximize level of independence, by discharge  PARTIALLY MET/ONGOING    Patient will use functional communication skills for social interactions (e g , greetings, social etiquette, and short questions/simple sentences) with both familiar and unfamiliar partners with 90% success, by discharge  PARTIALLY MET/ONGOING      Impressions/Recommendations    Impressions: Patient presents with improving language deficits post CVA  She continues to present with most difficulty in expressive written language/sentence structure as well as higher level word finding (see TOAL scores)  Patient would continue to benefit from skilled speech therapy to address the goals above  Patient continues to be very motivated and has a good prognosis  Recommendations:  -Patient would benefit from outpatient skilled Speech Therapy services: Speech-language therapy    -Frequency: 2-3x/week  -Duration: 6-8 weeks    -Intervention certification from: 7/2/8950  -Intervention certification to: 4/4/6772    -Intervention comments:   Aphasia testing with patient x 35 min; scoring and interpretation for POC development x 60 min; TOAL (language testing) x 25 min

## 2023-06-09 ENCOUNTER — OFFICE VISIT (OUTPATIENT)
Dept: SPEECH THERAPY | Facility: CLINIC | Age: 78
End: 2023-06-09
Payer: COMMERCIAL

## 2023-06-09 DIAGNOSIS — I63.30 CEREBRAL THROMBOSIS WITH CEREBRAL INFARCTION (HCC): ICD-10-CM

## 2023-06-09 DIAGNOSIS — I63.9 ACUTE CVA (CEREBROVASCULAR ACCIDENT) (HCC): ICD-10-CM

## 2023-06-09 DIAGNOSIS — R47.01 APHASIA: Primary | ICD-10-CM

## 2023-06-09 PROCEDURE — 92507 TX SP LANG VOICE COMM INDIV: CPT

## 2023-06-09 NOTE — PROGRESS NOTES
Daily Speech Treatment Note    Today's date: 2023  Patient’s name: Debora Wei  : 1945  MRN: [id number]  Safety measures: Hx of CVA  Referring provider: Leo Ochoa MD    Encounter Diagnosis     ICD-10-CM    1  Aphasia  R47 01       2  Cerebral thrombosis with cerebral infarction (HCC)  I63 30       3  Acute CVA (cerebrovascular accident) ([facility] [address] )  I63 9         Visit tracking:  -Referring provider: Epic  -Billing guidelines: CMS  -Visit # [facility] [address]: [address] [place] [address] Medicare PPO  -RE due     Subjective/Behavioral:  - Patient reported that she is reading her novels, just taking a little time with it  Comprehension is good  Objective/Assessment:  -Patient's family member/caregiver was present during today's session  Patient returned completed homework  Short-term goals:  TALKING    2  Patient will name an appropriate word opposite (e g , hot and /cold/) with 80% accuracy to build expressive vocabulary for conversation, to be achieved in 4-6 weeks  PARTIALLY MET/ONGOING  To target expressive language: Patient was verbally presented with a target word and was instructed to state one word that means the opposite (I e  Antonyms)  Patient completed this task in / (95%) opportunities provided moderate cueing  Patient benefited from writing words down  She demonstrated mild-moderate difficulty finding the right tense for a word  3  Patient will name an appropriate word synonym (e g , street or /road/) with 80% accuracy to build expressive vocabulary for conversation, to be achieved in 4-6 weeks  PARTIALLY MET/ONGOING  Patient was provided with a given word (e g  beautiful) and asked to provide a synonym   Patient completed task with 100% accuracy in 6 trials, independently      7  Patient will name up to 5 features to describe a person/place/thing with 80% accuracy to facilitate improved utilization of circumlocution strategy, to be achieved in 4-6 weeks PARTIALLY MET/ONGOING     8  Patient will complete picture description tasks using language organization strategies with 80% accuracy to facilitate improved utilization of circumlocution strategy, to be achieved in 4-6 weeks  PARTIALLY MET/ONGOING     9  Patient will increase the use of strategies for effective repair of misunderstandings during conversations 80% of the time with minimal cues, to be achieved in 4-6 weeks  PARTIALLY MET/ONGOING     WRITING     12  Patient will complete simple writing tasks at the sentence level with >90% acc to increase writing skills within FLE, to be achieved in 4-6 weeks  PARTIALLY MET/ONGOING       Plan:  -Patient was provided with home exercises/activities to target goals in plan of care at the end of today's session   -Continue with current plan of care

## 2023-06-12 ENCOUNTER — OFFICE VISIT (OUTPATIENT)
Dept: SPEECH THERAPY | Facility: CLINIC | Age: 78
End: 2023-06-12
Payer: COMMERCIAL

## 2023-06-12 DIAGNOSIS — I63.30 CEREBRAL THROMBOSIS WITH CEREBRAL INFARCTION (HCC): ICD-10-CM

## 2023-06-12 DIAGNOSIS — R47.01 APHASIA: Primary | ICD-10-CM

## 2023-06-12 PROCEDURE — 92507 TX SP LANG VOICE COMM INDIV: CPT

## 2023-06-12 NOTE — PROGRESS NOTES
Daily Speech Treatment Note    Today's date: 2023  Patient’s name: Rm Barron  : 1945  MRN: [id number]  Safety measures: Hx of CVA  Referring provider: Christoph Hsieh MD    Encounter Diagnosis     ICD-10-CM    1  Aphasia  R47 01       2  Cerebral thrombosis with cerebral infarction (HCC)  I63 30       3  Acute CVA (cerebrovascular accident) ([address] )  I63 9         Visit tracking:  -Referring provider: Epic  -Billing guidelines: CMS  -Visit # [facility] [address]: [address] [place] [address] Medicare PPO  -RE due     Subjective/Behavioral:  - Patient reported that she had a CAT scan and the small clot in her brain and artery are resolved  Objective/Assessment:  -Patient's family member/caregiver was present during today's session  Patient returned completed homework  Next session - target note writing  Short-term goals:  TALKING    2  Patient will name an appropriate word opposite (e g , hot and /cold/) with 80% accuracy to build expressive vocabulary for conversation, to be achieved in 4-6 weeks  PARTIALLY MET/ONGOING      3  Patient will name an appropriate word synonym (e g , street or /road/) with 80% accuracy to build expressive vocabulary for conversation, to be achieved in 4-6 weeks  PARTIALLY MET/ONGOING       7  Patient will name up to 5 features to describe a person/place/thing with 80% accuracy to facilitate improved utilization of circumlocution strategy, to be achieved in 4-6 weeks  PARTIALLY MET/ONGOING     8  Patient will complete picture description tasks using language organization strategies with 80% accuracy to facilitate improved utilization of circumlocution strategy, to be achieved in 4-6 weeks  PARTIALLY MET/ONGOING     9  Patient will increase the use of strategies for effective repair of misunderstandings during conversations 80% of the time with minimal cues, to be achieved in 4-6 weeks  PARTIALLY MET/ONGOING     WRITING     12   Patient will complete simple writing tasks at the sentence level with >90% acc to increase writing skills within FLE, to be achieved in 4-6 weeks  PARTIALLY MET/ONGOING  Patient was provided with 2 words and asked to create a sentences using them  Task completed with 60% accuracy, independently, improving accuracy to 100% with moderate cueing  She benefited from reading sentences out loud to ensure grammatical accuracy  She was also provided with tiles that had articles and conjunctions listed on them for reference, as these words are most difficult for her  Plan:  -Patient was provided with home exercises/activities to target goals in plan of care at the end of today's session   -Continue with current plan of care

## 2023-06-12 NOTE — PROGRESS NOTES
Daily Speech Treatment Note    Today's date: 2023  Patient’s name: Dave Miller  : 1945  MRN: [id number]  Safety measures: Hx of CVA  Referring provider: Angélica Egan MD    Encounter Diagnosis     ICD-10-CM    1  Aphasia  R47 01       2  Cerebral thrombosis with cerebral infarction ([facility] [address] )  I63 30         Visit tracking:  -Referring provider: Epic  -Billing guidelines: CMS  -Visit # - [facility] PENDING  -Insurance: [facility] Medicare PPO  -RE due     Subjective/Behavioral:  -Patient frustrated she forgot to call and schedule an appt for her hearing aides - she is having trouble with them    Objective/Assessment:  -Patient's family member/caregiver was present during today's session  Short-term goals:  TALKING    2  Patient will name an appropriate word opposite (e g , hot and /cold/) with 80% accuracy to build expressive vocabulary for conversation, to be achieved in 4-6 weeks  Replacing opposites in sentences (with and without word bank):     3  Patient will name an appropriate word synonym (e g , street or /road/) with 80% accuracy to build expressive vocabulary for conversation, to be achieved in 4-6 weeks  Synonym Bingo: To target word association, patient was asked to find an appropriate synonym for a given word on a bingo card  Each word had three possible matches  Using 2 boards, patient was asked to scan and identify the synonym  (i e , say = express, declare, utter)  Min cues provided throughout to match appropriate words and locate them  Patient sustained attention during task without difficulty  Task completed over 37 trials  Last, patient was asked to remove all bingo tiles while recalling the synonym it was associated with   Task completed in  opp          Lianet Stanton  Patient will name up to 5 features to describe a person/place/thing with 80% accuracy to facilitate improved utilization of circumlocution strategy, to be achieved in 4-6 "weeks      Completing Analogies (easy) _\"a train has a whistle, a car has a _____\" - open ended: 19/20     8  Patient will complete picture description tasks using language organization strategies with 80% accuracy to facilitate improved utilization of circumlocution strategy, to be achieved in 4-6 weeks      9  Patient will increase the use of strategies for effective repair of misunderstandings during conversations 80% of the time with minimal cues, to be achieved in 4-6 weeks       WRITING     12  Patient will complete simple writing tasks at the sentence level with >90% acc to increase writing skills within FLE, to be achieved in 4-6 weeks        Plan:  -Patient was provided with home exercises/activities to target goals in plan of care at the end of today's session   -Continue with current plan of care    "

## 2023-06-13 ENCOUNTER — OFFICE VISIT (OUTPATIENT)
Dept: VASCULAR SURGERY | Facility: CLINIC | Age: 78
End: 2023-06-13
Payer: COMMERCIAL

## 2023-06-13 VITALS
BODY MASS INDEX: 30.7 KG/M2 | HEIGHT: 66 IN | DIASTOLIC BLOOD PRESSURE: 86 MMHG | WEIGHT: 191 LBS | SYSTOLIC BLOOD PRESSURE: 150 MMHG | HEART RATE: 87 BPM

## 2023-06-13 DIAGNOSIS — I65.22 LEFT CAROTID STENOSIS: ICD-10-CM

## 2023-06-13 DIAGNOSIS — I63.30 CEREBRAL THROMBOSIS WITH CEREBRAL INFARCTION (HCC): ICD-10-CM

## 2023-06-13 DIAGNOSIS — I65.22 ATHEROSCLEROSIS OF LEFT CAROTID ARTERY: ICD-10-CM

## 2023-06-13 DIAGNOSIS — I63.9 ACUTE CVA (CEREBROVASCULAR ACCIDENT) (HCC): ICD-10-CM

## 2023-06-13 PROCEDURE — 99215 OFFICE O/P EST HI 40 MIN: CPT | Performed by: SURGERY

## 2023-06-13 RX ORDER — ATORVASTATIN CALCIUM 40 MG/1
40 TABLET, FILM COATED ORAL
Qty: 90 TABLET | Refills: 1 | Status: SHIPPED | OUTPATIENT
Start: 2023-06-13 | End: 2023-07-13

## 2023-06-13 NOTE — PROGRESS NOTES
Assessment/Plan:    Cerebral thrombosis with cerebral infarction [facility])  Admitted last month with left ICA embolus  Acute left hemispheric CVA with expressive aphagia  No chronic carotid disease  Managed with anticoagulation only and she has made an excellent recovery  Repeat CTA 6/7/23 shows no further thrombus in left carotid or intra-cranial circulation  Embolic workup showed small PFO and atrial septal aneurysm  Cardiology recommending AC only  Reviewed results of her CTA with her  Follow up Prn with Vascular         Diagnoses and all orders for this visit:    Acute CVA (cerebrovascular accident) [facility])  -     Ambulatory Referral to Vascular Surgery    Left carotid stenosis  -     Ambulatory Referral to Vascular Surgery    Cerebral thrombosis with cerebral infarction [facility])  -     Ambulatory Referral to Vascular Surgery    Atherosclerosis of left carotid artery  -     Ambulatory Referral to Vascular Surgery          Subjective:      Patient ID: Marlyn Jones is a 70 y o  female  Patient presents as ED follow-up for CVA 5/13  Patient is here for results review to rev CTA of head/neck done 6/07  HPI    The following portions of the patient's history were reviewed and updated as appropriate: allergies, current medications, past family history, past medical history, past social history, past surgical history and problem list     Review of Systems   Constitutional: Negative  HENT: Negative  Eyes: Negative  Respiratory: Negative  Cardiovascular: Negative  Gastrointestinal: Negative  Endocrine: Negative  Genitourinary: Negative  Musculoskeletal: Negative  Skin: Negative  Allergic/Immunologic: Negative  Neurological: Negative  Hematological: Negative  Psychiatric/Behavioral: Negative  I have reviewed the ROS above and made changes as needed          Objective:      /86 (BP Location: Right arm, Patient Position: Sitting, Cuff Size: Standard)   Pulse 87   Ht 5' "6\" (1 676 m)   Wt 86 6 kg (191 lb)   BMI 30 83 kg/m²          Physical Exam      General  Exam: alert, awake, oriented, no distress, consistent with stated age    Integumentary  Exam: warm, dry, no gross lesions, no bruises and normal color    Head and Neck  Exam: supple, no bruits, trachea midline, no JVD, no mass or palpable nodes    Eye  Exam: extraoccular movements intact, no scleral icterus, sclera clear, pupils equal round and reactive to light    ENMT  Exam: oral mucosa pink and moist    Chest and Lung  Exam: chest normal without deformity, bilaterally expansive, clear to auscultation    Cardiovascular  Exam: regular rate, regular rhythm, no murmurs, no rubs or gallops    Adbomen  Exam: soft, non-tender, non-distended, no pulsatile abdominal masses, no abdominal bruit    Peripheral Vascular  Exam: no clubbing of the digits of the upper extremity, no cyanosis, no edema, both feet are warm, radial pulses 2+ bilaterally, skin well perfused, without and no varicosities      No widened popliteal pulse noted bilaterally    Upper Extremity:  Palpation: Radial pulse- Bilateral 2+    Lower Extremity:  Palpation: Femoral pulse- Bilateral 2+         Popliteal pulse - Bilateral 2+        Dorsalis Pedis - Bilateral 2+        Posterior tibial - Bilateral 2+    Neurologic  Exam:alert, non-focal, oriented x 3, cranial nerves II-XII grossly intact                        "

## 2023-06-13 NOTE — ASSESSMENT & PLAN NOTE
Admitted last month with left ICA embolus  Acute left hemispheric CVA with expressive aphagia  No chronic carotid disease  Managed with anticoagulation only and she has made an excellent recovery  Repeat CTA 6/7/23 shows no further thrombus in left carotid or intra-cranial circulation  Embolic workup showed small PFO and atrial septal aneurysm  Cardiology recommending AC only  Reviewed results of her CTA with her  Follow up Prn with Vascular

## 2023-06-14 ENCOUNTER — OFFICE VISIT (OUTPATIENT)
Dept: SPEECH THERAPY | Facility: CLINIC | Age: 78
End: 2023-06-14
Payer: COMMERCIAL

## 2023-06-14 ENCOUNTER — TELEPHONE (OUTPATIENT)
Dept: NEUROLOGY | Facility: CLINIC | Age: 78
End: 2023-06-14

## 2023-06-14 DIAGNOSIS — R47.01 APHASIA: Primary | ICD-10-CM

## 2023-06-14 DIAGNOSIS — I63.9 ACUTE CVA (CEREBROVASCULAR ACCIDENT) (HCC): ICD-10-CM

## 2023-06-14 DIAGNOSIS — I63.30 CEREBRAL THROMBOSIS WITH CEREBRAL INFARCTION (HCC): ICD-10-CM

## 2023-06-14 PROCEDURE — 92507 TX SP LANG VOICE COMM INDIV: CPT

## 2023-06-14 NOTE — TELEPHONE ENCOUNTER
----- Message from Neema Fowler RN sent at 6/12/2023 10:13 AM EDT -----    ----- Message -----  From: Ana Owens MD  Sent: 6/12/2023   9:59 AM EDT  To: [facility]    Let patient know no new strokes and the thrombus have all essentially resolved

## 2023-06-16 ENCOUNTER — OFFICE VISIT (OUTPATIENT)
Dept: SPEECH THERAPY | Facility: CLINIC | Age: 78
End: 2023-06-16
Payer: COMMERCIAL

## 2023-06-16 DIAGNOSIS — R47.01 APHASIA: Primary | ICD-10-CM

## 2023-06-16 DIAGNOSIS — I63.30 CEREBRAL THROMBOSIS WITH CEREBRAL INFARCTION (HCC): ICD-10-CM

## 2023-06-16 PROCEDURE — 92507 TX SP LANG VOICE COMM INDIV: CPT

## 2023-06-16 NOTE — PROGRESS NOTES
Daily Speech Treatment Note    Today's date: 2023  Patient’s name: Pranay Covington  : 1945  MRN: [id number]  Safety measures: Hx of CVA  Referring provider: Cindy Morrison MD    Encounter Diagnosis     ICD-10-CM    1  Aphasia  R47 01       2  Cerebral thrombosis with cerebral infarction [facility])  I63 30         Visit tracking:  -Referring provider: Epic  -Billing guidelines: CMS  -Visit #3/8 - [address] [facility] [address]: [address] [employer] Medicare PPO  -RE due     Subjective/Behavioral:  - Patient reported that she is good  She didn't sleep as well last night  Her  reported that they went out with friends for the first time since her stroke and it went well  She is going to go shopping by herself for the first time today  Objective/Assessment:  -Patient's family member/caregiver was present during today's session  Patient returned completed homework - Completed sentences with 100% accuracy and Synonym replacement with Sentences - completed with 100% accuracy  Short-term goals:  TALKING    2  Patient will name an appropriate word opposite (e g , hot and /cold/) with 80% accuracy to build expressive vocabulary for conversation, to be achieved in 4-6 weeks  PARTIALLY MET/ONGOING      3  Patient will name an appropriate word synonym (e g , street or /road/) with 80% accuracy to build expressive vocabulary for conversation, to be achieved in 4-6 weeks  PARTIALLY MET/ONGOING       7  Patient will name up to 5 features to describe a person/place/thing with 80% accuracy to facilitate improved utilization of circumlocution strategy, to be achieved in 4-6 weeks  PARTIALLY MET/ONGOING     8  Patient will complete picture description tasks using language organization strategies with 80% accuracy to facilitate improved utilization of circumlocution strategy, to be achieved in 4-6 weeks  PARTIALLY MET/ONGOING     9  Patient will increase the use of strategies for effective repair of misunderstandings during conversations 80% of the time with minimal cues, to be achieved in 4-6 weeks  PARTIALLY MET/ONGOING     WRITING     12  Patient will complete simple writing tasks at the sentence level with >90% acc to increase writing skills within FLE, to be achieved in 4-6 weeks  PARTIALLY MET/ONGOING  Patient was provided with 2 words and asked to create a sentences using them  Task completed with 50% accuracy, independently, improving accuracy to 100% with moderate cueing  She benefited from formulating sentences without looking at words and reading sentences out loud to ensure grammatical accuracy      Patient then practiced note writing of small paragraph length  She wrote 4 separate notes, utilizing the same strategies of talking it aloud before writing it down  Task completed with 100% accuracy, provided min-mod cueing  Plan:  -Patient was provided with home exercises/activities to target goals in plan of care at the end of today's session   -Continue with current plan of care

## 2023-06-20 ENCOUNTER — OFFICE VISIT (OUTPATIENT)
Dept: SPEECH THERAPY | Facility: CLINIC | Age: 78
End: 2023-06-20
Payer: COMMERCIAL

## 2023-06-20 DIAGNOSIS — I63.9 ACUTE CVA (CEREBROVASCULAR ACCIDENT) (HCC): ICD-10-CM

## 2023-06-20 DIAGNOSIS — I63.30 CEREBRAL THROMBOSIS WITH CEREBRAL INFARCTION (HCC): ICD-10-CM

## 2023-06-20 DIAGNOSIS — R47.01 APHASIA: Primary | ICD-10-CM

## 2023-06-20 PROCEDURE — 92507 TX SP LANG VOICE COMM INDIV: CPT

## 2023-06-20 NOTE — PROGRESS NOTES
"Daily Speech Treatment Note    Today's date: 2023  Patient’s name: Patty Lacey  : 1945  MRN: [id number]  Safety measures: Hx of CVA  Referring provider: Mahamed Chavez MD    Encounter Diagnosis     ICD-10-CM    1  Aphasia  R47 01       2  Cerebral thrombosis with cerebral infarction (HCC)  I63 30       3  Acute CVA (cerebrovascular accident) ([facility] [address] )  I63 9         Visit tracking:  -Referring provider: Epic  -Billing guidelines: CMS  -Visit # - [address]: [address]  Medicare PPO  -RE due     Subjective/Behavioral:  -Patient stated she is doing very well! Her and her  took a small over night trip up to [facility]  Objective/Assessment:  -Patient's family member/caregiver was present during today's session  Short-term goals:  TALKING  2  Patient will name an appropriate word opposite (e g , hot and /cold/) with 80% accuracy to build expressive vocabulary for conversation, to be achieved in 4-6 weeks  PARTIALLY MET/ONGOING    3  Patient will name an appropriate word synonym (e g , street or /road/) with 80% accuracy to build expressive vocabulary for conversation, to be achieved in 4-6 weeks  PARTIALLY MET/ONGOING    7  Patient will name up to 5 features to describe a person/place/thing with 80% accuracy to facilitate improved utilization of circumlocution strategy, to be achieved in 4-6 weeks  PARTIALLY MET/ONGOING    To target education and practice of the circumlocution strategy, patient was asked to provide verbal/semantic descriptions of different people/places/things using \"Jeepers Peepers\" cards  Patient described cards in 18/20 (90%) opportunities independently, increasing to 20/20 (100%) opportunities given min verbal cues  Patient guessed words in 20/20 (100%) opportunities independently       8   Patient will complete picture description tasks using language organization strategies with 80% accuracy to facilitate improved utilization of " "circumlocution strategy, to be achieved in 4-6 weeks  PARTIALLY MET/ONGOING     9  Patient will increase the use of strategies for effective repair of misunderstandings during conversations 80% of the time with minimal cues, to be achieved in 4-6 weeks  PARTIALLY MET/ONGOING     WRITING  12  Patient will complete simple writing tasks at the sentence level with >90% acc to increase writing skills within FLE, to be achieved in 4-6 weeks  PARTIALLY MET/ONGOING    To target word building and thought organization: Patient played \"Fishing for Words,\" where they were instructed to roll 10 dice (each with different letters) and create words using the letters provided  Once Patient created a word, Clinician then rolled the unused letters and built a word off of the existing  This continued over 15 trials  Patient created words in 15/15 (100%) opportunities independently  Patient then wrote sentences for the task above using the words she created  Patient completed this task in 7/10 (70%) opportunities independently, increasing to 10/10 (100%) opportunities given min verbal cues  Plan:  -Patient was provided with home exercises/activities to target goals in plan of care at the end of today's session   -Continue with current plan of care    "

## 2023-06-22 NOTE — PROGRESS NOTES
Daily Speech Treatment Note    Today's date: 2023  Patient’s name: Liza Jensen  : 1945  MRN: [id number]  Safety measures: Hx of CVA  Referring provider: Carol Barron MD    Encounter Diagnosis     ICD-10-CM    1  Aphasia  R47 01       2  Cerebral thrombosis with cerebral infarction (HCC)  I63 30       3  Acute CVA (cerebrovascular accident) ([facility] [address] )  I63 9         Visit tracking:  -Referring provider: Epic  -Billing guidelines: CMS  -Visit # - [address]: [address] Medicare PPO  -RE due     Subjective/Behavioral:  -Patient reports that she is fine  She has been shopping by herself and had her first lunch with a friend by herself and it has been going well  Objective/Assessment:  -Patient's family member/caregiver was present during today's session  Patient completed Functional Memory (Appointments) homework with 100% accuracy  Short-term goals:  TALKING  2  Patient will name an appropriate word opposite (e g , hot and /cold/) with 80% accuracy to build expressive vocabulary for conversation, to be achieved in 4-6 weeks  PARTIALLY MET/ONGOING    3  Patient will name an appropriate word synonym (e g , street or /road/) with 80% accuracy to build expressive vocabulary for conversation, to be achieved in 4-6 weeks  PARTIALLY MET/ONGOING    7  Patient will name up to 5 features to describe a person/place/thing with 80% accuracy to facilitate improved utilization of circumlocution strategy, to be achieved in 4-6 weeks  PARTIALLY MET/ONGOING    8  Patient will complete picture description tasks using language organization strategies with 80% accuracy to facilitate improved utilization of circumlocution strategy, to be achieved in 4-6 weeks  PARTIALLY MET/ONGOING     9  Patient will increase the use of strategies for effective repair of misunderstandings during conversations 80% of the time with minimal cues, to be achieved in 4-6 weeks   PARTIALLY "MET/ONGOING     WRITING  12  Patient will complete simple writing tasks at the sentence level with >90% acc to increase writing skills within FLE, to be achieved in 4-6 weeks  PARTIALLY MET/ONGOING  Story Cubes - Using multiple dice (3) with a different picture on each side, the patient had to come up with a sentence/series of sentences using the images that she rolled  The patient then rolled the dice, named the objects pictured on the cubes (e g  a rainbow, magnifying glass, eye and fountain)  She then formulated her sentences aloud, followed by writing 2 to 3 sentences and reading the sentence out loud  The patient completed the task with 50% accuracy, independently, increasing to 75% with moderate cueing  The patient demonstrated improvement in ability to include articles and prepositions (e g  \"the\" and \"to\") within sentences, self-correcting in review of sentence  As this task was more creative and abstract, the patient demonstrated more difficulty in sentence formulation  Plan:  -Patient was provided with home exercises/activities to target goals in plan of care at the end of today's session   -Continue with current plan of care    "

## 2023-06-23 ENCOUNTER — OFFICE VISIT (OUTPATIENT)
Dept: SPEECH THERAPY | Facility: CLINIC | Age: 78
End: 2023-06-23
Payer: COMMERCIAL

## 2023-06-23 DIAGNOSIS — I63.9 ACUTE CVA (CEREBROVASCULAR ACCIDENT) (HCC): ICD-10-CM

## 2023-06-23 DIAGNOSIS — R47.01 APHASIA: Primary | ICD-10-CM

## 2023-06-23 DIAGNOSIS — I63.30 CEREBRAL THROMBOSIS WITH CEREBRAL INFARCTION (HCC): ICD-10-CM

## 2023-06-23 PROCEDURE — 92507 TX SP LANG VOICE COMM INDIV: CPT

## 2023-06-23 NOTE — PROGRESS NOTES
Daily Speech Treatment Note    Today's date: 2023  Patient’s name: Albert Fernandez  : 1945  MRN: [id number]  Safety measures: Hx of CVA  Referring provider: Jessee Payne MD    Encounter Diagnosis     ICD-10-CM    1  Aphasia  R47 01       2  Cerebral thrombosis with cerebral infarction (HCC)  I63 30       3  Acute CVA (cerebrovascular accident) ([address] )  I63 9         Visit tracking:  -Referring provider: Epic  -Billing guidelines: CMS  -Visit # - [address]: [address]  Medicare PPO  -RE due     Subjective/Behavioral:  -Patient reports that she is doing good  Objective/Assessment:  -Patient's family member/caregiver was present during today's session  Patient completed Synonym/Antonym homework completed with 100% accuracy  Short-term goals:  TALKING  2  Patient will name an appropriate word opposite (e g , hot and /cold/) with 80% accuracy to build expressive vocabulary for conversation, to be achieved in 4-6 weeks  PARTIALLY MET/ONGOING    3  Patient will name an appropriate word synonym (e g , street or /road/) with 80% accuracy to build expressive vocabulary for conversation, to be achieved in 4-6 weeks  PARTIALLY MET/ONGOING    7  Patient will name up to 5 features to describe a person/place/thing with 80% accuracy to facilitate improved utilization of circumlocution strategy, to be achieved in 4-6 weeks  PARTIALLY MET/ONGOING    8  Patient will complete picture description tasks using language organization strategies with 80% accuracy to facilitate improved utilization of circumlocution strategy, to be achieved in 4-6 weeks  PARTIALLY MET/ONGOING     9  Patient will increase the use of strategies for effective repair of misunderstandings during conversations 80% of the time with minimal cues, to be achieved in 4-6 weeks   PARTIALLY MET/ONGOING     WRITING  12  Patient will complete simple writing tasks at the sentence level with >90% acc to increase "writing skills within FLE, to be achieved in 4-6 weeks  PARTIALLY MET/ONGOING  Patient was provided with a picture depicting a story  She had to write 4 to 5 sentences to describe what was going on in the story  Task completed with 90% accuracy, independently, demonstrating only minor grammatical errors (errors were adding a small work like \"to\" or a difference in tense)  Patient was then provided with opinion driven topics and was asked to write 4 to 5 sentences to explain her position  As this was an abstract task, this proved more challenging  Patient completed task with 80% accuracy, independently, advancing to 100% accuracy with min-mod cueing  Plan:  -Patient was provided with home exercises/activities to target goals in plan of care at the end of today's session   -Continue with current plan of care    "

## 2023-06-27 ENCOUNTER — OFFICE VISIT (OUTPATIENT)
Dept: SPEECH THERAPY | Facility: CLINIC | Age: 78
End: 2023-06-27
Payer: COMMERCIAL

## 2023-06-27 DIAGNOSIS — R47.01 APHASIA: Primary | ICD-10-CM

## 2023-06-27 DIAGNOSIS — I63.9 ACUTE CVA (CEREBROVASCULAR ACCIDENT) (HCC): ICD-10-CM

## 2023-06-27 DIAGNOSIS — I63.30 CEREBRAL THROMBOSIS WITH CEREBRAL INFARCTION (HCC): ICD-10-CM

## 2023-06-27 PROCEDURE — 92507 TX SP LANG VOICE COMM INDIV: CPT

## 2023-06-29 ENCOUNTER — OFFICE VISIT (OUTPATIENT)
Dept: AUDIOLOGY | Age: 78
End: 2023-06-29
Payer: COMMERCIAL

## 2023-06-29 ENCOUNTER — OFFICE VISIT (OUTPATIENT)
Dept: AUDIOLOGY | Age: 78
End: 2023-06-29

## 2023-06-29 DIAGNOSIS — H90.3 SENSORY HEARING LOSS, BILATERAL: Primary | ICD-10-CM

## 2023-06-29 PROCEDURE — 92567 TYMPANOMETRY: CPT

## 2023-06-29 PROCEDURE — 92556 SPEECH AUDIOMETRY COMPLETE: CPT

## 2023-06-29 PROCEDURE — 92552 PURE TONE AUDIOMETRY AIR: CPT

## 2023-06-29 NOTE — PROGRESS NOTES
Hearing Aid Visit:    Name:  Nancy Anderson  :  1945  Age:  70 y o  Date of Evaluation: 23     Kasie Jaramillo is being seen for a hearing aid visit  Patient is fit with [employer] [address] aid(s)  Right serial [id number]  Left serial [id number]  Warranty date: 2024 (Loss/Damage and repair)    Half skeleton power molds length 2 receivers right and left  Patient reports difficulty understanding and that the right hearing aid continuously turns on and off  Action:  The hearing aids were cleaned and checked  Wax guards and domes were replaced, bilaterally  A listening check revealed that the hearing aids are providing adequate amplification or the patients hearing loss  The hearing aids were connected to the software and the latest firmware was updated to the hearing aids  Adjustments were made to the high pitches to assist with clarity, but the patient noted they seemed to loud  She chose to remain with her original settings at this time  2 packs of wax guards were provided upon request     She was encouraged to drop the right hearing aid off to be sent out for repair if the update does not fix the turning off and on issue  Recommendations: Follow up Nini Headley    Clinical Audiologist

## 2023-06-29 NOTE — PROGRESS NOTES
Daily Speech Treatment Note    Today's date: 2023  Patient’s name: Paco Bauer  : 1945  MRN: [id number]  Safety measures: Hx of CVA  Referring provider: Viky Villalba MD    Encounter Diagnosis     ICD-10-CM    1  Aphasia  R47 01       2  Cerebral thrombosis with cerebral infarction (HCC)  I63 30       3  Acute CVA (cerebrovascular accident) ([address] )  I63 9         Visit tracking:  -Referring provider: Epic  -Billing guidelines: CMS  -Visit # - [address]: [address] [employer] Medicare PPO  -RE due  or 12th visit    Subjective/Behavioral:  -Patient reports that she had an appointment with the audiologist due to suspected reduction in hearing following the stroke  Hearing was confirmed as decreased and she is scheduled to see the ENT for follow-up  Her  reports that she didn't sleep well last night and she is having more difficulty with her speech this morning  Objective/Assessment:  -Patient's family member/caregiver was present during today's session  Patient completed homework with 100% accuracy  Short-term goals:  TALKING  2  Patient will name an appropriate word opposite (e g , hot and /cold/) with 80% accuracy to build expressive vocabulary for conversation, to be achieved in 4-6 weeks  PARTIALLY MET/ONGOING    3  Patient will name an appropriate word synonym (e g , street or /road/) with 80% accuracy to build expressive vocabulary for conversation, to be achieved in 4-6 weeks  PARTIALLY MET/ONGOING    7  Patient will name up to 5 features to describe a person/place/thing with 80% accuracy to facilitate improved utilization of circumlocution strategy, to be achieved in 4-6 weeks  PARTIALLY MET/ONGOING    8  Patient will complete picture description tasks using language organization strategies with 80% accuracy to facilitate improved utilization of circumlocution strategy, to be achieved in 4-6 weeks  PARTIALLY MET/ONGOING     9  Patient will increase the use of strategies for effective repair of misunderstandings during conversations 80% of the time with minimal cues, to be achieved in 4-6 weeks  PARTIALLY MET/ONGOING  Patient conversed with the clinician, following prompting of causes to situations/events (e g  what causes a phone call at 3am in the morning?)  Patient provided verbal explanations of at least 3 complete sentences in length, demonstrating clear, grammatical structure 80% of the time  She required min-mod cueing to adjust minor words and tenses of words       WRITING  12  Patient will complete simple writing tasks at the sentence level with >90% acc to increase writing skills within FLE, to be achieved in 4-6 weeks  PARTIALLY MET/ONGOING        Plan:  -Patient was provided with home exercises/activities to target goals in plan of care at the end of today's session   -Continue with current plan of care

## 2023-06-29 NOTE — PROGRESS NOTES
HEARING EVALUATION    Name:  Tolu Gonzalez  :  1945  Age:  70 y o  Date of Evaluation: 23     History: Known Hearing Loss binaurally  Reason for visit: Tolu Gonzalez is being seen today at the request of Dr Susana Abbasi for an evaluation of hearing  Patient has a longstanding sensorineural hearing loss, bilaterally  She had a stroke about 2 months ago and notices an increased difficulty hearing  EVALUATION:    Otoscopic Evaluation:   Right Ear: Clear and healthy ear canal and tympanic membrane   Left Ear: Clear and healthy ear canal and tympanic membrane    Tympanometry:   Right: Type A - normal middle ear pressure and compliance   Left: Type A - normal middle ear pressure and compliance    Audiogram Results:  Pure tone testing revealed a mild sloping to severe sensorineural hearing loss bilaterally  SRT and PTA are in agreement indicating good test reliability  Word recognition scores were good in the left ear and poor in the right ear  Her hearing thresholds are stable, but her word recognition abilities in the right ear has significantly decreased  Patient had many false positives today  *see attached audiogram      RECOMMENDATIONS:  Annual hearing eval, Consult ENT, Return to [facility]  for F/U and Copy to Patient/Caregiver    PATIENT EDUCATION:   Discussed results and recommendations with Kasie  While it is likely that the stroke had an impact on her word recognition abilities, she was encouraged to follow up with her ENT  Questions were addressed and the patient was encouraged to contact our department should concerns arise  Nini Kilpatrick    Clinical Audiologist

## 2023-06-30 ENCOUNTER — OFFICE VISIT (OUTPATIENT)
Dept: SPEECH THERAPY | Facility: CLINIC | Age: 78
End: 2023-06-30
Payer: COMMERCIAL

## 2023-06-30 DIAGNOSIS — I63.9 ACUTE CVA (CEREBROVASCULAR ACCIDENT) (HCC): ICD-10-CM

## 2023-06-30 DIAGNOSIS — R47.01 APHASIA: Primary | ICD-10-CM

## 2023-06-30 DIAGNOSIS — I63.30 CEREBRAL THROMBOSIS WITH CEREBRAL INFARCTION (HCC): ICD-10-CM

## 2023-06-30 PROCEDURE — 92507 TX SP LANG VOICE COMM INDIV: CPT

## 2023-07-03 ENCOUNTER — APPOINTMENT (OUTPATIENT)
Dept: SPEECH THERAPY | Facility: CLINIC | Age: 78
End: 2023-07-03
Payer: COMMERCIAL

## 2023-07-03 NOTE — PROGRESS NOTES
Speech-Language Pathology Discharge    Today's date: 2023   Patient’s name: Rose Mary Hatch  : 1945  MRN: [id number]  Safety measures: Hx of CVA  Referring provider: Briseyda Brandt MD    Encounter Diagnosis     ICD-10-CM    1. Aphasia  R47.01       2. Cerebral thrombosis with cerebral infarction (HCC)  I63.30       3. Acute CVA (cerebrovascular accident) ([address])  I63.9         Visit tracking:  -Referring provider: Epic  -Billing guidelines: CMS  -Visit # - [id number] [address]: [address] Medicare PPO  -RE due  or 12th visit    Subjective comments: The patient reports that she feels good with her progress and is wondering when she might discharge. Her  also reports that he feels good about her discharging today. The times when she has a subtle grammatical error when speaking, she knows it and will self correct. 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 Her doctor thinks she should stay on Eloquist long term as this was likely the primary cause of her stroke. She currently describes her speech as 75% restored. Her  believes she is even more improved than that and that she does her best when she is most comfortable (e.g. with close friends and family). Her best friend said, "I would not even know that you had that problem." after 40 minutes of phone conversation. Her neighbors are saying this as well. The patient and her  both feel that she is ready to discharge at this point as her communication has greatly improved and she is communicating well out and about in the community. Patient's goal(s): She hopes to be a little bit better in conversation. She reports that she "jumbles" every once in a while. But when she is with her friends she doesn't have trouble with that.     Assessments    No formal testing conducted during today's discharge. See goals targeted below. Goals    Short Term Goals     TALKING  2. Patient will name an appropriate word opposite (e.g., hot and /cold/) with 80% accuracy to build expressive vocabulary for conversation, to be achieved in 4-6 weeks. MET     3. Patient will name an appropriate word synonym (e.g., street or /road/) with 80% accuracy to build expressive vocabulary for conversation, to be achieved in 4-6 weeks. MET    7. Patient will name up to 5 features to describe a person/place/thing with 80% accuracy to facilitate improved utilization of circumlocution strategy, to be achieved in 4-6 weeks. MET     8. Patient will complete picture description tasks using language organization strategies with 80% accuracy to facilitate improved utilization of circumlocution strategy, to be achieved in 4-6 weeks. MET     9. Patient will increase the use of strategies for effective repair of misunderstandings during conversations 80% of the time with minimal cues, to be achieved in 4-6 weeks. MET     WRITING     12. Patient will complete simple writing tasks at the sentence level with >90% acc to increase writing skills within FLE, to be achieved in 4-6 weeks. MET   The patient wrote a 3 paragraph (full page) story about 3 of her grandchildren. She then read it aloud. She demonstrated only one sentence that need to be corrected for grammatical error, completing task with greater than 90% accuracy.     Long Term Goals     Patient will demonstrate improved expressive and receptive language skills during structured and unstructured tasks by discharge. MET     Patient will improve ability to facilitate communication to meet needs including use of compensatory strategies to promote meaningful interactions for improved quality of life and maximize level of independence, by discharge.  MET     Patient will use functional communication skills for social interactions (e.g., greetings, social etiquette, and short questions/simple sentences) with both familiar and unfamiliar partners with 90% success, by discharge. MET     Impressions/Recommendations    Impressions:   -Patient has made good progress in speech therapy. She is writing full stories, with multiple paragraphs with minimal errors. She is able to recognize errors and self-correct. She is able to verbally express thoughts/ideas with communication partners for extended periods of time. There is only mild evidence of aphasia in expressive verbal and written language. As the patient and spouse are consistently practicing exercises provided in [address] [place] [address] at home, the patient will most likely continue to see further resolution of any remaining deficits. No further ST is recommended as patient demonstrates good compliance of HEP and has reached goals in therapy. Patient, spouse and clinician are in agreement to discharge at this time from [address] [place] [address] services. Recommendations:  -Patient to be discharged from outpatient skilled Speech Therapy services: Patient achieved all goals in plan of care. -Frequency: No treatment is warranted at this time.  -Duration: N/A    -Intervention Comments:  45 minutes of ST treatment time.

## 2023-07-05 ENCOUNTER — OFFICE VISIT (OUTPATIENT)
Dept: NEUROLOGY | Facility: CLINIC | Age: 78
End: 2023-07-05
Payer: COMMERCIAL

## 2023-07-05 VITALS
HEART RATE: 90 BPM | BODY MASS INDEX: 30.73 KG/M2 | DIASTOLIC BLOOD PRESSURE: 80 MMHG | WEIGHT: 191.2 LBS | HEIGHT: 66 IN | SYSTOLIC BLOOD PRESSURE: 108 MMHG

## 2023-07-05 DIAGNOSIS — E78.5 HYPERLIPIDEMIA: ICD-10-CM

## 2023-07-05 DIAGNOSIS — Z86.73 HISTORY OF CVA (CEREBROVASCULAR ACCIDENT): ICD-10-CM

## 2023-07-05 DIAGNOSIS — I63.30 CEREBRAL THROMBOSIS WITH CEREBRAL INFARCTION (HCC): ICD-10-CM

## 2023-07-05 DIAGNOSIS — I63.9 ACUTE CVA (CEREBROVASCULAR ACCIDENT) (HCC): ICD-10-CM

## 2023-07-05 DIAGNOSIS — Q21.12 PFO (PATENT FORAMEN OVALE): Primary | ICD-10-CM

## 2023-07-05 PROCEDURE — 1160F RVW MEDS BY RX/DR IN RCRD: CPT | Performed by: PSYCHIATRY & NEUROLOGY

## 2023-07-05 PROCEDURE — 1159F MED LIST DOCD IN RCRD: CPT | Performed by: PSYCHIATRY & NEUROLOGY

## 2023-07-05 PROCEDURE — 99215 OFFICE O/P EST HI 40 MIN: CPT | Performed by: PSYCHIATRY & NEUROLOGY

## 2023-07-05 NOTE — PROGRESS NOTES
Patient ID: Alexsandra Lambert is a 70 y.o. female. Assessment/Plan: This is a 71 y/o  Female with DM, hypothyroidism, hyperlipidemia who is here as a hospital follow up for left MCA occlusion/left ICA thrombus, with left MCA and left PCA strokes. Etiology is most concerning for cardioembolic stroke, and ANDREW showed small PFO with atrial septal aneurysm. Cardiology recommended lifelong anticoagulation w/ eliquis, no need for loop recorder since it would not . Most recent CTA reviewed which did not show any evidence of thrombus. PLAN:      Diagnoses and all orders for this visit:    PFO (patent foramen ovale)  -continue with eliquis now  -no need for closure, ROPE score is low.   -follows cardiology    Acute CVA (cerebrovascular accident) ([address])  -     Ambulatory referral to Neurology    Cerebral thrombosis with cerebral infarction [facility])  Etiology of the CVA -   -for stroke prevention continue with combination of eliquis 5mg PO BID and atorvastatin  -BP goal < 130/80, BP is at goal in the office. -LDL goal <70  -I advised patient to avoid using NSAIDs for headaches or other pain and to stick to tylenol if needed  -Recommend mediterranean diet & regular exercise regimen atleast 4-5 times a week for 20-30 minutes. -I educated patient/family regarding medication compliance    -     Ambulatory referral to Neurology    Hyperlipidemia    History of CVA (cerebrovascular accident)       Follow up - 6 months     I would be happy to see the patient sooner if any new questions/concerns arise. Patient/Guardian was advised to the call the office if they have any questions and concerns in the meantime.      Patient/Guardian does understand that if they have any new stroke like symptoms such as facial droop on one side, weakness/paralysis on either side, speech trouble, numbness on one side, balance issues, any vision changes, extreme dizziness or any new headache, to call 9-1-1 immediately or to proceed to the nearest ER immediately. Subjective:    HPI    This is a 69 y/o F who is here as a hospital follow-up for left MCA stroke. Patient presented with strokelike symptoms, frontal headaches, palpitations. Patient had a CT head which was negative for any acute findings. Patient did have a CTA head and neck which showed a left MCA occlusion and proximal left cervical ICA thrombus. Was thought to be partially occlusive and potentially mobile. Was started on aspirin and Plavix and was just DC'd and heparin drip was placed. Patient had a ANDREW ordered which showed a small PFO with a with atrial septal aneurysm. Patient was then started on Eliquis few days after. Had a repeat CTA head and neck and also followed up with cardiology since then and they recommended lifelong anticoagulation with Eliquis did not recommend loop recorder. The rope score was low PFO closure was also not recommended. She is doing well does not have any new TIA or CVA-like symptoms patient is compliant medication she is tolerating her Eliquis without any issues. Not have any residual deficits except for minor speech issues for which she is getting speech therapy twice a week. The following portions of the patient's history were reviewed and updated as appropriate:   She  has a past medical history of Biliary dyskinesia, CAP (community acquired pneumonia), Deep vein thrombosis (DVT) of proximal vein of right lower extremity, unspecified chronicity ([address]) (8/10/2022), Diabetes mellitus ([address]) (12/2018), Disease of thyroid gland (2000), GERD (gastroesophageal reflux disease), Hyperlipidemia, Palpitations, Pneumonia, Shortness of breath, and SVT (supraventricular tachycardia) ([address]) (6/8/2022).   She   Patient Active Problem List    Diagnosis Date Noted   • History of CVA (cerebrovascular accident) 07/05/2023   • Frontal headache 05/24/2023   • Hypokalemia 05/15/2023   • PFO (patent foramen ovale) 05/15/2023   • Cerebral thrombosis with cerebral infarction ([address]) 05/13/2023   • Palpitations 05/13/2023   • Acute non-recurrent sphenoidal sinusitis 05/11/2023   • COVID-19 05/11/2023   • Tachycardia 05/02/2023   • Trigger finger 02/24/2023   • Benign essential tremor 02/24/2023   • Urinary incontinence 08/14/2022   • Right groin pain 08/14/2022   • Osteoarthritis 12/14/2021   • Edema 06/08/2021   • Thyroid nodule 06/08/2021   • Exposure to COVID-19 virus 12/15/2020   • Right flank pain 11/02/2020   • Insomnia 09/03/2020   • Oral ulceration 09/03/2020   • Dyspnea on exertion 03/12/2020   • Medicare annual wellness visit, subsequent 02/28/2020   • Essential hypertension 08/20/2019   • Encounter for screening mammogram for breast cancer 08/20/2019   • Right lower quadrant abdominal pain 02/14/2019   • Pelvic pain 02/14/2019   • Family history of ovarian cancer 02/14/2019   • Wellness examination 02/14/2019   • Type 2 diabetes mellitus without complication, without long-term current use of insulin ([address]) 11/18/2018   • Atypical chest pain 11/18/2018   • Lymphedema 05/08/2018   • Fatigue 05/08/2018   • Hyperlipidemia 05/08/2018   • Hypothyroidism 05/08/2018   • Class 1 obesity due to excess calories with serious comorbidity and body mass index (BMI) of 33.0 to 33.9 in adult 05/08/2018   • Dupuytren's contracture 04/03/2017   • Fibrocystic breast changes 12/27/2016   • Diverticular disease of colon 08/22/2016   • Gastro-esophageal reflux disease with esophagitis 08/22/2016   • History of adenomatous polyp of colon 08/22/2016   • Dense breast tissue on mammogram 08/16/2016   • Hypothyroidism due to Hashimoto's thyroiditis 04/05/2016   • Impaired fasting glucose 11/06/2012   • Nephrolithiasis 11/06/2012     She  has a past surgical history that includes Cholecystectomy; Hemorrhoid surgery; Tubal ligation; Cardiac catheterization; Rotator cuff repair (Bilateral); Breast excisional biopsy (Left, 1993); Breast biopsy (Left, 1974);  Breast biopsy (Left, 1994); and Augmentation mammaplasty (Bilateral, 1998). Her family history includes Alcohol abuse in her father; Aneurysm in her brother and mother; Cancer in her brother and mother; Cirrhosis in her father; Diabetes in her son; No Known Problems in her daughter, maternal aunt, maternal aunt, maternal aunt, maternal aunt, maternal grandfather, maternal grandmother, paternal aunt, paternal aunt, paternal aunt, paternal aunt, paternal aunt, paternal grandfather, paternal grandmother, son, son, and son; Ovarian cancer (age of onset: 67) in her mother. She  reports that she quit smoking about 13 years ago. Her smoking use included cigarettes. She started smoking about 63 years ago. She has a 4.25 pack-year smoking history. She has never used smokeless tobacco. She reports current alcohol use of about 2.0 standard drinks of alcohol per week. She reports that she does not use drugs.   Current Outpatient Medications   Medication Sig Dispense Refill   • apixaban (Eliquis) 5 mg Take 1 tablet (5 mg total) by mouth 2 (two) times a day 180 tablet 0   • Ascorbic Acid (VITAMIN C) 1000 MG tablet Take 1 tablet by mouth daily     • atorvastatin (LIPITOR) 40 mg tablet Take 1 tablet (40 mg total) by mouth daily with dinner 90 tablet 1   • Biotin 5000 MCG CAPS Take 1 capsule by mouth daily     • cholecalciferol (VITAMIN D3) 1,000 units tablet Take 2,000 Units by mouth daily     • diphenhydrAMINE-acetaminophen (TYLENOL PM)  MG TABS Take 1 tablet by mouth daily at bedtime as needed for sleep     • esomeprazole (NexIUM) 40 MG capsule Take 1 capsule by mouth daily     • fluticasone (FLONASE) 50 mcg/act nasal spray USE 2 SPRAYS IN EACH NOSTRIL DAILY 48 g 3   • levothyroxine 125 mcg tablet TAKE 1 TABLET DAILY ON AN EMPTY STOMACH 90 tablet 3   • Magnesium 250 MG TABS Take 250 mg by mouth in the morning     • Menthol, Topical Analgesic, (BIOFREEZE EX) Apply 1 application topically daily as needed (pain)     • metoprolol tartrate (LOPRESSOR) 25 mg tablet Take one tablet as needed for palpitations (Patient taking differently: if needed Take one tablet as needed for palpitations) 90 tablet 2   • Multiple Vitamin (MULTIVITAMIN) capsule Take 1 capsule by mouth daily     • Probiotic Product (ALIGN PO) Take 1 capsule by mouth in the morning     • psyllium (METAMUCIL) 58.6 % powder Take 1 packet by mouth daily       No current facility-administered medications for this visit.      Current Outpatient Medications on File Prior to Visit   Medication Sig   • apixaban (Eliquis) 5 mg Take 1 tablet (5 mg total) by mouth 2 (two) times a day   • Ascorbic Acid (VITAMIN C) 1000 MG tablet Take 1 tablet by mouth daily   • atorvastatin (LIPITOR) 40 mg tablet Take 1 tablet (40 mg total) by mouth daily with dinner   • Biotin 5000 MCG CAPS Take 1 capsule by mouth daily   • cholecalciferol (VITAMIN D3) 1,000 units tablet Take 2,000 Units by mouth daily   • diphenhydrAMINE-acetaminophen (TYLENOL PM)  MG TABS Take 1 tablet by mouth daily at bedtime as needed for sleep   • esomeprazole (NexIUM) 40 MG capsule Take 1 capsule by mouth daily   • fluticasone (FLONASE) 50 mcg/act nasal spray USE 2 SPRAYS IN EACH NOSTRIL DAILY   • levothyroxine 125 mcg tablet TAKE 1 TABLET DAILY ON AN EMPTY STOMACH   • Magnesium 250 MG TABS Take 250 mg by mouth in the morning   • Menthol, Topical Analgesic, (BIOFREEZE EX) Apply 1 application topically daily as needed (pain)   • metoprolol tartrate (LOPRESSOR) 25 mg tablet Take one tablet as needed for palpitations (Patient taking differently: if needed Take one tablet as needed for palpitations)   • Multiple Vitamin (MULTIVITAMIN) capsule Take 1 capsule by mouth daily   • Probiotic Product (ALIGN PO) Take 1 capsule by mouth in the morning   • psyllium (METAMUCIL) 58.6 % powder Take 1 packet by mouth daily   • [DISCONTINUED] naproxen sodium (ALEVE) 220 MG tablet Take by mouth     No current facility-administered medications on file prior to visit. She is allergic to hydrochlorothiazide and meloxicam..         Objective:    Blood pressure 108/80, pulse 90, height 5' 6" (1.676 m), weight 86.7 kg (191 lb 3.2 oz), not currently breastfeeding. Physical Exam  General - patient is alert   Speech - no dysarthria noted, no aphasia noted. Neuro:   Cranial nerves: PERRL, EOMI, facial sensation intact to soft touch in V1, V2 and V3, no facial asymmetry noted, uvula/palate midline, tongue midline. Motor: 5/5 throughout, normal tone, no pronator drift noted. Sensory - intact to soft touch throughout  Reflexes - 2+ throughout  Coordination - no ataxia/dysmetria noted  Gait - normal   Neurological Exam      ROS:  Reviewed ROS   Review of Systems   Constitutional: Negative for appetite change, fatigue and fever. HENT: Negative. Negative for hearing loss, tinnitus, trouble swallowing and voice change. Eyes: Negative. Negative for photophobia, pain and visual disturbance. Respiratory: Negative. Negative for shortness of breath. Cardiovascular: Negative. Negative for palpitations. Gastrointestinal: Negative. Negative for nausea and vomiting. Endocrine: Negative. Negative for cold intolerance. Genitourinary: Negative. Negative for dysuria, frequency and urgency. Musculoskeletal: Negative for back pain, gait problem, myalgias and neck pain. Skin: Negative. Negative for rash. Allergic/Immunologic: Negative. Neurological: Positive for speech difficulty. Negative for dizziness, tremors, seizures, syncope, facial asymmetry, weakness, light-headedness, numbness and headaches. Patient states having asphasia    Hematological: Negative. Does not bruise/bleed easily. Psychiatric/Behavioral: Negative. Negative for confusion, hallucinations and sleep disturbance.

## 2023-07-07 ENCOUNTER — OFFICE VISIT (OUTPATIENT)
Dept: SPEECH THERAPY | Facility: CLINIC | Age: 78
End: 2023-07-07
Payer: COMMERCIAL

## 2023-07-07 DIAGNOSIS — I63.9 ACUTE CVA (CEREBROVASCULAR ACCIDENT) (HCC): ICD-10-CM

## 2023-07-07 DIAGNOSIS — I63.30 CEREBRAL THROMBOSIS WITH CEREBRAL INFARCTION (HCC): ICD-10-CM

## 2023-07-07 DIAGNOSIS — R47.01 APHASIA: Primary | ICD-10-CM

## 2023-07-07 PROCEDURE — 92507 TX SP LANG VOICE COMM INDIV: CPT

## 2023-07-10 PROBLEM — J01.30 ACUTE NON-RECURRENT SPHENOIDAL SINUSITIS: Status: RESOLVED | Noted: 2023-05-11 | Resolved: 2023-07-10

## 2023-07-11 ENCOUNTER — APPOINTMENT (OUTPATIENT)
Dept: SPEECH THERAPY | Facility: CLINIC | Age: 78
End: 2023-07-11
Payer: COMMERCIAL

## 2023-07-14 ENCOUNTER — APPOINTMENT (OUTPATIENT)
Dept: SPEECH THERAPY | Facility: CLINIC | Age: 78
End: 2023-07-14
Payer: COMMERCIAL

## 2023-07-18 ENCOUNTER — APPOINTMENT (OUTPATIENT)
Dept: SPEECH THERAPY | Facility: CLINIC | Age: 78
End: 2023-07-18
Payer: COMMERCIAL

## 2023-07-21 ENCOUNTER — APPOINTMENT (OUTPATIENT)
Dept: SPEECH THERAPY | Facility: CLINIC | Age: 78
End: 2023-07-21
Payer: COMMERCIAL

## 2023-07-25 ENCOUNTER — APPOINTMENT (OUTPATIENT)
Dept: SPEECH THERAPY | Facility: CLINIC | Age: 78
End: 2023-07-25
Payer: COMMERCIAL

## 2023-07-28 ENCOUNTER — OFFICE VISIT (OUTPATIENT)
Dept: AUDIOLOGY | Age: 78
End: 2023-07-28

## 2023-07-28 ENCOUNTER — APPOINTMENT (OUTPATIENT)
Dept: SPEECH THERAPY | Facility: CLINIC | Age: 78
End: 2023-07-28
Payer: COMMERCIAL

## 2023-07-28 DIAGNOSIS — H90.3 SENSORY HEARING LOSS, BILATERAL: Primary | ICD-10-CM

## 2023-07-28 NOTE — PROGRESS NOTES
Hearing Aid Visit:    Name:  Nimesh Chen  :  1945  Age:  70 y.o. Date of Evaluation: 23     Kasie Eng is being seen for a hearing aid visit. Patient is fit with [employer] More 3 miniRITE-T hearing aid(s). Right serial [id number]. Left serial [id number]. Warranty date: 2024 (Loss/Damage and repair).   Half skeleton power molds length 2 receivers right and left. Patient reports right hearing aid continues to be intermittent. She would like both hearing aids to be sent in for service/check. Action:  Provided patient with loaner More 3 hearing aids. Patient signed loaner agreement. Loaners were coupled with 6DB domes. Software adjustments were made to reflect change in coupling. Patient requested aids be turned up slightly (2 clicks). Loaners were paired to patient's phone. Recommendations:   Patient will be contacted to schedule HAV when aids are back from repair.       Nini Webb., CCC-A  Clinical Audiologist

## 2023-08-08 DIAGNOSIS — I63.9 ACUTE CVA (CEREBROVASCULAR ACCIDENT) (HCC): ICD-10-CM

## 2023-08-16 ENCOUNTER — OFFICE VISIT (OUTPATIENT)
Dept: AUDIOLOGY | Age: 78
End: 2023-08-16

## 2023-08-16 DIAGNOSIS — H90.3 SENSORY HEARING LOSS, BILATERAL: Primary | ICD-10-CM

## 2023-08-16 NOTE — PROGRESS NOTES
Hearing Aid Visit:    Name:  Taco Segura  :  1945  Age:  70 y.o. Date of Evaluation: 23     Kasie Maxwell is being seen for a hearing aid visit. Patient is fit with [employer] More 3 miniRITE-T hearing aid(s). Right serial [id number]. Left serial number [id number]. Warranty date: 2024 (Loss/Damage and repair).   Half skeleton power molds length 2 receivers right and left. Patient picked up repaired hearing aids and returned loaners. Action:  Hearing aids were paired to her phone and patient voiced satisfaction. No changes were made today. Recommendations: Follow up Nini Davis.   Clinical Audiologist

## 2023-08-17 ENCOUNTER — OFFICE VISIT (OUTPATIENT)
Dept: AUDIOLOGY | Age: 78
End: 2023-08-17

## 2023-08-17 DIAGNOSIS — H90.3 SENSORY HEARING LOSS, BILATERAL: Primary | ICD-10-CM

## 2023-08-17 NOTE — PROGRESS NOTES
Hearing Aid Visit:    Name:  Lulú Marino  :  1945  Age:  70 y.o. Date of Evaluation: 23     Kasie Ngo Laura is being seen for a hearing aid visit. Patient is fit with [employer] More 3 miniRITE-T hearing aid(s). Right serial [id number]. Left serial [id number]. Warranty date: 2024 (Loss/Damage and repair).   Half skeleton power molds length 2 receivers right and left. Patient had questions regarding Bluetooth connection and phone calls. Action:  All questions were addressed. Patient was comfortable using her phone/hearing aids for phone calls. Recommendations:    Follow up as needed     Siva Abdul CCC-A  Clinical Audiologist

## 2023-08-28 ENCOUNTER — HOSPITAL ENCOUNTER (OUTPATIENT)
Dept: RADIOLOGY | Age: 78
Discharge: HOME | End: 2023-08-28
Payer: COMMERCIAL

## 2023-08-28 VITALS — WEIGHT: 177 LBS | BODY MASS INDEX: 28.45 KG/M2 | HEIGHT: 66 IN

## 2023-08-28 DIAGNOSIS — Z12.31 SCREENING MAMMOGRAM, ENCOUNTER FOR: ICD-10-CM

## 2023-08-28 DIAGNOSIS — E11.9 TYPE 2 DIABETES MELLITUS WITHOUT COMPLICATION, WITHOUT LONG-TERM CURRENT USE OF INSULIN (HCC): Primary | ICD-10-CM

## 2023-08-28 PROCEDURE — 77063 BREAST TOMOSYNTHESIS BI: CPT

## 2023-08-28 PROCEDURE — 77067 SCR MAMMO BI INCL CAD: CPT

## 2023-08-30 ENCOUNTER — OFFICE VISIT (OUTPATIENT)
Dept: AUDIOLOGY | Age: 78
End: 2023-08-30

## 2023-08-30 DIAGNOSIS — H90.3 SENSORY HEARING LOSS, BILATERAL: Primary | ICD-10-CM

## 2023-08-30 NOTE — PROGRESS NOTES
Hearing Aid Visit:    Name:  Jose Robles  :  1945  Age:  70 y.o. Date of Evaluation: 23     Kasie Quinteros is being seen for a hearing aid visit. Patient is fit with [employer] More 3 miniRITE-T hearing aid(s). Right serial [id number]. Left serial [id number]. Warranty date: 2024 (Loss/Damage and repair).   Half skeleton power molds length 2 receivers right and left. Patient reports the hearing aids are randomly cutting out. Hearing aid will turn off and within seconds turn back on. Action:  Cleaned and checked. Told patient if it continues would need to be sent back in for repair. Recommendations: Follow up as needed.       Siva Javier, [facility]-A  Clinical Audiologist

## 2023-08-31 ENCOUNTER — APPOINTMENT (OUTPATIENT)
Dept: LAB | Age: 78
End: 2023-08-31
Payer: COMMERCIAL

## 2023-08-31 DIAGNOSIS — E11.9 TYPE 2 DIABETES MELLITUS WITHOUT COMPLICATION, WITHOUT LONG-TERM CURRENT USE OF INSULIN (HCC): ICD-10-CM

## 2023-08-31 LAB
ALBUMIN SERPL BCP-MCNC: 3.9 G/DL (ref 3.5–5)
ALP SERPL-CCNC: 36 U/L (ref 34–104)
ALT SERPL W P-5'-P-CCNC: 18 U/L (ref 7–52)
ANION GAP SERPL CALCULATED.3IONS-SCNC: 8 MMOL/L
AST SERPL W P-5'-P-CCNC: 19 U/L (ref 13–39)
BILIRUB SERPL-MCNC: 0.54 MG/DL (ref 0.2–1)
BUN SERPL-MCNC: 11 MG/DL (ref 5–25)
CALCIUM SERPL-MCNC: 9.4 MG/DL (ref 8.4–10.2)
CHLORIDE SERPL-SCNC: 104 MMOL/L (ref 96–108)
CHOLEST SERPL-MCNC: 135 MG/DL
CO2 SERPL-SCNC: 29 MMOL/L (ref 21–32)
CREAT SERPL-MCNC: 0.6 MG/DL (ref 0.6–1.3)
CREAT UR-MCNC: 50.1 MG/DL
EST. AVERAGE GLUCOSE BLD GHB EST-MCNC: 134 MG/DL
GFR SERPL CREATININE-BSD FRML MDRD: 88 ML/MIN/1.73SQ M
GLUCOSE P FAST SERPL-MCNC: 108 MG/DL (ref 65–99)
HBA1C MFR BLD: 6.3 %
HDLC SERPL-MCNC: 52 MG/DL
LDLC SERPL CALC-MCNC: 56 MG/DL (ref 0–100)
MICROALBUMIN UR-MCNC: <7 MG/L
MICROALBUMIN/CREAT 24H UR: <14 MG/G CREATININE (ref 0–30)
POTASSIUM SERPL-SCNC: 4 MMOL/L (ref 3.5–5.3)
PROT SERPL-MCNC: 6.2 G/DL (ref 6.4–8.4)
SODIUM SERPL-SCNC: 141 MMOL/L (ref 135–147)
TRIGL SERPL-MCNC: 133 MG/DL
TSH SERPL DL<=0.05 MIU/L-ACNC: 0.71 UIU/ML (ref 0.45–4.5)

## 2023-08-31 PROCEDURE — 84443 ASSAY THYROID STIM HORMONE: CPT

## 2023-08-31 PROCEDURE — 36415 COLL VENOUS BLD VENIPUNCTURE: CPT

## 2023-08-31 PROCEDURE — 80053 COMPREHEN METABOLIC PANEL: CPT

## 2023-08-31 PROCEDURE — 83036 HEMOGLOBIN GLYCOSYLATED A1C: CPT

## 2023-08-31 PROCEDURE — 80061 LIPID PANEL: CPT

## 2023-08-31 PROCEDURE — 3044F HG A1C LEVEL LT 7.0%: CPT | Performed by: PSYCHIATRY & NEUROLOGY

## 2023-09-06 RX ORDER — NYSTATIN 100000 U/G
CREAM TOPICAL 2 TIMES DAILY PRN
COMMUNITY
Start: 2023-08-29 | End: 2024-08-28

## 2023-09-06 RX ORDER — TRIAMCINOLONE ACETONIDE 5 MG/G
OINTMENT TOPICAL
COMMUNITY
Start: 2023-08-29

## 2023-09-12 ENCOUNTER — OFFICE VISIT (OUTPATIENT)
Dept: INTERNAL MEDICINE CLINIC | Facility: CLINIC | Age: 78
End: 2023-09-12
Payer: COMMERCIAL

## 2023-09-12 VITALS
BODY MASS INDEX: 28.87 KG/M2 | HEIGHT: 66 IN | WEIGHT: 179.6 LBS | DIASTOLIC BLOOD PRESSURE: 78 MMHG | SYSTOLIC BLOOD PRESSURE: 122 MMHG | RESPIRATION RATE: 16 BRPM | HEART RATE: 72 BPM | OXYGEN SATURATION: 96 %

## 2023-09-12 DIAGNOSIS — E11.9 TYPE 2 DIABETES MELLITUS WITHOUT COMPLICATION, WITHOUT LONG-TERM CURRENT USE OF INSULIN (HCC): Primary | ICD-10-CM

## 2023-09-12 DIAGNOSIS — E06.3 HYPOTHYROIDISM DUE TO HASHIMOTO'S THYROIDITIS: ICD-10-CM

## 2023-09-12 DIAGNOSIS — Z00.00 MEDICARE ANNUAL WELLNESS VISIT, SUBSEQUENT: ICD-10-CM

## 2023-09-12 DIAGNOSIS — E78.2 MIXED HYPERLIPIDEMIA: ICD-10-CM

## 2023-09-12 DIAGNOSIS — Z86.73 HISTORY OF CVA (CEREBROVASCULAR ACCIDENT): ICD-10-CM

## 2023-09-12 DIAGNOSIS — R32 URINARY INCONTINENCE, UNSPECIFIED TYPE: ICD-10-CM

## 2023-09-12 DIAGNOSIS — E03.8 HYPOTHYROIDISM DUE TO HASHIMOTO'S THYROIDITIS: ICD-10-CM

## 2023-09-12 PROBLEM — E66.3 OVERWEIGHT (BMI 25.0-29.9): Status: ACTIVE | Noted: 2023-09-12

## 2023-09-12 PROCEDURE — G0439 PPPS, SUBSEQ VISIT: HCPCS | Performed by: INTERNAL MEDICINE

## 2023-09-12 PROCEDURE — 99214 OFFICE O/P EST MOD 30 MIN: CPT | Performed by: INTERNAL MEDICINE

## 2023-09-12 NOTE — ASSESSMENT & PLAN NOTE
Lab Results   Component Value Date    HGBA1C 6.3 (H) 08/31/2023   I have counselled the pt to follow a healthy and balanced diet ,and recommend routine exercise. I will be ordering diabetic laboratories including comprehensive metabolic panel, hemoglobin A1c, urine microalbumin, lipid panel.

## 2023-09-12 NOTE — ASSESSMENT & PLAN NOTE
Hyperlipidemia controlled continue with current medical regiment recommend a low-cholesterol diet and recommend routine exercise we will continue to monitor the progress.   Continue Lipitor 40 mg once daily

## 2023-09-12 NOTE — PATIENT INSTRUCTIONS
Medicare Preventive Visit Patient Instructions  Thank you for completing your Welcome to Medicare Visit or Medicare Annual Wellness Visit today. Your next wellness visit will be due in one year (9/12/2024). The screening/preventive services that you may require over the next 5-10 years are detailed below. Some tests may not apply to you based off risk factors and/or age. Screening tests ordered at today's visit but not completed yet may show as past due. Also, please note that scanned in results may not display below. Preventive Screenings:  Service Recommendations Previous Testing/Comments   Colorectal Cancer Screening  * Colonoscopy    * Fecal Occult Blood Test (FOBT)/Fecal Immunochemical Test (FIT)  * Fecal DNA/Cologuard Test  * Flexible Sigmoidoscopy Age: 43-73 years old   Colonoscopy: every 10 years (may be performed more frequently if at higher risk)  OR  FOBT/FIT: every 1 year  OR  Cologuard: every 3 years  OR  Sigmoidoscopy: every 5 years  Screening may be recommended earlier than age 39 if at higher risk for colorectal cancer. Also, an individualized decision between you and your healthcare provider will decide whether screening between the ages of 77-80 would be appropriate. Colonoscopy: 11/24/2020  FOBT/FIT: Not on file  Cologuard: Not on file  Sigmoidoscopy: Not on file          Breast Cancer Screening Age: 36 years old  Frequency: every 1-2 years  Not required if history of left and right mastectomy Mammogram: 08/28/2023        Cervical Cancer Screening Between the ages of 21-29, pap smear recommended once every 3 years. Between the ages of 32-69, can perform pap smear with HPV co-testing every 5 years.    Recommendations may differ for women with a history of total hysterectomy, cervical cancer, or abnormal pap smears in past. Pap Smear: Not on file        Hepatitis C Screening Once for adults born between [address] [place] [address]  More frequently in patients at high risk for Hepatitis C Hep C Antibody: 03/21/2017        Diabetes Screening 1-2 times per year if you're at risk for diabetes or have pre-diabetes Fasting glucose: 108 mg/dL (8/31/2023)  A1C: 6.3 % (8/31/2023)      Cholesterol Screening Once every 5 years if you don't have a lipid disorder. May order more often based on risk factors. Lipid panel: 08/31/2023          Other Preventive Screenings Covered by Medicare:  1. Abdominal Aortic Aneurysm (AAA) Screening: covered once if your at risk. You're considered to be at risk if you have a family history of AAA. 2. Lung Cancer Screening: covers low dose CT scan once per year if you meet all of the following conditions: (1) Age 48-67; (2) No signs or symptoms of lung cancer; (3) Current smoker or have quit smoking within the last 15 years; (4) You have a tobacco smoking history of at least 20 pack years (packs per day multiplied by number of years you smoked); (5) You get a written order from a healthcare provider. 3. Glaucoma Screening: covered annually if you're considered high risk: (1) You have diabetes OR (2) Family history of glaucoma OR (3)  aged 48 and older OR (3)  American aged 72 and older  3. Osteoporosis Screening: covered every 2 years if you meet one of the following conditions: (1) You're estrogen deficient and at risk for osteoporosis based off medical history and other findings; (2) Have a vertebral abnormality; (3) On glucocorticoid therapy for more than 3 months; (4) Have primary hyperparathyroidism; (5) On osteoporosis medications and need to assess response to drug therapy. · Last bone density test (DXA Scan): 06/01/2022.  5. HIV Screening: covered annually if you're between the age of 15-65. Also covered annually if you are younger than 13 and older than 72 with risk factors for HIV infection. For pregnant patients, it is covered up to 3 times per pregnancy.     Immunizations:  Immunization Recommendations   Influenza Vaccine Annual influenza vaccination during flu season is recommended for all persons aged >= 6 months who do not have contraindications   Pneumococcal Vaccine   * Pneumococcal conjugate vaccine = PCV13 (Prevnar 13), PCV15 (Vaxneuvance), PCV20 (Prevnar 20)  * Pneumococcal polysaccharide vaccine = PPSV23 (Pneumovax) Adults 20-63 years old: 1-3 doses may be recommended based on certain risk factors  Adults 72 years old: 1-2 doses may be recommended based off what pneumonia vaccine you previously received   Hepatitis B Vaccine 3 dose series if at intermediate or high risk (ex: diabetes, end stage renal disease, liver disease)   Tetanus (Td) Vaccine - COST NOT COVERED BY MEDICARE PART B Following completion of primary series, a booster dose should be given every 10 years to maintain immunity against tetanus. Td may also be given as tetanus wound prophylaxis. Tdap Vaccine - COST NOT COVERED BY MEDICARE PART B Recommended at least once for all adults. For pregnant patients, recommended with each pregnancy. Shingles Vaccine (Shingrix) - COST NOT COVERED BY MEDICARE PART B  2 shot series recommended in those aged 48 and above     Health Maintenance Due:      Topic Date Due   • Breast Cancer Screening: Mammogram  08/28/2024   • Hepatitis C Screening  Completed   • Colorectal Cancer Screening  Discontinued     Immunizations Due:      Topic Date Due   • COVID-19 Vaccine (6 - Moderna series) 12/26/2022     Advance Directives   What are advance directives? Advance directives are legal documents that state your wishes and plans for medical care. These plans are made ahead of time in case you lose your ability to make decisions for yourself. Advance directives can apply to any medical decision, such as the treatments you want, and if you want to donate organs. What are the types of advance directives? There are many types of advance directives, and each state has rules about how to use them.  You may choose a combination of any of the following:  · Living will: This is a written record of the treatment you want. You can also choose which treatments you do not want, which to limit, and which to stop at a certain time. This includes surgery, medicine, IV fluid, and tube feedings. · Durable power of  for [facility]): This is a written record that states who you want to make healthcare choices for you when you are unable to make them for yourself. This person, called a proxy, is usually a family member or a friend. You may choose more than 1 proxy. · Do not resuscitate (DNR) order:  A DNR order is used in case your heart stops beating or you stop breathing. It is a request not to have certain forms of treatment, such as CPR. A DNR order may be included in other types of advance directives. · Medical directive: This covers the care that you want if you are in a coma, near death, or unable to make decisions for yourself. You can list the treatments you want for each condition. Treatment may include pain medicine, surgery, blood transfusions, dialysis, IV or tube feedings, and a ventilator (breathing machine). · Values history: This document has questions about your views, beliefs, and how you feel and think about life. This information can help others choose the care that you would choose. Why are advance directives important? An advance directive helps you control your care. Although spoken wishes may be used, it is better to have your wishes written down. Spoken wishes can be misunderstood, or not followed. Treatments may be given even if you do not want them. An advance directive may make it easier for your family to make difficult choices about your care. Weight Management   Why it is important to manage your weight:  Being overweight increases your risk of health conditions such as heart disease, high blood pressure, type 2 diabetes, and certain types of cancer.  It can also increase your risk for osteoarthritis, sleep apnea, and other respiratory problems. Aim for a slow, steady weight loss. Even a small amount of weight loss can lower your risk of health problems. How to lose weight safely:  A safe and healthy way to lose weight is to eat fewer calories and get regular exercise. You can lose up about 1 pound a week by decreasing the number of calories you eat by 500 calories each day. Healthy meal plan for weight management:  A healthy meal plan includes a variety of foods, contains fewer calories, and helps you stay healthy. A healthy meal plan includes the following:  · Eat whole-grain foods more often. A healthy meal plan should contain fiber. Fiber is the part of grains, fruits, and vegetables that is not broken down by your body. Whole-grain foods are healthy and provide extra fiber in your diet. Some examples of whole-grain foods are whole-wheat breads and pastas, oatmeal, brown rice, and bulgur. · Eat a variety of vegetables every day. Include dark, leafy greens such as spinach, kale, veronica greens, and mustard greens. Eat yellow and orange vegetables such as carrots, sweet potatoes, and winter squash. · Eat a variety of fruits every day. Choose fresh or canned fruit (canned in its own juice or light syrup) instead of juice. Fruit juice has very little or no fiber. · Eat low-fat dairy foods. Drink fat-free (skim) milk or 1% milk. Eat fat-free yogurt and low-fat cottage cheese. Try low-fat cheeses such as mozzarella and other reduced-fat cheeses. · Choose meat and other protein foods that are low in fat. Choose beans or other legumes such as split peas or lentils. Choose fish, skinless poultry (chicken or turkey), or lean cuts of red meat (beef or pork). Before you cook meat or poultry, cut off any visible fat. · Use less fat and oil. Try baking foods instead of frying them. Add less fat, such as margarine, sour cream, regular salad dressing and mayonnaise to foods. Eat fewer high-fat foods.  Some examples of high-fat foods include french fries, doughnuts, ice cream, and cakes. · Eat fewer sweets. Limit foods and drinks that are high in sugar. This includes candy, cookies, regular soda, and sweetened drinks. Exercise:  Exercise at least 30 minutes per day on most days of the week. Some examples of exercise include walking, biking, dancing, and swimming. You can also fit in more physical activity by taking the stairs instead of the elevator or parking farther away from stores. Ask your healthcare provider about the best exercise plan for you. © Copyright [employer] 2018 Information is for End User's use only and may not be sold, redistributed or otherwise used for commercial purposes. All illustrations and images included in CareNotes® are the copyrighted property of [employer] or [address] [place] [address]    Urinary Incontinence   AMBULATORY CARE:   Urinary incontinence (UI)  is when you lose control of your bladder. UI develops because your bladder cannot store or empty urine properly. The 3 most common types of UI are stress incontinence, urge incontinence, or both. Common symptoms include the following:   • You feel like your bladder does not empty completely when you urinate. • You urinate often and need to urinate immediately. • You leak urine when you sleep, or you wake up with the urge to urinate. • You leak urine when you cough, sneeze, exercise, or laugh. Call your doctor if:   • You have severe pain. • You are confused or cannot think clearly. • You have a fever. • You see blood in your urine. • You have pain when you urinate. • You have new or worse pain, even after treatment. • Your mouth feels dry or you have vision changes. • Your urine is cloudy or smells bad. • You have questions or concerns about your condition or care. Medicines:   • Medicines  may be given to help strengthen your bladder control. • Take your medicine as directed.   Contact your healthcare provider if you think your medicine is not helping or if you have side effects. Tell your provider if you are allergic to any medicine. Keep a list of the medicines, vitamins, and herbs you take. Include the amounts, and when and why you take them. Bring the list or the pill bottles to follow-up visits. Carry your medicine list with you in case of an emergency. Do pelvic muscle exercises often:  Your pelvic muscles help you stop urinating. Squeeze these muscles tight for 5 seconds, then relax for 5 seconds. Gradually work up to squeezing for 10 seconds. Do 3 sets of 15 repetitions a day, or as directed. This will help strengthen your pelvic muscles and improve bladder control. Train your bladder:  Go to the bathroom at set times, such as every 2 hours, even if you do not feel the urge to go. You can also try to hold your urine when you feel the urge to go. For example, hold your urine for 5 minutes when you feel the urge to go. As that becomes easier, hold your urine for 10 minutes. Self-care:   • Keep a UI record. Write down how often you leak urine and how much you leak. Make a note of what you were doing when you leaked urine. • Drink liquids as directed. Ask your healthcare provider how much liquid to drink each day and which liquids are best for you. You may need to limit the amount of liquid you drink to help control your urine leakage. Do not drink any liquid right before you go to bed. Limit or do not have drinks that contain caffeine or alcohol. • Prevent constipation. Eat a variety of high-fiber foods. Good examples are high-fiber cereals, beans, vegetables, and whole-grain breads. Prune juice may help make your bowel movement softer. Walking is the best way to trigger your intestines to have a bowel movement. • Exercise regularly and maintain a healthy weight. Ask your healthcare provider how much you should weigh and about the best exercise plan for you.  Weight loss and exercise will decrease pressure on your bladder and help you control your leakage. Ask him or her to help you create a weight loss plan if you are overweight. • Use a catheter as directed  to help empty your bladder. A catheter is a tiny, plastic tube that is put into your bladder to drain your urine. Your healthcare provider may tell you to use a catheter to prevent your bladder from getting too full and leaking urine. • Go to behavior therapy as directed. Behavior therapy may be used to help you learn to control your urge to urinate. Follow up with your doctor as directed:  Write down your questions so you remember to ask them during your visits. © Copyright Elisha Goltz 2022 Information is for End User's use only and may not be sold, redistributed or otherwise used for commercial purposes. The above information is an  only. It is not intended as medical advice for individual conditions or treatments. Talk to your doctor, nurse or pharmacist before following any medical regimen to see if it is safe and effective for you. Type 2 Diabetes Management for Adults   AMBULATORY CARE:   Type 2 diabetes  is a disease that affects how your body uses glucose (sugar). Either your body cannot make enough insulin, or it cannot use the insulin correctly. It is important to keep diabetes controlled to prevent damage to your heart, blood vessels, and other organs. Management will help you feel well and enjoy your daily activities. Your diabetes care team providers can help you make a plan to fit diabetes care into your schedule. Your plan can change over time to fit your needs and your family's needs. Have someone call your local emergency number (911 in the [address]) if:   • You cannot be woken. • You have signs of diabetic ketoacidosis:     ? confusion, fatigue    ? vomiting    ?  rapid heartbeat    ? fruity smelling breath    ? extreme thirst    ? dry mouth and skin    • You have any of the following signs of a heart attack:      ? Squeezing, pressure, or pain in your chest    ? You may  also have any of the following:     - Discomfort or pain in your back, neck, jaw, stomach, or arm    - Shortness of breath    - Nausea or vomiting    - Lightheadedness or a sudden cold sweat    • You have any of the following signs of a stroke:      ? Numbness or drooping on one side of your face     ? Weakness in an arm or leg    ? Confusion or difficulty speaking    ? Dizziness, a severe headache, or vision loss    Call your doctor or diabetes care team provider if:   • You have a sore or wound that will not heal.    • You have a change in the amount you urinate. • Your blood sugar levels are higher than your target goals. • You often have lower blood sugar levels than your target goals. • Your skin is red, dry, warm, or swollen. • You have trouble coping with diabetes, or you feel anxious or depressed. • You have questions or concerns about your condition or care. What you need to know about high blood sugar levels:  High blood sugar levels may not cause any symptoms. You may feel more thirsty or urinate more often than usual. Over time, high blood sugar levels can damage your nerves, blood vessels, tissues, and organs. The following can increase your blood sugar levels:  • Large meals or large amounts of carbohydrates at one time    • Less physical activity    • Stress    • Illness    • A lower dose of diabetes medicine or insulin, or a late dose    What you need to know about low blood sugar levels:  Symptoms include feeling shaky, dizzy, irritable, or confused. You can prevent symptoms by keeping your blood sugar levels from going too low. • Treat a low blood sugar level right away:      ? Drink 4 ounces of juice or have 1 tube of glucose gel. ? Check your blood sugar level again 10 to 15 minutes later. ? When the level goes back to normal, eat a meal or snack to prevent another decrease.        • Keep glucose gel, raisins, or hard candy with you at all times to treat a low blood sugar level. • Your blood sugar level can get too low if you take diabetes medicine or insulin and do not eat enough food. • If you use insulin, check your blood sugar level before you exercise. ? If your blood sugar level is below 100 mg/dL, eat 4 crackers or 2 ounces of raisins, or drink 4 ounces of juice. ? Check your level every 30 minutes if you exercise longer than 1 hour. ? You may need a snack during or after exercise. What you can do to manage your blood sugar levels:   • Check your blood sugar levels as directed and as needed. Several items are available to use to check your levels. You may need to check by testing a drop of blood in a glucose monitor. You may instead be given a continuous glucose monitoring (CGM) device. The device is worn at all times. The CGM checks your blood sugar level every 5 minutes. It sends results to an electronic device such as a smart phone. A CGM can be used with or without an insulin pump. You and your diabetes care team providers will decide on the best method for you. The goal for blood sugar levels before meals  is between 80 and 130 mg/dL and 2 hours after eating  is lower than 180 mg/dL. • Make healthy food choices. Work with a dietitian to develop a meal plan that works for you and your schedule. A dietitian can help you learn how to eat the right amount of carbohydrates during your meals and snacks. Carbohydrates can raise your blood sugar level if you eat too many at one time. Examples of foods that contain carbohydrates are breads, cereals, rice, pasta, and sweets. • Eat high-fiber foods as directed. Fiber helps improve blood sugar levels. Fiber also lowers your risk for heart disease and other problems diabetes can cause. Examples of high-fiber foods include vegetables, whole-grain bread, and beans such as simmons beans.  Your dietitian can tell you how much fiber to have each day. • Get regular physical activity. Physical activity can help you get to your target blood sugar level goal and manage your weight. Get at least 150 minutes of moderate to vigorous aerobic physical activity each week. Do not miss more than 2 days in a row. Do not sit longer than 30 minutes at a time. Your healthcare provider can help you create an activity plan. The plan can include the best activities for you and can help you build your strength and endurance. • Maintain a healthy weight. Ask your team what a healthy weight is for you. A healthy weight can help you control diabetes and prevent heart disease. Ask your team to help you create a weight loss plan, if needed. Weight loss can help make a difference in managing diabetes. Your team will help you set a weight-loss goal, such as 10 to 15 pounds, or 5% of your extra weight. Together you and your team can set manageable weight loss goals. • Take your diabetes medicine or insulin as directed. You may need diabetes medicine, insulin, or both to help control your blood sugar levels. Your healthcare provider will teach you how and when to take your diabetes medicine or insulin. You will also be taught about side effects oral diabetes medicine can cause. Insulin may be injected or given through a pump or pen. You and your providers will decide on the best method for you:    ? An insulin pump  is an implanted device that gives your insulin 24 hours a day. An insulin pump prevents the need for multiple insulin injections in a day. ? An insulin pen  is a device prefilled with the right amount of insulin. ? You and your family members will be taught how to draw up and give insulin  if this is the best method for you. Your providers will also teach you how to dispose of needles and syringes. ? You will learn how much insulin you need  and when to give it. You will be taught when not to give insulin.  You will also be taught what to do if your blood sugar level drops too low. This may happen if you take insulin and do not eat the right amount of carbohydrates. More ways to manage type 2 diabetes:   • Wear medical alert identification. Wear medical alert jewelry or carry a card that says you have diabetes. Ask your provider where to get these items. • Do not smoke. Nicotine and other chemicals in cigarettes and cigars can cause lung and blood vessel damage. It also makes it more difficult to manage your diabetes. Ask your provider for information if you currently smoke and need help to quit. Do not use e-cigarettes or smokeless tobacco in place of cigarettes or to help you quit. They still contain nicotine. • Check your feet each day for cuts, scratches, calluses, or other wounds. Look for redness and swelling, and feel for warmth. Wear shoes that fit well. Check your shoes for rocks or other objects that can hurt your feet. Do not walk barefoot or wear shoes without socks. Wear cotton socks to help keep your feet dry. • Ask about vaccines you may need. You have a higher risk for serious illness if you get the flu, pneumonia, COVID-19, or hepatitis. Ask your provider if you should get vaccines to prevent these or other diseases, and when to get the vaccines. • Talk to your provider if you become stressed about diabetes care. Sometimes being able to fit diabetes care into your life can cause increased stress. The stress can cause you not to take care of yourself properly. Your care team providers can help by offering tips about self-care. Your providers may suggest you talk to a mental health provider who can listen and offer help with self-care issues. • Have your A1c checked as directed. Your provider may check your A1c every 3 months, or 2 times each year if your diabetes is controlled. An A1c test shows the average amount of sugar in your blood over the past 2 to 3 months.  Your provider will tell you what your A1c level should be. • Have screening tests as directed. Your provider may recommend screening for complications of diabetes and other conditions that may develop. Some screenings may begin right away and some may happen within the first 5 years of diagnosis:    ? Examples of diabetes complications  include kidney problems, high cholesterol, high blood pressure, blood vessel problems, eye problems, and sleep apnea. ? You may be screened for a low vitamin B level  if you take oral diabetes medicine for a long time. ? Women of childbearing years may be screened  for polycystic ovarian syndrome (PCOS). Follow up with your doctor or diabetes care team providers as directed: You may need to have blood tests done before your follow-up visit. The test results will show if changes need to be made in your treatment or self-care. Talk to your provider if you cannot afford your medicine. Write down your questions so you remember to ask them during your visits. © Copyright [employer] 2022 Information is for End User's use only and may not be sold, redistributed or otherwise used for commercial purposes. The above information is an  only. It is not intended as medical advice for individual conditions or treatments. Talk to your doctor, nurse or pharmacist before following any medical regimen to see if it is safe and effective for you.

## 2023-09-12 NOTE — ASSESSMENT & PLAN NOTE
Assessment and plan 1. Medicare subsequent annual wellness examination overall the patient is clinically stable and doing well, we encouraged the patient to follow a healthy and balanced diet. We recommend that the patient exercise routinely approximately 30 minutes 5 times per week . We have reviewed the patient's vaccines and have made recommendations for updates if necessary   annual flu shot recommended   . We will be ordering screening laboratories which are age appropriate. Return to the office in   6 months   call if any problems.

## 2023-09-12 NOTE — ASSESSMENT & PLAN NOTE
Making very nice progress she has had significant improvements in her fascia she has completed physical therapy she will continue with Eliquis

## 2023-09-12 NOTE — PROGRESS NOTES
Assessment and Plan:     Problem List Items Addressed This Visit        Endocrine    Type 2 diabetes mellitus without complication, without long-term current use of insulin ([address]) - Primary       Lab Results   Component Value Date    HGBA1C 6.3 (H) 08/31/2023   I have counselled the pt to follow a healthy and balanced diet ,and recommend routine exercise. I will be ordering diabetic laboratories including comprehensive metabolic panel, hemoglobin A1c, urine microalbumin, lipid panel. Relevant Orders    Hemoglobin A1C    Comprehensive metabolic panel    Lipid Panel with Direct LDL reflex    Microalbumin, Random Urine (W/Creatinine) ([facility] ONLY)    Hypothyroidism due to Hashimoto's thyroiditis    Relevant Orders    TSH, 3rd generation with Free T4 reflex       Other    Hyperlipidemia     Hyperlipidemia controlled continue with current medical regiment recommend a low-cholesterol diet and recommend routine exercise we will continue to monitor the progress. Continue Lipitor 40 mg once daily         Medicare annual wellness visit, subsequent     Assessment and plan 1. Medicare subsequent annual wellness examination overall the patient is clinically stable and doing well, we encouraged the patient to follow a healthy and balanced diet. We recommend that the patient exercise routinely approximately 30 minutes 5 times per week . We have reviewed the patient's vaccines and have made recommendations for updates if necessary   annual flu shot recommended   . We will be ordering screening laboratories which are age appropriate. Return to the office in   6 months   call if any problems. Urinary incontinence     Recommend Kegel/pelvic floor exercises symptoms are mild         History of CVA (cerebrovascular accident)     Making very nice progress she has had significant improvements in her fascia she has completed physical therapy she will continue with Eliquis           BMI Counseling:  Body mass index is 28.99 kg/m². The BMI is above normal. Nutrition recommendations include decreasing portion sizes and moderation in carbohydrate intake. Exercise recommendations include exercising 3-5 times per week. Rationale for BMI follow-up plan is due to patient being overweight or obese. Depression Screening and Follow-up Plan: Patient was screened for depression during today's encounter. They screened negative with a PHQ-2 score of 1. Preventive health issues were discussed with patient, and age appropriate screening tests were ordered as noted in patient's After Visit Summary. Personalized health advice and appropriate referrals for health education or preventive services given if needed, as noted in patient's After Visit Summary. History of Present Illness:     Patient presents for a Medicare Wellness Visit    HPI 75-year old female coming in for a follow up office visit regarding type 2 diabetes, overweight, urinary incontinence, hypothyroidism, mixed hyperlipidemia and status post stroke shortly following COVID-19; the patient reports me compliant taking medications without untoward side effects the. The patient is here to review his medical condition, update me on the medical condition and the patient reports me no hospitalizations and no ER visits. No injuries no illnesses she is making very nice progress on her aphasia she has completed physical therapy and has seen the neurologist.  Overall is doing very well here to review laboratories in detail trying to follow healthy balanced diet and remains active. Patient is requesting over-the-counter medicine for weight loss  Patient Care Team:  González Ramsay DO as PCP - Harman Beal, MD Roberts Mare, MD Lizzie Smalls, DO Madelon Rash, MD Hassan Holm, MD Risa Stage, MD Worth Klinefelter, SHERRY     Review of Systems:   Patient's shoes and socks removed.     Right Foot/Ankle   Right Foot Inspection  Skin Exam: skin normal and skin intact. No dry skin, no warmth, no callus, no erythema, no maceration, no abnormal color, no pre-ulcer, no ulcer and no callus. Toe Exam: ROM and strength within normal limits. Sensory   Monofilament testing: intact    Left Foot/Ankle  Left Foot Inspection  Skin Exam: skin normal and skin intact. No dry skin, no warmth, no erythema, no maceration, normal color, no pre-ulcer, no ulcer and no callus. Toe Exam: ROM and strength within normal limits. Sensory   Monofilament testing: intact    Review of Systems   Constitutional: Negative for activity change, appetite change and unexpected weight change. HENT: Negative for congestion and postnasal drip. Eyes: Negative for visual disturbance. Respiratory: Negative for cough and shortness of breath. Cardiovascular: Negative for chest pain. Gastrointestinal: Negative for abdominal pain, diarrhea, nausea and vomiting. Neurological: Negative for dizziness, light-headedness and headaches. Improving aphasia expressive   Hematological: Negative for adenopathy.         Problem List:     Patient Active Problem List   Diagnosis   • Lymphedema   • Fatigue   • Hyperlipidemia   • Hypothyroidism   • Class 1 obesity due to excess calories with serious comorbidity and body mass index (BMI) of 33.0 to 33.9 in adult   • Type 2 diabetes mellitus without complication, without long-term current use of insulin (HCC)   • Atypical chest pain   • Right lower quadrant abdominal pain   • Pelvic pain   • Family history of ovarian cancer   • Wellness examination   • Dense breast tissue on mammogram   • Fibrocystic breast changes   • Dupuytren's contracture   • Diverticular disease of colon   • Gastro-esophageal reflux disease with esophagitis   • History of adenomatous polyp of colon   • Hypothyroidism due to Hashimoto's thyroiditis   • Impaired fasting glucose   • Nephrolithiasis   • Essential hypertension   • Encounter for screening mammogram for breast cancer   • Medicare annual wellness visit, subsequent   • Dyspnea on exertion   • Insomnia   • Oral ulceration   • Right flank pain   • Exposure to COVID-19 virus   • Edema   • Thyroid nodule   • Osteoarthritis   • Urinary incontinence   • Right groin pain   • Trigger finger   • Benign essential tremor   • Tachycardia   • COVID-19   • Cerebral thrombosis with cerebral infarction [facility])   • Palpitations   • Hypokalemia   • PFO (patent foramen ovale)   • Frontal headache   • History of CVA (cerebrovascular accident)   • Overweight (BMI 25.0-29. 9)      Past Medical and Surgical History:     Past Medical History:   Diagnosis Date   • Biliary dyskinesia    • CAP (community acquired pneumonia)     last assessed 8/17/16   • Deep vein thrombosis (DVT) of proximal vein of right lower extremity, unspecified chronicity ([address]) 8/10/2022   • Diabetes mellitus ([address]) 12/2018    Type 2 no insulin   • Disease of thyroid gland 2000   • GERD (gastroesophageal reflux disease)    • Hyperlipidemia    • Palpitations    • Pneumonia    • Shortness of breath    • SVT (supraventricular tachycardia) ([address]) 6/8/2022     Past Surgical History:   Procedure Laterality Date   • AUGMENTATION MAMMAPLASTY Bilateral 1998    breast surgery- with prosthetic implant 1998; pre-pectoral salline   • BREAST BIOPSY Left 1974   • BREAST BIOPSY Left 1994   • BREAST EXCISIONAL BIOPSY Left 1993   • CARDIAC CATHETERIZATION      procedure summary   • CHOLECYSTECTOMY      onset 2003   • HEMORRHOID SURGERY     • ROTATOR CUFF REPAIR Bilateral     onset 2010   • TUBAL LIGATION      onset 1980      Family History:     Family History   Problem Relation Age of Onset   • Aneurysm Mother         cerebral   • Ovarian cancer Mother 67   • Cancer Mother    • Cirrhosis Father         alcoholic   • Alcohol abuse Father    • Aneurysm Brother         abdominal aortic; cerebral   • Diabetes Son    • No Known Problems Daughter    • No Known Problems Maternal Grandmother    • No Known Problems Maternal Grandfather    • No Known Problems Paternal Grandmother    • No Known Problems Paternal Grandfather    • No Known Problems Son    • No Known Problems Son    • No Known Problems Son    • No Known Problems Maternal Aunt    • No Known Problems Maternal Aunt    • No Known Problems Maternal Aunt    • No Known Problems Maternal Aunt    • No Known Problems Paternal Aunt    • No Known Problems Paternal Aunt    • No Known Problems Paternal Aunt    • No Known Problems Paternal Aunt    • No Known Problems Paternal Aunt    • Cancer Brother         bladder cancer      Social History:     Social History     Socioeconomic History   • Marital status: /Civil Union     Spouse name: None   • Number of children: None   • Years of education: 12   • Highest education level: None   Occupational History   • None   Tobacco Use   • Smoking status: Former     Packs/day: 0.25     Years: 17.00     Total pack years: 4.25     Types: Cigarettes     Start date:      Quit date: 2010     Years since quittin.7   • Smokeless tobacco: Never   • Tobacco comments:     0.5 ppw intermittently last smoked    Vaping Use   • Vaping Use: Never used   Substance and Sexual Activity   • Alcohol use:  Yes     Alcohol/week: 2.0 standard drinks of alcohol     Types: 2 Glasses of wine per week     Comment: Maybe 2 glasses a month   • Drug use: No   • Sexual activity: Not Currently     Partners: Male     Birth control/protection: Post-menopausal     Comment: I'm 77   Other Topics Concern   • None   Social History Narrative    Caffeine use     Social Determinants of Health     Financial Resource Strain: Low Risk  (2023)    Overall Financial Resource Strain (CARDIA)    • Difficulty of Paying Living Expenses: Not hard at all   Food Insecurity: No Food Insecurity (5/15/2023)    Hunger Vital Sign    • Worried About Running Out of Food in the Last Year: Never true    • Ran Out of Food in the Last Year: Never true   Transportation Needs: No Transportation Needs (9/12/2023)    PRAPARE - Transportation    • Lack of Transportation (Medical): No    • Lack of Transportation (Non-Medical):  No   Physical Activity: Inactive (8/22/2022)    Exercise Vital Sign    • Days of Exercise per Week: 0 days    • Minutes of Exercise per Session: 0 min   Stress: Not on file   Social Connections: Not on file   Intimate Partner Violence: Not on file   Housing Stability: Low Risk  (5/15/2023)    Housing Stability Vital Sign    • Unable to Pay for Housing in the Last Year: No    • Number of Places Lived in the Last Year: 1    • Unstable Housing in the Last Year: No      Medications and Allergies:     Current Outpatient Medications   Medication Sig Dispense Refill   • apixaban (Eliquis) 5 mg Take 1 tablet (5 mg total) by mouth 2 (two) times a day 180 tablet 3   • Ascorbic Acid (VITAMIN C) 1000 MG tablet Take 1 tablet by mouth daily     • Biotin 5000 MCG CAPS Take 1 capsule by mouth daily     • cholecalciferol (VITAMIN D3) 1,000 units tablet Take 2,000 Units by mouth daily     • diphenhydrAMINE-acetaminophen (TYLENOL PM)  MG TABS Take 1 tablet by mouth daily at bedtime as needed for sleep     • esomeprazole (NexIUM) 40 MG capsule Take 1 capsule by mouth daily     • fluticasone (FLONASE) 50 mcg/act nasal spray USE 2 SPRAYS IN EACH NOSTRIL DAILY 48 g 3   • levothyroxine 125 mcg tablet TAKE 1 TABLET DAILY ON AN EMPTY STOMACH 90 tablet 3   • Magnesium 250 MG TABS Take 250 mg by mouth in the morning     • Menthol, Topical Analgesic, (BIOFREEZE EX) Apply 1 application topically daily as needed (pain)     • metoprolol tartrate (LOPRESSOR) 25 mg tablet Take one tablet as needed for palpitations (Patient taking differently: if needed Take one tablet as needed for palpitations) 90 tablet 2   • Multiple Vitamin (MULTIVITAMIN) capsule Take 1 capsule by mouth daily     • nystatin (MYCOSTATIN) cream Apply topically 2 (two) times a day as needed • Probiotic Product (ALIGN PO) Take 1 capsule by mouth in the morning     • psyllium (METAMUCIL) 58.6 % powder Take 1 packet by mouth daily     • triamcinolone (KENALOG) 0.5 % ointment      • atorvastatin (LIPITOR) 40 mg tablet Take 1 tablet (40 mg total) by mouth daily with dinner 90 tablet 1     No current facility-administered medications for this visit. Allergies   Allergen Reactions   • Hydrochlorothiazide Palpitations   • Meloxicam Rash      Immunizations:     Immunization History   Administered Date(s) Administered   • COVID-19 MODERNA VACC 0.25 ML IM BOOSTER 10/31/2022   • COVID-19 MODERNA VACC 0.5 ML IM 01/25/2021, 02/22/2021, 10/27/2021, 04/27/2022   • H1N1, All Formulations 01/13/2010   • INFLUENZA 09/19/2018, 08/19/2021, 09/01/2023   • Influenza Quadrivalent 3 years and older 09/01/2023   • Influenza Split High Dose Preservative Free IM 09/17/2016, 08/21/2017, 08/30/2019   • Influenza, high dose seasonal 0.7 mL 09/19/2018, 08/26/2020, 08/24/2022   • Pneumococcal Conjugate 13-Valent 01/04/2017   • Pneumococcal Polysaccharide PPV23 05/29/2012, 05/29/2012   • Td (adult), adsorbed 01/01/2004   • Tdap 08/16/2016   • Zoster 08/27/2007, 05/08/2018, 07/15/2018   • Zoster Vaccine Recombinant 05/08/2018, 07/15/2018      Health Maintenance:         Topic Date Due   • Breast Cancer Screening: Mammogram  08/28/2024   • Hepatitis C Screening  Completed   • Colorectal Cancer Screening  Discontinued         Topic Date Due   • COVID-19 Vaccine (6 - Moderna series) 12/26/2022      Medicare Screening Tests and Risk Assessments:     Chloe Hernandez is here for her Subsequent Wellness visit. Health Risk Assessment:   Patient rates overall health as good. Patient feels that their physical health rating is much worse. Patient is satisfied with their life. Eyesight was rated as slightly worse. Hearing was rated as much worse. Patient feels that their emotional and mental health rating is same.  Patients states they are never, rarely angry. Patient states they are sometimes unusually tired/fatigued. Pain experienced in the last 7 days has been some. Patient's pain rating has been 6/10. Patient states that she has experienced no weight loss or gain in last 6 months. Arthritis of the feet and the big toe  Working with podiatry Dr Darren Mei ,     Fall Risk Screening: In the past year, patient has experienced: no history of falling in past year      Urinary Incontinence Screening:   Patient has leaked urine accidently in the last six months. Home Safety:  Patient does not have trouble with stairs inside or outside of their home. Patient has working smoke alarms and has working carbon monoxide detector. Home safety hazards include: none. Nutrition:   Current diet is Frequent junk food and Low Carb. Medications:   Patient is currently taking over-the-counter supplements. OTC medications include: Biotin, vit D3, multi vitamin, magnesium, align,. Patient is able to manage medications. Activities of Daily Living (ADLs)/Instrumental Activities of Daily Living (IADLs):   Walk and transfer into and out of bed and chair?: Yes  Dress and groom yourself?: Yes    Bathe or shower yourself?: Yes    Feed yourself? Yes  Do your laundry/housekeeping?: Yes  Manage your money, pay your bills and track your expenses?: Yes  Make your own meals?: Yes    Do your own shopping?: Yes    Previous Hospitalizations:   Any hospitalizations or ED visits within the last 12 months?: Yes    How many hospitalizations have you had in the last year?: 1-2    Advance Care Planning:   Living will: Yes    Durable POA for healthcare:  Yes    Advanced directive: Yes      Cognitive Screening:   Provider or family/friend/caregiver concerned regarding cognition?: Yes    Cognition Comments: Earlier in yr from stroke had difficulty but improved    PREVENTIVE SCREENINGS      Cardiovascular Screening:    General: Screening Not Indicated and History Lipid Disorder      Diabetes Screening:     General: Screening Not Indicated and History Diabetes      Breast Cancer Screening:     General: Screening Current      Cervical Cancer Screening:    General: Screening Not Indicated      Lung Cancer Screening:     General: Screening Not Indicated      Hepatitis C Screening:    General: Screening Current    Screening, Brief Intervention, and Referral to Treatment (SBIRT)    Screening  Typical number of drinks in a day: 0  Typical number of drinks in a week: 0  Interpretation: Low risk drinking behavior. AUDIT-C Screenin) How often did you have a drink containing alcohol in the past year? monthly or less  2) How many drinks did you have on a typical day when you were drinking in the past year? 0  3) How often did you have 6 or more drinks on one occasion in the past year? never    AUDIT-C Score: 1  Interpretation: Score 0-2 (female): Negative screen for alcohol misuse    Single Item Drug Screening:  How often have you used an illegal drug (including marijuana) or a prescription medication for non-medical reasons in the past year? never    Single Item Drug Screen Score: 0  Interpretation: Negative screen for possible drug use disorder    No results found. Physical Exam:     /78   Pulse 72   Resp 16   Ht 5' 6" (1.676 m)   Wt 81.5 kg (179 lb 9.6 oz)   SpO2 96%   BMI 28.99 kg/m²     Physical Exam  Vitals and nursing note reviewed. Constitutional:       General: She is not in acute distress. Appearance: Normal appearance. She is well-developed. She is not ill-appearing or toxic-appearing. HENT:      Head: Normocephalic and atraumatic. Right Ear: External ear normal.      Left Ear: External ear normal.      Nose: Nose normal.   Eyes:      Pupils: Pupils are equal, round, and reactive to light. Cardiovascular:      Rate and Rhythm: Normal rate and regular rhythm. Heart sounds: Normal heart sounds. No murmur heard.   Pulmonary:      Effort: Pulmonary effort is normal. Breath sounds: Normal breath sounds. Abdominal:      General: There is no distension. Palpations: Abdomen is soft. Tenderness: There is no abdominal tenderness. There is no guarding. Feet:      Right foot:      Skin integrity: No ulcer, skin breakdown, erythema, warmth, callus or dry skin. Left foot:      Skin integrity: No ulcer, skin breakdown, erythema, warmth, callus or dry skin. Neurological:      Mental Status: She is alert.           Clementina Villalta DO

## 2023-09-12 NOTE — ASSESSMENT & PLAN NOTE
For weight loss over-the-counter we did recommend Otto  Patient is requesting a medication to be  by 2 to 3 hours from all other medications prior to lunch meal also recommend a multivitamin once daily following a healthy and balanced diet routine exercise

## 2023-10-02 ENCOUNTER — APPOINTMENT (EMERGENCY)
Dept: CT IMAGING | Facility: HOSPITAL | Age: 78
End: 2023-10-02
Payer: COMMERCIAL

## 2023-10-02 ENCOUNTER — HOSPITAL ENCOUNTER (OUTPATIENT)
Facility: HOSPITAL | Age: 78
Setting detail: OBSERVATION
Discharge: HOME | End: 2023-10-03
Attending: EMERGENCY MEDICINE | Admitting: INTERNAL MEDICINE
Payer: COMMERCIAL

## 2023-10-02 DIAGNOSIS — R29.90 STROKE-LIKE SYMPTOMS: ICD-10-CM

## 2023-10-02 DIAGNOSIS — I10 ESSENTIAL HYPERTENSION: ICD-10-CM

## 2023-10-02 DIAGNOSIS — R29.90 STROKE-LIKE SYMPTOM: Primary | ICD-10-CM

## 2023-10-02 DIAGNOSIS — R29.810 FACIAL DROOP: ICD-10-CM

## 2023-10-02 PROBLEM — G43.109 OCULAR MIGRAINE: Status: ACTIVE | Noted: 2023-10-02

## 2023-10-02 LAB
2HR DELTA HS TROPONIN: 1 NG/L
4HR DELTA HS TROPONIN: 1 NG/L
ANION GAP SERPL CALCULATED.3IONS-SCNC: 11 MMOL/L
APTT PPP: 31 SECONDS (ref 23–37)
BILIRUB UR QL STRIP: NEGATIVE
BUN SERPL-MCNC: 10 MG/DL (ref 5–25)
CALCIUM SERPL-MCNC: 10 MG/DL (ref 8.4–10.2)
CARDIAC TROPONIN I PNL SERPL HS: 2 NG/L
CARDIAC TROPONIN I PNL SERPL HS: 3 NG/L
CARDIAC TROPONIN I PNL SERPL HS: 3 NG/L
CHLORIDE SERPL-SCNC: 105 MMOL/L (ref 96–108)
CLARITY UR: CLEAR
CO2 SERPL-SCNC: 24 MMOL/L (ref 21–32)
COLOR UR: NORMAL
CREAT SERPL-MCNC: 0.68 MG/DL (ref 0.6–1.3)
ERYTHROCYTE [DISTWIDTH] IN BLOOD BY AUTOMATED COUNT: 13.4 % (ref 11.6–15.1)
FLUAV RNA RESP QL NAA+PROBE: NEGATIVE
FLUBV RNA RESP QL NAA+PROBE: NEGATIVE
GFR SERPL CREATININE-BSD FRML MDRD: 85 ML/MIN/1.73SQ M
GLUCOSE SERPL-MCNC: 131 MG/DL (ref 65–140)
GLUCOSE SERPL-MCNC: 132 MG/DL (ref 65–140)
GLUCOSE SERPL-MCNC: 137 MG/DL (ref 65–140)
GLUCOSE UR STRIP-MCNC: NEGATIVE MG/DL
HCT VFR BLD AUTO: 42.3 % (ref 34.8–46.1)
HGB BLD-MCNC: 13.5 G/DL (ref 11.5–15.4)
HGB UR QL STRIP.AUTO: NEGATIVE
INR PPP: 1.07 (ref 0.84–1.19)
KETONES UR STRIP-MCNC: NEGATIVE MG/DL
LEUKOCYTE ESTERASE UR QL STRIP: NEGATIVE
MCH RBC QN AUTO: 28.5 PG (ref 26.8–34.3)
MCHC RBC AUTO-ENTMCNC: 31.9 G/DL (ref 31.4–37.4)
MCV RBC AUTO: 89 FL (ref 82–98)
NITRITE UR QL STRIP: NEGATIVE
PH UR STRIP.AUTO: 7 [PH]
PLATELET # BLD AUTO: 315 THOUSANDS/UL (ref 149–390)
PMV BLD AUTO: 9.9 FL (ref 8.9–12.7)
POTASSIUM SERPL-SCNC: 3.4 MMOL/L (ref 3.5–5.3)
PROT UR STRIP-MCNC: NEGATIVE MG/DL
PROTHROMBIN TIME: 14.5 SECONDS (ref 11.6–14.5)
RBC # BLD AUTO: 4.74 MILLION/UL (ref 3.81–5.12)
RSV RNA RESP QL NAA+PROBE: NEGATIVE
SARS-COV-2 RNA RESP QL NAA+PROBE: NEGATIVE
SODIUM SERPL-SCNC: 140 MMOL/L (ref 135–147)
SP GR UR STRIP.AUTO: 1.02 (ref 1–1.03)
UROBILINOGEN UR STRIP-ACNC: <2 MG/DL
WBC # BLD AUTO: 8.4 THOUSAND/UL (ref 4.31–10.16)

## 2023-10-02 PROCEDURE — 81003 URINALYSIS AUTO W/O SCOPE: CPT | Performed by: EMERGENCY MEDICINE

## 2023-10-02 PROCEDURE — 70496 CT ANGIOGRAPHY HEAD: CPT

## 2023-10-02 PROCEDURE — 85027 COMPLETE CBC AUTOMATED: CPT

## 2023-10-02 PROCEDURE — 99223 1ST HOSP IP/OBS HIGH 75: CPT | Performed by: NURSE PRACTITIONER

## 2023-10-02 PROCEDURE — 82948 REAGENT STRIP/BLOOD GLUCOSE: CPT

## 2023-10-02 PROCEDURE — 36415 COLL VENOUS BLD VENIPUNCTURE: CPT

## 2023-10-02 PROCEDURE — 84484 ASSAY OF TROPONIN QUANT: CPT

## 2023-10-02 PROCEDURE — 93005 ELECTROCARDIOGRAM TRACING: CPT

## 2023-10-02 PROCEDURE — 99285 EMERGENCY DEPT VISIT HI MDM: CPT

## 2023-10-02 PROCEDURE — 85610 PROTHROMBIN TIME: CPT

## 2023-10-02 PROCEDURE — 0241U HB NFCT DS VIR RESP RNA 4 TRGT: CPT

## 2023-10-02 PROCEDURE — NC001 PR NO CHARGE: Performed by: STUDENT IN AN ORGANIZED HEALTH CARE EDUCATION/TRAINING PROGRAM

## 2023-10-02 PROCEDURE — 85730 THROMBOPLASTIN TIME PARTIAL: CPT

## 2023-10-02 PROCEDURE — 70498 CT ANGIOGRAPHY NECK: CPT

## 2023-10-02 PROCEDURE — 80048 BASIC METABOLIC PNL TOTAL CA: CPT

## 2023-10-02 RX ORDER — ATORVASTATIN CALCIUM 40 MG/1
40 TABLET, FILM COATED ORAL
Status: DISCONTINUED | OUTPATIENT
Start: 2023-10-03 | End: 2023-10-03 | Stop reason: HOSPADM

## 2023-10-02 RX ORDER — ASPIRIN 81 MG/1
81 TABLET, CHEWABLE ORAL DAILY
Status: DISCONTINUED | OUTPATIENT
Start: 2023-10-03 | End: 2023-10-03 | Stop reason: HOSPADM

## 2023-10-02 RX ORDER — LANOLIN ALCOHOL/MO/W.PET/CERES
400 CREAM (GRAM) TOPICAL DAILY
Status: DISCONTINUED | OUTPATIENT
Start: 2023-10-03 | End: 2023-10-03 | Stop reason: HOSPADM

## 2023-10-02 RX ORDER — LEVOTHYROXINE SODIUM 0.12 MG/1
125 TABLET ORAL
Status: DISCONTINUED | OUTPATIENT
Start: 2023-10-03 | End: 2023-10-03 | Stop reason: HOSPADM

## 2023-10-02 RX ORDER — SACCHAROMYCES BOULARDII 250 MG
250 CAPSULE ORAL DAILY
Status: DISCONTINUED | OUTPATIENT
Start: 2023-10-03 | End: 2023-10-03 | Stop reason: HOSPADM

## 2023-10-02 RX ORDER — ASCORBIC ACID 500 MG
1000 TABLET ORAL DAILY
Status: DISCONTINUED | OUTPATIENT
Start: 2023-10-03 | End: 2023-10-03 | Stop reason: HOSPADM

## 2023-10-02 RX ORDER — PANTOPRAZOLE SODIUM 40 MG/1
40 TABLET, DELAYED RELEASE ORAL
Status: DISCONTINUED | OUTPATIENT
Start: 2023-10-03 | End: 2023-10-03 | Stop reason: HOSPADM

## 2023-10-02 RX ORDER — MELATONIN
2000 DAILY
Status: DISCONTINUED | OUTPATIENT
Start: 2023-10-03 | End: 2023-10-03 | Stop reason: HOSPADM

## 2023-10-02 RX ORDER — ACETAMINOPHEN 325 MG/1
650 TABLET ORAL EVERY 6 HOURS PRN
Status: DISCONTINUED | OUTPATIENT
Start: 2023-10-02 | End: 2023-10-03 | Stop reason: HOSPADM

## 2023-10-02 RX ORDER — FLUTICASONE PROPIONATE 50 MCG
2 SPRAY, SUSPENSION (ML) NASAL DAILY
Status: DISCONTINUED | OUTPATIENT
Start: 2023-10-03 | End: 2023-10-03 | Stop reason: HOSPADM

## 2023-10-02 RX ORDER — POTASSIUM CHLORIDE 20 MEQ/1
40 TABLET, EXTENDED RELEASE ORAL ONCE
Status: COMPLETED | OUTPATIENT
Start: 2023-10-02 | End: 2023-10-02

## 2023-10-02 RX ADMIN — IOHEXOL 85 ML: 350 INJECTION, SOLUTION INTRAVENOUS at 19:20

## 2023-10-02 RX ADMIN — POTASSIUM CHLORIDE 40 MEQ: 1500 TABLET, EXTENDED RELEASE ORAL at 20:28

## 2023-10-02 NOTE — ED PROVIDER NOTES
History  Chief Complaint   Patient presents with   • Facial Droop     Pt reports she feels off, disoriented, reports feels she has a right facial droop. Hx Stroke may 13,  states pt still has some expressive aphasia. 59-year-old female with prior stroke in May with residual expressive aphasia on Eliquis 5 mg twice daily presenting to the ED for evaluation of right lower facial droop. Patient states she noticed her facial droop while looking in the mirror at [facility] today. Unknown last known well. Reports her expressive aphasia is at baseline, no worse than normal.  Did take Eliquis today. Not on aspirin. No weakness. Patient denies headache, dizziness, vision changes, chest pain, shortness of breath, abdominal pain, urinary symptoms, URI symptoms, fever or chills. Prior to Admission Medications   Prescriptions Last Dose Informant Patient Reported? Taking?    Ascorbic Acid (VITAMIN C) 1000 MG tablet  Self Yes No   Sig: Take 1 tablet by mouth daily   Biotin 5000 MCG CAPS  Self Yes No   Sig: Take 1 capsule by mouth daily   Magnesium 250 MG TABS  Self Yes No   Sig: Take 250 mg by mouth in the morning   Menthol, Topical Analgesic, (BIOFREEZE EX)  Self Yes No   Sig: Apply 1 application topically daily as needed (pain)   Multiple Vitamin (MULTIVITAMIN) capsule  Self Yes No   Sig: Take 1 capsule by mouth daily   Probiotic Product (ALIGN PO)  Self Yes No   Sig: Take 1 capsule by mouth in the morning   apixaban (Eliquis) 5 mg   No No   Sig: Take 1 tablet (5 mg total) by mouth 2 (two) times a day   atorvastatin (LIPITOR) 40 mg tablet  Self No No   Sig: Take 1 tablet (40 mg total) by mouth daily with dinner   cholecalciferol (VITAMIN D3) 1,000 units tablet  Self Yes No   Sig: Take 2,000 Units by mouth daily   diphenhydrAMINE-acetaminophen (TYLENOL PM)  MG TABS  Self Yes No   Sig: Take 1 tablet by mouth daily at bedtime as needed for sleep   esomeprazole (NexIUM) 40 MG capsule  Self Yes No   Sig: Take 1 capsule by mouth daily   fluticasone (FLONASE) 50 mcg/act nasal spray  Self No No   Sig: USE 2 SPRAYS IN EACH NOSTRIL DAILY   levothyroxine 125 mcg tablet  Self No No   Sig: TAKE 1 TABLET DAILY ON AN EMPTY STOMACH   metoprolol tartrate (LOPRESSOR) 25 mg tablet  Self No No   Sig: Take one tablet as needed for palpitations   Patient taking differently: if needed Take one tablet as needed for palpitations   nystatin (MYCOSTATIN) cream   Yes No   Sig: Apply topically 2 (two) times a day as needed   psyllium (METAMUCIL) 58.6 % powder  Self Yes No   Sig: Take 1 packet by mouth daily   triamcinolone (KENALOG) 0.5 % ointment   Yes No      Facility-Administered Medications: None       Past Medical History:   Diagnosis Date   • Biliary dyskinesia    • CAP (community acquired pneumonia)     last assessed 8/17/16   • Deep vein thrombosis (DVT) of proximal vein of right lower extremity, unspecified chronicity ([address]) 08/10/2022   • Diabetes mellitus ([address]) 12/2018    Type 2 no insulin   • Disease of thyroid gland 2000   • GERD (gastroesophageal reflux disease)    • Hyperlipidemia    • Palpitations    • Pneumonia    • Shortness of breath    • Stroke [facility])    • SVT (supraventricular tachycardia) 06/08/2022       Past Surgical History:   Procedure Laterality Date   • AUGMENTATION MAMMAPLASTY Bilateral 1998    breast surgery- with prosthetic implant 1998; pre-pectoral salline   • BREAST BIOPSY Left 1974   • BREAST BIOPSY Left 1994   • BREAST EXCISIONAL BIOPSY Left 1993   • CARDIAC CATHETERIZATION      procedure summary   • CHOLECYSTECTOMY      onset 2003   • HEMORRHOID SURGERY     • ROTATOR CUFF REPAIR Bilateral     onset 2010   • TUBAL LIGATION      onset 1980       Family History   Problem Relation Age of Onset   • Aneurysm Mother         cerebral   • Ovarian cancer Mother 67   • Cancer Mother    • Cirrhosis Father         alcoholic   • Alcohol abuse Father    • Aneurysm Brother         abdominal aortic; cerebral   • Diabetes Son    • No Known Problems Daughter    • No Known Problems Maternal Grandmother    • No Known Problems Maternal Grandfather    • No Known Problems Paternal Grandmother    • No Known Problems Paternal Grandfather    • No Known Problems Son    • No Known Problems Son    • No Known Problems Son    • No Known Problems Maternal Aunt    • No Known Problems Maternal Aunt    • No Known Problems Maternal Aunt    • No Known Problems Maternal Aunt    • No Known Problems Paternal Aunt    • No Known Problems Paternal Aunt    • No Known Problems Paternal Aunt    • No Known Problems Paternal Aunt    • No Known Problems Paternal Aunt    • Cancer Brother         bladder cancer     I have reviewed and agree with the history as documented. E-Cigarette/Vaping   • E-Cigarette Use Never User      E-Cigarette/Vaping Substances   • Nicotine No    • THC No    • CBD No    • Flavoring No    • Other No    • Unknown No      Social History     Tobacco Use   • Smoking status: Former     Packs/day: 0.25     Years: 17.00     Total pack years: 4.25     Types: Cigarettes     Start date: 58     Quit date: 2010     Years since quittin.7   • Smokeless tobacco: Never   • Tobacco comments:     0.5 ppw intermittently last smoked    Vaping Use   • Vaping Use: Never used   Substance Use Topics   • Alcohol use: Yes     Alcohol/week: 2.0 standard drinks of alcohol     Types: 2 Glasses of wine per week     Comment: Maybe 2 glasses a month   • Drug use: No        Review of Systems   Constitutional: Negative for chills and fever. HENT: Negative for ear pain and sore throat. Eyes: Negative for pain and visual disturbance. Respiratory: Negative for cough and shortness of breath. Cardiovascular: Negative for chest pain and palpitations. Gastrointestinal: Negative for abdominal pain and vomiting. Genitourinary: Negative for dysuria and hematuria. Musculoskeletal: Negative for arthralgias and back pain.    Skin: Negative for color change and rash. Neurological: Negative for seizures and syncope. Right lower facial droop   All other systems reviewed and are negative. Physical Exam  ED Triage Vitals   Temperature Pulse Respirations Blood Pressure SpO2   10/02/23 1853 10/02/23 1853 10/02/23 1853 10/02/23 1853 10/02/23 1853   98.1 °F (36.7 °C) (!) 112 18 (!) 172/82 97 %      Temp Source Heart Rate Source Patient Position - Orthostatic VS BP Location FiO2 (%)   10/02/23 1853 10/02/23 1853 10/02/23 1910 10/02/23 1853 --   Oral Monitor Lying Right arm       Pain Score       10/02/23 2010       No Pain             Orthostatic Vital Signs  Vitals:    10/02/23 2115 10/02/23 2145 10/02/23 2245 10/02/23 2345   BP: 157/72 (!) 180/88 144/65 137/66   Pulse: 72 82 69 75   Patient Position - Orthostatic VS: Sitting Sitting Lying        Physical Exam  Vitals and nursing note reviewed. Constitutional:       General: She is not in acute distress. Appearance: She is well-developed. HENT:      Head: Normocephalic and atraumatic. Eyes:      Conjunctiva/sclera: Conjunctivae normal.   Cardiovascular:      Rate and Rhythm: Regular rhythm. Tachycardia present. Pulses: Normal pulses. Heart sounds: Normal heart sounds. No murmur heard. Comments: Sinus tachycardia at 112 bpm  Pulmonary:      Effort: Pulmonary effort is normal. No respiratory distress. Breath sounds: Normal breath sounds. Abdominal:      Palpations: Abdomen is soft. Tenderness: There is no abdominal tenderness. Musculoskeletal:         General: No swelling. Cervical back: Neck supple. Skin:     General: Skin is warm and dry. Capillary Refill: Capillary refill takes less than 2 seconds. Neurological:      Mental Status: She is alert and oriented to person, place, and time. GCS: GCS eye subscore is 4. GCS verbal subscore is 5. GCS motor subscore is 6. Cranial Nerves: Cranial nerve deficit present. Sensory: Sensation is intact. Motor: Motor function is intact. Coordination: Coordination is intact. Gait: Gait is intact. Comments: Minor right lower facial droop with flattening of nasolabial folds. Minor expressive aphasia which is no worse than baseline according to patient and  at bedside. Raises eyebrows, closes eyes, turns head left to right, no visual field deficits, pupils 2 mm equal round reactive to light, tongue and uvula are midline, shrugs shoulders, 5/5 strength in bilateral upper and lower extremities. No sensory losses.    Psychiatric:         Mood and Affect: Mood normal.         ED Medications  Medications   apixaban (ELIQUIS) tablet 5 mg (has no administration in time range)   ascorbic acid (VITAMIN C) tablet 1,000 mg (has no administration in time range)   atorvastatin (LIPITOR) tablet 40 mg (has no administration in time range)   cholecalciferol (VITAMIN D3) tablet 2,000 Units (has no administration in time range)   pantoprazole (PROTONIX) EC tablet 40 mg (has no administration in time range)   magnesium Oxide (MAG-OX) tablet 400 mg (has no administration in time range)   multivitamin-minerals (CENTRUM) tablet 1 tablet (has no administration in time range)   saccharomyces boulardii (FLORASTOR) capsule 250 mg (has no administration in time range)   psyllium (METAMUCIL) 1 packet (has no administration in time range)   fluticasone (FLONASE) 50 mcg/act nasal spray 2 spray (has no administration in time range)   levothyroxine tablet 125 mcg (has no administration in time range)   acetaminophen (TYLENOL) tablet 650 mg (650 mg Oral Given 10/3/23 0003)   aspirin chewable tablet 81 mg (has no administration in time range)   iohexol (OMNIPAQUE) 350 MG/ML injection (MULTI-DOSE) 85 mL (85 mL Intravenous Given 10/2/23 1920)   potassium chloride (K-DUR,KLOR-CON) CR tablet 40 mEq (40 mEq Oral Given 10/2/23 2028)       Diagnostic Studies  Results Reviewed     Procedure Component Value Units Date/Time    HS Troponin I 4hr [[id number]]  (Normal) Collected: 10/02/23 2230    Lab Status: Final result Specimen: Blood from Arm, Left Updated: 10/02/23 2338     hs TnI 4hr 3 ng/L      Delta 4hr hsTnI 1 ng/L     HS Troponin I 2hr [[id number]]  (Normal) Collected: 10/02/23 2116    Lab Status: Final result Specimen: Blood from Arm, Right Updated: 10/02/23 2147     hs TnI 2hr 3 ng/L      Delta 2hr hsTnI 1 ng/L     UA (URINE) with reflex to Scope [[id number]] Collected: 10/02/23 2030    Lab Status: Final result Specimen: Urine, Clean Catch Updated: 10/02/23 2043     Color, UA Light Yellow     Clarity, UA Clear     Specific Gravity, UA 1.025     pH, UA 7.0     Leukocytes, UA Negative     Nitrite, UA Negative     Protein, UA Negative mg/dl      Glucose, UA Negative mg/dl      Ketones, UA Negative mg/dl      Urobilinogen, UA <2.0 mg/dl      Bilirubin, UA Negative     Occult Blood, UA Negative    COVID/FLU/RSV [[id number]]  (Normal) Collected: 10/02/23 1910    Lab Status: Final result Specimen: Nares from Nose Updated: 10/02/23 2001     SARS-CoV-2 Negative     INFLUENZA A PCR Negative     INFLUENZA B PCR Negative     RSV PCR Negative    Narrative:      FOR PEDIATRIC PATIENTS - copy/paste COVID Guidelines URL to browser: [email or web address]    SARS-CoV-2 assay is a Nucleic Acid Amplification assay intended for the  qualitative detection of nucleic acid from SARS-CoV-2 in nasopharyngeal  swabs. Results are for the presumptive identification of SARS-CoV-2 RNA. Positive results are indicative of infection with SARS-CoV-2, the virus  causing COVID-19, but do not rule out bacterial infection or co-infection  with other viruses. Laboratories within the [facility] and its  territories are required to report all positive results to the appropriate  public health authorities.  Negative results do not preclude SARS-CoV-2  infection and should not be used as the sole basis for treatment or other  patient management decisions. Negative results must be combined with  clinical observations, patient history, and epidemiological information. This test has not been FDA cleared or approved. This test has been authorized by FDA under an Emergency Use Authorization  (EUA). This test is only authorized for the duration of time the  declaration that circumstances exist justifying the authorization of the  emergency use of an in vitro diagnostic tests for detection of SARS-CoV-2  virus and/or diagnosis of COVID-19 infection under section 564(b)(1) of  the Act, 21 U. S.C. 490MQO-8(S)(5), unless the authorization is terminated  or revoked sooner. The test has been validated but independent review by FDA  and CLIA is pending. Test performed using [employer]: This RT-PCR assay targets N2,  a region unique to SARS-CoV-2. A conserved region in the E-gene was chosen  for pan-Sarbecovirus detection which includes SARS-CoV-2. According to CMS-2020-01-R, this platform meets the definition of high-throughput technology.     Fingerstick Glucose (POCT) [[id number]]  (Normal) Collected: 10/02/23 1948    Lab Status: Final result Updated: 10/02/23 1951     POC Glucose 137 mg/dl     HS Troponin 0hr (reflex protocol) [[id number]]  (Normal) Collected: 10/02/23 1910    Lab Status: Final result Specimen: Blood from Arm, Right Updated: 10/02/23 1946     hs TnI 0hr 2 ng/L     Basic metabolic panel [[id number]]  (Abnormal) Collected: 10/02/23 1910    Lab Status: Final result Specimen: Blood from Arm, Right Updated: 10/02/23 1938     Sodium 140 mmol/L      Potassium 3.4 mmol/L      Chloride 105 mmol/L      CO2 24 mmol/L      ANION GAP 11 mmol/L      BUN 10 mg/dL      Creatinine 0.68 mg/dL      Glucose 131 mg/dL      Calcium 10.0 mg/dL      eGFR 85 ml/min/1.73sq m     Narrative:      [place] guidelines for Chronic Kidney Disease (CKD):   •  Stage 1 with normal or high GFR (GFR > 90 mL/min/1.73 square meters)  •  Stage 2 Mild CKD (GFR = 60-89 mL/min/1.73 square meters)  •  Stage 3A Moderate CKD (GFR = 45-59 mL/min/1.73 square meters)  •  Stage 3B Moderate CKD (GFR = 30-44 mL/min/1.73 square meters)  •  Stage 4 Severe CKD (GFR = 15-29 mL/min/1.73 square meters)  •  Stage 5 End Stage CKD (GFR <15 mL/min/1.73 square meters)  Note: GFR calculation is accurate only with a steady state creatinine    Protime-INR [[id number]]  (Normal) Collected: 10/02/23 1910    Lab Status: Final result Specimen: Blood from Arm, Right Updated: 10/02/23 1934     Protime 14.5 seconds      INR 1.07    APTT [[id number]]  (Normal) Collected: 10/02/23 1910    Lab Status: Final result Specimen: Blood from Arm, Right Updated: 10/02/23 1934     PTT 31 seconds     CBC and Platelet [[id number]]  (Normal) Collected: 10/02/23 1910    Lab Status: Final result Specimen: Blood from Arm, Right Updated: 10/02/23 1926     WBC 8.40 Thousand/uL      RBC 4.74 Million/uL      Hemoglobin 13.5 g/dL      Hematocrit 42.3 %      MCV 89 fL      MCH 28.5 pg      MCHC 31.9 g/dL      RDW 13.4 %      Platelets 799 Thousands/uL      MPV 9.9 fL     Fingerstick Glucose (POCT) [[id number]]  (Normal) Collected: 10/02/23 1852    Lab Status: Final result Updated: 10/02/23 1853     POC Glucose 132 mg/dl                  CTA stroke alert (head/neck)   Final Result by Nanette Nath MD (10/02 2017)      No hemodynamically significant stenosis, dissection or occlusion of the carotid or vertebral arteries or major vessels of the [place]               Findings were directly discussed with Melissa Melendez at  7:40 PM  .                           Workstation performed: [id number]         CT stroke alert brain   Final Result by Nanette Nath MD (10/02 2017)      Stable chronic left MCA territory infarct. No evidence of acute vascular territorial infarction, intracranial hemorrhage or mass.       Findings were directly discussed with Melissa Melendez at  7:40 PM  . Workstation performed: [id number]         MRI Inpatient Order    (Results Pending)         Procedures  ECG 12 Lead Documentation Only    Date/Time: 10/2/2023 7:25 PM    Performed by: Merian Dakins, MD  Authorized by: Merian Dakins, MD    Indications / Diagnosis:  Stroke alert  Patient location:  ED  Previous ECG:     Previous ECG:  Compared to current    Comparison ECG info:  5/13/23    Similarity:  No change  Interpretation:     Interpretation: normal    Rate:     ECG rate:  102    ECG rate assessment: tachycardic    Rhythm:     Rhythm: sinus rhythm    Ectopy:     Ectopy: PAC    QRS:     QRS axis:  Normal    QRS intervals:  Normal  Conduction:     Conduction: normal    ST segments:     ST segments:  Normal  T waves:     T waves: normal      CriticalCare Time    Date/Time: 10/3/2023 12:26 AM    Performed by: Merian Dakins, MD  Authorized by: Merian Dakins, MD    Critical care provider statement:     Critical care time (minutes):  30    Critical care time was exclusive of:  Separately billable procedures and treating other patients    Critical care was necessary to treat or prevent imminent or life-threatening deterioration of the following conditions:  CNS failure or compromise    Critical care was time spent personally by me on the following activities:  Blood draw for specimens, obtaining history from patient or surrogate, discussions with consultants, evaluation of patient's response to treatment, examination of patient, development of treatment plan with patient or surrogate, ordering and performing treatments and interventions, ordering and review of laboratory studies, ordering and review of radiographic studies, re-evaluation of patient's condition and review of old charts    I assumed direction of critical care for this patient from another provider in my specialty: no            ED Course  ED Course as of 10/03/23 0028   Mon Oct 02, 2023   1909 Stroke alert called   1940 CBC and Platelet  wnl 1940 POC Glucose: 132   1940 PTT: 31   1940 POCT INR: 1.07   1940 Potassium(!): 3.4  Will replete   2004 COVID/FLU/RSV  negative   2026 CTA stroke alert (head/neck)  IMPRESSION:     No hemodynamically significant stenosis, dissection or occlusion of the carotid or vertebral arteries or major vessels of the [place]   2026 CT stroke alert brain  IMPRESSION:     Stable chronic left MCA territory infarct. No evidence of acute vascular territorial infarction, intracranial hemorrhage or mass. 2041 SLIM texted for admission   2048 Pt re-eval; updated about all labs and imaging. Agreeable to admission. Facial droop remains unchanged compared to initial eval                   Stroke Assessment     Row Name 10/02/23 1922             NIH Stroke Scale    Interval Baseline      Level of Consciousness (1a.) 0      LOC Questions (1b.) 0      LOC Commands (1c.) 0      Best Gaze (2.) 0      Visual (3.) 0      Facial Palsy (4.) 1      Motor Arm, Left (5a.) 0      Motor Arm, Right (5b.) 0      Motor Leg, Left (6a.) 0      Motor Leg, Right (6b.) 0      Limb Ataxia (7.) 0      Sensory (8.) 0      Best Language (9.) 1      Dysarthria (10.) 0      Extinction and Inattention (11.) (Formerly Neglect) 0      Total 2                                    Medical Decision Making  74-year-old female with prior stroke in May with residual expressive aphasia on Eliquis 5 mg twice daily presenting to the ED for evaluation of right lower facial     Initial NIHSS= 2, no acute intracranial abnormality on imaging. Case discussed with neurology, will admit to internal medicine on stroke pathway for MRI in the morning. Amount and/or Complexity of Data Reviewed  External Data Reviewed: labs, ECG and notes. Labs: ordered. Decision-making details documented in ED Course. Radiology: ordered. Decision-making details documented in ED Course. ECG/medicine tests: ordered and independent interpretation performed.       Risk  Prescription drug management. Decision regarding hospitalization. Disposition  Final diagnoses:   Stroke-like symptoms   Facial droop     Time reflects when diagnosis was documented in both MDM as applicable and the Disposition within this note     Time User Action Codes Description Comment    10/2/2023  7:07 PM Stepan Cane Add [R29.90] Stroke-like symptoms     10/2/2023  8:26 PM Stepan Cane Add [R29.810] Facial droop     10/2/2023 11:21 PM Frankie Sever Add [R29.90] Stroke-like symptom       ED Disposition     ED Disposition   Admit    Condition   Stable    Date/Time   Mon Oct 2, 2023  8:46 PM    Comment   Case was discussed with ESPINOZA and the patient's admission status was agreed to be Admission Status: observation status to the service of Dr. Teofilo Taylor . Follow-up Information    None         Current Discharge Medication List      CONTINUE these medications which have NOT CHANGED    Details   apixaban (Eliquis) 5 mg Take 1 tablet (5 mg total) by mouth 2 (two) times a day  Qty: 180 tablet, Refills: 3    Associated Diagnoses: Acute CVA (cerebrovascular accident) ([address])      Ascorbic Acid (VITAMIN C) 1000 MG tablet Take 1 tablet by mouth daily      atorvastatin (LIPITOR) 40 mg tablet Take 1 tablet (40 mg total) by mouth daily with dinner  Qty: 90 tablet, Refills: 1    Associated Diagnoses: Acute CVA (cerebrovascular accident) ([address]);  Cerebral thrombosis with cerebral infarction (HCC)      Biotin 5000 MCG CAPS Take 1 capsule by mouth daily      cholecalciferol (VITAMIN D3) 1,000 units tablet Take 2,000 Units by mouth daily      diphenhydrAMINE-acetaminophen (TYLENOL PM)  MG TABS Take 1 tablet by mouth daily at bedtime as needed for sleep      esomeprazole (NexIUM) 40 MG capsule Take 1 capsule by mouth daily      fluticasone (FLONASE) 50 mcg/act nasal spray USE 2 SPRAYS IN EACH NOSTRIL DAILY  Qty: 48 g, Refills: 3    Associated Diagnoses: Acute non-recurrent sinusitis, unspecified location levothyroxine 125 mcg tablet TAKE 1 TABLET DAILY ON AN EMPTY STOMACH  Qty: 90 tablet, Refills: 3    Associated Diagnoses: Hypothyroidism, unspecified type      Magnesium 250 MG TABS Take 250 mg by mouth in the morning      Menthol, Topical Analgesic, (BIOFREEZE EX) Apply 1 application topically daily as needed (pain)      metoprolol tartrate (LOPRESSOR) 25 mg tablet Take one tablet as needed for palpitations  Qty: 90 tablet, Refills: 2    Associated Diagnoses: Palpitations      Multiple Vitamin (MULTIVITAMIN) capsule Take 1 capsule by mouth daily      nystatin (MYCOSTATIN) cream Apply topically 2 (two) times a day as needed      Probiotic Product (ALIGN PO) Take 1 capsule by mouth in the morning      psyllium (METAMUCIL) 58.6 % powder Take 1 packet by mouth daily      triamcinolone (KENALOG) 0.5 % ointment            No discharge procedures on file. PDMP Review     None           ED Provider  Attending physically available and evaluated Katharina Denney. I managed the patient along with the ED Attending.     Electronically Signed by         Aly Villalta MD  10/03/23 9222

## 2023-10-02 NOTE — Clinical Note
Case was discussed with ESPINOZA and the patient's admission status was agreed to be Admission Status: inpatient status to the service of Dr. Jesus Joya .

## 2023-10-02 NOTE — PROGRESS NOTES
Called by  regarding stroke alert at 1913, neuro response was immediate. 70year old with PMHx including prior left ICA thrombus, left MCA occlusion with left MCA and also left PCA territory strokes, on Eliquis, presenting with mild R facial droop. Has some expressive aphasia that is at her baseline from prior strokes. Noted to have slight flattening of the nasolabial fold on the right that is not evident with smile (which is symmetrical)  - NIHSS 2  - LKW: she noticed symptoms at 3 PM but unclear LKN otherwise    CTH: no acute abnormality  CTA: no LVO    Not a TNK candidate since she took Eliquis today and she is outside of the window. Not an endovascular candidate since no LVO. Lower suspicion for acute stroke with isolated mild nasolabial fold flattening but given risk factors, would admit for stroke workup. Can continue Eliquis at this time (she already took her nightly dose). Systolic BP goal < 181.

## 2023-10-02 NOTE — ED ATTENDING ATTESTATION
10/2/2023  Adrian Butler DO, saw and evaluated the patient. I have discussed the patient with the resident/non-physician practitioner and agree with the resident's/non-physician practitioner's findings, Plan of Care, and MDM as documented in the resident's/non-physician practitioner's note, except where noted. All available labs and Radiology studies were reviewed. I was present for key portions of any procedure(s) performed by the resident/non-physician practitioner and I was immediately available to provide assistance. At this point I agree with the current assessment done in the Emergency Department. I have conducted an independent evaluation of this patient a history and physical is as follows:    Patient is a 81-year-old female with a past medical history of CVA who presents with right facial droop. Patient states that she was in front of the mirror brushing her hair around 3 PM when she noticed her right facial weakness. Asymptomatic otherwise. She states that it did not improve and she was brought to the ED by her . Patient has a history of CVA which has resulted in residual, mild expressive aphasia. Patient states that her speech is unchanged at this time. She denies any recent head injury. She is on Eliquis and did take her dose twice today. On exam, patient is in no acute distress. Heart is tachycardic, regular rhythm. Sounds normal.  Mild flattening of the right nasolabial fold. Cranial nerves are otherwise unremarkable. Motor, sensation normal.  Mild expressive aphasia. Spoke with neurology who will follow up after the CT imaging is completed. Recommends adding a urinalysis. CTA reveals no evidence of acute infarction. No indication for thrombolytics or endovascular intervention. Patient does not need antiplatelets this evening since she took both doses of direct Xa inhibitor today. Will admit for further stroke workup.      Portions of the above record have been created with voice recognition software. Occasional wrong word or "sound alike" substitutions may have occurred due to the inherent limitations of voice recognition software. Read the chart carefully and recognize, using context, where substitutions may have occurred.       ED Course         Critical Care Time  Procedures

## 2023-10-03 ENCOUNTER — APPOINTMENT (OUTPATIENT)
Dept: MRI IMAGING | Facility: HOSPITAL | Age: 78
End: 2023-10-03
Payer: COMMERCIAL

## 2023-10-03 VITALS
RESPIRATION RATE: 18 BRPM | SYSTOLIC BLOOD PRESSURE: 145 MMHG | OXYGEN SATURATION: 93 % | HEART RATE: 86 BPM | TEMPERATURE: 97.7 F | DIASTOLIC BLOOD PRESSURE: 77 MMHG

## 2023-10-03 LAB
ANION GAP SERPL CALCULATED.3IONS-SCNC: 6 MMOL/L
ATRIAL RATE: 102 BPM
BUN SERPL-MCNC: 9 MG/DL (ref 5–25)
CALCIUM SERPL-MCNC: 9.1 MG/DL (ref 8.4–10.2)
CHLORIDE SERPL-SCNC: 109 MMOL/L (ref 96–108)
CHOLEST SERPL-MCNC: 140 MG/DL
CO2 SERPL-SCNC: 27 MMOL/L (ref 21–32)
CREAT SERPL-MCNC: 0.6 MG/DL (ref 0.6–1.3)
ERYTHROCYTE [DISTWIDTH] IN BLOOD BY AUTOMATED COUNT: 13.6 % (ref 11.6–15.1)
EST. AVERAGE GLUCOSE BLD GHB EST-MCNC: 134 MG/DL
GFR SERPL CREATININE-BSD FRML MDRD: 88 ML/MIN/1.73SQ M
GLUCOSE P FAST SERPL-MCNC: 97 MG/DL (ref 65–99)
GLUCOSE SERPL-MCNC: 114 MG/DL (ref 65–140)
GLUCOSE SERPL-MCNC: 157 MG/DL (ref 65–140)
GLUCOSE SERPL-MCNC: 97 MG/DL (ref 65–140)
HBA1C MFR BLD: 6.3 %
HCT VFR BLD AUTO: 37.1 % (ref 34.8–46.1)
HDLC SERPL-MCNC: 49 MG/DL
HGB BLD-MCNC: 12.1 G/DL (ref 11.5–15.4)
LDLC SERPL CALC-MCNC: 72 MG/DL (ref 0–100)
MCH RBC QN AUTO: 29.2 PG (ref 26.8–34.3)
MCHC RBC AUTO-ENTMCNC: 32.6 G/DL (ref 31.4–37.4)
MCV RBC AUTO: 90 FL (ref 82–98)
P AXIS: 50 DEGREES
PLATELET # BLD AUTO: 272 THOUSANDS/UL (ref 149–390)
PMV BLD AUTO: 10.1 FL (ref 8.9–12.7)
POTASSIUM SERPL-SCNC: 3.8 MMOL/L (ref 3.5–5.3)
PR INTERVAL: 164 MS
QRS AXIS: -6 DEGREES
QRSD INTERVAL: 80 MS
QT INTERVAL: 360 MS
QTC INTERVAL: 469 MS
RBC # BLD AUTO: 4.14 MILLION/UL (ref 3.81–5.12)
SODIUM SERPL-SCNC: 142 MMOL/L (ref 135–147)
T WAVE AXIS: 47 DEGREES
TRIGL SERPL-MCNC: 97 MG/DL
VENTRICULAR RATE: 102 BPM
WBC # BLD AUTO: 6.46 THOUSAND/UL (ref 4.31–10.16)

## 2023-10-03 PROCEDURE — 99205 OFFICE O/P NEW HI 60 MIN: CPT | Performed by: STUDENT IN AN ORGANIZED HEALTH CARE EDUCATION/TRAINING PROGRAM

## 2023-10-03 PROCEDURE — 80061 LIPID PANEL: CPT | Performed by: NURSE PRACTITIONER

## 2023-10-03 PROCEDURE — 83036 HEMOGLOBIN GLYCOSYLATED A1C: CPT

## 2023-10-03 PROCEDURE — 85027 COMPLETE CBC AUTOMATED: CPT | Performed by: NURSE PRACTITIONER

## 2023-10-03 PROCEDURE — 82948 REAGENT STRIP/BLOOD GLUCOSE: CPT

## 2023-10-03 PROCEDURE — 99239 HOSP IP/OBS DSCHRG MGMT >30: CPT | Performed by: INTERNAL MEDICINE

## 2023-10-03 PROCEDURE — 80048 BASIC METABOLIC PNL TOTAL CA: CPT | Performed by: NURSE PRACTITIONER

## 2023-10-03 PROCEDURE — 93010 ELECTROCARDIOGRAM REPORT: CPT | Performed by: INTERNAL MEDICINE

## 2023-10-03 PROCEDURE — 70551 MRI BRAIN STEM W/O DYE: CPT

## 2023-10-03 RX ORDER — AMLODIPINE BESYLATE 5 MG/1
5 TABLET ORAL DAILY
Qty: 30 TABLET | Refills: 0 | Status: SHIPPED | OUTPATIENT
Start: 2023-10-04 | End: 2023-10-19 | Stop reason: SDUPTHER

## 2023-10-03 RX ORDER — INSULIN LISPRO 100 [IU]/ML
1-6 INJECTION, SOLUTION INTRAVENOUS; SUBCUTANEOUS
Status: DISCONTINUED | OUTPATIENT
Start: 2023-10-03 | End: 2023-10-03 | Stop reason: HOSPADM

## 2023-10-03 RX ORDER — AMLODIPINE BESYLATE 5 MG/1
5 TABLET ORAL DAILY
Status: DISCONTINUED | OUTPATIENT
Start: 2023-10-03 | End: 2023-10-03 | Stop reason: HOSPADM

## 2023-10-03 RX ADMIN — AMLODIPINE BESYLATE 5 MG: 5 TABLET ORAL at 13:55

## 2023-10-03 RX ADMIN — LEVOTHYROXINE SODIUM 125 MCG: 125 TABLET ORAL at 05:15

## 2023-10-03 RX ADMIN — APIXABAN 5 MG: 5 TABLET, FILM COATED ORAL at 09:22

## 2023-10-03 RX ADMIN — INSULIN LISPRO 1 UNITS: 100 INJECTION, SOLUTION INTRAVENOUS; SUBCUTANEOUS at 12:39

## 2023-10-03 RX ADMIN — ACETAMINOPHEN 650 MG: 325 TABLET, FILM COATED ORAL at 00:03

## 2023-10-03 NOTE — PHYSICAL THERAPY NOTE
Physical Therapy Screen    Patient Name: Khanh Soto  TQCCJ'C Date: 10/3/2023  Problem List  Principal Problem:    Stroke-like symptom  Active Problems:    Hyperlipidemia    Type 2 diabetes mellitus without complication, without long-term current use of insulin (HCC)    Essential hypertension    History of Cerebral thrombosis with cerebral infarction [facility])    PFO (patent foramen ovale)    Ocular migraine        10/03/23 0850   PT Last Visit   PT Visit Date 10/03/23   Note Type   Note type Screen   Additional Comments PT consult received and chart reviewed. Pt presents with stroke like symptoms, AMS, expressive aphasia. Spoke with RN Alex Sheikh and Aldine Meigs whom report pt has been independently mobilizing in room without issue or impairment. Pt denies concerns about mobility upon d/c. Will screen from PT caseload. Please reconsult if pt's medical or functional status significantly changes.        Debra Davidson, PT, DPT   Available via Pixel[employer]  NPI # [id number]  [place] License - [id number]  [id number]

## 2023-10-03 NOTE — ASSESSMENT & PLAN NOTE
• Presents with complaint of R facial droop. Has baseline expressive aphasia which is unchanged, per patient. • CT: no acute abnormality  • CTA: no LVO  • Lipid panels were normal      • Stroke Pathway. enacted  o Frequent vital signs. o Neuro checks. HgbA1c, pending  o Telemetry. o Continue Aspirin and statin. • Obtain MRI brain, per neuro. Pending  • Can consider TTE after MRI as needed and per neuro recommendation  • Consult Neurology. • PT/OT consultation.

## 2023-10-03 NOTE — ASSESSMENT & PLAN NOTE
Lab Results   Component Value Date    HGBA1C 6.3 (H) 08/31/2023       Recent Labs     10/02/23  1852 10/02/23  1948 10/03/23  0720 10/03/23  1100   POCGLU 132 137 114 157*       Blood Sugar Average: Last 72 hrs:  (P) 135     Plan:  Continue to monitor with her PCP

## 2023-10-03 NOTE — ASSESSMENT & PLAN NOTE
· Ocular migraines treated with tylenol as needed. Symptoms last for approx 20 minutes. Symptomatic earlier. Can continue to follow up outpatient with her neurologist and PCP.

## 2023-10-03 NOTE — SPEECH THERAPY NOTE
Speech Language/Pathology  Speech/Language Pathology  Assessment    Patient Name: Gabriele Jewell  RJQQN'K Date: 10/3/2023     Problem List  Principal Problem:    Stroke-like symptom  Active Problems:    Hyperlipidemia    Type 2 diabetes mellitus without complication, without long-term current use of insulin (HCC)    Essential hypertension    History of Cerebral thrombosis with cerebral infarction (HCC)    PFO (patent foramen ovale)    Ocular migraine      Gabriele Jewell is a 70 y.o. female with a PMH of cva, DVT, diet controlled DM, HLD,  who presents with right facial droop this afternoon around 3 pm.  Facial droop now appears resolved. Reports history of ocular migraines and had one earlier today. Has baseline aphasia but reports this is at baseline. Stroke alert in ED. CT showing old stroke.     Consult received for speech/swallow eval on stroke pathway. Pt passed nsg swallow screen; tolerating regular diet w/o s/s dysphagia or aspiration. No new speech/language deficits reported- at baseline. NIH score is 2  MRI: pending     No need for formal speech/swallow eval at this time. Reconsult if needed.     Yolette Ng CCC-SLP  Speech Pathologist  Available via  tiger text

## 2023-10-03 NOTE — PROGRESS NOTES
[id number] [facility]  Progress Note  Name: Lara Hicks  MRN: [id number]  Unit/Bed#: S -01 I Date of Admission: 10/2/2023   Date of Service: 10/3/2023 I Hospital Day: 0    Assessment/Plan   * Stroke-like symptom  Assessment & Plan  • Presents with complaint of R facial droop. Has baseline expressive aphasia which is unchanged, per patient. • CT: no acute abnormality  • CTA: no LVO  • Lipid panels were normal      • Stroke Pathway. enacted  o Frequent vital signs. o Neuro checks. HgbA1c, pending  o Telemetry. o Continue Aspirin and statin. • Obtain MRI brain, per neuro. Pending  • Can consider TTE after MRI as needed and per neuro recommendation  • Consult Neurology. • PT/OT consultation. 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 · Maintained on eliquis    PFO (patent foramen ovale)  Assessment & Plan  · Small PFO and atrial septal aneurysm on recent echo during work up in May  · Not candidate for closure and would not  per cardiology. · Maintained on lifelong anticoagulation. May order echo based on MRI findings, per neuro    Ocular migraine  Assessment & Plan  · Ocular migraines treated with tylenol as needed. Symptoms last for approx 20 minutes. Symptomatic earlier    Essential hypertension  Assessment & Plan  Patient has had elevated blood pressures especially in this hospital visit. Looking at chart review, patient also had an elevated blood pressure above goal in her May office visit to her primary care. As there have been 2 documented blood pressures above goal, patient can be started on antihypertensive. Plan:   We will recommend to start patient on amlodipine 5 mg daily        Type 2 diabetes mellitus without complication, without long-term current use of insulin [facility])  Assessment & Plan  Lab Results   Component Value Date HGBA1C 6.3 (H) 2023       Recent Labs     10/02/23  1852 10/02/23  1948 10/03/23  0720 10/03/23  1100   POCGLU 132 137 114 157*       Blood Sugar Average: Last 72 hrs:  (P) 135     Plan:  Continue to monitor    Hyperlipidemia  Assessment & Plan  Total cholesterol 140, triglycerides 97, HDL 47, LDL 72 in lipid panel collected today    · Continue statin               VTE Pharmacologic Prophylaxis: VTE Score: 9 High Risk (Score >/= 5) - Pharmacological DVT Prophylaxis Ordered: apixaban (Eliquis). Sequential Compression Devices Ordered. Patient Centered Rounds: I performed bedside rounds with nursing staff today. Discussions with Specialists or Other Care Team Provider: Neurology    Education and Discussions with Family / Patient: Updated  () at bedside. Current Length of Stay: 0 day(s)  Current Patient Status: Observation   Discharge Plan: Anticipate discharge tomorrow to home. Code Status: Level 1 - Full Code    Subjective:   Patient seen at bedside today. No acute events overnight. Patient states that she is feeling very well. Patient denies any fevers, chills, headaches, chest pain, shortness of breath, abdominal pain, nausea, vomiting, constipation, diarrhea. Patient has no acute or significant concerns or complaints. Patient is doing well overall and would like to go home as tonight is a rehearsal dinner for her grandson's confirmation tomorrow. Objective:     Vitals:   Temp (24hrs), Av.1 °F (36.7 °C), Min:97.5 °F (36.4 °C), Max:98.9 °F (37.2 °C)    Temp:  [97.5 °F (36.4 °C)-98.9 °F (37.2 °C)] 97.7 °F (36.5 °C)  HR:  [] 77  Resp:  [16-18] 18  BP: (122-187)/(61-88) 163/72  SpO2:  [92 %-98 %] 93 %  There is no height or weight on file to calculate BMI. Input and Output Summary (last 24 hours):      Intake/Output Summary (Last 24 hours) at 10/3/2023 1345  Last data filed at 10/2/2023 2100  Gross per 24 hour   Intake 0 ml   Output --   Net 0 ml       Physical Exam:   Physical Exam  Vitals and nursing note reviewed. Constitutional:       General: She is not in acute distress. Appearance: Normal appearance. She is not ill-appearing, toxic-appearing or diaphoretic. HENT:      Head: Normocephalic and atraumatic. Mouth/Throat:      Mouth: Mucous membranes are moist.   Eyes:      General: No scleral icterus. Pupils: Pupils are equal, round, and reactive to light. Cardiovascular:      Rate and Rhythm: Normal rate and regular rhythm. Pulses: Normal pulses. Heart sounds: No friction rub. No gallop. Pulmonary:      Effort: Pulmonary effort is normal. No respiratory distress. Breath sounds: No wheezing, rhonchi or rales. Abdominal:      General: Bowel sounds are normal. There is no distension. Tenderness: There is no abdominal tenderness. There is no guarding or rebound. Musculoskeletal:         General: No swelling. Normal range of motion. Cervical back: Normal range of motion and neck supple. Right lower leg: No edema. Left lower leg: No edema. Skin:     General: Skin is warm. Coloration: Skin is not jaundiced. Neurological:      General: No focal deficit present. Mental Status: She is alert and oriented to person, place, and time. Cranial Nerves: No cranial nerve deficit. Sensory: No sensory deficit. Motor: No weakness. Coordination: Coordination normal.   Psychiatric:         Mood and Affect: Mood normal.         Behavior: Behavior normal.         Thought Content:  Thought content normal.         Judgment: Judgment normal.          Additional Data:     Labs:  Results from last 7 days   Lab Units 10/03/23  0443   WBC Thousand/uL 6.46   HEMOGLOBIN g/dL 12.1   HEMATOCRIT % 37.1   PLATELETS Thousands/uL 272     Results from last 7 days   Lab Units 10/03/23  0443   SODIUM mmol/L 142   POTASSIUM mmol/L 3.8   CHLORIDE mmol/L 109*   CO2 mmol/L 27   BUN mg/dL 9   CREATININE mg/dL 0.60 ANION GAP mmol/L 6   CALCIUM mg/dL 9.1   GLUCOSE RANDOM mg/dL 97     Results from last 7 days   Lab Units 10/02/23  1910   INR  1.07     Results from last 7 days   Lab Units 10/03/23  1100 10/03/23  0720 10/02/23  1948 10/02/23  1852   POC GLUCOSE mg/dl 157* 114 137 132               Lines/Drains:  Invasive Devices     Peripheral Intravenous Line  Duration           Peripheral IV 10/02/23 Right Antecubital <1 day                  Telemetry:  Telemetry Orders (From admission, onward)             24 Hour Telemetry Monitoring  Continuous x 24 Hours (Telem)        Question:  Reason for 24 Hour Telemetry  Answer:  TIA/Suspected CVA/ Confirmed CVA                 Telemetry Reviewed: Normal Sinus Rhythm  Indication for Continued Telemetry Use: No indication for continued use. Will discontinue. Imaging: Reviewed radiology reports from this admission including: CT head and MRI brain    CTA stroke alert (head/neck)   Final Result by Marilu Hurst MD (10/02 2017)      No hemodynamically significant stenosis, dissection or occlusion of the carotid or vertebral arteries or major vessels of the [place]               Findings were directly discussed with Tyrell Nunes at  7:40 PM  .                           Workstation performed: [id number]         CT stroke alert brain   Final Result by Marilu Hurst MD (10/02 2017)      Stable chronic left MCA territory infarct. No evidence of acute vascular territorial infarction, intracranial hemorrhage or mass.       Findings were directly discussed with Tyrell Nunes at  7:40 PM  .      Workstation performed: [id number]         MRI brain wo contrast    (Results Pending)       Recent Cultures (last 7 days):         Last 24 Hours Medication List:   Current Facility-Administered Medications   Medication Dose Route Frequency Provider Last Rate   • acetaminophen  650 mg Oral Q6H PRN TAMMIE [place]     • amLODIPine  5 mg Oral Daily Lavaun Eisenmenger, DO     • apixaban  5 mg Oral BID TAMMIE Vera     • vitamin C  1,000 mg Oral Daily TAMMIE Vera     • aspirin  81 mg Oral Daily TAMMIE Vera     • atorvastatin  40 mg Oral Daily With TAMMIE Narvaez     • cholecalciferol  2,000 Units Oral Daily TAMMIE Vera     • fluticasone  2 spray Each Nare Daily TAMMIE Vera     • insulin lispro  1-6 Units Subcutaneous TID [address] [facility] MD Debo     • levothyroxine  125 mcg Oral Early Morning TAMMIE Vera     • magnesium Oxide  400 mg Oral Daily TAMMIE Vera     • multivitamin-minerals  1 tablet Oral Daily TAMMIE Vera     • pantoprazole  40 mg Oral Early Morning TAMMIE Vera     • psyllium  1 packet Oral Daily TAMMIE Vera     • saccharomyces boulardii  250 mg Oral Daily TAMMIE Vera          Today, Patient Was Seen By: Jeanette Hayward DO    **Please Note: This note may have been constructed using a voice recognition system. **

## 2023-10-03 NOTE — ASSESSMENT & PLAN NOTE
· Small PFO and atrial septal aneurysm on recent echo during work up in May  · Not candidate for closure and would not  per cardiology. · Maintained on lifelong anticoagulation.     Stable for d/c as MRI shows no acute abnormalities, as per neurologist.

## 2023-10-03 NOTE — ASSESSMENT & PLAN NOTE
· Ocular migraines treated with tylenol as needed. Symptoms last for approx 20 minutes.   Symptomatic earlier

## 2023-10-03 NOTE — H&P
[address] [facility]  H&P  Name: Tabitha Price 70 y.o. female I MRN: [id number]  Unit/Bed#: S -01 I Date of Admission: 10/2/2023   Date of Service: 10/2/2023 I Hospital Day: 0      Assessment/Plan   * Stroke-like symptom  Assessment & Plan  • Presents with R facial droop. Has baseline expressive aphasia which is unchanged, per patient. • CT: no acute abnormality  • CTA: no LVO  • Stroke Pathway. o Frequent vital signs. o Neuro checks. o Telemetry. o Check lipid panel and A1c.  o Aspirin and statin. • Obtain MRI brain. • Would hold TTE for now  • Consult Neurology. • PT/OT consultation. Ocular migraine  Assessment & Plan  · Ocular migraines treated with tylenol as needed. Symptoms last for approx 20 minutes. 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 · Maintained on eliquis    PFO (patent foramen ovale)  Assessment & Plan  · Small PFO and atrial septal aneurysm on recent echo during work up in May  · Not candidate for closure and would not  per cardiology. · Maintained on lifelong anticoagulation. Hyperlipidemia  Assessment & Plan  · Continue statin       VTE Pharmacologic Prophylaxis: VTE Score: 9 High Risk (Score >/= 5) - Pharmacological DVT Prophylaxis Ordered: apixaban (Eliquis). Sequential Compression Devices Ordered. Code Status: Level 1 - Full Code   Discussion with family: Patient declined call to . Anticipated Length of Stay: Patient will be admitted on an observation basis with an anticipated length of stay of less than 2 midnights secondary to stroke wokr up. Total Time Spent on Date of Encounter in care of patient: 40 mins.  This time was spent on one or more of the following: performing physical exam; counseling and coordination of care; obtaining or reviewing history; documenting in the medical record; reviewing/ordering tests, medications or procedures; communicating with other healthcare professionals and discussing with patient's family/caregivers. Chief Complaint: right facial droop    History of Present Illness:  Nabil Quiroga is a 70 y.o. female with a PMH of cva, DVT, diet controlled DM, HLD,  who presents with right facial droop this afternoon around 3 pm.  Facial droop now appears resolved. Reports history of ocular migraines and had one earlier today. Has baseline aphasia but reports this is at baseline. Stroke alert in ED. CT showing old stroke. Review of Systems:  Review of Systems   Neurological: Positive for facial asymmetry, speech difficulty and headaches. Negative for dizziness, tremors, seizures, syncope, weakness, light-headedness and numbness. All other systems reviewed and are negative.       Past Medical and Surgical History:   Past Medical History:   Diagnosis Date   • Biliary dyskinesia    • CAP (community acquired pneumonia)     last assessed 8/17/16   • Deep vein thrombosis (DVT) of proximal vein of right lower extremity, unspecified chronicity ([address]) 08/10/2022   • Diabetes mellitus ([address]) 12/2018    Type 2 no insulin   • Disease of thyroid gland 2000   • GERD (gastroesophageal reflux disease)    • Hyperlipidemia    • Palpitations    • Pneumonia    • Shortness of breath    • Stroke [facility])    • SVT (supraventricular tachycardia) 06/08/2022       Past Surgical History:   Procedure Laterality Date   • AUGMENTATION MAMMAPLASTY Bilateral 1998    breast surgery- with prosthetic implant 1998; pre-pectoral salline   • BREAST BIOPSY Left 1974   • BREAST BIOPSY Left 1994   • BREAST EXCISIONAL BIOPSY Left 1993   • CARDIAC CATHETERIZATION      procedure summary   • CHOLECYSTECTOMY      onset 2003   • HEMORRHOID SURGERY     • ROTATOR CUFF REPAIR Bilateral     onset 2010   • TUBAL LIGATION      onset 1980       Meds/Allergies:  Prior to Admission medications    Medication Sig Start Date End Date Taking?  Authorizing Provider   apixaban (Eliquis) 5 mg Take 1 tablet (5 mg total) by mouth 2 (two) times a day 8/8/23 11/6/23  Jacinto Sahu DO   Ascorbic Acid (VITAMIN C) 1000 MG tablet Take 1 tablet by mouth daily 11/6/12   Historical Provider, MD   atorvastatin (LIPITOR) 40 mg tablet Take 1 tablet (40 mg total) by mouth daily with dinner 6/13/23 7/13/23  Jacinto Sahu DO   Biotin 5000 MCG CAPS Take 1 capsule by mouth daily    Historical Provider, MD   cholecalciferol (VITAMIN D3) 1,000 units tablet Take 2,000 Units by mouth daily    Historical Provider, MD   diphenhydrAMINE-acetaminophen (TYLENOL PM)  MG TABS Take 1 tablet by mouth daily at bedtime as needed for sleep    Historical Provider, MD   esomeprazole (NexIUM) 40 MG capsule Take 1 capsule by mouth daily 5/3/11   Historical Provider, MD   fluticasone (FLONASE) 50 mcg/act nasal spray USE 2 SPRAYS IN [facility] NOSTRIL DAILY 8/26/22   Jacinto Sahu DO   levothyroxine 125 mcg tablet TAKE 1 TABLET DAILY ON AN EMPTY STOMACH 10/10/22   Jacinto Sahu DO   Magnesium 250 MG TABS Take 250 mg by mouth in the morning    Historical Provider, MD   Menthol, Topical Analgesic, (BIOFREEZE EX) Apply 1 application topically daily as needed (pain)    Historical Provider, MD   metoprolol tartrate (LOPRESSOR) 25 mg tablet Take one tablet as needed for palpitations  Patient taking differently: if needed Take one tablet as needed for palpitations 2/21/23   Armida Halsted, MD   Multiple Vitamin (MULTIVITAMIN) capsule Take 1 capsule by mouth daily    Historical Provider, MD   nystatin (MYCOSTATIN) cream Apply topically 2 (two) times a day as needed 8/29/23 8/28/24  Historical Provider, MD   Probiotic Product (ALIGN PO) Take 1 capsule by mouth in the morning    Historical Provider, MD   psyllium (METAMUCIL) 58.6 % powder Take 1 packet by mouth daily    Historical Provider, MD   triamcinolone (KENALOG) 0.5 % ointment  8/29/23 Historical Provider, MD   naproxen sodium (ALEVE) 220 MG tablet Take by mouth  19  Historical Provider, MD BARBOZA have reviewed home medications with patient personally. Allergies: Allergies   Allergen Reactions   • Hydrochlorothiazide Palpitations   • Meloxicam Rash       Social History:  Marital Status: /Civil Union   Occupation:   Patient Pre-hospital Living Situation: Home  Patient Pre-hospital Level of Mobility: walks  Patient Pre-hospital Diet Restrictions:   Substance Use History:   Social History     Substance and Sexual Activity   Alcohol Use Yes   • Alcohol/week: 2.0 standard drinks of alcohol   • Types: 2 Glasses of wine per week    Comment: Maybe 2 glasses a month     Social History     Tobacco Use   Smoking Status Former   • Packs/day: 0.25   • Years: 17.00   • Total pack years: 4.25   • Types: Cigarettes   • Start date:    • Quit date: 2010   • Years since quittin.7   Smokeless Tobacco Never   Tobacco Comments    0.5 ppw intermittently last smoked      Social History     Substance and Sexual Activity   Drug Use No       Family History:      Physical Exam:     Vitals:   Blood Pressure: 144/65 (10/02/23 2245)  Pulse: 69 (10/02/23 2245)  Temperature: 98.9 °F (37.2 °C) (10/02/23 2245)  Temp Source: Oral (10/02/23 2245)  Respirations: 17 (10/02/23 2245)  SpO2: 94 % (10/02/23 2245)    Physical Exam  Constitutional:       General: She is not in acute distress. Appearance: Normal appearance. She is not ill-appearing. HENT:      Head: Normocephalic and atraumatic. Right Ear: External ear normal.      Left Ear: External ear normal.      Nose: Nose normal.      Mouth/Throat:      Pharynx: Oropharynx is clear. Eyes:      General: No scleral icterus. Extraocular Movements: Extraocular movements intact. Conjunctiva/sclera: Conjunctivae normal.      Pupils: Pupils are equal, round, and reactive to light.    Cardiovascular:      Rate and Rhythm: Normal rate and regular rhythm. Pulses: Normal pulses. Heart sounds: Normal heart sounds. Pulmonary:      Effort: Pulmonary effort is normal.      Breath sounds: Normal breath sounds. Abdominal:      General: Bowel sounds are normal.      Palpations: Abdomen is soft. Musculoskeletal:         General: Normal range of motion. Cervical back: Normal range of motion. Skin:     General: Skin is warm. Capillary Refill: Capillary refill takes less than 2 seconds. Neurological:      Mental Status: She is alert and oriented to person, place, and time. Cranial Nerves: No facial asymmetry. Sensory: No sensory deficit.       Comments: Aphasia present, chronic   Psychiatric:         Mood and Affect: Mood normal.         Behavior: Behavior normal.          Additional Data:     Lab Results:  Results from last 7 days   Lab Units 10/02/23  1910   WBC Thousand/uL 8.40   HEMOGLOBIN g/dL 13.5   HEMATOCRIT % 42.3   PLATELETS Thousands/uL 315     Results from last 7 days   Lab Units 10/02/23  1910   SODIUM mmol/L 140   POTASSIUM mmol/L 3.4*   CHLORIDE mmol/L 105   CO2 mmol/L 24   BUN mg/dL 10   CREATININE mg/dL 0.68   ANION GAP mmol/L 11   CALCIUM mg/dL 10.0   GLUCOSE RANDOM mg/dL 131     Results from last 7 days   Lab Units 10/02/23  1910   INR  1.07     Results from last 7 days   Lab Units 10/02/23  1948 10/02/23  1852   POC GLUCOSE mg/dl 137 132               Lines/Drains:  Invasive Devices     Peripheral Intravenous Line  Duration           Peripheral IV 10/02/23 Right Antecubital <1 day                    Imaging: Reviewed radiology reports from this admission including: CT head  CTA stroke alert (head/neck)   Final Result by Jace Juares MD (10/02 2017)      No hemodynamically significant stenosis, dissection or occlusion of the carotid or vertebral arteries or major vessels of the [place]               Findings were directly discussed with Angeline Carvajal at  7:40 PM  . Workstation performed: [id number]         CT stroke alert brain   Final Result by Sandrita Cueva MD (10/02 2017)      Stable chronic left MCA territory infarct. No evidence of acute vascular territorial infarction, intracranial hemorrhage or mass. Findings were directly discussed with Oscar Arevalo at  7:40 PM  .      Workstation performed: [id number]         MRI Inpatient Order    (Results Pending)       EKG and Other Studies Reviewed on Admission:   · EKG: Sinus Tachycardia. . ** Please Note: This note has been constructed using a voice recognition system.  **

## 2023-10-03 NOTE — UTILIZATION REVIEW
Initial Clinical Review    Admission: Date/Time/Statement:   Admission Orders (From admission, onward)     Ordered        10/02/23 2046  Place in Observation  Once                      Orders Placed This Encounter   Procedures   • Place in Observation     Standing Status:   Standing     Number of Occurrences:   1     Order Specific Question:   Level of Care     Answer:   Med Surg [16]     ED Arrival Information     Expected   -    Arrival   10/2/2023 18:49    Acuity   Emergent            Means of arrival   Walk-In    Escorted by   Family Member    Service   Hospitalist    Admission type   Emergency            Arrival complaint   -           Chief Complaint   Patient presents with   • Facial Droop     Pt reports she feels off, disoriented, reports feels she has a right facial droop. Hx Stroke may 13,  states pt still has some expressive aphasia. Initial Presentation: 70 y.o. female  with a PMH of CVA, DVT, diet controlled DM and HLD,  who presented to the ED from home for evaluation of right facial droop this afternoon around 3 pm.  Facial droop now appears resolved. Reports history of ocular migraines and had one earlier today. Has baseline aphasia but reports this is at baseline. Stroke alert in ED. CT showing old stroke. PE:  AOx3. Aphasia present, chronic. 10/2 Admit to Observation for evaluation and treatment of stroke like symptoms: Stroke pathway with telemetry, neuro checks, check lipid panel and A1c. Aspirin and statin. MRI brain, consult Neurology, PT/OT consult. 10/3 Neurology consult:    Stroke pathway. BP: SBP up to 220/110 until about 3pm and then can normalize to normotension. Obtain lipids and A1c, continue home statin. Maintain glucose <180, SSI for coverage if indicated. Anticoagulation due to Eliquis 5mg source of stroke,  TTE,  MRI brain pending,  DVT ppx and SCDs. Monitor on telemetry. PT/OT/Speech/PM&R. PE: AOx3. Speech is normal.   Strength 5/5 throughout. ED Triage Vitals   Temperature Pulse Respirations Blood Pressure SpO2   10/02/23 1853 10/02/23 1853 10/02/23 1853 10/02/23 1853 10/02/23 1853   98.1 °F (36.7 °C) (!) 112 18 (!) 172/82 97 %      Temp Source Heart Rate Source Patient Position - Orthostatic VS BP Location FiO2 (%)   10/02/23 1853 10/02/23 1853 10/02/23 1910 10/02/23 1853 --   Oral Monitor Lying Right arm       Pain Score       10/02/23 2010       No Pain          Wt Readings from Last 1 Encounters:   10/02/23 81 kg (178 lb 9.2 oz)     Additional Vital Signs:     Date/Time Temp Pulse Resp BP MAP (mmHg) SpO2 O2 Device   10/03/23 0700 97.7 °F (36.5 °C) 77 18 163/72 104 93 % --   10/03/23 0245 97.9 °F (36.6 °C) 78 16 122/61 86 94 % --   10/03/23 0045 97.5 °F (36.4 °C) 69 16 139/67 96 98 % --   10/02/23 2345 97.6 °F (36.4 °C) 75 16 137/66 95 96 % --   10/02/23 2245 98.9 °F (37.2 °C) 69 17 144/65 96 94 % None (Room air)   10/02/23 2145 98.7 °F (37.1 °C) 82 18 180/88 Abnormal  123 -- --   10/02/23 2115 -- 72 18 157/72 107 93 % None (Room air)   10/02/23 2045 -- 83 -- 153/72 -- 94 % --   10/02/23 20:40:37 -- 81 18 153/72 -- 93 % None (Room air)   10/02/23 2040 -- 83 -- -- -- 93 % --   10/02/23 2035 -- 88 -- -- -- 95 % --   10/02/23 2030 -- 100 -- 164/80 -- 92 % --   10/02/23 20:25:39 -- 91 18 164/80 -- 93 % None (Room air)   10/02/23 20:25:14 -- 91 18 164/80 -- 93 % None (Room air)   10/02/23 20:10:21 -- 90 18 157/80 -- 95 % None (Room air)   10/02/23 1959 -- 85 -- -- -- 93 % --   10/02/23 1957 -- -- -- 169/77 -- -- --   10/02/23 1955 -- 80 18 169/77 -- 93 % None (Room air)   10/02/23 1950 -- 83 -- -- -- 94 % --   10/02/23 1949 -- 81 18 172/74 Abnormal  106 93 % None (Room air)   10/02/23 1945 -- 84 -- 172/74 Abnormal  -- 97 % --   10/02/23 19:40:10 -- 78 18 172/74 Abnormal  -- 95 % None (Room air)   10/02/23 1940 -- 83 -- -- -- 95 % --   10/02/23 1935 -- 88 -- -- -- 95 % --   10/02/23 1930 -- 87 -- 172/77 Abnormal  -- 96 % --   10/02/23 1926 -- -- -- 187/81 Abnormal  -- -- --   10/02/23 19:25:56 -- 86 18 187/81 Abnormal  -- 97 % --   10/02/23 1925 -- 90 -- -- -- 95 % --   10/02/23 1920 -- 94 -- -- -- 93 % --   10/02/23 1915 -- 102 -- 172/83 Abnormal  -- 94 % --   10/02/23 19:10:30 -- 112 Abnormal  18 172/82 Abnormal  -- 97 % None (Room air)       Date and Time Eye Opening Best Verbal Response Best Motor Response [place] Coma Scale Score   10/02/23 2145 4 5 6 15   10/02/23 2025 4 5 6 15   10/02/23 1955 4 5 6 15   10/02/23 1940 4 5 6 15   10/02/23 1925 4 5 6 15   10/02/23 1910 4 5 6 15   10/02/23 1900 4 5 6 15         Pertinent Labs/Diagnostic Test Results:     10/2 EKG:    Sinus tachycardia with occasional Premature ventricular complexes  Possible Inferior infarct (cited on or before 13-MAY-2023)  Anterolateral infarct (cited on or before 13-MAY-2023)  Abnormal ECG  When compared with ECG of 13-MAY-2023 11:39,  No significant change was found      CTA stroke alert (head/neck)   Final Result by Nanette Nath MD (10/02 2017)      No hemodynamically significant stenosis, dissection or occlusion of the carotid or vertebral arteries or major vessels of the [place]         CT stroke alert brain   Final Result by Nanette Nath MD (10/02 2017)      Stable chronic left MCA territory infarct. No evidence of acute vascular territorial infarction, intracranial hemorrhage or mass.          MRI Inpatient Order    (Results Pending)     Results from last 7 days   Lab Units 10/02/23  1910   SARS-COV-2  Negative     Results from last 7 days   Lab Units 10/03/23  0443 10/02/23  1910   WBC Thousand/uL 6.46 8.40   HEMOGLOBIN g/dL 12.1 13.5   HEMATOCRIT % 37.1 42.3   PLATELETS Thousands/uL 272 315         Results from last 7 days   Lab Units 10/03/23  0443 10/02/23  1910   SODIUM mmol/L 142 140   POTASSIUM mmol/L 3.8 3.4*   CHLORIDE mmol/L 109* 105   CO2 mmol/L 27 24   ANION GAP mmol/L 6 11   BUN mg/dL 9 10   CREATININE mg/dL 0.60 0.68   EGFR ml/min/1.73sq m 88 85   CALCIUM mg/dL 9.1 10.0         Results from last 7 days   Lab Units 10/03/23  0720 10/02/23  1948 10/02/23  1852   POC GLUCOSE mg/dl 114 137 132     Results from last 7 days   Lab Units 10/03/23  0443 10/02/23  1910   GLUCOSE RANDOM mg/dL 97 131             No results found for: "BETA-HYDROXYBUTYRATE"                   Results from last 7 days   Lab Units 10/02/23  2230 10/02/23  2116 10/02/23  1910   HS TNI 0HR ng/L  --   --  2   HS TNI 2HR ng/L  --  3  --    HSTNI D2 ng/L  --  1  --    HS TNI 4HR ng/L 3  --   --    HSTNI D4 ng/L 1  --   --          Results from last 7 days   Lab Units 10/02/23  1910   PROTIME seconds 14.5   INR  1.07   PTT seconds 31                                                             Results from last 7 days   Lab Units 10/02/23  2030   CLARITY UA  Clear   COLOR UA  Light Yellow   SPEC GRAV UA  1.025   PH UA  7.0   GLUCOSE UA mg/dl Negative   KETONES UA mg/dl Negative   BLOOD UA  Negative   PROTEIN UA mg/dl Negative   NITRITE UA  Negative   BILIRUBIN UA  Negative   UROBILINOGEN UA (BE) mg/dl <2.0   LEUKOCYTES UA  Negative     Results from last 7 days   Lab Units 10/02/23  1910   INFLUENZA A PCR  Negative   INFLUENZA B PCR  Negative   RSV PCR  Negative                 ED Treatment:   Medication Administration from 10/02/2023 1849 to 10/02/2023 2143       Date/Time Order Dose Route Action     10/02/2023 1920 EDT iohexol (OMNIPAQUE) 350 MG/ML injection (MULTI-DOSE) 85 mL 85 mL Intravenous Given     10/02/2023 2028 EDT potassium chloride (K-DUR,KLOR-CON) CR tablet 40 mEq 40 mEq Oral Given        Past Medical History:   Diagnosis Date   • Biliary dyskinesia    • CAP (community acquired pneumonia)     last assessed 8/17/16   • Deep vein thrombosis (DVT) of proximal vein of right lower extremity, unspecified chronicity ([address]) 08/10/2022   • Diabetes mellitus ([address]) 12/2018    Type 2 no insulin   • Disease of thyroid gland 2000   • GERD (gastroesophageal reflux disease)    • Hyperlipidemia • Palpitations    • Pneumonia    • Shortness of breath    • Stroke [facility])    • SVT (supraventricular tachycardia) 06/08/2022     Present on Admission:  • History of Cerebral thrombosis with cerebral infarction ([address])  • Hyperlipidemia  • Type 2 diabetes mellitus without complication, without long-term current use of insulin (HCC)  • Essential hypertension      Admitting Diagnosis: Facial droop [R29.810]  Stroke-like symptoms [R29.90]  Age/Sex: 70 y.o. female       Admission Orders: Baseline NIH stroke scale on admission, reassess Q24H x 2 D, Nursing dysphagia assessment prior to staring diet, neuro checks, telemetry, MRI brain, PT/OT/Speech eval. SCD. Scheduled Medications:    apixaban, 5 mg, Oral, BID  vitamin C, 1,000 mg, Oral, Daily  aspirin, 81 mg, Oral, Daily  atorvastatin, 40 mg, Oral, Daily With Dinner  cholecalciferol, 2,000 Units, Oral, Daily  fluticasone, 2 spray, Each Nare, Daily  insulin lispro, 1-6 Units, Subcutaneous, TID AC  levothyroxine, 125 mcg, Oral, Early Morning  magnesium Oxide, 400 mg, Oral, Daily  multivitamin-minerals, 1 tablet, Oral, Daily  pantoprazole, 40 mg, Oral, Early Morning  psyllium, 1 packet, Oral, Daily  saccharomyces boulardii, 250 mg, Oral, Daily      Continuous IV Infusions:     PRN Meds:  acetaminophen, 650 mg, Oral, Q6H PRN        IP CONSULT TO NEUROLOGY  IP CONSULT TO CASE MANAGEMENT  IP CONSULT TO NUTRITION SERVICES    Network Utilization Review Department  ATTENTION: Please call with any questions or concerns to [phone] and carefully listen to the prompts so that you are directed to the right person. All voicemails are confidential.  Daria Vasquez all requests for admission clinical reviews, approved or denied determinations and any other requests to dedicated fax number below belonging to the campus where the patient is receiving treatment.  List of dedicated fax numbers for the Facilities:  FACILITY NAME UR FAX NUMBER   ADMISSION DENIALS (Administrative/Medical [facility]) [phone] 2303 [facility] (Maternity/NICU/Pediatrics) [address] [facility] [phone] [address] [address] [facility] [address] [phone]

## 2023-10-03 NOTE — ASSESSMENT & PLAN NOTE
Lab Results   Component Value Date    HGBA1C 6.3 (H) 08/31/2023       Recent Labs     10/02/23  1852 10/02/23  1948 10/03/23  0720 10/03/23  1100   POCGLU 132 137 114 157*       Blood Sugar Average: Last 72 hrs:  (P) 135     Plan:  Continue to monitor

## 2023-10-03 NOTE — OCCUPATIONAL THERAPY NOTE
Occupational Therapy Screen Note         Patient Name: Magdalena Turner  [id number] Date: 10/3/2023           10/03/23 0830   OT Last Visit   OT Visit Date 10/03/23   Note Type   Note type Screen   Additional Comments OT orders received. Chart review performed. Based on conversation with SALMA Blankenship, pt appears to be performing at functional baseline. Pt has been (I) in room and getting to/from bathroom without assistance. Pt reports no concerns about being able to complete ADLs or IADLs upon d/c to home. Pt does not demonstrate need for skilled OT at this time. Pt will not be seen further by OT services. DC OT orders. If pt's functional status declines please re-consult.      Pradeep Mendoza MS, OTR/L

## 2023-10-03 NOTE — ASSESSMENT & PLAN NOTE
70year old with PMHx including prior left ICA thrombus, left MCA occlusion with left MCA and also left PCA territory strokes (05/2022 hosptialization presenting as  expressive aphasia and mild frontal headaches ) on Eliquis, DM, hypothyroidism, hyperlipidemiaStroke alert on 10/2/2023  6:54 PM with initial NIHSS of 2 and LKW unknown (symptoms started 10/02/23 2pm), initial Blood Pressure: (!) 172/82. Initial presenting deficits were R facial droop and baseline expressive aphasia  As a result of LKW unknown and took eliquis day of presentation, pt was determined to not be a candidate for thrombolysis (TNK). • Exam on 10/03/23: no facial droop  • Current Blood Pressure: 122/61, BP over 24 hours: BP  Min: 122/61  Max: 187/81   • Vascular risk factors: DM. HLD, past strokes   • Home meds: eliquis, liptior     Workup:  Lab Results   Component Value Date    HGBA1C 6.3 (H) 08/31/2023    CHOLESTEROL 140 10/03/2023    LDLCALC 72 10/03/2023    TRIG 97 10/03/2023    INR 1.07 10/02/2023      CTH: table chronic left MCA territory infarct. No evidence of acute vascular territorial infarction, intracranial hemorrhage or mass. • CTA: No hemodynamically significant stenosis, dissection or occlusion of the carotid or vertebral arteries or major vessels of the [place]  • MRI: pending   • Echocardiogram: pending   • Telemetry: no events    Pertinent scores:  - NIHSS: 2  Stroke Modified Rola Score: 1 (No significant disability.  Able to carry out all usual activities, despite some symptoms)    Impression: stroke like symptoms w/ R facial droop and proceeding w/ stroke pathway although lower suspicion for acute stroke but admitted given risk factors     Plan:  - Stroke pathway  - Discussed plan with neurology attending, Dr. Kia Bang  - BP: SBP up to 220/110 until about 3pm and then can normalize to normotension  - continue home statin  - Maintain glucose <180, SSI for coverage if indicated  - Medical management as per primary team appreciated  - continue home Anticoagulation: eliquis 5mg  - TTE pending  - MRI brain pending  - DVT ppx and SCDs  - Monitor on telemetry  - PT/OT/Speech/PM&R input appreciated  - Stroke education

## 2023-10-03 NOTE — ASSESSMENT & PLAN NOTE
· Small PFO and atrial septal aneurysm on recent echo during work up in May  · Not candidate for closure and would not  per cardiology. · Maintained on lifelong anticoagulation.     May order echo based on MRI findings, per neuro

## 2023-10-03 NOTE — CONSULTS
NEUROLOGY RESIDENCY CONSULT NOTE     Name: Magdalena Turner   Age & Sex: 70 y.o. female   MRN: [id number]  Unit/Bed#: S -01   Encounter: [id number]  Length of Stay: 0    ASSESSMENT & PLAN     * Stroke-like symptom  Assessment & Plan  70year old with PMHx including prior left ICA thrombus, left MCA occlusion with left MCA and also left PCA territory strokes (05/2022 hosptialization presenting as  expressive aphasia and mild frontal headaches ) on Eliquis, DM, hypothyroidism, hyperlipidemiaStroke alert on 10/2/2023  6:54 PM with initial NIHSS of 2 and LKW unknown (symptoms started 10/02/23 2pm), initial Blood Pressure: (!) 172/82. Initial presenting deficits were R facial droop and baseline expressive aphasia  As a result of LKW unknown and took eliquis day of presentation, pt was determined to not be a candidate for thrombolysis (TNK). • Exam on 10/03/23: no facial droop  • Current Blood Pressure: 122/61, BP over 24 hours: BP  Min: 122/61  Max: 187/81   • Vascular risk factors: DM. HLD, past strokes   • Home meds: eliquis, liptior     Workup:  Lab Results   Component Value Date    HGBA1C 6.3 (H) 08/31/2023    CHOLESTEROL 140 10/03/2023    LDLCALC 72 10/03/2023    TRIG 97 10/03/2023    INR 1.07 10/02/2023      CTH: table chronic left MCA territory infarct. No evidence of acute vascular territorial infarction, intracranial hemorrhage or mass. • CTA: No hemodynamically significant stenosis, dissection or occlusion of the carotid or vertebral arteries or major vessels of the [place]  • MRI: pending   • Echocardiogram: pending   • Telemetry: no events    Pertinent scores:  - NIHSS: 2  Stroke Modified Orla Score: 1 (No significant disability.  Able to carry out all usual activities, despite some symptoms)    Impression: stroke like symptoms w/ R facial droop and proceeding w/ stroke pathway although lower suspicion for acute stroke but admitted given risk factors     Plan:  - Stroke pathway  - Discussed plan with neurology attending, Dr. Damien Edge  - BP: SBP up to 220/110 until about 3pm and then can normalize to normotension  - continue home statin  - Maintain glucose <180, SSI for coverage if indicated  - Medical management as per primary team appreciated  - continue home Anticoagulation: eliquis 5mg  - TTE pending  - MRI brain pending  - DVT ppx and SCDs  - Monitor on telemetry  - PT/OT/Speech/PM&R input appreciated  - Stroke education          Recommendations for outpatient neurological follow up have yet to be determined. This should be completed prior to removing patient from list or discharge     SUBJECTIVE     Reason for Consult / Principal Problem: Right facial droop  Hx and PE limited by: None    HPI: David Vazquez is a 70 y.o.  female with PMHx including prior left ICA thrombus, left MCA occlusion with left MCA and also left PCA territory strokes (05/2023 hosptialization presenting as  expressive aphasia and mild frontal headaches ) on Eliquis, DM, hypothyroidism, hyperlipidemia presented with mild R facial droop. Has some expressive aphasia that is at her baseline from prior strokes. Noted to have slight flattening of the nasolabial fold on the right that is not evident with smile (which is symmetrical) and corrects with smiling.  - NIHSS 2 during stroke alert, during evaluation today I have only appreciated NIHSS of 1 for noted mild right facial droop  - LKW: she noticed symptoms at 3 PM but stated that the last time she saw herself in the mirror was that morning around 9 AM 10/02/2023  Not a TNK candidate since she took Eliquis 10/02/23 and she is outside of the window. Not an endovascular candidate since no LVO. Patient noted that given she was very concerned of her last stroke when it occurred a few months ago, when she noticed a right facial droop she was concerned which is ultimately what brought her into the ED for further evaluation.   also stated initially he did not notice the facial droop but given patient had a stroke previously, they wanted to get evaluated. Past ANDREW showed ANDREW showed small PFO with atrial septal aneurysm.  Cardiology recommended lifelong anticoagulation w/ eliquis          Inpatient consult to Neurology  Consult performed by: Chino Ruiz DO  Consult ordered by: Manohar Cook DO          Historical Information   Past Medical History:   Diagnosis Date   • Biliary dyskinesia    • CAP (community acquired pneumonia)     last assessed 8/17/16   • Deep vein thrombosis (DVT) of proximal vein of right lower extremity, unspecified chronicity ([address]) 08/10/2022   • Diabetes mellitus ([address]) 12/2018    Type 2 no insulin   • Disease of thyroid gland 2000   • GERD (gastroesophageal reflux disease)    • Hyperlipidemia    • Palpitations    • Pneumonia    • Shortness of breath    • Stroke [facility])    • SVT (supraventricular tachycardia) 06/08/2022     Past Surgical History:   Procedure Laterality Date   • AUGMENTATION MAMMAPLASTY Bilateral 1998    breast surgery- with prosthetic implant 1998; pre-pectoral salline   • BREAST BIOPSY Left 1974   • BREAST BIOPSY Left 1994   • BREAST EXCISIONAL BIOPSY Left 1993   • CARDIAC CATHETERIZATION      procedure summary   • CHOLECYSTECTOMY      onset 2003   • HEMORRHOID SURGERY     • ROTATOR CUFF REPAIR Bilateral     onset 2010   • TUBAL LIGATION      onset 1980     Social History   Social History     Substance and Sexual Activity   Alcohol Use Yes   • Alcohol/week: 2.0 standard drinks of alcohol   • Types: 2 Glasses of wine per week    Comment: Maybe 2 glasses a month     Social History     Substance and Sexual Activity   Drug Use No     E-Cigarette/Vaping   • E-Cigarette Use Never User      E-Cigarette/Vaping Substances   • Nicotine No    • THC No    • CBD No    • Flavoring No    • Other No    • Unknown No      Social History     Tobacco Use   Smoking Status Former   • Packs/day: 0.25   • Years: 17.00   • Total pack years: 4.25   • Types: Cigarettes   • Start date: 58   • Quit date: 2010   • Years since quittin.7   Smokeless Tobacco Never   Tobacco Comments    0.5 ppw intermittently last smoked      Family History:   Family History   Problem Relation Age of Onset   • Aneurysm Mother         cerebral   • Ovarian cancer Mother 67   • Cancer Mother    • Cirrhosis Father         alcoholic   • Alcohol abuse Father    • Aneurysm Brother         abdominal aortic; cerebral   • Diabetes Son    • No Known Problems Daughter    • No Known Problems Maternal Grandmother    • No Known Problems Maternal Grandfather    • No Known Problems Paternal Grandmother    • No Known Problems Paternal Grandfather    • No Known Problems Son    • No Known Problems Son    • No Known Problems Son    • No Known Problems Maternal Aunt    • No Known Problems Maternal Aunt    • No Known Problems Maternal Aunt    • No Known Problems Maternal Aunt    • No Known Problems Paternal Aunt    • No Known Problems Paternal Aunt    • No Known Problems Paternal Aunt    • No Known Problems Paternal Aunt    • No Known Problems Paternal Aunt    • Cancer Brother         bladder cancer     Meds/Allergies   all current active meds have been reviewed, current meds:   Current Facility-Administered Medications   Medication Dose Route Frequency   • acetaminophen (TYLENOL) tablet 650 mg  650 mg Oral Q6H PRN   • apixaban (ELIQUIS) tablet 5 mg  5 mg Oral BID   • ascorbic acid (VITAMIN C) tablet 1,000 mg  1,000 mg Oral Daily   • aspirin chewable tablet 81 mg  81 mg Oral Daily   • atorvastatin (LIPITOR) tablet 40 mg  40 mg Oral Daily With Dinner   • cholecalciferol (VITAMIN D3) tablet 2,000 Units  2,000 Units Oral Daily   • fluticasone (FLONASE) 50 mcg/act nasal spray 2 spray  2 spray Each Nare Daily   • insulin lispro (HumaLOG) 100 units/mL subcutaneous injection 1-6 Units  1-6 Units Subcutaneous TID AC   • levothyroxine tablet 125 mcg  125 mcg Oral Early Morning   • magnesium Oxide (MAG-OX) tablet 400 mg  400 mg Oral Daily   • multivitamin-minerals (CENTRUM) tablet 1 tablet  1 tablet Oral Daily   • pantoprazole (PROTONIX) EC tablet 40 mg  40 mg Oral Early Morning   • psyllium (METAMUCIL) 1 packet  1 packet Oral Daily   • saccharomyces boulardii (FLORASTOR) capsule 250 mg  250 mg Oral Daily    and PTA meds:   Prior to Admission Medications   Prescriptions Last Dose Informant Patient Reported? Taking?    Ascorbic Acid (VITAMIN C) 1000 MG tablet  Self Yes No   Sig: Take 1 tablet by mouth daily   Biotin 5000 MCG CAPS  Self Yes No   Sig: Take 1 capsule by mouth daily   Magnesium 250 MG TABS  Self Yes No   Sig: Take 250 mg by mouth in the morning   Menthol, Topical Analgesic, (BIOFREEZE EX)  Self Yes No   Sig: Apply 1 application topically daily as needed (pain)   Multiple Vitamin (MULTIVITAMIN) capsule  Self Yes No   Sig: Take 1 capsule by mouth daily   Probiotic Product (ALIGN PO)  Self Yes No   Sig: Take 1 capsule by mouth in the morning   apixaban (Eliquis) 5 mg   No No   Sig: Take 1 tablet (5 mg total) by mouth 2 (two) times a day   atorvastatin (LIPITOR) 40 mg tablet  Self No No   Sig: Take 1 tablet (40 mg total) by mouth daily with dinner   cholecalciferol (VITAMIN D3) 1,000 units tablet  Self Yes No   Sig: Take 2,000 Units by mouth daily   diphenhydrAMINE-acetaminophen (TYLENOL PM)  MG TABS  Self Yes No   Sig: Take 1 tablet by mouth daily at bedtime as needed for sleep   esomeprazole (NexIUM) 40 MG capsule  Self Yes No   Sig: Take 1 capsule by mouth daily   fluticasone (FLONASE) 50 mcg/act nasal spray  Self No No   Sig: USE 2 SPRAYS IN EACH NOSTRIL DAILY   levothyroxine 125 mcg tablet  Self No No   Sig: TAKE 1 TABLET DAILY ON AN EMPTY STOMACH   metoprolol tartrate (LOPRESSOR) 25 mg tablet  Self No No   Sig: Take one tablet as needed for palpitations   Patient taking differently: if needed Take one tablet as needed for palpitations   nystatin (MYCOSTATIN) cream   Yes No   Sig: Apply topically 2 (two) times a day as needed   psyllium (METAMUCIL) 58.6 % powder  Self Yes No   Sig: Take 1 packet by mouth daily   triamcinolone (KENALOG) 0.5 % ointment   Yes No      Facility-Administered Medications: None     Allergies   Allergen Reactions   • Hydrochlorothiazide Palpitations   • Meloxicam Rash       Review of previous medical records was  completed. Review of Systems   Constitutional: Negative for chills and fever. HENT: Negative for ear pain and sore throat. Eyes: Negative for pain and visual disturbance. Respiratory: Negative for cough and shortness of breath. Cardiovascular: Negative for chest pain and palpitations. Gastrointestinal: Negative for abdominal pain and vomiting. Genitourinary: Negative for dysuria and hematuria. Musculoskeletal: Negative for arthralgias and back pain. Skin: Negative for color change and rash. Neurological: Positive for speech difficulty. Negative for seizures, syncope and facial asymmetry. All other systems reviewed and are negative. OBJECTIVE     Patient ID: Jessie Hagan is a 70 y.o. female. Vitals:   Vitals:    10/02/23 2345 10/03/23 0045 10/03/23 0245 10/03/23 0700   BP: 137/66 139/67 122/61 163/72   BP Location:       Pulse: 75 69 78 77   Resp: 16 16 16 18   Temp: 97.6 °F (36.4 °C) 97.5 °F (36.4 °C) 97.9 °F (36.6 °C) 97.7 °F (36.5 °C)   TempSrc:       SpO2: 96% 98% 94% 93%      There is no height or weight on file to calculate BMI. Intake/Output Summary (Last 24 hours) at 10/3/2023 1052  Last data filed at 10/2/2023 2100  Gross per 24 hour   Intake 0 ml   Output --   Net 0 ml       Temperature:   Temp (24hrs), Av.1 °F (36.7 °C), Min:97.5 °F (36.4 °C), Max:98.9 °F (37.2 °C)    Temperature: 97.7 °F (36.5 °C)    Invasive Devices: Invasive Devices     Peripheral Intravenous Line  Duration           Peripheral IV 10/02/23 Right Antecubital <1 day                Physical Exam  Vitals and nursing note reviewed. Constitutional:       General: She is not in acute distress. Appearance: She is well-developed. HENT:      Head: Normocephalic and atraumatic. Eyes:      Extraocular Movements: EOM normal.      Conjunctiva/sclera: Conjunctivae normal.      Pupils: Pupils are equal, round, and reactive to light. Cardiovascular:      Rate and Rhythm: Normal rate. Pulmonary:      Effort: Pulmonary effort is normal. No respiratory distress. Abdominal:      Tenderness: There is no abdominal tenderness. Musculoskeletal:         General: No swelling. Cervical back: Neck supple. Skin:     General: Skin is warm and dry. Capillary Refill: Capillary refill takes less than 2 seconds. Neurological:      Mental Status: She is alert and oriented to person, place, and time. Motor: Motor strength is normal.     Coordination: Finger-Nose-Finger Test and Heel to [facility] Test normal.      Deep Tendon Reflexes:      Reflex Scores:       Tricep reflexes are 2+ on the right side and 2+ on the left side. Bicep reflexes are 2+ on the right side and 2+ on the left side. Brachioradialis reflexes are 2+ on the right side and 2+ on the left side. Patellar reflexes are 2+ on the right side and 2+ on the left side. Achilles reflexes are 2+ on the right side and 2+ on the left side. Psychiatric:         Mood and Affect: Mood normal.         Speech: Speech normal.          Neurologic Exam     Mental Status   Oriented to person, place, and time. Speech: speech is normal   Able to perform simple calculations. Able to name object. Able to repeat. Normal comprehension. Cranial Nerves     CN II   Visual fields full to confrontation. CN III, IV, VI   Pupils are equal, round, and reactive to light. Extraocular motions are normal.   CN III: no CN III palsy  CN VI: no CN VI palsy  Nystagmus: none     CN V   Facial sensation intact. CN VII   Facial expression full, symmetric.      CN VIII   CN VIII normal.     CN IX, X   CN IX normal.   CN X normal.     CN XI   CN XI normal.     CN XII   CN XII normal.     Motor Exam   Right arm pronator drift: absent  Left arm pronator drift: absent    Strength   Strength 5/5 throughout. Sensory Exam   Light touch normal.     Gait, Coordination, and Reflexes     Coordination   Finger to nose coordination: normal  Heel to shin coordination: normal    Tremor   Resting tremor: absent  Intention tremor: absent  Action tremor: absent    Reflexes   Right brachioradialis: 2+  Left brachioradialis: 2+  Right biceps: 2+  Left biceps: 2+  Right triceps: 2+  Left triceps: 2+  Right patellar: 2+  Left patellar: 2+  Right achilles: 2+  Left achilles: 2+  Right plantar: normal  Left plantar: normal             LABORATORY DATA     Labs: I have personally reviewed pertinent reports. and I have personally reviewed pertinent films in PACS  Results from last 7 days   Lab Units 10/03/23  0443 10/02/23  1910   WBC Thousand/uL 6.46 8.40   HEMOGLOBIN g/dL 12.1 13.5   HEMATOCRIT % 37.1 42.3   PLATELETS Thousands/uL 272 315      Results from last 7 days   Lab Units 10/03/23  0443 10/02/23  1910   POTASSIUM mmol/L 3.8 3.4*   CHLORIDE mmol/L 109* 105   CO2 mmol/L 27 24   BUN mg/dL 9 10   CREATININE mg/dL 0.60 0.68   CALCIUM mg/dL 9.1 10.0              Results from last 7 days   Lab Units 10/02/23  1910   INR  1.07   PTT seconds 31               IMAGING & DIAGNOSTIC TESTING     Radiology Results: I have personally reviewed pertinent reports.    and I have personally reviewed pertinent films in PACS  CTA stroke alert (head/neck)   Final Result by Brady Grewal MD (10/02 2017)      No hemodynamically significant stenosis, dissection or occlusion of the carotid or vertebral arteries or major vessels of the [place]               Findings were directly discussed with Mary Ellen Mendez at  7:40 PM  .                           Workstation performed: [id number]         CT stroke alert brain Final Result by Jes Herrmann MD (10/02 2017)      Stable chronic left MCA territory infarct. No evidence of acute vascular territorial infarction, intracranial hemorrhage or mass. Findings were directly discussed with Dileep Queen at  7:40 PM  .      Workstation performed: [id number]         MRI Inpatient Order    (Results Pending)       Other Diagnostic Testing: I have personally reviewed pertinent reports. ACTIVE MEDICATIONS     Current Facility-Administered Medications   Medication Dose Route Frequency   • acetaminophen (TYLENOL) tablet 650 mg  650 mg Oral Q6H PRN   • apixaban (ELIQUIS) tablet 5 mg  5 mg Oral BID   • ascorbic acid (VITAMIN C) tablet 1,000 mg  1,000 mg Oral Daily   • aspirin chewable tablet 81 mg  81 mg Oral Daily   • atorvastatin (LIPITOR) tablet 40 mg  40 mg Oral Daily With Dinner   • cholecalciferol (VITAMIN D3) tablet 2,000 Units  2,000 Units Oral Daily   • fluticasone (FLONASE) 50 mcg/act nasal spray 2 spray  2 spray Each Nare Daily   • insulin lispro (HumaLOG) 100 units/mL subcutaneous injection 1-6 Units  1-6 Units Subcutaneous TID AC   • levothyroxine tablet 125 mcg  125 mcg Oral Early Morning   • magnesium Oxide (MAG-OX) tablet 400 mg  400 mg Oral Daily   • multivitamin-minerals (CENTRUM) tablet 1 tablet  1 tablet Oral Daily   • pantoprazole (PROTONIX) EC tablet 40 mg  40 mg Oral Early Morning   • psyllium (METAMUCIL) 1 packet  1 packet Oral Daily   • saccharomyces boulardii (FLORASTOR) capsule 250 mg  250 mg Oral Daily       Prior to Admission medications    Medication Sig Start Date End Date Taking?  Authorizing Provider   apixaban (Eliquis) 5 mg Take 1 tablet (5 mg total) by mouth 2 (two) times a day 8/8/23 11/6/23  Héctor Ibarra DO   Ascorbic Acid (VITAMIN C) 1000 MG tablet Take 1 tablet by mouth daily 11/6/12   Historical Provider, MD   atorvastatin (LIPITOR) 40 mg tablet Take 1 tablet (40 mg total) by mouth daily with dinner 6/13/23 7/13/23  Kylie Baltazar DO [place]   Biotin 5000 MCG CAPS Take 1 capsule by mouth daily    Historical Provider, MD   cholecalciferol (VITAMIN D3) 1,000 units tablet Take 2,000 Units by mouth daily    Historical Provider, MD   diphenhydrAMINE-acetaminophen (TYLENOL PM)  MG TABS Take 1 tablet by mouth daily at bedtime as needed for sleep    Historical Provider, MD   esomeprazole (NexIUM) 40 MG capsule Take 1 capsule by mouth daily 5/3/11   Historical Provider, MD   fluticasone (FLONASE) 50 mcg/act nasal spray USE 2 SPRAYS IN EACH NOSTRIL DAILY 8/26/22   Radha Davila DO   levothyroxine 125 mcg tablet TAKE 1 TABLET DAILY ON AN EMPTY STOMACH 10/10/22   Radha Davila DO   Magnesium 250 MG TABS Take 250 mg by mouth in the morning    Historical Provider, MD   Menthol, Topical Analgesic, (BIOFREEZE EX) Apply 1 application topically daily as needed (pain)    Historical Provider, MD   metoprolol tartrate (LOPRESSOR) 25 mg tablet Take one tablet as needed for palpitations  Patient taking differently: if needed Take one tablet as needed for palpitations 2/21/23   Viridiana Rodriguez MD   Multiple Vitamin (MULTIVITAMIN) capsule Take 1 capsule by mouth daily    Historical Provider, MD   nystatin (MYCOSTATIN) cream Apply topically 2 (two) times a day as needed 8/29/23 8/28/24  Historical Provider, MD   Probiotic Product (ALIGN PO) Take 1 capsule by mouth in the morning    Historical Provider, MD   psyllium (METAMUCIL) 58.6 % powder Take 1 packet by mouth daily    Historical Provider, MD   triamcinolone (KENALOG) 0.5 % ointment  8/29/23   Historical Provider, MD   naproxen sodium (ALEVE) 220 MG tablet Take by mouth  6/27/19  Historical Provider, MD         CODE STATUS & ADVANCED DIRECTIVES     Code Status: Level 1 - Full Code  Advance Directive and Living Will:      Power of : Yes  POLST:        VTE Pharmacologic Prophylaxis: Apixaban (Eliquis)  VTE Mechanical Prophylaxis: sequential compression device    ==  [employer], DO [facility], PGY-3

## 2023-10-03 NOTE — ASSESSMENT & PLAN NOTE
· Small PFO and atrial septal aneurysm on recent echo during work up in May  · Not candidate for closure and would not  per cardiology. · Maintained on lifelong anticoagulation.

## 2023-10-03 NOTE — ASSESSMENT & PLAN NOTE
• Presents with R facial droop. Has baseline expressive aphasia which is unchanged, per patient. • CT: no acute abnormality  • CTA: no LVO  • Stroke Pathway. o Frequent vital signs. o Neuro checks. o Telemetry. o Check lipid panel and A1c.  o Aspirin and statin. • Obtain MRI brain. • Would hold TTE for now  • Consult Neurology. • PT/OT consultation.

## 2023-10-03 NOTE — ASSESSMENT & PLAN NOTE
• Presents with complaint of R facial droop. Has baseline expressive aphasia which is unchanged, per patient. • CT: no acute abnormality  • CTA: no LVO  • Lipid panels were normal  • Neurology on board  • MRI Brain completed and showed no acute abnormalities    Plan:  Follow up outpatient with PCP in 1 week. Is stable for discharge and does not need further follow up, as per neuro.

## 2023-10-03 NOTE — ASSESSMENT & PLAN NOTE
Total cholesterol 140, triglycerides 97, HDL 47, LDL 72 in lipid panel collected today    · Continue statin

## 2023-10-03 NOTE — ASSESSMENT & PLAN NOTE
Patient has had elevated blood pressures especially in this hospital visit. Looking at chart review, patient also had an elevated blood pressure above goal in her May office visit to her primary care. As there have been 2 documented blood pressures above goal, patient can be started on antihypertensive. Plan:   We will recommend to start patient on amlodipine 5 mg daily

## 2023-10-03 NOTE — DISCHARGE SUMMARY
[facility]  Discharge- Allie Archuleta 1945, 70 y.o. female MRN: [id number]  Unit/Bed#: S -01 Encounter: [id number]  Primary Care Provider: Maya Harris DO   Date and time admitted to hospital: 10/2/2023  6:54 PM    * Stroke-like symptom  Assessment & Plan  • Presents with complaint of R facial droop. Has baseline expressive aphasia which is unchanged, per patient. • CT: no acute abnormality  • CTA: no LVO  • Lipid panels were normal  • Neurology on board  • MRI Brain completed and showed no acute abnormalities    Plan:  Follow up outpatient with PCP in 1 week. Is stable for discharge and does not need further follow up, as per neuro. 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 · Maintained on eliquis    Plan:  -Follow up outpatient with PCP and neurologist.     PFO (patent foramen ovale)  Assessment & Plan  · Small PFO and atrial septal aneurysm on recent echo during work up in May  · Not candidate for closure and would not  per cardiology. · Maintained on lifelong anticoagulation. Stable for d/c as MRI shows no acute abnormalities, as per neurologist.     Ocular migraine  Assessment & Plan  · Ocular migraines treated with tylenol as needed. Symptoms last for approx 20 minutes. Symptomatic earlier. Can continue to follow up outpatient with her neurologist and PCP. Essential hypertension  Assessment & Plan  Patient has had elevated blood pressures especially in this hospital visit. Looking at chart review, patient also had an elevated blood pressure above goal in her May office visit to her primary care. As there have been 2 documented blood pressures above goal, patient can be started on antihypertensive. Plan:   We will recommend to start patient on amlodipine 5 mg daily        Type 2 diabetes mellitus without complication, without long-term current use of insulin [facility])  Assessment & Plan  Lab Results   Component Value Date    HGBA1C 6.3 (H) 08/31/2023       Recent Labs     10/02/23  1852 10/02/23  1948 10/03/23  0720 10/03/23  1100   POCGLU 132 137 114 157*       Blood Sugar Average: Last 72 hrs:  (P) 135     Plan:  Continue to monitor with her PCP    Hyperlipidemia  Assessment & Plan  Total cholesterol 140, triglycerides 97, HDL 47, LDL 72 in lipid panel collected today    · Continue statin         Medical Problems     Resolved Problems  Date Reviewed: 10/3/2023   None       Discharging Resident: Kristie Moya DO  Discharging Attending: Tomasa Marinelli*  PCP: Es Francois DO  Admission Date:   Admission Orders (From admission, onward)     Ordered        10/02/23 2046  Place in Observation  Once                      Discharge Date: 10/03/23    Consultations During Hospital Stay:  · Neurology    Procedures Performed:   · None    Significant Findings / Test Results:   · MRI brain on 10/3/23 showed  "No acute intracranial pathology. Stable encephalomalacia due to chronic left MCA distribution infarct that was acute/subacute on previous MRI."    Incidental Findings:   · None     Test Results Pending at Discharge (will require follow up): · None     Outpatient Tests Requested:  · None    Complications:  None    Reason for Admission: Stroke like symptoms    Hospital Course:   Estiven French is a 70 y.o. female patient who originally presented to the hospital on 10/2/2023 due to strokelike symptoms. She has baseline expressive aphasia as she had previous stroke in May 2023. Stroke pathway was started and CT and CTA of the head showed no acute abnormalities. Lipids were also normal.  Neurology was consulted who recommended patient complete an MRI. After speaking with patient this morning, patient stated that she feels completely normal and does not feel she has any more symptoms.   She thinks she saw a small facial droop on her right side but does not think so anymore. She came in just to be evaluated due to anxiety from her previous stroke. She has had no neurological defects focal or otherwise yesterday or today. MRI of the brain was completed which also showed no acute abnormalities. As per neuro, patient is stable for discharge and does not need to complete an echo. Patient will continue the medications that she came to the hospital with. She is medically stable for discharge and can be discharged today. The patient, initially admitted to the hospital as inpatient, was discharged earlier than expected given the following: Normal MRI. Please see above list of diagnoses and related plan for additional information. Condition at Discharge: stable    Discharge Day Visit / Exam:   * Please refer to separate progress note for these details *    Discussion with Family: Updated  () at bedside. Discharge instructions/Information to patient and family:   See after visit summary for information provided to patient and family. Provisions for Follow-Up Care:  See after visit summary for information related to follow-up care and any pertinent home health orders. Disposition:   Home    Planned Readmission: None    Discharge Medications:  See after visit summary for reconciled discharge medications provided to patient and/or family.       **Please Note: This note may have been constructed using a voice recognition system**

## 2023-10-03 NOTE — DISCHARGE INSTR - AVS FIRST PAGE
Dear Estiven French,     It was our pleasure to care for you here at [facility]. It is our hope that we were always able to exceed the expected standards for your care during your stay. You were hospitalized due to stroke-like symptoms. You were cared for on the [facility] third floor by Kristie Moya DO under the service of Tomasa Marinelli with the [facility] who covers for your primary care physician (PCP), Es Frnacois DO, while you were hospitalized. If you have any questions or concerns related to this hospitalization, you may contact us at [phone]. For follow up as well as any medication refills, we recommend that you follow up with your primary care physician. A registered nurse will reach out to you by phone within a few days after your discharge to answer any additional questions that you may have after going home. However, at this time we provide for you here, the most important instructions / recommendations at discharge:     Notable Medication Adjustments -   Begin taking amlodipine 5 mg once per day-starting tomorrow  Continue taking all your home medications as you were before being hospitalized  Testing Required after Discharge -   None  ** Please contact your PCP to request testing orders for any of the testing recommended here **  Important follow up information -   Follow-up within 1 week with your PCP no later than October 10th  Other Instructions -   Your primary care provider should evaluate you for your stroke risk as well as follow up on your outstanding hemoglobin A1c test.  She was to stratify her risk for diabetes  Please review this entire after visit summary as additional general instructions including medication list, appointments, activity, diet, any pertinent wound care, and other additional recommendations from your care team that may be provided for you.       Sincerely,     Kristie Moya DO

## 2023-10-03 NOTE — PLAN OF CARE
Problem: Neurological Deficit  Goal: Neurological status is stable or improving  Description: Interventions:  - Monitor and assess patient's level of consciousness, motor function, sensory function, and level of assistance needed for ADLs. - Monitor and report changes from baseline. Collaborate with interdisciplinary team to initiate plan and implement interventions as ordered. - Provide and maintain a safe environment. - Consider seizure precautions. - Consider fall precautions. - Consider aspiration precautions. - Consider bleeding precautions. Outcome: Progressing     Problem: Activity Intolerance/Impaired Mobility  Goal: Mobility/activity is maintained at optimum level for patient  Description: Interventions:  - Assess and monitor patient  barriers to mobility and need for assistive/adaptive devices. - Assess patient's emotional response to limitations. - Collaborate with interdisciplinary team and initiate plans and interventions as ordered. - Encourage independent activity per ability.  - Maintain proper body alignment. - Perform active/passive rom as tolerated/ordered. - Plan activities to conserve energy.  - Turn patient as appropriate  Outcome: Progressing     Problem: Communication Impairment  Goal: Ability to express needs and understand communication  Description: Assess patient's communication skills and ability to understand information. Patient will demonstrate use of effective communication techniques, alternative methods of communication and understanding even if not able to speak. - Encourage communication and provide alternate methods of communication as needed. - Collaborate with case management/ for discharge needs. - Include patient/family/caregiver in decisions related to communication.   Outcome: Progressing     Problem: Potential for Aspiration  Goal: Non-ventilated patient's risk of aspiration is minimized  Description: Assess and monitor vital signs, respiratory status, and labs (WBC). Monitor for signs of aspiration (tachypnea, cough, rales, wheezing, cyanosis, fever). - Assess and monitor patient's ability to swallow. - Place patient up in chair to eat if possible. - HOB up at 90 degrees to eat if unable to get patient up into chair.  - Supervise patient during oral intake. - Instruct patient/ family to take small bites. - Instruct patient/ family to take small single sips when taking liquids. - Follow patient-specific strategies generated by speech pathologist.  Outcome: Progressing  Goal: Ventilated patient's risk of aspiration is minimized  Description: Assess and monitor vital signs, respiratory status, airway cuff pressure, and labs (WBC). Monitor for signs of aspiration (tachypnea, cough, rales, wheezing, cyanosis, fever). - Elevate head of bed 30 degrees if patient has tube feeding.  - Monitor tube feeding. Outcome: Progressing     Problem: Nutrition  Goal: Nutrition/Hydration status is improving  Description: Monitor and assess patient's nutrition/hydration status for malnutrition (ex- brittle hair, bruises, dry skin, pale skin and conjunctiva, muscle wasting, smooth red tongue, and disorientation). Collaborate with interdisciplinary team and initiate plan and interventions as ordered. Monitor patient's weight and dietary intake as ordered or per policy. Utilize nutrition screening tool and intervene per policy. Determine patient's food preferences and provide high-protein, high-caloric foods as appropriate. - Assist patient with eating.  - Allow adequate time for meals.  - Encourage patient to take dietary supplement as ordered. - Collaborate with clinical nutritionist.  - Include patient/family/caregiver in decisions related to nutrition.   Outcome: Progressing     Problem: PAIN - ADULT  Goal: Verbalizes/displays adequate comfort level or baseline comfort level  Description: Interventions:  - Encourage patient to monitor pain and request assistance  - Assess pain using appropriate pain scale  - Administer analgesics based on type and severity of pain and evaluate response  - Implement non-pharmacological measures as appropriate and evaluate response  - Consider cultural and social influences on pain and pain management  - Notify physician/advanced practitioner if interventions unsuccessful or patient reports new pain  Outcome: Progressing     Problem: INFECTION - ADULT  Goal: Absence or prevention of progression during hospitalization  Description: INTERVENTIONS:  - Assess and monitor for signs and symptoms of infection  - Monitor lab/diagnostic results  - Monitor all insertion sites, i.e. indwelling lines, tubes, and drains  - Monitor endotracheal if appropriate and nasal secretions for changes in amount and color  - Weed appropriate cooling/warming therapies per order  - Administer medications as ordered  - Instruct and encourage patient and family to use good hand hygiene technique  - Identify and instruct in appropriate isolation precautions for identified infection/condition  Outcome: Progressing  Goal: Absence of fever/infection during neutropenic period  Description: INTERVENTIONS:  - Monitor WBC    Outcome: Progressing     Problem: SAFETY ADULT  Goal: Patient will remain free of falls  Description: INTERVENTIONS:  - Educate patient/family on patient safety including physical limitations  - Instruct patient to call for assistance with activity   - Consult OT/PT to assist with strengthening/mobility   - Keep Call bell within reach  - Keep bed low and locked with side rails adjusted as appropriate  - Keep care items and personal belongings within reach  - Initiate and maintain comfort rounds  - Make Fall Risk Sign visible to staff  - Offer Toileting every 2 Hours, in advance of need  - Initiate/Maintain bed/chair alarm  - Obtain necessary fall risk management equipment  - Apply yellow socks and bracelet for high fall risk patients  - Consider moving patient to room near nurses station  Outcome: Progressing  Goal: Maintain or return to baseline ADL function  Description: INTERVENTIONS:  -  Assess patient's ability to carry out ADLs; assess patient's baseline for ADL function and identify physical deficits which impact ability to perform ADLs (bathing, care of mouth/teeth, toileting, grooming, dressing, etc.)  - Assess/evaluate cause of self-care deficits   - Assess range of motion  - Assess patient's mobility; develop plan if impaired  - Assess patient's need for assistive devices and provide as appropriate  - Encourage maximum independence but intervene and supervise when necessary  - Involve family in performance of ADLs  - Assess for home care needs following discharge   - Consider OT consult to assist with ADL evaluation and planning for discharge  - Provide patient education as appropriate  Outcome: Progressing  Goal: Maintains/Returns to pre admission functional level  Description: INTERVENTIONS:  - Perform BMAT or MOVE assessment daily.   - Set and communicate daily mobility goal to care team and patient/family/caregiver.    - Collaborate with rehabilitation services on mobility goals if consulted  - Perform Range of Motion   - Reposition patient   - Dangle patient   - Stand patient  - Ambulate patient  - Out of bed to chair   - Out of bed for meals   - Record patient progress and toleration of activity level   Outcome: Progressing     Problem: DISCHARGE PLANNING  Goal: Discharge to home or other facility with appropriate resources  Description: INTERVENTIONS:  - Identify barriers to discharge w/patient and caregiver  - Arrange for needed discharge resources and transportation as appropriate  - Identify discharge learning needs (meds, wound care, etc.)  - Arrange for interpretive services to assist at discharge as needed  - Refer to Case Management Department for coordinating discharge planning if the patient needs post-hospital services based on physician/advanced practitioner order or complex needs related to functional status, cognitive ability, or social support system  Outcome: Progressing     Problem: Knowledge Deficit  Goal: Patient/family/caregiver demonstrates understanding of disease process, treatment plan, medications, and discharge instructions  Description: Complete learning assessment and assess knowledge base.   Interventions:  - Provide teaching at level of understanding  - Provide teaching via preferred learning methods  Outcome: Progressing

## 2023-10-03 NOTE — ASSESSMENT & PLAN NOTE
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   · Maintained on eliquis    Plan:  -Follow up outpatient with PCP and neurologist.

## 2023-10-04 ENCOUNTER — TELEPHONE (OUTPATIENT)
Dept: INTERNAL MEDICINE CLINIC | Facility: CLINIC | Age: 78
End: 2023-10-04

## 2023-10-04 ENCOUNTER — TELEPHONE (OUTPATIENT)
Dept: NEUROLOGY | Facility: CLINIC | Age: 78
End: 2023-10-04

## 2023-10-04 NOTE — TELEPHONE ENCOUNTER
Patient was in the ER for stroke like symptoms. She was advised to follow up with her PCP in one week. Patient ONLY wants to see you. She is currently scheduled on 11/7.       Please advise

## 2023-10-04 NOTE — TELEPHONE ENCOUNTER
Hello, could you please give further instructions on time frame that patient should be scheduled and should she see her Attending. Thank you!

## 2023-10-05 ENCOUNTER — OFFICE VISIT (OUTPATIENT)
Dept: INTERNAL MEDICINE CLINIC | Facility: CLINIC | Age: 78
End: 2023-10-05
Payer: COMMERCIAL

## 2023-10-05 ENCOUNTER — TRANSITIONAL CARE MANAGEMENT (OUTPATIENT)
Dept: INTERNAL MEDICINE CLINIC | Facility: CLINIC | Age: 78
End: 2023-10-05

## 2023-10-05 VITALS
HEART RATE: 85 BPM | HEIGHT: 66 IN | SYSTOLIC BLOOD PRESSURE: 124 MMHG | RESPIRATION RATE: 16 BRPM | OXYGEN SATURATION: 97 % | DIASTOLIC BLOOD PRESSURE: 78 MMHG | BODY MASS INDEX: 28.7 KG/M2 | WEIGHT: 178.6 LBS

## 2023-10-05 DIAGNOSIS — E66.3 OVERWEIGHT (BMI 25.0-29.9): ICD-10-CM

## 2023-10-05 DIAGNOSIS — F41.1 GAD (GENERALIZED ANXIETY DISORDER): ICD-10-CM

## 2023-10-05 DIAGNOSIS — Z13.0 SCREENING, ANEMIA, DEFICIENCY, IRON: ICD-10-CM

## 2023-10-05 DIAGNOSIS — Z86.73 HISTORY OF CVA (CEREBROVASCULAR ACCIDENT): ICD-10-CM

## 2023-10-05 DIAGNOSIS — R73.01 IMPAIRED FASTING GLUCOSE: Primary | ICD-10-CM

## 2023-10-05 DIAGNOSIS — Z13.6 SCREENING FOR CARDIOVASCULAR CONDITION: ICD-10-CM

## 2023-10-05 DIAGNOSIS — F33.9 DEPRESSION, RECURRENT (HCC): ICD-10-CM

## 2023-10-05 PROCEDURE — 99214 OFFICE O/P EST MOD 30 MIN: CPT | Performed by: INTERNAL MEDICINE

## 2023-10-05 RX ORDER — SERTRALINE HYDROCHLORIDE 25 MG/1
25 TABLET, FILM COATED ORAL DAILY
Qty: 30 TABLET | Refills: 5 | Status: SHIPPED | OUTPATIENT
Start: 2023-10-05 | End: 2024-04-02

## 2023-10-05 NOTE — PROGRESS NOTES
Patient called in yesterday. This was documented you responded to the message of her phone call yesterday because she wanted to be seen by only you and her appointment was made for today. Did you discuss her hospital stay? If so are you able to make this her TCM appointment?

## 2023-10-09 NOTE — ASSESSMENT & PLAN NOTE
Symptoms Compatible with moderate anxiety and mild depression related to recent stroke no suicidal ideation we will start Zoloft 25 mg once daily reviewed the risk benefits and side effects of the medication in detail with the patient if any side effects she will notify me immediately we did discuss discuss starting counseling orders provided

## 2023-10-09 NOTE — ASSESSMENT & PLAN NOTE
Strokelike symptoms recent ER visit did not reveal stroke fortunately suspect there is an element of anxiety contributory reviewed ER visits imaging laboratories and test in detail with the patient

## 2023-10-19 DIAGNOSIS — I10 ESSENTIAL HYPERTENSION: ICD-10-CM

## 2023-10-19 RX ORDER — AMLODIPINE BESYLATE 5 MG/1
5 TABLET ORAL DAILY
Qty: 30 TABLET | Refills: 0 | Status: SHIPPED | OUTPATIENT
Start: 2023-10-19 | End: 2023-11-18

## 2023-10-19 NOTE — TELEPHONE ENCOUNTER
Medication Refill Request     Name Amlodipine 5mg  Dose/Frequency daily   Quantity 30  Verified pharmacy   [x]  Verified ordering Provider   [x]  Does patient have enough for the next 3 days? Yes [x] No []      This med was originally ordered during pt's hospitalization on 10/02/2023. Pt has f/u with PCP scheduled for 11/16/2023, but she will run out of her prescription prior to that. Please look  into refilling. Thank you.

## 2023-11-14 ENCOUNTER — OFFICE VISIT (OUTPATIENT)
Dept: CARDIOLOGY CLINIC | Facility: CLINIC | Age: 78
End: 2023-11-14
Payer: COMMERCIAL

## 2023-11-14 VITALS
OXYGEN SATURATION: 65 % | BODY MASS INDEX: 28.28 KG/M2 | HEART RATE: 90 BPM | HEIGHT: 66 IN | SYSTOLIC BLOOD PRESSURE: 112 MMHG | DIASTOLIC BLOOD PRESSURE: 60 MMHG | WEIGHT: 176 LBS

## 2023-11-14 DIAGNOSIS — I25.3 ATRIAL SEPTAL ANEURYSM: ICD-10-CM

## 2023-11-14 DIAGNOSIS — I63.30 CEREBRAL THROMBOSIS WITH CEREBRAL INFARCTION (HCC): ICD-10-CM

## 2023-11-14 DIAGNOSIS — I63.9 ACUTE CVA (CEREBROVASCULAR ACCIDENT) (HCC): ICD-10-CM

## 2023-11-14 DIAGNOSIS — E78.2 MIXED HYPERLIPIDEMIA: ICD-10-CM

## 2023-11-14 DIAGNOSIS — I10 ESSENTIAL HYPERTENSION: Primary | ICD-10-CM

## 2023-11-14 DIAGNOSIS — R06.09 DYSPNEA ON EXERTION: ICD-10-CM

## 2023-11-14 DIAGNOSIS — Q21.12 PFO (PATENT FORAMEN OVALE): ICD-10-CM

## 2023-11-14 PROCEDURE — 99214 OFFICE O/P EST MOD 30 MIN: CPT | Performed by: INTERNAL MEDICINE

## 2023-11-14 NOTE — PROGRESS NOTES
Cardiology Follow Up    [facility]  1945  [id number]  [address] [place] C[facility]  [address]  [address] [facility]  [phone]    1. Essential hypertension        2. History of Cerebral thrombosis with cerebral infarction ([address])        3. PFO (patent foramen ovale)  Ambulatory referral to Cardiology      4. Mixed hyperlipidemia        5. Dyspnea on exertion        6. Acute CVA (cerebrovascular accident) [facility])  Ambulatory referral to Cardiology      7. Cerebral thrombosis with cerebral infarction [facility])  Ambulatory referral to Cardiology      8. Atrial septal aneurysm  Ambulatory referral to Cardiology          Interval History: Cardiology follow-up. Patient was recently seen in emergency room with possible TIA. MRI of the brain revealed chronic left MCA encephalomalacia but no acute events. Patient recovered well. He has had no further neurological deficits. Compliant medications, denies any significant bleeding issues on full anticoagulation with a factor X inhibitor. Patient states been compliant with low-cholesterol diet. Lipids this year, total cholesterol 140, HDL 49 LDL 72, controlled on high intense statin therapy. Compliant with low-sodium diet, blood pressure has been well controlled. Patient continues to exercise regularly vigorously, no exertional symptoms, she is lost close to 40 pounds since her CVA.     Patient Active Problem List   Diagnosis    Lymphedema    Fatigue    Hyperlipidemia    Hypothyroidism    Class 1 obesity due to excess calories with serious comorbidity and body mass index (BMI) of 33.0 to 33.9 in adult    Type 2 diabetes mellitus without complication, without long-term current use of insulin (HCC)    Atypical chest pain    Right lower quadrant abdominal pain    Pelvic pain    Family history of ovarian cancer    Wellness examination    Dense breast tissue on mammogram    Fibrocystic breast changes    Dupuytren's contracture    Diverticular disease of colon    Gastro-esophageal reflux disease with esophagitis    History of adenomatous polyp of colon    Hypothyroidism due to Hashimoto's thyroiditis    Impaired fasting glucose    Nephrolithiasis    Essential hypertension    Encounter for screening mammogram for breast cancer    Medicare annual wellness visit, subsequent    Dyspnea on exertion    Insomnia    Oral ulceration    Right flank pain    Exposure to COVID-19 virus    Edema    Thyroid nodule    Osteoarthritis    Urinary incontinence    Right groin pain    Trigger finger    Benign essential tremor    Tachycardia    COVID-19    History of Cerebral thrombosis with cerebral infarction (HCC)    Palpitations    Hypokalemia    PFO (patent foramen ovale)    Frontal headache    History of CVA (cerebrovascular accident)    Overweight (BMI 25.0-29. 9)    Stroke-like symptom    Ocular migraine    Depression, recurrent (HCC)     Past Medical History:   Diagnosis Date    Biliary dyskinesia     CAP (community acquired pneumonia)     last assessed 16    Deep vein thrombosis (DVT) of proximal vein of right lower extremity, unspecified chronicity ([address]) 08/10/2022    Diabetes mellitus ([address]) 2018    Type 2 no insulin    Disease of thyroid gland     GERD (gastroesophageal reflux disease)     Hyperlipidemia     Palpitations     Pneumonia     Shortness of breath     Stroke (HCC)     SVT (supraventricular tachycardia) 2022     Social History     Socioeconomic History    Marital status: /Civil Union     Spouse name: Not on file    Number of children: Not on file    Years of education: 12    Highest education level: Not on file   Occupational History    Not on file   Tobacco Use    Smoking status: Former     Packs/day: 0.25     Years: 17.00     Total pack years: 4.25     Types: Cigarettes     Start date: 58     Quit date: 2010     Years since quittin.8    Smokeless tobacco: Never Tobacco comments:     0.5 ppw intermittently last smoked 2010   Vaping Use    Vaping Use: Never used   Substance and Sexual Activity    Alcohol use: Yes     Alcohol/week: 2.0 standard drinks of alcohol     Types: 2 Glasses of wine per week     Comment: Maybe 2 glasses a month    Drug use: No    Sexual activity: Not Currently     Partners: Male     Birth control/protection: Post-menopausal     Comment: I'm 77   Other Topics Concern    Not on file   Social History Narrative    Caffeine use     Social Determinants of Health     Financial Resource Strain: Low Risk  (9/12/2023)    Overall Financial Resource Strain (CARDIA)     Difficulty of Paying Living Expenses: Not hard at all   Food Insecurity: No Food Insecurity (5/15/2023)    Hunger Vital Sign     Worried About Running Out of Food in the Last Year: Never true     Ran Out of Food in the Last Year: Never true   Transportation Needs: No Transportation Needs (9/12/2023)    PRAPARE - Transportation     Lack of Transportation (Medical): No     Lack of Transportation (Non-Medical):  No   Physical Activity: Inactive (8/22/2022)    Exercise Vital Sign     Days of Exercise per Week: 0 days     Minutes of Exercise per Session: 0 min   Stress: Not on file   Social Connections: Not on file   Intimate Partner Violence: Not on file   Housing Stability: Low Risk  (5/15/2023)    Housing Stability Vital Sign     Unable to Pay for Housing in the Last Year: No     Number of Places Lived in the Last Year: 1     Unstable Housing in the Last Year: No      Family History   Problem Relation Age of Onset    Aneurysm Mother         cerebral    Ovarian cancer Mother 67    Cancer Mother     Cirrhosis Father         alcoholic    Alcohol abuse Father     Aneurysm Brother         abdominal aortic; cerebral    Diabetes Son     No Known Problems Daughter     No Known Problems Maternal Grandmother     No Known Problems Maternal Grandfather     No Known Problems Paternal Grandmother     No Known Problems Paternal Grandfather     No Known Problems Son     No Known Problems Son     No Known Problems Son     No Known Problems Maternal Aunt     No Known Problems Maternal Aunt     No Known Problems Maternal Aunt     No Known Problems Maternal Aunt     No Known Problems Paternal Aunt     No Known Problems Paternal Aunt     No Known Problems Paternal Aunt     No Known Problems Paternal Aunt     No Known Problems Paternal Aunt     Cancer Brother         bladder cancer     Past Surgical History:   Procedure Laterality Date    AUGMENTATION MAMMAPLASTY Bilateral 1998    breast surgery- with prosthetic implant 1998; pre-pectoral salline    BREAST BIOPSY Left 1974    BREAST BIOPSY Left 1994    BREAST EXCISIONAL BIOPSY Left 1993    CARDIAC CATHETERIZATION      procedure summary    CHOLECYSTECTOMY      onset 2003    HEMORRHOID SURGERY      ROTATOR CUFF REPAIR Bilateral     onset 2010    TUBAL LIGATION      onset 1980       Current Outpatient Medications:     amLODIPine (NORVASC) 5 mg tablet, Take 1 tablet (5 mg total) by mouth daily, Disp: 30 tablet, Rfl: 0    apixaban (Eliquis) 5 mg, Take 1 tablet (5 mg total) by mouth 2 (two) times a day, Disp: 180 tablet, Rfl: 3    Ascorbic Acid (VITAMIN C) 1000 MG tablet, Take 1 tablet by mouth daily, Disp: , Rfl:     atorvastatin (LIPITOR) 40 mg tablet, Take 1 tablet (40 mg total) by mouth daily with dinner, Disp: 90 tablet, Rfl: 1    Biotin 5000 MCG CAPS, Take 1 capsule by mouth daily, Disp: , Rfl:     cholecalciferol (VITAMIN D3) 1,000 units tablet, Take 2,000 Units by mouth daily, Disp: , Rfl:     diphenhydrAMINE-acetaminophen (TYLENOL PM)  MG TABS, Take 1 tablet by mouth daily at bedtime as needed for sleep, Disp: , Rfl:     esomeprazole (NexIUM) 40 MG capsule, Take 1 capsule by mouth daily, Disp: , Rfl:     fluticasone (FLONASE) 50 mcg/act nasal spray, USE 2 SPRAYS IN EACH NOSTRIL DAILY, Disp: 48 g, Rfl: 3    levothyroxine 125 mcg tablet, TAKE 1 TABLET DAILY ON AN EMPTY STOMACH, Disp: 90 tablet, Rfl: 1    Magnesium 250 MG TABS, Take 250 mg by mouth in the morning, Disp: , Rfl:     Menthol, Topical Analgesic, (BIOFREEZE EX), Apply 1 application topically daily as needed (pain), Disp: , Rfl:     metoprolol tartrate (LOPRESSOR) 25 mg tablet, Take one tablet as needed for palpitations (Patient taking differently: if needed Take one tablet as needed for palpitations), Disp: 90 tablet, Rfl: 2    Multiple Vitamin (MULTIVITAMIN) capsule, Take 1 capsule by mouth daily, Disp: , Rfl:     nystatin (MYCOSTATIN) cream, Apply topically 2 (two) times a day as needed, Disp: , Rfl:     Probiotic Product (ALIGN PO), Take 1 capsule by mouth in the morning, Disp: , Rfl:     psyllium (METAMUCIL) 58.6 % powder, Take 1 packet by mouth daily, Disp: , Rfl:     sertraline (ZOLOFT) 25 mg tablet, Take 1 tablet (25 mg total) by mouth daily, Disp: 30 tablet, Rfl: 5    triamcinolone (KENALOG) 0.5 % ointment, , Disp: , Rfl:   Allergies   Allergen Reactions    Hydrochlorothiazide Palpitations    Meloxicam Rash       Labs:  Admission on 10/02/2023, Discharged on 10/03/2023   Component Date Value    POC Glucose 10/02/2023 132     Sodium 10/02/2023 140     Potassium 10/02/2023 3.4 (L)     Chloride 10/02/2023 105     CO2 10/02/2023 24     ANION GAP 10/02/2023 11     BUN 10/02/2023 10     Creatinine 10/02/2023 0.68     Glucose 10/02/2023 131     Calcium 10/02/2023 10.0     eGFR 10/02/2023 85     hs TnI 0hr 10/02/2023 2     WBC 10/02/2023 8.40     RBC 10/02/2023 4.74     Hemoglobin 10/02/2023 13.5     Hematocrit 10/02/2023 42.3     MCV 10/02/2023 89     MCH 10/02/2023 28.5     MCHC 10/02/2023 31.9     RDW 10/02/2023 13.4     Platelets 55/84/7645 315     MPV 10/02/2023 9.9     Protime 10/02/2023 14.5     INR 10/02/2023 1.07     PTT 10/02/2023 31     SARS-CoV-2 10/02/2023 Negative     INFLUENZA A PCR 10/02/2023 Negative     INFLUENZA B PCR 10/02/2023 Negative     RSV PCR 10/02/2023 Negative     Ventricular Rate 10/02/2023 102     Atrial Rate 10/02/2023 102     UT Interval 10/02/2023 164     QRSD Interval 10/02/2023 80     QT Interval 10/02/2023 360     QTC Interval 10/02/2023 469     P Axis 10/02/2023 50     QRS Axis 10/02/2023 -6     T Wave [place] 10/02/2023 47     Color, UA 10/02/2023 Light Yellow     Clarity, UA 10/02/2023 Clear     Specific Gravity, UA 10/02/2023 1.025     pH, UA 10/02/2023 7.0     Leukocytes, UA 10/02/2023 Negative     Nitrite, UA 10/02/2023 Negative     Protein, UA 10/02/2023 Negative     Glucose, UA 10/02/2023 Negative     Ketones, UA 10/02/2023 Negative     Urobilinogen, UA 10/02/2023 <2.0     Bilirubin, UA 10/02/2023 Negative     Occult Blood, UA 10/02/2023 Negative     hs TnI 2hr 10/02/2023 3     Delta 2hr hsTnI 10/02/2023 1     POC Glucose 10/02/2023 137     hs TnI 4hr 10/02/2023 3     Delta 4hr hsTnI 10/02/2023 1     Cholesterol 10/03/2023 140     Triglycerides 10/03/2023 97     HDL, Direct 10/03/2023 49 (L)     LDL Calculated 10/03/2023 72     Sodium 10/03/2023 142     Potassium 10/03/2023 3.8     Chloride 10/03/2023 109 (H)     CO2 10/03/2023 27     ANION GAP 10/03/2023 6     BUN 10/03/2023 9     Creatinine 10/03/2023 0.60     Glucose 10/03/2023 97     Glucose, Fasting 10/03/2023 97     Calcium 10/03/2023 9.1     eGFR 10/03/2023 88     WBC 10/03/2023 6.46     RBC 10/03/2023 4.14     Hemoglobin 10/03/2023 12.1     Hematocrit 10/03/2023 37.1     MCV 10/03/2023 90     MCH 10/03/2023 29.2     MCHC 10/03/2023 32.6     RDW 10/03/2023 13.6     Platelets 03/38/3971 272     MPV 10/03/2023 10.1     Hemoglobin A1C 10/03/2023 6.3 (H)     EAG 10/03/2023 134     POC Glucose 10/03/2023 114     POC Glucose 10/03/2023 157 (H)    Appointment on 08/31/2023   Component Date Value    Sodium 08/31/2023 141     Potassium 08/31/2023 4.0     Chloride 08/31/2023 104     CO2 08/31/2023 29     ANION GAP 08/31/2023 8     BUN 08/31/2023 11     Creatinine 08/31/2023 0.60     Glucose, Fasting 08/31/2023 108 (H)     Calcium 08/31/2023 9. 4     AST 08/31/2023 19     ALT 08/31/2023 18     Alkaline Phosphatase 08/31/2023 36     Total Protein 08/31/2023 6.2 (L)     Albumin 08/31/2023 3.9     Total Bilirubin 08/31/2023 0.54     eGFR 08/31/2023 88     Hemoglobin A1C 08/31/2023 6.3 (H)     EAG 08/31/2023 134     Cholesterol 08/31/2023 135     Triglycerides 08/31/2023 133     HDL, Direct 08/31/2023 52     LDL Calculated 08/31/2023 56     TSH 3RD GENERATON 08/31/2023 0.708    Admission on 05/13/2023, Discharged on 05/16/2023   Component Date Value    Extra Tube 05/13/2023 Hold for add-ons. Extra Tube 05/13/2023 Hold for add-ons.      Extra Tube 05/13/2023 on hold     Ventricular Rate 05/13/2023 102     Atrial Rate 05/13/2023 102     RI Interval 05/13/2023 180     QRSD Interval 05/13/2023 76     QT Interval 05/13/2023 342     QTC Interval 05/13/2023 445     P Axis 05/13/2023 53     QRS [place] 05/13/2023 -24     T Wave Axis 05/13/2023 43     WBC 05/13/2023 7.83     RBC 05/13/2023 4.74     Hemoglobin 05/13/2023 13.5     Hematocrit 05/13/2023 41.9     MCV 05/13/2023 88     MCH 05/13/2023 28.5     MCHC 05/13/2023 32.2     RDW 05/13/2023 14.6     MPV 05/13/2023 10.4     Platelets 19/51/7791 346     nRBC 05/13/2023 0     Neutrophils Relative 05/13/2023 76 (H)     Immat GRANS % 05/13/2023 0     Lymphocytes Relative 05/13/2023 16     Monocytes Relative 05/13/2023 6     Eosinophils Relative 05/13/2023 1     Basophils Relative 05/13/2023 1     Neutrophils Absolute 05/13/2023 6.02     Immature Grans Absolute 05/13/2023 0.03     Lymphocytes Absolute 05/13/2023 1.21     Monocytes Absolute 05/13/2023 0.45     Eosinophils Absolute 05/13/2023 0.07     Basophils Absolute 05/13/2023 0.05     Sodium 05/13/2023 136     Potassium 05/13/2023 3.9     Chloride 05/13/2023 102     CO2 05/13/2023 25     ANION GAP 05/13/2023 9     BUN 05/13/2023 10     Creatinine 05/13/2023 0.52 (L)     Glucose 05/13/2023 113     Calcium 05/13/2023 9.3     AST 05/13/2023 19     ALT 05/13/2023 16 Alkaline Phosphatase 05/13/2023 37     Total Protein 05/13/2023 6.4     Albumin 05/13/2023 4.2     Total Bilirubin 05/13/2023 0.45     eGFR 05/13/2023 93     TSH 3RD GENERATON 05/13/2023 0.295 (L)     LACTIC ACID 05/13/2023 0.9     Color, UA 05/13/2023 Colorless     Clarity, UA 05/13/2023 Clear     Specific Gravity, UA 05/13/2023 1.003     pH, UA 05/13/2023 7.0     Leukocytes, UA 05/13/2023 Negative     Nitrite, UA 05/13/2023 Negative     Protein, UA 05/13/2023 Negative     Glucose, UA 05/13/2023 Negative     Ketones, UA 05/13/2023 Trace (A)     Urobilinogen, UA 05/13/2023 <2.0     Bilirubin, UA 05/13/2023 Negative     Occult Blood, UA 05/13/2023 Negative     Magnesium 05/13/2023 1.8 (L)     hs TnI 0hr 05/13/2023 3     Blood Culture 05/13/2023 No Growth After 5 Days. Blood Culture 05/13/2023 No Growth After 5 Days.      Free T4 05/13/2023 1.33     POC Glucose 05/13/2023 150 (H)     Cholesterol 05/14/2023 134     Triglycerides 05/14/2023 111     HDL, Direct 05/14/2023 40 (L)     LDL Calculated 05/14/2023 72     Hemoglobin A1C 05/14/2023 5.8 (H)     EAG 05/14/2023 120     Sodium 05/14/2023 139     Potassium 05/14/2023 3.5     Chloride 05/14/2023 106     CO2 05/14/2023 27     ANION GAP 05/14/2023 6     BUN 05/14/2023 9     Creatinine 05/14/2023 0.43 (L)     Glucose 05/14/2023 103     Calcium 05/14/2023 8.5     AST 05/14/2023 16     ALT 05/14/2023 14     Alkaline Phosphatase 05/14/2023 29 (L)     Total Protein 05/14/2023 5.4 (L)     Albumin 05/14/2023 3.6     Total Bilirubin 05/14/2023 0.51     eGFR 05/14/2023 99     WBC 05/14/2023 5.70     RBC 05/14/2023 4.20     Hemoglobin 05/14/2023 12.0     Hematocrit 05/14/2023 37.3     MCV 05/14/2023 89     MCH 05/14/2023 28.6     MCHC 05/14/2023 32.2     RDW 05/14/2023 14.7     MPV 05/14/2023 9.3     Platelets 64/78/0598 280     nRBC 05/14/2023 0     Neutrophils Relative 05/14/2023 59     Immat GRANS % 05/14/2023 0     Lymphocytes Relative 05/14/2023 29     Monocytes Relative 05/14/2023 8     Eosinophils Relative 05/14/2023 3     Basophils Relative 05/14/2023 1     Neutrophils Absolute 05/14/2023 3.35     Immature Grans Absolute 05/14/2023 0.02     Lymphocytes Absolute 05/14/2023 1.65     Monocytes Absolute 05/14/2023 0.47     Eosinophils Absolute 05/14/2023 0.17     Basophils Absolute 05/14/2023 0.04     POC Glucose 05/14/2023 103     POC Glucose 05/14/2023 133     POC Glucose 05/14/2023 123     PTT 05/14/2023 30     Protime 05/14/2023 13.3     INR 05/14/2023 0.99     WBC 05/14/2023 8.54     RBC 05/14/2023 4.64     Hemoglobin 05/14/2023 13.2     Hematocrit 05/14/2023 41.2     MCV 05/14/2023 89     MCH 05/14/2023 28.4     MCHC 05/14/2023 32.0     RDW 05/14/2023 14.9     Platelets 07/78/1550 338     MPV 05/14/2023 9.6     LV EF 05/15/2023 65     PTT 05/14/2023 63 (H)     POC Glucose 05/14/2023 128     POC Glucose 05/14/2023 112     POC Glucose 05/14/2023 123     PTT 05/15/2023 57 (H)     POC Glucose 05/14/2023 117     Sodium 05/15/2023 139     Potassium 05/15/2023 3.3 (L)     Chloride 05/15/2023 103     CO2 05/15/2023 27     ANION GAP 05/15/2023 9     BUN 05/15/2023 9     Creatinine 05/15/2023 0.48 (L)     Glucose 05/15/2023 123     Calcium 05/15/2023 8.8     eGFR 05/15/2023 95     PTT 05/15/2023 53 (H)     POC Glucose 05/15/2023 108     POC Glucose 05/15/2023 105     POC Glucose 05/15/2023 104     PTT 05/15/2023 104 (H)     POC Glucose 05/15/2023 119     POC Glucose 05/15/2023 98     Sodium 05/16/2023 138     Potassium 05/16/2023 3.7     Chloride 05/16/2023 106     CO2 05/16/2023 26     ANION GAP 05/16/2023 6     BUN 05/16/2023 12     Creatinine 05/16/2023 0.50 (L)     Glucose 05/16/2023 110     Calcium 05/16/2023 8.4     eGFR 05/16/2023 94     PTT 05/16/2023 102 (H)     PTT 05/16/2023 83 (H)     POC Glucose 05/16/2023 107     PTT 05/16/2023 70 (H)     POC Glucose 05/16/2023 106     POC Glucose 05/16/2023 108      Imaging: No results found.     Review of Systems:  Review of Systems Constitutional:  Positive for activity change. Negative for fatigue and unexpected weight change. HENT:  Negative for hearing loss and nosebleeds. Eyes:  Negative for visual disturbance. Respiratory:  Negative for apnea, shortness of breath, wheezing and stridor. Cardiovascular:  Negative for chest pain, palpitations and leg swelling. Gastrointestinal:  Negative for abdominal pain, anal bleeding and blood in stool. Endocrine: Negative for cold intolerance. Genitourinary:  Negative for hematuria. Musculoskeletal:  Positive for arthralgias. Negative for gait problem and myalgias. Skin:  Negative for pallor and rash. Neurological:  Negative for dizziness, syncope, facial asymmetry, speech difficulty and weakness. Hematological:  Bruises/bleeds easily. Psychiatric/Behavioral:  Negative for sleep disturbance. The patient is not nervous/anxious. Physical Exam:  Physical Exam  Vitals reviewed. Constitutional:       General: She is not in acute distress. Appearance: Normal appearance. She is normal weight. She is not ill-appearing, toxic-appearing or diaphoretic. Eyes:      General: No scleral icterus. Neck:      Vascular: No carotid bruit. Cardiovascular:      Rate and Rhythm: Normal rate and regular rhythm. Pulses: Normal pulses. Heart sounds: Normal heart sounds. No murmur heard. No friction rub. No gallop. Pulmonary:      Effort: Pulmonary effort is normal. No respiratory distress. Breath sounds: Normal breath sounds. No stridor. No wheezing, rhonchi or rales. Musculoskeletal:      Right lower leg: No edema. Left lower leg: No edema. Skin:     General: Skin is warm and dry. Capillary Refill: Capillary refill takes less than 2 seconds. Coloration: Skin is not jaundiced or pale. Findings: No bruising or erythema. Neurological:      Mental Status: She is alert and oriented to person, place, and time.    Psychiatric:         Mood and Affect: Mood normal.         Discussion/Summary: Status post CVA, atherothrombotic, my feeling that this is related to her previous COVID-19 infection. 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 I will continue full anticoagulation on a permanent basis. And given that I do not favor implantable loop as this will not change her medical therapy. Additionally given her age group she is not a candidate for PFO closure, even if these were to be done, this would not change her anticoagulation regimen. I explained the rational to the patient and her  who understand and agree with current plan. Might consider adding low-dose aspirin to her regimen. Hold. CTA of the head and neck follow-up revealed resolution of the problems in the carotid. ,  There was no obstructive disease. Continue current medical regimen. Heart catheterization 2020 revealed normal epicardial coronary arteries. This note was completed in part utilizing [employer] direct voice recognition software. Grammatical errors, random word insertion, spelling mistakes, and incomplete sentences may be an occasional consequence of the system secondary to software limitations, ambient noise and hardware issues. At the time of dictation, efforts were made to edit, clarify and /or correct errors. Please read the chart carefully and recognize, using context, where substitutions have occurred. If you have any questions or concerns about the context, text or information contained within the body of this dictation, please contact myself, the provider, for further clarification.

## 2023-11-16 ENCOUNTER — OFFICE VISIT (OUTPATIENT)
Dept: INTERNAL MEDICINE CLINIC | Facility: CLINIC | Age: 78
End: 2023-11-16
Payer: COMMERCIAL

## 2023-11-16 VITALS
BODY MASS INDEX: 28.42 KG/M2 | OXYGEN SATURATION: 98 % | DIASTOLIC BLOOD PRESSURE: 70 MMHG | WEIGHT: 176.8 LBS | HEIGHT: 66 IN | HEART RATE: 79 BPM | SYSTOLIC BLOOD PRESSURE: 124 MMHG | RESPIRATION RATE: 16 BRPM

## 2023-11-16 DIAGNOSIS — I89.0 LYMPHEDEMA: ICD-10-CM

## 2023-11-16 DIAGNOSIS — E66.3 OVERWEIGHT (BMI 25.0-29.9): Primary | ICD-10-CM

## 2023-11-16 DIAGNOSIS — Q21.12 PFO (PATENT FORAMEN OVALE): ICD-10-CM

## 2023-11-16 DIAGNOSIS — I10 PRIMARY HYPERTENSION: ICD-10-CM

## 2023-11-16 DIAGNOSIS — I63.30 CEREBRAL THROMBOSIS WITH CEREBRAL INFARCTION (HCC): ICD-10-CM

## 2023-11-16 DIAGNOSIS — I10 ESSENTIAL HYPERTENSION: ICD-10-CM

## 2023-11-16 DIAGNOSIS — F41.9 ANXIETY: ICD-10-CM

## 2023-11-16 PROCEDURE — 99214 OFFICE O/P EST MOD 30 MIN: CPT | Performed by: INTERNAL MEDICINE

## 2023-11-16 RX ORDER — AMLODIPINE BESYLATE 5 MG/1
5 TABLET ORAL DAILY
Qty: 90 TABLET | Refills: 3 | Status: SHIPPED | OUTPATIENT
Start: 2023-11-16 | End: 2024-11-10

## 2023-11-16 NOTE — PROGRESS NOTES
Assessment/Plan:    Overweight (BMI 25.0-29. 9)  I have counselled the pt to follow a healthy and balanced diet ,and recommend routine exercise. Making very nice progress     PFO (patent foramen ovale)  Currently anticoagulated stable and doing well asymptomatic has recently met with cardiology not a candidate for surgical intervention currently continue with anticoagulation Eliquis    Lymphedema  Currently stable doing well    Primary hypertension  Hypertension - controlled, I have counseled patient following healthy balance diet, I would like the patient reduce sodium, exercise routinely, I would like the patient continued the med current medical regiment and we will continue to monitor. Continue amlodipine 5 mg once daily Rx provided blood pressure today 120/70    Anxiety  Significant improvements in overall anxiety levels she did not start Zoloft she reports me she has started to change her perspective she has made improvement in her symptoms and would like to hold off on SSRI which I think is very appropriate we will continue observe    History of Cerebral thrombosis with cerebral infarction [facility])  Making very nice progress significant improvements in expressive aphasia she will continue with the Eliquis, statin blood pressure well controlled suspect related to COVID-19 she is recently seen her cardiologist we did review that note          Problem List Items Addressed This Visit        Cardiovascular and Mediastinum    Primary hypertension     Hypertension - controlled, I have counseled patient following healthy balance diet, I would like the patient reduce sodium, exercise routinely, I would like the patient continued the med current medical regiment and we will continue to monitor.   Continue amlodipine 5 mg once daily Rx provided blood pressure today 120/70         Relevant Medications    amLODIPine (NORVASC) 5 mg tablet    History of Cerebral thrombosis with cerebral infarction [facility])     Making very nice progress significant improvements in expressive aphasia she will continue with the Eliquis, statin blood pressure well controlled suspect related to COVID-19 she is recently seen her cardiologist we did review that note          PFO (patent foramen ovale)     Currently anticoagulated stable and doing well asymptomatic has recently met with cardiology not a candidate for surgical intervention currently continue with anticoagulation Eliquis         Relevant Medications    amLODIPine (NORVASC) 5 mg tablet       Other    Lymphedema     Currently stable doing well         Overweight (BMI 25.0-29.9) - Primary     I have counselled the pt to follow a healthy and balanced diet ,and recommend routine exercise. Making very nice progress          Anxiety     Significant improvements in overall anxiety levels she did not start Zoloft she reports me she has started to change her perspective she has made improvement in her symptoms and would like to hold off on SSRI which I think is very appropriate we will continue observe            Return to office as scheduled call if any problems, length of visit 30 minutes review of her conditions discussion of anxiety, PFO and documentation. Subjective:      Patient ID: Estiven French is a 70 y.o. female.   Depression, recurrent ([address])        Symptoms Compatible with moderate anxiety and mild depression related to recent stroke no suicidal ideation we will start Zoloft 25 mg once daily reviewed the risk benefits and side effects of the medication in detail with the patient if any side effects she will notify me immediately we did discuss discuss starting counseling orders provided           Relevant Medications     sertraline (ZOLOFT) 25 mg tablet     HPI 75-year old female coming in for a follow up office visit regarding essential hypertension, overweight, PFO, lymphedema, hypertension, anxiety and stroke; the patient reports me compliant taking medications without untoward side effects the. The patient is here to review his medical condition, update me on the medical condition and the patient reports me no hospitalizations and no ER visits. Since last visit the patient has made significant improvements in her anxiety levels she did not start the Zoloft and reports to me that she is taking a different perspective. She feels more relaxed less anxious and doing very well from the standpoint to like to hold off on solids which I think is very appropriate at this point. She is following a healthy and balanced diet she recently saw her cardiologist she does have a known PFO but does not need surgical intervention or closure she will continue with the Eliquis. reports anxiety better , bp [id number]    The following portions of the patient's history were reviewed and updated as appropriate: allergies, current medications, past family history, past medical history, past social history, past surgical history and problem list.    Review of Systems   Constitutional:  Negative for activity change, appetite change and unexpected weight change. HENT:  Negative for congestion and postnasal drip. Eyes:  Negative for visual disturbance. Respiratory:  Negative for cough and shortness of breath. Cardiovascular:  Negative for chest pain. Gastrointestinal:  Negative for abdominal pain, diarrhea, nausea and vomiting. Neurological:  Negative for dizziness, light-headedness and headaches. Hematological:  Negative for adenopathy. Psychiatric/Behavioral:  The patient is not nervous/anxious. Objective:    Return in about 6 months (around 5/16/2024). No results found.       Allergies   Allergen Reactions   • Hydrochlorothiazide Palpitations   • Meloxicam Rash       Past Medical History:   Diagnosis Date   • Biliary dyskinesia    • CAP (community acquired pneumonia)     last assessed 8/17/16   • Class 1 obesity due to excess calories with serious comorbidity and body mass index (BMI) of 33.0 to 33.9 in adult 05/08/2018   • Deep vein thrombosis (DVT) of proximal vein of right lower extremity, unspecified chronicity ([address]) 08/10/2022   • Diabetes mellitus ([address]) 12/2018    Type 2 no insulin   • Disease of thyroid gland 2000   • GERD (gastroesophageal reflux disease)    • Hyperlipidemia    • Palpitations    • Pneumonia    • Shortness of breath    • Stroke [facility])    • SVT (supraventricular tachycardia) 06/08/2022     Past Surgical History:   Procedure Laterality Date   • AUGMENTATION MAMMAPLASTY Bilateral 1998    breast surgery- with prosthetic implant 1998; pre-pectoral salline   • BREAST BIOPSY Left 1974   • BREAST BIOPSY Left 1994   • BREAST EXCISIONAL BIOPSY Left 1993   • CARDIAC CATHETERIZATION      procedure summary   • CHOLECYSTECTOMY      onset 2003   • HEMORRHOID SURGERY     • ROTATOR CUFF REPAIR Bilateral     onset 2010   • TUBAL LIGATION      onset 1980     Current Outpatient Medications on File Prior to Visit   Medication Sig Dispense Refill   • Ascorbic Acid (VITAMIN C) 1000 MG tablet Take 1 tablet by mouth daily     • Biotin 5000 MCG CAPS Take 1 capsule by mouth daily     • cholecalciferol (VITAMIN D3) 1,000 units tablet Take 2,000 Units by mouth daily     • diphenhydrAMINE-acetaminophen (TYLENOL PM)  MG TABS Take 1 tablet by mouth daily at bedtime as needed for sleep     • esomeprazole (NexIUM) 40 MG capsule Take 1 capsule by mouth daily     • fluticasone (FLONASE) 50 mcg/act nasal spray USE 2 SPRAYS IN EACH NOSTRIL DAILY 48 g 3   • levothyroxine 125 mcg tablet TAKE 1 TABLET DAILY ON AN EMPTY STOMACH 90 tablet 1   • Magnesium 250 MG TABS Take 250 mg by mouth in the morning     • Menthol, Topical Analgesic, (BIOFREEZE EX) Apply 1 application topically daily as needed (pain)     • metoprolol tartrate (LOPRESSOR) 25 mg tablet Take one tablet as needed for palpitations (Patient taking differently: if needed Take one tablet as needed for palpitations) 90 tablet 2   • Multiple Vitamin (MULTIVITAMIN) capsule Take 1 capsule by mouth daily     • nystatin (MYCOSTATIN) cream Apply topically 2 (two) times a day as needed     • Probiotic Product (ALIGN PO) Take 1 capsule by mouth in the morning     • psyllium (METAMUCIL) 58.6 % powder Take 1 packet by mouth daily     • triamcinolone (KENALOG) 0.5 % ointment      • apixaban (Eliquis) 5 mg Take 1 tablet (5 mg total) by mouth 2 (two) times a day 180 tablet 3   • atorvastatin (LIPITOR) 40 mg tablet Take 1 tablet (40 mg total) by mouth daily with dinner 90 tablet 1   • [DISCONTINUED] naproxen sodium (ALEVE) 220 MG tablet Take by mouth       No current facility-administered medications on file prior to visit.      Family History   Problem Relation Age of Onset   • Aneurysm Mother         cerebral   • Ovarian cancer Mother 67   • Cancer Mother    • Cirrhosis Father         alcoholic   • Alcohol abuse Father    • Aneurysm Brother         abdominal aortic; cerebral   • Diabetes Son    • No Known Problems Daughter    • No Known Problems Maternal Grandmother    • No Known Problems Maternal Grandfather    • No Known Problems Paternal Grandmother    • No Known Problems Paternal Grandfather    • No Known Problems Son    • No Known Problems Son    • No Known Problems Son    • No Known Problems Maternal Aunt    • No Known Problems Maternal Aunt    • No Known Problems Maternal Aunt    • No Known Problems Maternal Aunt    • No Known Problems Paternal Aunt    • No Known Problems Paternal Aunt    • No Known Problems Paternal Aunt    • No Known Problems Paternal Aunt    • No Known Problems Paternal Aunt    • Cancer Brother         bladder cancer     Social History     Socioeconomic History   • Marital status: /Civil Union     Spouse name: Not on file   • Number of children: Not on file   • Years of education: 15   • Highest education level: Not on file   Occupational History   • Not on file   Tobacco Use   • Smoking status: Former     Packs/day: 0.25     Years: 17.00 Total pack years: 4.25     Types: Cigarettes     Start date: 58     Quit date: 2010     Years since quittin.8   • Smokeless tobacco: Never   • Tobacco comments:     0.5 ppw intermittently last smoked    Vaping Use   • Vaping Use: Never used   Substance and Sexual Activity   • Alcohol use: Yes     Alcohol/week: 2.0 standard drinks of alcohol     Types: 2 Glasses of wine per week     Comment: Maybe 2 glasses a month   • Drug use: No   • Sexual activity: Not Currently     Partners: Male     Birth control/protection: Post-menopausal     Comment: I'm 77   Other Topics Concern   • Not on file   Social History Narrative    Caffeine use     Social Determinants of Health     Financial Resource Strain: Low Risk  (2023)    Overall Financial Resource Strain (CARDIA)    • Difficulty of Paying Living Expenses: Not hard at all   Food Insecurity: No Food Insecurity (5/15/2023)    Hunger Vital Sign    • Worried About Running Out of Food in the Last Year: Never true    • Ran Out of Food in the Last Year: Never true   Transportation Needs: No Transportation Needs (2023)    PRAPARE - Transportation    • Lack of Transportation (Medical): No    • Lack of Transportation (Non-Medical):  No   Physical Activity: Inactive (2022)    Exercise Vital Sign    • Days of Exercise per Week: 0 days    • Minutes of Exercise per Session: 0 min   Stress: Not on file   Social Connections: Not on file   Intimate Partner Violence: Not on file   Housing Stability: Low Risk  (5/15/2023)    Housing Stability Vital Sign    • Unable to Pay for Housing in the Last Year: No    • Number of Places Lived in the Last Year: 1    • Unstable Housing in the Last Year: No     Vitals:    23 0829   BP: 124/70   Pulse: 79   Resp: 16   SpO2: 98%   Weight: 80.2 kg (176 lb 12.8 oz)   Height: 5' 6" (1.676 m)     Results for orders placed or performed during the hospital encounter of 10/02/23   COVID/FLU/RSV    Specimen: Nose; Nares   Result Value Ref Range    SARS-CoV-2 Negative Negative    INFLUENZA A PCR Negative Negative    INFLUENZA B PCR Negative Negative    RSV PCR Negative Negative   Basic metabolic panel   Result Value Ref Range    Sodium 140 135 - 147 mmol/L    Potassium 3.4 (L) 3.5 - 5.3 mmol/L    Chloride 105 96 - 108 mmol/L    CO2 24 21 - 32 mmol/L    ANION GAP 11 mmol/L    BUN 10 5 - 25 mg/dL    Creatinine 0.68 0.60 - 1.30 mg/dL    Glucose 131 65 - 140 mg/dL    Calcium 10.0 8.4 - 10.2 mg/dL    eGFR 85 ml/min/1.73sq m   HS Troponin 0hr (reflex protocol)   Result Value Ref Range    hs TnI 0hr 2 "Refer to ACS Flowchart"- see link ng/L   CBC and Platelet   Result Value Ref Range    WBC 8.40 4.31 - 10.16 Thousand/uL    RBC 4.74 3.81 - 5.12 Million/uL    Hemoglobin 13.5 11.5 - 15.4 g/dL    Hematocrit 42.3 34.8 - 46.1 %    MCV 89 82 - 98 fL    MCH 28.5 26.8 - 34.3 pg    MCHC 31.9 31.4 - 37.4 g/dL    RDW 13.4 11.6 - 15.1 %    Platelets 183 403 - 070 Thousands/uL    MPV 9.9 8.9 - 12.7 fL   Protime-INR   Result Value Ref Range    Protime 14.5 11.6 - 14.5 seconds    INR 1.07 0.84 - 1.19   APTT   Result Value Ref Range    PTT 31 23 - 37 seconds   UA (URINE) with reflex to Scope   Result Value Ref Range    Color, UA Light Yellow     Clarity, UA Clear     Specific Gravity, UA 1.025 1.003 - 1.030    pH, UA 7.0 4.5, 5.0, 5.5, 6.0, 6.5, 7.0, 7.5, 8.0    Leukocytes, UA Negative Negative    Nitrite, UA Negative Negative    Protein, UA Negative Negative mg/dl    Glucose, UA Negative Negative mg/dl    Ketones, UA Negative Negative mg/dl    Urobilinogen, UA <2.0 <2.0 mg/dl mg/dl    Bilirubin, UA Negative Negative    Occult Blood, UA Negative Negative   HS Troponin I 2hr   Result Value Ref Range    hs TnI 2hr 3 "Refer to ACS Flowchart"- see link ng/L    Delta 2hr hsTnI 1 <20 ng/L   HS Troponin I 4hr   Result Value Ref Range    hs TnI 4hr 3 "Refer to ACS Flowchart"- see link ng/L    Delta 4hr hsTnI 1 <20 ng/L   Lipid Panel with Direct LDL reflex   Result Value Ref Range    Cholesterol 140 See Comment mg/dL    Triglycerides 97 See Comment mg/dL    HDL, Direct 49 (L) >=50 mg/dL    LDL Calculated 72 0 - 100 mg/dL   Basic metabolic panel   Result Value Ref Range    Sodium 142 135 - 147 mmol/L    Potassium 3.8 3.5 - 5.3 mmol/L    Chloride 109 (H) 96 - 108 mmol/L    CO2 27 21 - 32 mmol/L    ANION GAP 6 mmol/L    BUN 9 5 - 25 mg/dL    Creatinine 0.60 0.60 - 1.30 mg/dL    Glucose 97 65 - 140 mg/dL    Glucose, Fasting 97 65 - 99 mg/dL    Calcium 9.1 8.4 - 10.2 mg/dL    eGFR 88 ml/min/1.73sq m   CBC (With Platelets)   Result Value Ref Range    WBC 6.46 4.31 - 10.16 Thousand/uL    RBC 4.14 3.81 - 5.12 Million/uL    Hemoglobin 12.1 11.5 - 15.4 g/dL    Hematocrit 37.1 34.8 - 46.1 %    MCV 90 82 - 98 fL    MCH 29.2 26.8 - 34.3 pg    MCHC 32.6 31.4 - 37.4 g/dL    RDW 13.6 11.6 - 15.1 %    Platelets 712 101 - 427 Thousands/uL    MPV 10.1 8.9 - 12.7 fL   Hemoglobin A1C   Result Value Ref Range    Hemoglobin A1C 6.3 (H) Normal 4.0-5.6%; PreDiabetic 5.7-6.4%; Diabetic >=6.5%; Glycemic control for adults with diabetes <7.0% %     mg/dl   ECG 12 lead   Result Value Ref Range    Ventricular Rate 102 BPM    Atrial Rate 102 BPM    DC Interval 164 ms    QRSD Interval 80 ms    QT Interval 360 ms    QTC Interval 469 ms    P Axis 50 degrees    QRS Axis -6 degrees    T Wave Axis 47 degrees   Fingerstick Glucose (POCT)   Result Value Ref Range    POC Glucose 132 65 - 140 mg/dl   Fingerstick Glucose (POCT)   Result Value Ref Range    POC Glucose 137 65 - 140 mg/dl   Fingerstick Glucose (POCT)   Result Value Ref Range    POC Glucose 114 65 - 140 mg/dl   Fingerstick Glucose (POCT)   Result Value Ref Range    POC Glucose 157 (H) 65 - 140 mg/dl     Weight (last 2 days)     Date/Time Weight    11/16/23 0829 80.2 (176.8)        Body mass index is 28.54 kg/m².   BP      Temp      Pulse     Resp      SpO2        Vitals:    11/16/23 0829   Weight: 80.2 kg (176 lb 12.8 oz)     Vitals:    11/16/23 7432 Weight: 80.2 kg (176 lb 12.8 oz)       /70   Pulse 79   Resp 16   Ht 5' 6" (1.676 m)   Wt 80.2 kg (176 lb 12.8 oz)   SpO2 98%   BMI 28.54 kg/m²          Physical Exam  Vitals and nursing note reviewed. Constitutional:       General: She is not in acute distress. Appearance: Normal appearance. She is well-developed. She is not ill-appearing, toxic-appearing or diaphoretic. HENT:      Head: Normocephalic. Eyes:      General: No scleral icterus. Right eye: No discharge. Left eye: No discharge. Conjunctiva/sclera: Conjunctivae normal.      Pupils: Pupils are equal, round, and reactive to light. Cardiovascular:      Rate and Rhythm: Normal rate and regular rhythm. Heart sounds: Normal heart sounds. No murmur heard. No friction rub. No gallop. Pulmonary:      Effort: No respiratory distress. Breath sounds: Normal breath sounds. No wheezing or rales. Abdominal:      General: Bowel sounds are normal. There is no distension. Palpations: Abdomen is soft. There is no mass. Tenderness: There is no abdominal tenderness. There is no guarding or rebound. Musculoskeletal:         General: No deformity. Cervical back: Neck supple. Lymphadenopathy:      Cervical: No cervical adenopathy. Neurological:      Mental Status: She is alert. Coordination: Coordination normal.   Psychiatric:         Mood and Affect: Mood is not anxious or depressed. Thought Content: Thought content does not include suicidal ideation.

## 2023-11-17 PROBLEM — F41.9 ANXIETY: Status: ACTIVE | Noted: 2023-11-17

## 2023-11-17 PROBLEM — E66.09 CLASS 1 OBESITY DUE TO EXCESS CALORIES WITH SERIOUS COMORBIDITY AND BODY MASS INDEX (BMI) OF 33.0 TO 33.9 IN ADULT: Status: RESOLVED | Noted: 2018-05-08 | Resolved: 2023-11-17

## 2023-11-17 PROBLEM — E66.811 CLASS 1 OBESITY DUE TO EXCESS CALORIES WITH SERIOUS COMORBIDITY AND BODY MASS INDEX (BMI) OF 33.0 TO 33.9 IN ADULT: Status: RESOLVED | Noted: 2018-05-08 | Resolved: 2023-11-17

## 2023-11-17 NOTE — ASSESSMENT & PLAN NOTE
Currently anticoagulated stable and doing well asymptomatic has recently met with cardiology not a candidate for surgical intervention currently continue with anticoagulation Eliquis

## 2023-11-17 NOTE — ASSESSMENT & PLAN NOTE
I have counselled the pt to follow a healthy and balanced diet ,and recommend routine exercise.   Making very nice progress

## 2023-11-17 NOTE — ASSESSMENT & PLAN NOTE
Making very nice progress significant improvements in expressive aphasia she will continue with the Eliquis, statin blood pressure well controlled suspect related to COVID-19 she is recently seen her cardiologist we did review that note

## 2023-11-17 NOTE — ASSESSMENT & PLAN NOTE
Hypertension - controlled, I have counseled patient following healthy balance diet, I would like the patient reduce sodium, exercise routinely, I would like the patient continued the med current medical regiment and we will continue to monitor.   Continue amlodipine 5 mg once daily Rx provided blood pressure today 120/70

## 2023-11-17 NOTE — ASSESSMENT & PLAN NOTE
Significant improvements in overall anxiety levels she did not start Zoloft she reports me she has started to change her perspective she has made improvement in her symptoms and would like to hold off on SSRI which I think is very appropriate we will continue observe

## 2023-11-22 DIAGNOSIS — I63.30 CEREBRAL THROMBOSIS WITH CEREBRAL INFARCTION (HCC): ICD-10-CM

## 2023-11-22 DIAGNOSIS — I63.9 ACUTE CVA (CEREBROVASCULAR ACCIDENT) (HCC): ICD-10-CM

## 2023-11-22 RX ORDER — ATORVASTATIN CALCIUM 40 MG/1
40 TABLET, FILM COATED ORAL
Qty: 90 TABLET | Refills: 1 | Status: SHIPPED | OUTPATIENT
Start: 2023-11-22

## 2023-11-29 ENCOUNTER — OFFICE VISIT (OUTPATIENT)
Dept: NEUROLOGY | Facility: CLINIC | Age: 78
End: 2023-11-29
Payer: COMMERCIAL

## 2023-11-29 VITALS
DIASTOLIC BLOOD PRESSURE: 70 MMHG | SYSTOLIC BLOOD PRESSURE: 130 MMHG | HEIGHT: 66 IN | WEIGHT: 178 LBS | HEART RATE: 82 BPM | BODY MASS INDEX: 28.61 KG/M2

## 2023-11-29 DIAGNOSIS — E78.5 HYPERLIPIDEMIA: ICD-10-CM

## 2023-11-29 DIAGNOSIS — I63.30 CEREBRAL THROMBOSIS WITH CEREBRAL INFARCTION (HCC): ICD-10-CM

## 2023-11-29 DIAGNOSIS — Q21.12 PFO (PATENT FORAMEN OVALE): Primary | ICD-10-CM

## 2023-11-29 PROCEDURE — 99215 OFFICE O/P EST HI 40 MIN: CPT | Performed by: PSYCHIATRY & NEUROLOGY

## 2023-11-29 NOTE — PROGRESS NOTES
Patient ID: Leita Opitz is a 70 y.o. female. Assessment/Plan: This is a 69 y/o  Female who is here as a hx of left MCA occlusion/ left ICA thrombus, with left MCA and left PCA strokes. ANDREW showed small PFO with septal aneurysm which was deemed not be intervened surgically also because she would need lifelong anticoagulation. Etiology is cardioembolic at this time. PLAN:     Diagnoses and all orders for this visit:    PFO (patent foramen ovale)    History of Cerebral thrombosis with cerebral infarction ([address])  -for secondary stroke prevention, recommend continuation of combination of eliquis and atorvastatin  -Blood Pressure goal < 130/80, BP is at goal in the office. -LDL goal <70  -I advised patient to avoid using NSAIDs for headaches or other pain and to stick to tylenol if needed  -Recommend lifestyle modifications such as mediterranean diet & regular exercise regimen atleast 4-5 times a week for 20-30 minutes. -I educated patient/family regarding medication compliance  -encourage smoking cessation, and control of diabetes and hypertension; defer management to primary     Hyperlipidemia  -continue with atorvastatin       Follow up in 6 months     I would be happy to see the patient sooner if any new questions/concerns arise. Patient/Guardian was advised to the call the office if they have any questions and concerns in the meantime. Patient/Guardian does understand that if they have any new stroke like symptoms such as facial droop on one side, weakness/paralysis on either side, speech trouble, numbness on one side, balance issues, any vision changes, extreme dizziness or any new headache, to call 9-1-1 immediately or to proceed to the nearest ER immediately.       I have spent a total time of 40 minutes on 11/29/23 in caring for this patient including Risks and benefits of tx options, Instructions for management, Counseling / Coordination of care, Documenting in the medical record, and Reviewing / ordering tests, medicine, procedures  . Subjective:    HPI    This is a 70year old  female who is here as a follow up for hx of left MCA stroke and proximal left ICA thrombus. Patient is driving, without any issues. No new TIA/CVA Like symptoms. She is complaint with eliquis and is tolerating it well w/o any issues. She has some minor balance issues, and occasional slurred speech which are residual deficits noted. The following portions of the patient's history were reviewed and updated as appropriate: She  has a past medical history of Biliary dyskinesia, CAP (community acquired pneumonia), Class 1 obesity due to excess calories with serious comorbidity and body mass index (BMI) of 33.0 to 33.9 in adult (05/08/2018), Deep vein thrombosis (DVT) of proximal vein of right lower extremity, unspecified chronicity ([address]) (08/10/2022), Diabetes mellitus ([address]) (12/2018), Disease of thyroid gland (2000), GERD (gastroesophageal reflux disease), Hyperlipidemia, Palpitations, Pneumonia, Shortness of breath, Stroke ([address]), and SVT (supraventricular tachycardia) (06/08/2022).   She   Patient Active Problem List    Diagnosis Date Noted    Anxiety 11/17/2023    Depression, recurrent ([address]) 10/05/2023    Stroke-like symptom 10/02/2023    Ocular migraine 10/02/2023    Overweight (BMI 25.0-29.9) 09/12/2023    History of CVA (cerebrovascular accident) 07/05/2023    Frontal headache 05/24/2023    Hypokalemia 05/15/2023    PFO (patent foramen ovale) 05/15/2023    History of Cerebral thrombosis with cerebral infarction ([address]) 05/13/2023    Palpitations 05/13/2023    COVID-19 05/11/2023    Tachycardia 05/02/2023    Trigger finger 02/24/2023    Benign essential tremor 02/24/2023    Urinary incontinence 08/14/2022    Right groin pain 08/14/2022    Osteoarthritis 12/14/2021    Edema 06/08/2021    Thyroid nodule 06/08/2021    Exposure to COVID-19 virus 12/15/2020    Right flank pain 11/02/2020 Insomnia 09/03/2020    Oral ulceration 09/03/2020    Dyspnea on exertion 03/12/2020    Medicare annual wellness visit, subsequent 02/28/2020    Primary hypertension 08/20/2019    Encounter for screening mammogram for breast cancer 08/20/2019    Right lower quadrant abdominal pain 02/14/2019    Pelvic pain 02/14/2019    Family history of ovarian cancer 02/14/2019    Wellness examination 02/14/2019    Type 2 diabetes mellitus without complication, without long-term current use of insulin ([address]) 11/18/2018    Atypical chest pain 11/18/2018    Lymphedema 05/08/2018    Fatigue 05/08/2018    Hyperlipidemia 05/08/2018    Hypothyroidism 05/08/2018    Dupuytren's contracture 04/03/2017    Fibrocystic breast changes 12/27/2016    Diverticular disease of colon 08/22/2016    Gastro-esophageal reflux disease with esophagitis 08/22/2016    History of adenomatous polyp of colon 08/22/2016    Dense breast tissue on mammogram 08/16/2016    Hypothyroidism due to Hashimoto's thyroiditis 04/05/2016    Impaired fasting glucose 11/06/2012    Nephrolithiasis 11/06/2012     She  has a past surgical history that includes Cholecystectomy; Hemorrhoid surgery; Tubal ligation; Cardiac catheterization; Rotator cuff repair (Bilateral); Breast excisional biopsy (Left, 1993); Breast biopsy (Left, 1974); Breast biopsy (Left, 1994); and Augmentation mammaplasty (Bilateral, 1998). Her family history includes Alcohol abuse in her father; Aneurysm in her brother and mother; Cancer in her brother and mother; Cirrhosis in her father; Diabetes in her son; No Known Problems in her daughter, maternal aunt, maternal aunt, maternal aunt, maternal aunt, maternal grandfather, maternal grandmother, paternal aunt, paternal aunt, paternal aunt, paternal aunt, paternal aunt, paternal grandfather, paternal grandmother, son, son, and son; Ovarian cancer (age of onset: 67) in her mother. She  reports that she quit smoking about 13 years ago.  Her smoking use included cigarettes. She started smoking about 63 years ago. She has a 4.25 pack-year smoking history. She has never used smokeless tobacco. She reports current alcohol use of about 2.0 standard drinks of alcohol per week. She reports that she does not use drugs. Current Outpatient Medications   Medication Sig Dispense Refill    amLODIPine (NORVASC) 5 mg tablet Take 1 tablet (5 mg total) by mouth daily 90 tablet 3    apixaban (Eliquis) 5 mg Take 1 tablet (5 mg total) by mouth 2 (two) times a day 180 tablet 3    Ascorbic Acid (VITAMIN C) 1000 MG tablet Take 1 tablet by mouth daily      atorvastatin (LIPITOR) 40 mg tablet TAKE 1 TABLET DAILY WITH DINNER 90 tablet 1    Biotin 5000 MCG CAPS Take 1 capsule by mouth daily      cholecalciferol (VITAMIN D3) 1,000 units tablet Take 2,000 Units by mouth daily      diphenhydrAMINE-acetaminophen (TYLENOL PM)  MG TABS Take 1 tablet by mouth daily at bedtime as needed for sleep      esomeprazole (NexIUM) 40 MG capsule Take 1 capsule by mouth daily      fluticasone (FLONASE) 50 mcg/act nasal spray USE 2 SPRAYS IN EACH NOSTRIL DAILY 48 g 3    levothyroxine 125 mcg tablet TAKE 1 TABLET DAILY ON AN EMPTY STOMACH 90 tablet 1    Magnesium 250 MG TABS Take 250 mg by mouth in the morning      Menthol, Topical Analgesic, (BIOFREEZE EX) Apply 1 application topically daily as needed (pain)      metoprolol tartrate (LOPRESSOR) 25 mg tablet Take one tablet as needed for palpitations (Patient taking differently: if needed Take one tablet as needed for palpitations) 90 tablet 2    Multiple Vitamin (MULTIVITAMIN) capsule Take 1 capsule by mouth daily      nystatin (MYCOSTATIN) cream Apply topically 2 (two) times a day as needed      Probiotic Product (ALIGN PO) Take 1 capsule by mouth in the morning      psyllium (METAMUCIL) 58.6 % powder Take 1 packet by mouth daily      triamcinolone (KENALOG) 0.5 % ointment        No current facility-administered medications for this visit.      Current Outpatient Medications on File Prior to Visit   Medication Sig    amLODIPine (NORVASC) 5 mg tablet Take 1 tablet (5 mg total) by mouth daily    apixaban (Eliquis) 5 mg Take 1 tablet (5 mg total) by mouth 2 (two) times a day    Ascorbic Acid (VITAMIN C) 1000 MG tablet Take 1 tablet by mouth daily    atorvastatin (LIPITOR) 40 mg tablet TAKE 1 TABLET DAILY WITH DINNER    Biotin 5000 MCG CAPS Take 1 capsule by mouth daily    cholecalciferol (VITAMIN D3) 1,000 units tablet Take 2,000 Units by mouth daily    diphenhydrAMINE-acetaminophen (TYLENOL PM)  MG TABS Take 1 tablet by mouth daily at bedtime as needed for sleep    esomeprazole (NexIUM) 40 MG capsule Take 1 capsule by mouth daily    fluticasone (FLONASE) 50 mcg/act nasal spray USE 2 SPRAYS IN EACH NOSTRIL DAILY    levothyroxine 125 mcg tablet TAKE 1 TABLET DAILY ON AN EMPTY STOMACH    Magnesium 250 MG TABS Take 250 mg by mouth in the morning    Menthol, Topical Analgesic, (BIOFREEZE EX) Apply 1 application topically daily as needed (pain)    metoprolol tartrate (LOPRESSOR) 25 mg tablet Take one tablet as needed for palpitations (Patient taking differently: if needed Take one tablet as needed for palpitations)    Multiple Vitamin (MULTIVITAMIN) capsule Take 1 capsule by mouth daily    nystatin (MYCOSTATIN) cream Apply topically 2 (two) times a day as needed    Probiotic Product (ALIGN PO) Take 1 capsule by mouth in the morning    psyllium (METAMUCIL) 58.6 % powder Take 1 packet by mouth daily    triamcinolone (KENALOG) 0.5 % ointment     [DISCONTINUED] naproxen sodium (ALEVE) 220 MG tablet Take by mouth     No current facility-administered medications on file prior to visit. She is allergic to hydrochlorothiazide and meloxicam..         Objective:    Blood pressure 130/70, pulse 82, height 5' 6" (1.676 m), weight 80.7 kg (178 lb), not currently breastfeeding. Physical Exam  General - patient is alert   Speech - no dysarthria noted, no aphasia noted. Neuro:   Cranial nerves: PERRL, EOMI, facial sensation intact to soft touch in V1, V2 and V3, no facial asymmetry noted, uvula/palate midline, tongue midline. Motor: 5/5 throughout, normal tone, no pronator drift noted. Sensory - intact to soft touch throughout  Reflexes - 2+ throughout  Coordination - no ataxia/dysmetria noted  Gait - normal    Neurological Exam      ROS:    Review of Systems   Constitutional:  Negative for appetite change, fatigue and fever. HENT: Negative. Negative for hearing loss, tinnitus, trouble swallowing and voice change. Eyes: Negative. Negative for photophobia, pain and visual disturbance. Respiratory: Negative. Negative for shortness of breath. Cardiovascular: Negative. Negative for palpitations. Gastrointestinal: Negative. Negative for nausea and vomiting. Endocrine: Negative. Negative for cold intolerance. Genitourinary: Negative. Negative for dysuria, frequency and urgency. Musculoskeletal:  Positive for back pain. Negative for gait problem, myalgias and neck pain. Skin: Negative. Negative for rash. Allergic/Immunologic: Negative. Neurological: Negative. Negative for dizziness, tremors, seizures, syncope, facial asymmetry, speech difficulty, weakness, light-headedness, numbness and headaches. Hematological: Negative. Does not bruise/bleed easily. Psychiatric/Behavioral: Negative. Negative for confusion, hallucinations and sleep disturbance.

## 2023-12-05 ENCOUNTER — OFFICE VISIT (OUTPATIENT)
Dept: PODIATRY | Facility: CLINIC | Age: 78
End: 2023-12-05
Payer: COMMERCIAL

## 2023-12-05 VITALS — BODY MASS INDEX: 28.73 KG/M2 | HEIGHT: 66 IN

## 2023-12-05 DIAGNOSIS — M20.22 HALLUX RIGIDUS OF LEFT FOOT: Primary | ICD-10-CM

## 2023-12-05 PROCEDURE — 20600 DRAIN/INJ JOINT/BURSA W/O US: CPT | Performed by: PODIATRIST

## 2023-12-05 RX ORDER — LIDOCAINE HYDROCHLORIDE 10 MG/ML
1 INJECTION, SOLUTION INFILTRATION; PERINEURAL
Status: SHIPPED | OUTPATIENT
Start: 2023-12-05

## 2023-12-05 RX ORDER — TRIAMCINOLONE ACETONIDE 40 MG/ML
20 INJECTION, SUSPENSION INTRA-ARTICULAR; INTRAMUSCULAR
Status: SHIPPED | OUTPATIENT
Start: 2023-12-05

## 2023-12-05 RX ADMIN — TRIAMCINOLONE ACETONIDE 20 MG: 40 INJECTION, SUSPENSION INTRA-ARTICULAR; INTRAMUSCULAR at 15:15

## 2023-12-05 RX ADMIN — LIDOCAINE HYDROCHLORIDE 1 ML: 10 INJECTION, SOLUTION INFILTRATION; PERINEURAL at 15:15

## 2023-12-05 NOTE — PROGRESS NOTES
Patient presents with recurrence of pain left great toe joint secondary to hallux rigidus. Patient typically gets relief with cortisone injections for approximately 5 months. She is again having pain and injection is desired. On exam, pedal pulses are palpable. There is a severe hallux rigidus deformity of the left foot as there is no range of motion at the first MPJ. Dorsal exostosis is present. There is pain with palpation lateral aspect first MPJ. Treatment: Injected lateral aspect left first MPJ with 0.5 cc Kenalog 40 along with 1 cc 1% Xylocaine. Patient will contact me when pain again recurs. Small joint arthrocentesis: L great MTP  Universal Protocol:  Consent: Verbal consent obtained.   Consent given by: patient  Patient understanding: patient states understanding of the procedure being performed  Patient identity confirmed: verbally with patient  Supporting Documentation  Indications: pain   Procedure Details  Location: great toe - L great MTP  Needle size: 25 G  Ultrasound guidance: no  Approach: dorsal  Medications administered: 1 mL lidocaine 1 %; 20 mg triamcinolone acetonide 40 mg/mL

## 2023-12-06 ENCOUNTER — OFFICE VISIT (OUTPATIENT)
Dept: AUDIOLOGY | Age: 78
End: 2023-12-06

## 2023-12-06 DIAGNOSIS — H90.3 SENSORY HEARING LOSS, BILATERAL: Primary | ICD-10-CM

## 2023-12-06 NOTE — PROGRESS NOTES
Progress Note    Name:  Estiven French  :  1945  Age:  70 y.o. MRN:  [id number]  Date of Evaluation: 23     Patient is fit with OtLikez More 3 miniRITE-T hearing aid(s). Right serial number [id number]. Left serial number [id number]. Warranty date: 2024 (Loss/Damage and repair). Half skeleton power molds length 2 receivers right and left. Patient requested domes be put on her hearing aids instead of her earmolds for comfort. Domes were paced on her hearing aids and her settings were adjusted accordingly. Patient voiced comfort and good sound quality. No other questions were addressed.     Nini Lam., CCC-A  Clinical Audiologist  [address] [place] [address],1  [address] [facility] [address]

## 2023-12-12 ENCOUNTER — OFFICE VISIT (OUTPATIENT)
Dept: AUDIOLOGY | Age: 78
End: 2023-12-12

## 2023-12-12 DIAGNOSIS — H90.3 SENSORY HEARING LOSS, BILATERAL: Primary | ICD-10-CM

## 2023-12-12 NOTE — PROGRESS NOTES
Hearing Aid Visit:    Name:  Khanh Soto  :  1945  Age:  70 y.o. MRN:  [id number]  Date of Evaluation: 23     Khanh Soto was seen today (2023) for a(n) in-warranty hearing aid check of her bilateral hearing aids. Today, Ms. Bing Bass reports she would like her skeleton power molds re-attached to her hearing aids. She would also like the hearing aids turned up. Device Information:  Patient is fit with [employer] More 3 miniRITE-T hearing aid(s). Right serial number [id number]. Left serial number [id number]. Warranty date: 2024 (Loss/Damage and repair). Half skeleton power molds length 2 receivers right and left. Action:  Coupled molds to hearing aids. Increased aids to Step 3, gave additional 3 dB boost to overall gain at patient's request.     Patient reports that the molds annoy her because they frequently move in her ears. Patient confirmed she lost weight recently. Patient would benefit from new impressions and remade molds as molds are loose. She will consider and contact the office for an impression appointment when she is ready. Recommendations:   Return as needed.       Nini Doty., [facility]-A  Clinical Audiologist  [address] [facility],[address]  [address] [address]

## 2024-01-17 LAB
LEFT EYE DIABETIC RETINOPATHY: NORMAL
RIGHT EYE DIABETIC RETINOPATHY: NORMAL

## 2024-01-23 ENCOUNTER — TELEPHONE (OUTPATIENT)
Dept: CARDIOLOGY CLINIC | Facility: CLINIC | Age: 79
End: 2024-01-23

## 2024-01-23 NOTE — TELEPHONE ENCOUNTER
Hi, my name is Kasie Cheatham.   I'm patient of Doctor Tatyana.   My birthday is [birth year].   I am scheduled to have cataract surgery the end of March and I don't. I need to see if I need to get a clearance.  f you could please give me a call at [phone]. Thank you.

## 2024-02-02 ENCOUNTER — OFFICE VISIT (OUTPATIENT)
Dept: AUDIOLOGY | Age: 79
End: 2024-02-02

## 2024-02-02 DIAGNOSIS — H90.3 SENSORY HEARING LOSS, BILATERAL: Primary | ICD-10-CM

## 2024-02-02 NOTE — PROGRESS NOTES
Hearing Aid Visit:    Name:  Kasie Cheatham  :  1945  Age:  78 y.o.  MRN:  [id number]  Date of Evaluation: 24     HISTORY:    Kasie Cheatham was seen today (2024) for a(n) in-warranty hearing aid check of her bilateral hearing aids. Today, Kasie stated she is struggling to understand speech clearly.    DEVICE INFORMATION:     Left Device Right Device   Hearing Aid Make: OtnothingGrinder  OtnothingGrinder    Hearing Aid Model: MORE 3 Mini RITE-R MORE 3 Mini RITE-R   Serial Number: [id number]   Repair Warranty Date: 24   Loss/Damage Warranty Status:   Active  Active        Length/Output    Wax System: Pro Wax Pro Wax   Dome Size/Style: - -   Battery: Lithium-ion Rechargeable Lithium-ion Rechargeable      Earmold Serial Number: [id number]   Earmold Warranty Date:  N/A N/A    Serial Number: N/A   Warranty Date: N/A    Accessories: N/A       ACTION/ADJUSTMENTS:    Reviewed audiogram with patient and discussed decreased speech understanding. Discussed realistic expectations.     Changed from VAC+ to NAL-NL2, patient noted better sound quality. Increased overall gain 2 clicks and added speech rescue. Patient noted improved sound quality.     Discussed the use of a connect clip, quoted $255 for a new one and $65 for a in stock with no warranty. Patient will discuss with  and call back.     RECOMMENDATIONS:     Follow up in April to send hearing aids in prior to warranty end date.       Siva Heath, CCC-A  Clinical Audiologist  [facility]  [address]  [place] [address]

## 2024-02-21 PROBLEM — Z00.00 MEDICARE ANNUAL WELLNESS VISIT, SUBSEQUENT: Status: RESOLVED | Noted: 2020-02-28 | Resolved: 2024-02-21

## 2024-04-02 DIAGNOSIS — E03.9 HYPOTHYROIDISM, UNSPECIFIED TYPE: ICD-10-CM

## 2024-04-02 RX ORDER — LEVOTHYROXINE SODIUM 0.12 MG/1
TABLET ORAL
Qty: 90 TABLET | Refills: 1 | Status: SHIPPED | OUTPATIENT
Start: 2024-04-02

## 2024-04-03 ENCOUNTER — TELEMEDICINE (OUTPATIENT)
Dept: INTERNAL MEDICINE CLINIC | Facility: CLINIC | Age: 79
End: 2024-04-03
Payer: COMMERCIAL

## 2024-04-03 DIAGNOSIS — M54.50 ACUTE MIDLINE LOW BACK PAIN WITHOUT SCIATICA: Primary | ICD-10-CM

## 2024-04-03 PROCEDURE — G2211 COMPLEX E/M VISIT ADD ON: HCPCS | Performed by: INTERNAL MEDICINE

## 2024-04-03 PROCEDURE — 99212 OFFICE O/P EST SF 10 MIN: CPT | Performed by: INTERNAL MEDICINE

## 2024-04-03 NOTE — PROGRESS NOTES
Virtual Regular Visit    Verification of patient location:    Patient is located at Home in the following state in which I hold an active license [place]      Assessment/Plan:    Problem List Items Addressed This Visit        Surgery/Wound/Pain    Acute midline low back pain without sciatica - Primary     Likely musculoskeletal in nature/degenerative arthritis will check x-ray lumbar spine, warm compress, lidocaine patch, Tylenol as directed recommend exercises for the lower back if symptoms do not improve in the next 2 to 4 weeks she can contact me for a formal evaluation at physical therapy patient does have a follow-up with me in the near future call if any change, worse         Relevant Orders    XR spine lumbar minimum 4 views non injury (Completed)              Reason for visit is   Chief Complaint   Patient presents with   • Back Pain     Started 9 days ago, worsening with radiation   • Virtual Regular Visit          Encounter provider Yannick Gaspar DO    Provider located at [address] [place] [address] [place] [address]      Recent Visits  Date Type Provider Dept   04/03/24 Telemedicine Yannick Gaspar DO [facility]   Showing recent visits within past 7 days and meeting all other requirements  Future Appointments  No visits were found meeting these conditions.  Showing future appointments within next 150 days and meeting all other requirements       The patient was identified by name and date of birth. Kasiejuan david Cheatham was informed that this is a telemedicine visit and that the visit is being conducted through the Epic Embedded platform. She agrees to proceed..  My office door was closed. No one else was in the room.  She acknowledged consent and understanding of privacy and security of the video platform. The patient has agreed to participate and understands they can discontinue the visit at any time.    Patient is aware this is a billable  service.     Subjective  Kasie Cheatham is a 78 y.o. female lower back pain.      HPI 78-year-old female chief complaint of lower back pain no radicular symptoms last Monday with standing in the halll, has had for 9 day using the Takt[employer] tub, was doing back exercises and huriting, lower back worse with getting up pain both thighs , no tingling , no weak no inc worse with bending ,  tylenol pm no weakness no radiation of the pain no incontinence    Past Medical History:   Diagnosis Date   • Biliary dyskinesia    • CAP (community acquired pneumonia)     last assessed 8/17/16   • Class 1 obesity due to excess calories with serious comorbidity and body mass index (BMI) of 33.0 to 33.9 in adult 05/08/2018   • Deep vein thrombosis (DVT) of proximal vein of right lower extremity, unspecified chronicity (HCC) 08/10/2022   • Diabetes mellitus ([facility]) 12/2018    Type 2 no insulin   • Disease of thyroid gland 2000   • GERD (gastroesophageal reflux disease)    • Hyperlipidemia    • Palpitations    • Pneumonia    • Shortness of breath    • Stroke ([facility])    • SVT (supraventricular tachycardia) 06/08/2022       Past Surgical History:   Procedure Laterality Date   • AUGMENTATION MAMMAPLASTY Bilateral 1998    breast surgery- with prosthetic implant 1998; pre-pectoral salline   • BREAST BIOPSY Left 1974   • BREAST BIOPSY Left 1994   • BREAST EXCISIONAL BIOPSY Left 1993   • CARDIAC CATHETERIZATION      procedure summary   • CHOLECYSTECTOMY      onset 2003   • HEMORRHOID SURGERY     • ROTATOR CUFF REPAIR Bilateral     onset 2010   • TUBAL LIGATION      onset 1980       Current Outpatient Medications   Medication Sig Dispense Refill   • amLODIPine (NORVASC) 5 mg tablet Take 1 tablet (5 mg total) by mouth daily 90 tablet 3   • Ascorbic Acid (VITAMIN C) 1000 MG tablet Take 1 tablet by mouth daily     • atorvastatin (LIPITOR) 40 mg tablet TAKE 1 TABLET DAILY WITH DINNER 90 tablet 1   • Biotin 5000 MCG CAPS Take 1 capsule by mouth daily      • cholecalciferol (VITAMIN D3) 1,000 units tablet Take 2,000 Units by mouth daily     • diphenhydrAMINE-acetaminophen (TYLENOL PM)  MG TABS Take 1 tablet by mouth daily at bedtime as needed for sleep     • esomeprazole (NexIUM) 40 MG capsule Take 1 capsule by mouth daily     • fluticasone (FLONASE) 50 mcg/act nasal spray USE 2 SPRAYS IN EACH NOSTRIL DAILY 48 g 3   • levothyroxine 125 mcg tablet TAKE 1 TABLET DAILY ON AN EMPTY STOMACH 90 tablet 1   • Magnesium 250 MG TABS Take 250 mg by mouth in the morning     • metoprolol tartrate (LOPRESSOR) 25 mg tablet Take one tablet as needed for palpitations (Patient taking differently: if needed Take one tablet as needed for palpitations) 90 tablet 2   • Multiple Vitamin (MULTIVITAMIN) capsule Take 1 capsule by mouth daily     • nystatin (MYCOSTATIN) cream Apply topically 2 (two) times a day as needed     • Probiotic Product (ALIGN PO) Take 1 capsule by mouth in the morning     • psyllium (METAMUCIL) 58.6 % powder Take 1 packet by mouth daily     • triamcinolone (KENALOG) 0.5 % ointment      • apixaban (Eliquis) 5 mg Take 1 tablet (5 mg total) by mouth 2 (two) times a day 180 tablet 3   • Menthol, Topical Analgesic, (BIOFREEZE EX) Apply 1 application topically daily as needed (pain) (Patient not taking: Reported on 4/3/2024)       Current Facility-Administered Medications   Medication Dose Route Frequency Provider Last Rate Last Admin   • lidocaine (XYLOCAINE) 1 % injection 1 mL  1 mL Injection  Mike Jack DPM   1 mL at 12/05/23 1515   • triamcinolone acetonide (KENALOG-40) 40 mg/mL injection 20 mg  20 mg Infiltration  Mike Jack DPM   20 mg at 12/05/23 1515        Allergies   Allergen Reactions   • Hydrochlorothiazide Palpitations   • Meloxicam Rash       Review of Systems   Constitutional:  Negative for fever.   Genitourinary:  Negative for difficulty urinating.   Musculoskeletal:  Positive for back pain.   Neurological:  Negative for weakness and  numbness.       Video Exam    There were no vitals filed for this visit.    Physical Exam appears comfortable    Visit Time  Total Visit Duration: 15min

## 2024-04-04 ENCOUNTER — APPOINTMENT (OUTPATIENT)
Dept: RADIOLOGY | Age: 79
End: 2024-04-04
Payer: COMMERCIAL

## 2024-04-04 DIAGNOSIS — M54.50 ACUTE MIDLINE LOW BACK PAIN WITHOUT SCIATICA: ICD-10-CM

## 2024-04-04 PROCEDURE — 72110 X-RAY EXAM L-2 SPINE 4/>VWS: CPT

## 2024-04-07 PROBLEM — M54.50 ACUTE MIDLINE LOW BACK PAIN WITHOUT SCIATICA: Status: ACTIVE | Noted: 2024-04-07

## 2024-04-07 NOTE — ASSESSMENT & PLAN NOTE
Likely musculoskeletal in nature/degenerative arthritis will check x-ray lumbar spine, warm compress, lidocaine patch, Tylenol as directed recommend exercises for the lower back if symptoms do not improve in the next 2 to 4 weeks she can contact me for a formal evaluation at physical therapy patient does have a follow-up with me in the near future call if any change, worse

## 2024-04-10 ENCOUNTER — RA CDI HCC (OUTPATIENT)
Dept: OTHER | Facility: HOSPITAL | Age: 79
End: 2024-04-10

## 2024-04-10 ENCOUNTER — APPOINTMENT (OUTPATIENT)
Dept: LAB | Age: 79
End: 2024-04-10
Payer: COMMERCIAL

## 2024-04-10 DIAGNOSIS — E11.9 TYPE 2 DIABETES MELLITUS WITHOUT COMPLICATION, WITHOUT LONG-TERM CURRENT USE OF INSULIN (HCC): ICD-10-CM

## 2024-04-10 DIAGNOSIS — E06.3 HYPOTHYROIDISM DUE TO HASHIMOTO'S THYROIDITIS: ICD-10-CM

## 2024-04-10 DIAGNOSIS — Z13.0 SCREENING, ANEMIA, DEFICIENCY, IRON: ICD-10-CM

## 2024-04-10 DIAGNOSIS — E03.8 HYPOTHYROIDISM DUE TO HASHIMOTO'S THYROIDITIS: ICD-10-CM

## 2024-04-10 DIAGNOSIS — Z13.6 SCREENING FOR CARDIOVASCULAR CONDITION: ICD-10-CM

## 2024-04-10 DIAGNOSIS — R73.01 IMPAIRED FASTING GLUCOSE: ICD-10-CM

## 2024-04-10 LAB
ALBUMIN SERPL BCP-MCNC: 3.9 G/DL (ref 3.5–5)
ALP SERPL-CCNC: 34 U/L (ref 34–104)
ALT SERPL W P-5'-P-CCNC: 15 U/L (ref 7–52)
ANION GAP SERPL CALCULATED.3IONS-SCNC: 9 MMOL/L (ref 4–13)
AST SERPL W P-5'-P-CCNC: 17 U/L (ref 13–39)
BILIRUB SERPL-MCNC: 0.58 MG/DL (ref 0.2–1)
BUN SERPL-MCNC: 15 MG/DL (ref 5–25)
CALCIUM SERPL-MCNC: 8.7 MG/DL (ref 8.4–10.2)
CHLORIDE SERPL-SCNC: 105 MMOL/L (ref 96–108)
CHOLEST SERPL-MCNC: 143 MG/DL
CO2 SERPL-SCNC: 28 MMOL/L (ref 21–32)
CREAT SERPL-MCNC: 0.53 MG/DL (ref 0.6–1.3)
CREAT UR-MCNC: 70.8 MG/DL
EST. AVERAGE GLUCOSE BLD GHB EST-MCNC: 131 MG/DL
GFR SERPL CREATININE-BSD FRML MDRD: 92 ML/MIN/1.73SQ M
GLUCOSE P FAST SERPL-MCNC: 112 MG/DL (ref 65–99)
HBA1C MFR BLD: 6.2 %
HDLC SERPL-MCNC: 54 MG/DL
LDLC SERPL CALC-MCNC: 67 MG/DL (ref 0–100)
MICROALBUMIN UR-MCNC: <7 MG/L
MICROALBUMIN/CREAT 24H UR: <10 MG/G CREATININE (ref 0–30)
POTASSIUM SERPL-SCNC: 3.7 MMOL/L (ref 3.5–5.3)
PROT SERPL-MCNC: 5.9 G/DL (ref 6.4–8.4)
SODIUM SERPL-SCNC: 142 MMOL/L (ref 135–147)
TRIGL SERPL-MCNC: 110 MG/DL
TSH SERPL DL<=0.05 MIU/L-ACNC: 0.57 UIU/ML (ref 0.45–4.5)

## 2024-04-10 PROCEDURE — 36415 COLL VENOUS BLD VENIPUNCTURE: CPT

## 2024-04-10 PROCEDURE — 83036 HEMOGLOBIN GLYCOSYLATED A1C: CPT

## 2024-04-10 PROCEDURE — 82570 ASSAY OF URINE CREATININE: CPT

## 2024-04-10 PROCEDURE — 84443 ASSAY THYROID STIM HORMONE: CPT

## 2024-04-10 PROCEDURE — 80053 COMPREHEN METABOLIC PANEL: CPT

## 2024-04-10 PROCEDURE — 82043 UR ALBUMIN QUANTITATIVE: CPT

## 2024-04-10 PROCEDURE — 80061 LIPID PANEL: CPT

## 2024-04-10 NOTE — PROGRESS NOTES
HCC coding opportunities          Chart Reviewed number of suggestions sent to Provider: 1   I69.328 see HPI 11/29/23    Patients Insurance     Medicare Insurance: [employer]

## 2024-04-16 ENCOUNTER — OFFICE VISIT (OUTPATIENT)
Dept: INTERNAL MEDICINE CLINIC | Facility: CLINIC | Age: 79
End: 2024-04-16
Payer: COMMERCIAL

## 2024-04-16 ENCOUNTER — HOSPITAL ENCOUNTER (OUTPATIENT)
Dept: VASCULAR ULTRASOUND | Facility: HOSPITAL | Age: 79
Discharge: HOME | End: 2024-04-16
Payer: COMMERCIAL

## 2024-04-16 VITALS
BODY MASS INDEX: 28.77 KG/M2 | WEIGHT: 179 LBS | HEART RATE: 78 BPM | HEIGHT: 66 IN | OXYGEN SATURATION: 99 % | RESPIRATION RATE: 16 BRPM

## 2024-04-16 DIAGNOSIS — E66.3 OVERWEIGHT (BMI 25.0-29.9): ICD-10-CM

## 2024-04-16 DIAGNOSIS — E11.9 TYPE 2 DIABETES MELLITUS WITHOUT COMPLICATION, WITHOUT LONG-TERM CURRENT USE OF INSULIN (HCC): Primary | ICD-10-CM

## 2024-04-16 DIAGNOSIS — I89.0 LYMPHEDEMA: ICD-10-CM

## 2024-04-16 DIAGNOSIS — Z12.39 ENCOUNTER FOR SCREENING FOR MALIGNANT NEOPLASM OF BREAST, UNSPECIFIED SCREENING MODALITY: ICD-10-CM

## 2024-04-16 DIAGNOSIS — M47.816 FACET ARTHRITIS OF LUMBAR REGION: ICD-10-CM

## 2024-04-16 DIAGNOSIS — I10 PRIMARY HYPERTENSION: ICD-10-CM

## 2024-04-16 DIAGNOSIS — E03.9 HYPOTHYROIDISM, UNSPECIFIED TYPE: ICD-10-CM

## 2024-04-16 DIAGNOSIS — M79.605 PAIN OF LEFT LOWER EXTREMITY: ICD-10-CM

## 2024-04-16 DIAGNOSIS — E78.2 MIXED HYPERLIPIDEMIA: ICD-10-CM

## 2024-04-16 DIAGNOSIS — Z12.31 ENCOUNTER FOR SCREENING MAMMOGRAM FOR MALIGNANT NEOPLASM OF BREAST: ICD-10-CM

## 2024-04-16 PROBLEM — M79.604 PAIN OF RIGHT LOWER EXTREMITY: Status: ACTIVE | Noted: 2024-04-16

## 2024-04-16 PROBLEM — F33.9 DEPRESSION, RECURRENT (HCC): Status: RESOLVED | Noted: 2023-10-05 | Resolved: 2024-04-16

## 2024-04-16 PROCEDURE — 93971 EXTREMITY STUDY: CPT

## 2024-04-16 PROCEDURE — 93971 EXTREMITY STUDY: CPT | Performed by: SURGERY

## 2024-04-16 PROCEDURE — G2211 COMPLEX E/M VISIT ADD ON: HCPCS | Performed by: INTERNAL MEDICINE

## 2024-04-16 PROCEDURE — 99214 OFFICE O/P EST MOD 30 MIN: CPT | Performed by: INTERNAL MEDICINE

## 2024-04-16 NOTE — ASSESSMENT & PLAN NOTE
Lab Results   Component Value Date    HGBA1C 6.2 (H) 04/10/2024   I have counselled the pt to follow a healthy and balanced diet ,and recommend routine exercise.  I will be ordering diabetic laboratories including comprehensive metabolic panel, hemoglobin A1c, urine microalbumin, lipid panel.  Annual eye examination recently completed will obtain copy, diabetic foot examination completed normal

## 2024-04-16 NOTE — ASSESSMENT & PLAN NOTE
Improvements in the lower back pain recommend range of motion exercises along with core muscle strengthening resolution of symptoms

## 2024-04-16 NOTE — PROGRESS NOTES
Patient's shoes and socks removed.    Right Foot/Ankle   Right Foot Inspection  Skin Exam: skin normal and skin intact. No dry skin, no warmth, no callus, no erythema, no maceration, no abnormal color, no pre-ulcer, no ulcer and no callus.     Toe Exam: ROM and strength within normal limits.     Sensory   Monofilament testing: intact    Vascular  The right DP pulse is 2+. The right PT pulse is 2+.     Left Foot/Ankle  Left Foot Inspection  Skin Exam: skin normal and skin intact. No dry skin, no warmth, no erythema, no maceration, normal color, no pre-ulcer, no ulcer and no callus.     Toe Exam: ROM and strength within normal limits.     Sensory   Monofilament testing: intact    Vascular  The left DP pulse is 2+. The left PT pulse is 2+.     Assign Risk Category  No deformity present  No loss of protective sensation  No weak pulses  Risk: 0       Assessment/Plan:    Lymphedema  Currently stable wears compression stockings routinely    Hyperlipidemia  Hyperlipidemia controlled continue with current medical regiment recommend a low-cholesterol diet and recommend routine exercise we will continue to monitor the progress.  Continue Lipitor 40 mg once daily    Hypothyroidism  Hypothyroidism controlled the patient is currently euthyroid I will be ordering a TSH prior to the next office visit and the patient will continue with current medical regiment; we will continue to monitor the patient's progress.    Type 2 diabetes mellitus without complication, without long-term current use of insulin ([facility])    Lab Results   Component Value Date    HGBA1C 6.2 (H) 04/10/2024   I have counselled the pt to follow a healthy and balanced diet ,and recommend routine exercise.  I will be ordering diabetic laboratories including comprehensive metabolic panel, hemoglobin A1c, urine microalbumin, lipid panel.  Annual eye examination recently completed will obtain copy, diabetic foot examination completed normal    Overweight (BMI 25.0-29.9)  I  have counselled the pt to follow a healthy and balanced diet ,and recommend routine exercise.    Facet arthritis of lumbar region  Improvements in the lower back pain recommend range of motion exercises along with core muscle strengthening resolution of symptoms    Pain of left lower extremity  Pain anterior shin region both subcutaneous tenderness and also pain localized to the anterior tibialis muscle distal pain distal pulses 2 or 2 there is more swelling of the right lower extremity as compared to the left lower extremity rule out DVT versus shinsplints will check venous Doppler left lower extremity stat if negative patient may start stretching I have provided stretching exercises she can continue with the Tylenol unable to take NSAIDs because of previous stroke and on Eliquis also may use Biofreeze she did not want to start physical therapy at this point if symptoms not resolved she can notify me for further treatment plan         Problem List Items Addressed This Visit        Cardiovascular and Mediastinum    Primary hypertension       Endocrine    Hypothyroidism     Hypothyroidism controlled the patient is currently euthyroid I will be ordering a TSH prior to the next office visit and the patient will continue with current medical regiment; we will continue to monitor the patient's progress.         Relevant Orders    TSH, 3rd generation    Type 2 diabetes mellitus without complication, without long-term current use of insulin ([facility]) - Primary       Lab Results   Component Value Date    HGBA1C 6.2 (H) 04/10/2024   I have counselled the pt to follow a healthy and balanced diet ,and recommend routine exercise.  I will be ordering diabetic laboratories including comprehensive metabolic panel, hemoglobin A1c, urine microalbumin, lipid panel.  Annual eye examination recently completed will obtain copy, diabetic foot examination completed normal         Relevant Orders    Hemoglobin A1C    Comprehensive metabolic  panel    Lipid Panel with Direct LDL reflex    Albumin / creatinine urine ratio       Musculoskeletal and Integument    Facet arthritis of lumbar region     Improvements in the lower back pain recommend range of motion exercises along with core muscle strengthening resolution of symptoms            Surgery/Wound/Pain    Pain of left lower extremity     Pain anterior shin region both subcutaneous tenderness and also pain localized to the anterior tibialis muscle distal pain distal pulses 2 or 2 there is more swelling of the right lower extremity as compared to the left lower extremity rule out DVT versus shinsplints will check venous Doppler left lower extremity stat if negative patient may start stretching I have provided stretching exercises she can continue with the Tylenol unable to take NSAIDs because of previous stroke and on Eliquis also may use Biofreeze she did not want to start physical therapy at this point if symptoms not resolved she can notify me for further treatment plan         Relevant Orders    VAS VENOUS DUPLEX -LOWER LIMB UNILATERAL       Other    Lymphedema     Currently stable wears compression stockings routinely         Hyperlipidemia     Hyperlipidemia controlled continue with current medical regiment recommend a low-cholesterol diet and recommend routine exercise we will continue to monitor the progress.  Continue Lipitor 40 mg once daily         Overweight (BMI 25.0-29.9)     I have counselled the pt to follow a healthy and balanced diet ,and recommend routine exercise.        Other Visit Diagnoses     Encounter for screening for malignant neoplasm of breast, unspecified screening modality        Relevant Orders    Mammo screening bilateral w cad    Encounter for screening mammogram for malignant neoplasm of breast        Relevant Orders    Mammo screening bilateral w cad          Return to office  6 months  call if any problems  Subjective:      Patient ID: Kasie Cheatham is a 78 y.o.  female.   left MCA occlusion/ left ICA thrombus, with left MCA and left PCA strokes. ANDREW showed small PFO with septal aneurysm which was deemed not be intervened surgically also because she would need lifelong anticoagulation. Etiology is cardioembolic at this time.    HPI 78-year old female coming in for a follow up office visit regarding type 2 diabetes, hypothyroidism, hyperlipidemia, lymphedema, hypertension, overweight, pain left lower extremity anterior shin and facet arthritis/lower back pain; the patient reports me compliant taking medications without untoward side effects the.  The patient is here to review his medical condition, update me on the medical condition and the patient reports me no hospitalizations and no ER visits.  Try to follow healthy and balanced diet remains active she was less active when she had lower back pain she reports me symptom improvement of the lower back pain using Tylenol and Biofreeze and stretching exercises she is doing much better and she plans to increase her activity levels and start riding her bike again.  She is here to review her laboratories in detail.  She reports me left anterior shin pain over the last 6 months which has in the last 1 to 2 months progressively gotten worse her symptoms are primarily at nighttime in bed she needs to put Biofreeze on to relieve her discomfort and pain by the next morning she is feeling better throughout the day she does fine.  The pt reports achey in the left shin, sporatic for a while , more frequent using bifreeze, notice while in bed keeps the pt up wakes up back to normal patient will consider a COVID-vaccine prior to her travel for vacation this summer    The following portions of the patient's history were reviewed and updated as appropriate: allergies, current medications, past family history, past medical history, past social history, past surgical history and problem list.    Review of Systems   Constitutional:  Negative for  activity change, appetite change and unexpected weight change.   HENT:  Negative for congestion and postnasal drip.    Eyes:  Negative for visual disturbance.   Respiratory:  Negative for cough and shortness of breath.    Cardiovascular:  Negative for chest pain.   Gastrointestinal:  Negative for abdominal pain, diarrhea, nausea and vomiting.   Neurological:  Negative for dizziness, light-headedness and headaches.   Hematological:  Negative for adenopathy.    Left anterior shin pain, back pain resolved    Objective:    Return in about 6 months (around 10/16/2024).    Procedure: XR spine lumbar minimum 4 views non injury    Result Date: 4/4/2024  Narrative: LUMBAR SPINE INDICATION:   Low back pain, unspecified. COMPARISON:  None. VIEWS:  XR SPINE LUMBAR MINIMUM 4 VIEWS NON INJURY Images: 4 FINDINGS: There are 5 non rib bearing lumbar vertebral bodies. There is no evidence of acute fracture or destructive osseous lesion. There is levocurvature of the lumbar spine. There is no spondylolisthesis. There is mild facet disease in the lower lumbar spine. Otherwise no significant lumbar degenerative change noted. The pedicles appear intact. Soft tissues are unremarkable.     Impression: Mild facet disease. No acute abnormality Electronically signed: 04/04/2024 10:36 PM Greg Dominguez MD       Allergies   Allergen Reactions   • Hydrochlorothiazide Palpitations   • Meloxicam Rash       Past Medical History:   Diagnosis Date   • Biliary dyskinesia    • CAP (community acquired pneumonia)     last assessed 8/17/16   • Class 1 obesity due to excess calories with serious comorbidity and body mass index (BMI) of 33.0 to 33.9 in adult 05/08/2018   • Deep vein thrombosis (DVT) of proximal vein of right lower extremity, unspecified chronicity (HCC) 08/10/2022   • Diabetes mellitus ([facility]) 12/2018    Type 2 no insulin   • Disease of thyroid gland 2000   • GERD (gastroesophageal reflux disease)    • Hyperlipidemia    • Palpitations    •  Pneumonia    • Shortness of breath    • Stroke (HCC)    • SVT (supraventricular tachycardia) 06/08/2022     Past Surgical History:   Procedure Laterality Date   • AUGMENTATION MAMMAPLASTY Bilateral 1998    breast surgery- with prosthetic implant 1998; pre-pectoral salline   • BREAST BIOPSY Left 1974   • BREAST BIOPSY Left 1994   • BREAST EXCISIONAL BIOPSY Left 1993   • CARDIAC CATHETERIZATION      procedure summary   • CHOLECYSTECTOMY      onset 2003   • HEMORRHOID SURGERY     • ROTATOR CUFF REPAIR Bilateral     onset 2010   • TUBAL LIGATION      onset 1980     Current Outpatient Medications on File Prior to Visit   Medication Sig Dispense Refill   • amLODIPine (NORVASC) 5 mg tablet Take 1 tablet (5 mg total) by mouth daily 90 tablet 3   • Ascorbic Acid (VITAMIN C) 1000 MG tablet Take 1 tablet by mouth daily     • atorvastatin (LIPITOR) 40 mg tablet TAKE 1 TABLET DAILY WITH DINNER 90 tablet 1   • Biotin 5000 MCG CAPS Take 1 capsule by mouth daily     • cholecalciferol (VITAMIN D3) 1,000 units tablet Take 2,000 Units by mouth daily     • diphenhydrAMINE-acetaminophen (TYLENOL PM)  MG TABS Take 1 tablet by mouth daily at bedtime as needed for sleep     • esomeprazole (NexIUM) 40 MG capsule Take 1 capsule by mouth daily     • fluticasone (FLONASE) 50 mcg/act nasal spray USE 2 SPRAYS IN EACH NOSTRIL DAILY 48 g 3   • levothyroxine 125 mcg tablet TAKE 1 TABLET DAILY ON AN EMPTY STOMACH 90 tablet 1   • Magnesium 250 MG TABS Take 250 mg by mouth in the morning     • Menthol, Topical Analgesic, (BIOFREEZE EX) Apply 1 application. topically daily as needed (pain)     • metoprolol tartrate (LOPRESSOR) 25 mg tablet Take one tablet as needed for palpitations (Patient taking differently: if needed Take one tablet as needed for palpitations) 90 tablet 2   • Multiple Vitamin (MULTIVITAMIN) capsule Take 1 capsule by mouth daily     • nystatin (MYCOSTATIN) cream Apply topically 2 (two) times a day as needed     • Probiotic  Product (ALIGN PO) Take 1 capsule by mouth in the morning     • psyllium (METAMUCIL) 58.6 % powder Take 1 packet by mouth daily     • triamcinolone (KENALOG) 0.5 % ointment      • apixaban (Eliquis) 5 mg Take 1 tablet (5 mg total) by mouth 2 (two) times a day 180 tablet 3   • [DISCONTINUED] naproxen sodium (ALEVE) 220 MG tablet Take by mouth       Current Facility-Administered Medications on File Prior to Visit   Medication Dose Route Frequency Provider Last Rate Last Admin   • lidocaine (XYLOCAINE) 1 % injection 1 mL  1 mL Injection  Mike Jack DPM   1 mL at 12/05/23 1515   • triamcinolone acetonide (KENALOG-40) 40 mg/mL injection 20 mg  20 mg Infiltration  Mike Jack DPM   20 mg at 12/05/23 1515     Family History   Problem Relation Age of Onset   • Aneurysm Mother         cerebral   • Ovarian cancer Mother 72   • Cancer Mother    • Cirrhosis Father         alcoholic   • Alcohol abuse Father    • Aneurysm Brother         abdominal aortic; cerebral   • Diabetes Son    • No Known Problems Daughter    • No Known Problems Maternal Grandmother    • No Known Problems Maternal Grandfather    • No Known Problems Paternal Grandmother    • No Known Problems Paternal Grandfather    • No Known Problems Son    • No Known Problems Son    • No Known Problems Son    • No Known Problems Maternal Aunt    • No Known Problems Maternal Aunt    • No Known Problems Maternal Aunt    • No Known Problems Maternal Aunt    • No Known Problems Paternal Aunt    • No Known Problems Paternal Aunt    • No Known Problems Paternal Aunt    • No Known Problems Paternal Aunt    • No Known Problems Paternal Aunt    • Cancer Brother 67        bladder cancer     Social History     Socioeconomic History   • Marital status: /Civil Union     Spouse name: Not on file   • Number of children: Not on file   • Years of education: 12   • Highest education level: Not on file   Occupational History   • Not on file   Tobacco Use   • Smoking status:  "Former     Current packs/day: 0.00     Average packs/day: 0.3 packs/day for 50.0 years (12.5 ttl pk-yrs)     Types: Cigarettes     Start date:      Quit date: 2010     Years since quittin.2   • Smokeless tobacco: Never   • Tobacco comments:     0.5 ppw intermittently last smoked    Vaping Use   • Vaping status: Never Used   Substance and Sexual Activity   • Alcohol use: Yes     Alcohol/week: 2.0 standard drinks of alcohol     Types: 2 Glasses of wine per week     Comment: Maybe 2 glasses a month   • Drug use: No   • Sexual activity: Not Currently     Partners: Male     Birth control/protection: Post-menopausal     Comment: I'm 77   Other Topics Concern   • Not on file   Social History Narrative    Caffeine use     Social Determinants of Health     Financial Resource Strain: Low Risk  (2023)    Overall Financial Resource Strain (CARDIA)    • Difficulty of Paying Living Expenses: Not hard at all   Food Insecurity: No Food Insecurity (5/15/2023)    Hunger Vital Sign    • Worried About Running Out of Food in the Last Year: Never true    • Ran Out of Food in the Last Year: Never true   Transportation Needs: No Transportation Needs (2023)    PRAPARE - Transportation    • Lack of Transportation (Medical): No    • Lack of Transportation (Non-Medical): No   Physical Activity: Inactive (2022)    Exercise Vital Sign    • Days of Exercise per Week: 0 days    • Minutes of Exercise per Session: 0 min   Stress: Not on file   Social Connections: Not on file   Intimate Partner Violence: Not on file   Housing Stability: Low Risk  (5/15/2023)    Housing Stability Vital Sign    • Unable to Pay for Housing in the Last Year: No    • Number of Places Lived in the Last Year: 1    • Unstable Housing in the Last Year: No     Vitals:    24 0745   Pulse: 78   Resp: 16   SpO2: 99%   Weight: 81.2 kg (179 lb)   Height: 5' 6\" (1.676 m)     Results for orders placed or performed in visit on 04/10/24 " "  Hemoglobin A1C   Result Value Ref Range    Hemoglobin A1C 6.2 (H) Normal 4.0-5.6%; PreDiabetic 5.7-6.4%; Diabetic >=6.5%; Glycemic control for adults with diabetes <7.0% %     mg/dl   Comprehensive metabolic panel   Result Value Ref Range    Sodium 142 135 - 147 mmol/L    Potassium 3.7 3.5 - 5.3 mmol/L    Chloride 105 96 - 108 mmol/L    CO2 28 21 - 32 mmol/L    ANION GAP 9 4 - 13 mmol/L    BUN 15 5 - 25 mg/dL    Creatinine 0.53 (L) 0.60 - 1.30 mg/dL    Glucose, Fasting 112 (H) 65 - 99 mg/dL    Calcium 8.7 8.4 - 10.2 mg/dL    AST 17 13 - 39 U/L    ALT 15 7 - 52 U/L    Alkaline Phosphatase 34 34 - 104 U/L    Total Protein 5.9 (L) 6.4 - 8.4 g/dL    Albumin 3.9 3.5 - 5.0 g/dL    Total Bilirubin 0.58 0.20 - 1.00 mg/dL    eGFR 92 ml/min/1.73sq m   Lipid Panel with Direct LDL reflex   Result Value Ref Range    Cholesterol 143 See Comment mg/dL    Triglycerides 110 See Comment mg/dL    HDL, Direct 54 >=50 mg/dL    LDL Calculated 67 0 - 100 mg/dL   TSH, 3rd generation with Free T4 reflex   Result Value Ref Range    TSH 3RD GENERATON 0.574 0.450 - 4.500 uIU/mL   Albumin / creatinine urine ratio   Result Value Ref Range    Creatinine, Ur 70.8 Reference range not established. mg/dL    Albumin,U,Random <7.0 <20.0 mg/L    Albumin Creat Ratio <10 0 - 30 mg/g creatinine     Weight (last 2 days)     Date/Time Weight    04/16/24 0745 81.2 (179)        Body mass index is 28.89 kg/m².  BP      Temp      Pulse 78 (04/16/24 0745)   Resp 16 (04/16/24 0745)    SpO2 99 % (04/16/24 0745)      Vitals:    04/16/24 0745   Weight: 81.2 kg (179 lb)     Vitals:    04/16/24 0745   Weight: 81.2 kg (179 lb)       Pulse 78   Resp 16   Ht 5' 6\" (1.676 m)   Wt 81.2 kg (179 lb)   SpO2 99%   BMI 28.89 kg/m²       Mild tenderness of the anterior distal shin left side no redness lymphedema present more swelling on the left compared to right distal pulses 2 or 2 no cord noted no warmth no redness   Physical Exam  Vitals and nursing note " reviewed.   Constitutional:       General: She is not in acute distress.     Appearance: Normal appearance. She is well-developed. She is not ill-appearing, toxic-appearing or diaphoretic.   HENT:      Head: Normocephalic.   Eyes:      General: No scleral icterus.        Right eye: No discharge.         Left eye: No discharge.      Conjunctiva/sclera: Conjunctivae normal.      Pupils: Pupils are equal, round, and reactive to light.   Cardiovascular:      Rate and Rhythm: Normal rate and regular rhythm.      Pulses: no weak pulses.           Dorsalis pedis pulses are 2+ on the right side and 2+ on the left side.        Posterior tibial pulses are 2+ on the right side and 2+ on the left side.      Heart sounds: Normal heart sounds. No murmur heard.     No friction rub. No gallop.   Pulmonary:      Effort: No respiratory distress.      Breath sounds: Normal breath sounds. No wheezing or rales.   Abdominal:      General: Bowel sounds are normal. There is no distension.      Palpations: Abdomen is soft. There is no mass.      Tenderness: There is no abdominal tenderness. There is no guarding or rebound.   Musculoskeletal:         General: No deformity.      Cervical back: Neck supple.   Feet:      Right foot:      Skin integrity: No ulcer, skin breakdown, erythema, warmth, callus or dry skin.      Left foot:      Skin integrity: No ulcer, skin breakdown, erythema, warmth, callus or dry skin.   Lymphadenopathy:      Cervical: No cervical adenopathy.   Neurological:      Mental Status: She is alert.      Coordination: Coordination normal.

## 2024-04-16 NOTE — ASSESSMENT & PLAN NOTE
Pain anterior shin region both subcutaneous tenderness and also pain localized to the anterior tibialis muscle distal pain distal pulses 2 or 2 there is more swelling of the right lower extremity as compared to the left lower extremity rule out DVT versus shinsplints will check venous Doppler left lower extremity stat if negative patient may start stretching I have provided stretching exercises she can continue with the Tylenol unable to take NSAIDs because of previous stroke and on Eliquis also may use Biofreeze she did not want to start physical therapy at this point if symptoms not resolved she can notify me for further treatment plan

## 2024-04-25 DIAGNOSIS — K21.00 GASTROESOPHAGEAL REFLUX DISEASE WITH ESOPHAGITIS, UNSPECIFIED WHETHER HEMORRHAGE: Primary | ICD-10-CM

## 2024-04-25 RX ORDER — ESOMEPRAZOLE MAGNESIUM 40 MG/1
40 CAPSULE, DELAYED RELEASE ORAL DAILY
Qty: 90 CAPSULE | Refills: 1 | Status: SHIPPED | OUTPATIENT
Start: 2024-04-25

## 2024-05-01 ENCOUNTER — OFFICE VISIT (OUTPATIENT)
Dept: AUDIOLOGY | Age: 79
End: 2024-05-01

## 2024-05-01 DIAGNOSIS — H90.3 SENSORY HEARING LOSS, BILATERAL: Primary | ICD-10-CM

## 2024-05-01 NOTE — PROGRESS NOTES
Progress Note    Name:  Kasie Cheatham  :  1945  Age:  79 y.o.  MRN:  [id number]  Date of Evaluation: 24     Patient would like her hearing aids to be sent in before they go out of warranty.    Loaners were provided.  [id number]  [id number]    HAV when hearing aids return from repair.        Nini Allison., CCC-A  Clinical Audiologist

## 2024-05-10 NOTE — PROGRESS NOTES
Progress Note    Name:  Kasie Cheatham  :  1945  Age:  79 y.o.  MRN:  [id number]  Date of Evaluation: 05/10/24     Hearing aids arrived from repair.  Right: [id number]  Left: [id number]  2024    In basket sent to [facility] to schedule HAV.        Nini Allison., CCC-A  Clinical Audiologist

## 2024-05-13 ENCOUNTER — OFFICE VISIT (OUTPATIENT)
Dept: AUDIOLOGY | Age: 79
End: 2024-05-13

## 2024-05-13 ENCOUNTER — OFFICE VISIT (OUTPATIENT)
Dept: PODIATRY | Facility: CLINIC | Age: 79
End: 2024-05-13
Payer: COMMERCIAL

## 2024-05-13 VITALS
RESPIRATION RATE: 18 BRPM | SYSTOLIC BLOOD PRESSURE: 124 MMHG | HEIGHT: 66 IN | HEART RATE: 88 BPM | BODY MASS INDEX: 28.89 KG/M2 | DIASTOLIC BLOOD PRESSURE: 75 MMHG

## 2024-05-13 DIAGNOSIS — H90.3 SENSORY HEARING LOSS, BILATERAL: Primary | ICD-10-CM

## 2024-05-13 DIAGNOSIS — M20.11 HALLUX VALGUS OF RIGHT FOOT: ICD-10-CM

## 2024-05-13 DIAGNOSIS — M20.22 HALLUX RIGIDUS OF LEFT FOOT: Primary | ICD-10-CM

## 2024-05-13 PROCEDURE — 99212 OFFICE O/P EST SF 10 MIN: CPT | Performed by: PODIATRIST

## 2024-05-13 PROCEDURE — 20600 DRAIN/INJ JOINT/BURSA W/O US: CPT | Performed by: PODIATRIST

## 2024-05-13 RX ORDER — LIDOCAINE HYDROCHLORIDE 10 MG/ML
1 INJECTION, SOLUTION INFILTRATION; PERINEURAL
Status: SHIPPED | OUTPATIENT
Start: 2024-05-13

## 2024-05-13 RX ORDER — TRIAMCINOLONE ACETONIDE 40 MG/ML
20 INJECTION, SUSPENSION INTRA-ARTICULAR; INTRAMUSCULAR
Status: SHIPPED | OUTPATIENT
Start: 2024-05-13

## 2024-05-13 RX ADMIN — LIDOCAINE HYDROCHLORIDE 1 ML: 10 INJECTION, SOLUTION INFILTRATION; PERINEURAL at 13:00

## 2024-05-13 RX ADMIN — TRIAMCINOLONE ACETONIDE 20 MG: 40 INJECTION, SUSPENSION INTRA-ARTICULAR; INTRAMUSCULAR at 13:00

## 2024-05-13 NOTE — PROGRESS NOTES
Patient presents with bilateral foot pain.  Patient having pain in the same area both feet which is along the lateral aspect of the first MPJ of the right foot.  Patient has known hallux rigidus of the left foot but also has hallux valgus of the right foot.  She denies medial eminence pain right foot.    On exam, pedal pulses are within normal limits..  Hallux valgus deformity present right foot.  Normal range of motion in right first MPJ.  Hallux rigidus deformity left foot with no motion available at the first MPJ.    Treatment: Injected lateral aspect first MPJ each foot with 0.5 cc Kenalog 40 along with 1 cc 1% Xylocaine.  Reappoint 5 months.    Small joint arthrocentesis: R great MTP  Universal Protocol:  Consent: Verbal consent obtained.  Risks and benefits: risks, benefits and alternatives were discussed  Patient identity confirmed: verbally with patient  Supporting Documentation  Indications: pain   Procedure Details  Location: great toe - R great MTP  Ultrasound guidance: no  Approach: dorsal  Medications administered: 1 mL lidocaine 1 %; 20 mg triamcinolone acetonide 40 mg/mL        Small joint arthrocentesis: L great MTP  Universal Protocol:  Consent: Verbal consent obtained.  Risks and benefits: risks, benefits and alternatives were discussed  Consent given by: patient  Patient understanding: patient states understanding of the procedure being performed  Patient identity confirmed: verbally with patient  Supporting Documentation  Indications: pain   Procedure Details  Location: great toe - L great MTP  Needle size: 25 G  Ultrasound guidance: no  Approach: dorsal  Medications administered: 1 mL lidocaine 1 %; 20 mg triamcinolone acetonide 40 mg/mL

## 2024-05-20 ENCOUNTER — TELEPHONE (OUTPATIENT)
Dept: ADMINISTRATIVE | Facility: OTHER | Age: 79
End: 2024-05-20

## 2024-05-20 ENCOUNTER — OFFICE VISIT (OUTPATIENT)
Dept: AUDIOLOGY | Age: 79
End: 2024-05-20

## 2024-05-20 DIAGNOSIS — I63.30 CEREBRAL THROMBOSIS WITH CEREBRAL INFARCTION (HCC): ICD-10-CM

## 2024-05-20 DIAGNOSIS — I63.9 ACUTE CVA (CEREBROVASCULAR ACCIDENT) (HCC): ICD-10-CM

## 2024-05-20 DIAGNOSIS — H90.3 SENSORY HEARING LOSS, BILATERAL: Primary | ICD-10-CM

## 2024-05-20 RX ORDER — ATORVASTATIN CALCIUM 40 MG/1
40 TABLET, FILM COATED ORAL
Qty: 90 TABLET | Refills: 1 | Status: SHIPPED | OUTPATIENT
Start: 2024-05-20

## 2024-05-20 NOTE — TELEPHONE ENCOUNTER
----- Message from Trina ADLER sent at 5/17/2024 11:32 AM EDT -----  Regarding: care gap request/diabetic eye exam  05/17/24 11:32 AM    Hello, our patient attached above has had Diabetic Eye Exam completed/performed. Please assist in updating the patient chart by pulling the document from the Media Tab. The date of service is 1/17/24.     Thank you,  Trina Kelly  [facility]

## 2024-05-20 NOTE — PROGRESS NOTES
Hearing Aid Visit:    Name:  Kasie Cheatham  :  1945  Age:  79 y.o.  MRN:  [id number]  Date of Evaluation: 24     HISTORY:    Kasie Cheatham was seen today (2024) for a(n) in-warranty hearing aid check of her bilateral hearing aids. Today, Kasie reports that the left hearing aid is not holding a charge in comparison to the right.    DEVICE INFORMATION:           Left Device Right Device   Hearing Aid Make: OtOurShelf  OtOurShelf    Hearing Aid Model: MORE 3 Mini RITE-R MORE 3 Mini RITE-R   Serial Number: [id number]   Repair Warranty Date: 24   Loss/Damage Warranty Status:   Active  Active         Length/Output    Wax System: Pro Wax Pro Wax   Dome Size/Style: - -   Battery: Lithium-ion Rechargeable Lithium-ion Rechargeable       [employer] Serial Number: [id number]   [employer] Warranty Date:  N/A N/A    Serial Number: N/A   Warranty Date: N/A    Accessories: N/A        ACTION/ADJUSTMENTS:    She was advised that it is likely due to her more severe hearing loss in the left ear. The battery was replaced due to the hearing aids going out of warranty.    RECOMMENDATIONS:     Follow up PRN      Nini Allison., [facility]  Clinical Audiologist  [facility]  [place] [place] [id number]

## 2024-05-20 NOTE — TELEPHONE ENCOUNTER
Upon review of the In Basket request we were able to locate, review, and update the patient chart as requested for Diabetic Eye Exam.    Any additional questions or concerns should be emailed to the Practice Liaisons via the appropriate education email address, please do not reply via In Basket.    Thank you  Tasha Ruiz

## 2024-05-25 ENCOUNTER — APPOINTMENT (OUTPATIENT)
Dept: RADIOLOGY | Age: 79
End: 2024-05-25
Payer: COMMERCIAL

## 2024-05-25 ENCOUNTER — OFFICE VISIT (OUTPATIENT)
Dept: URGENT CARE | Age: 79
End: 2024-05-25
Payer: COMMERCIAL

## 2024-05-25 VITALS
RESPIRATION RATE: 18 BRPM | TEMPERATURE: 99 F | DIASTOLIC BLOOD PRESSURE: 86 MMHG | OXYGEN SATURATION: 99 % | SYSTOLIC BLOOD PRESSURE: 154 MMHG | HEART RATE: 88 BPM

## 2024-05-25 DIAGNOSIS — S99.919A ANKLE INJURY, INITIAL ENCOUNTER: ICD-10-CM

## 2024-05-25 DIAGNOSIS — S82.64XA CLOSED NONDISPLACED FRACTURE OF LATERAL MALLEOLUS OF RIGHT FIBULA, INITIAL ENCOUNTER: Primary | ICD-10-CM

## 2024-05-25 PROCEDURE — 99213 OFFICE O/P EST LOW 20 MIN: CPT

## 2024-05-25 PROCEDURE — 73610 X-RAY EXAM OF ANKLE: CPT

## 2024-05-25 PROCEDURE — 73630 X-RAY EXAM OF FOOT: CPT

## 2024-05-25 NOTE — PATIENT INSTRUCTIONS
Right foot and ankle xray: Suspicious for nondisplaced distal fibular fracture, pending final read.  Rest, Ice, Elevate your right leg.  Use the walking boot until you follow up with your orthopedic doctor or Podiatrist.  Follow up in 1 week.  Continue to take tylenol as needed for pain.   Follow up with PCP in 3-5 days.  Proceed to  ER if symptoms worsen.

## 2024-05-25 NOTE — PROGRESS NOTES
[facility]        NAME: Kasie Cheatham is a 79 y.o. female  : 1945    MRN: [id number]  DATE: May 25, 2024  TIME: 3:58 PM    Assessment and Plan   Closed nondisplaced fracture of lateral malleolus of right fibula, initial encounter [S82.64XA]  1. Closed nondisplaced fracture of lateral malleolus of right fibula, initial encounter        2. Ankle injury, initial encounter  XR foot 3+ vw right    XR ankle 3+ vw right    Ambulatory Referral to Podiatry        Right foot and ankle xray:  suspicious for nondisplaced distal fibular fracture, pending final read.  Given information for orthopedics; follow up within 1 week  Instructed to keep walking boot on until follow up with orthopedics  Follow up with PCP in 3-5 days.  Proceed to  ER if symptoms worsen.  RICE    Patient Instructions       Follow up with PCP in 3-5 days.  Proceed to  ER if symptoms worsen.    If tests have been performed at [facility], our office will contact you with results if changes need to be made to the care plan discussed with you at the visit.  You can review your full results on [facility] MyChart.    Chief Complaint     Chief Complaint   Patient presents with   • Ankle Injury     Patient twisted her ankle when standing up earlier today. Experiencing swelling and pain.          History of Present Illness       Patient is a 79-year-old female presenting with right ankle pain after sustaining a fall just prior to arrival.  Patient states that she went to get up from the couch, and experienced an inversion turn of her ankle in the living room.  She denies head strike, no loss of consciousness, no neck or back pain.  She takes Eliquis.  Patient denies headache, weakness, difficulty with memory, or speech.  Since the incident, she has elevated her right ankle, and taking Tylenol without improvement.  She states she is unable to bear weight due to pain 10/10.    Ankle Injury   Pertinent negatives include no numbness.       Review of  Systems   Review of Systems   Musculoskeletal:  Positive for joint swelling. Negative for back pain, neck pain and neck stiffness.   Skin:  Negative for wound.   Neurological:  Negative for dizziness, syncope, facial asymmetry, speech difficulty, weakness, light-headedness, numbness and headaches.         Current Medications       Current Outpatient Medications:   •  amLODIPine (NORVASC) 5 mg tablet, Take 1 tablet (5 mg total) by mouth daily, Disp: 90 tablet, Rfl: 3  •  Ascorbic Acid (VITAMIN C) 1000 MG tablet, Take 1 tablet by mouth daily, Disp: , Rfl:   •  atorvastatin (LIPITOR) 40 mg tablet, TAKE 1 TABLET DAILY WITH DINNER, Disp: 90 tablet, Rfl: 1  •  Biotin 5000 MCG CAPS, Take 1 capsule by mouth daily, Disp: , Rfl:   •  cholecalciferol (VITAMIN D3) 1,000 units tablet, Take 2,000 Units by mouth daily, Disp: , Rfl:   •  diphenhydrAMINE-acetaminophen (TYLENOL PM)  MG TABS, Take 1 tablet by mouth daily at bedtime as needed for sleep, Disp: , Rfl:   •  esomeprazole (NexIUM) 40 MG capsule, Take 1 capsule (40 mg total) by mouth daily, Disp: 90 capsule, Rfl: 1  •  fluticasone (FLONASE) 50 mcg/act nasal spray, USE 2 SPRAYS IN EACH NOSTRIL DAILY, Disp: 48 g, Rfl: 3  •  levothyroxine 125 mcg tablet, TAKE 1 TABLET DAILY ON AN EMPTY STOMACH, Disp: 90 tablet, Rfl: 1  •  Magnesium 250 MG TABS, Take 250 mg by mouth in the morning, Disp: , Rfl:   •  Menthol, Topical Analgesic, (BIOFREEZE EX), Apply 1 application. topically daily as needed (pain), Disp: , Rfl:   •  metoprolol tartrate (LOPRESSOR) 25 mg tablet, Take one tablet as needed for palpitations (Patient taking differently: if needed Take one tablet as needed for palpitations), Disp: 90 tablet, Rfl: 2  •  Multiple Vitamin (MULTIVITAMIN) capsule, Take 1 capsule by mouth daily, Disp: , Rfl:   •  nystatin (MYCOSTATIN) cream, Apply topically 2 (two) times a day as needed, Disp: , Rfl:   •  Probiotic Product (ALIGN PO), Take 1 capsule by mouth in the morning, Disp: , Rfl:    •  psyllium (METAMUCIL) 58.6 % powder, Take 1 packet by mouth daily, Disp: , Rfl:   •  triamcinolone (KENALOG) 0.5 % ointment, , Disp: , Rfl:   •  apixaban (Eliquis) 5 mg, Take 1 tablet (5 mg total) by mouth 2 (two) times a day, Disp: 180 tablet, Rfl: 3    Current Facility-Administered Medications:   •  lidocaine (XYLOCAINE) 1 % injection 1 mL, 1 mL, Injection, , Mike Jack DPM, 1 mL at 12/05/23 1515  •  lidocaine (XYLOCAINE) 1 % injection 1 mL, 1 mL, Injection, , , 1 mL at 05/13/24 1300  •  lidocaine (XYLOCAINE) 1 % injection 1 mL, 1 mL, Injection, , , 1 mL at 05/13/24 1300  •  triamcinolone acetonide (KENALOG-40) 40 mg/mL injection 20 mg, 20 mg, Infiltration, , Mike Jack DPM, 20 mg at 12/05/23 1515  •  triamcinolone acetonide (KENALOG-40) 40 mg/mL injection 20 mg, 20 mg, Infiltration, , , 20 mg at 05/13/24 1300  •  triamcinolone acetonide (KENALOG-40) 40 mg/mL injection 20 mg, 20 mg, Infiltration, , , 20 mg at 05/13/24 1300    Current Allergies     Allergies as of 05/25/2024 - Reviewed 05/25/2024   Allergen Reaction Noted   • Hydrochlorothiazide Palpitations 09/23/2014   • Meloxicam Rash 03/12/2020            The following portions of the patient's history were reviewed and updated as appropriate: allergies, current medications, past family history, past medical history, past social history, past surgical history and problem list.     Past Medical History:   Diagnosis Date   • Biliary dyskinesia    • CAP (community acquired pneumonia)     last assessed 8/17/16   • Class 1 obesity due to excess calories with serious comorbidity and body mass index (BMI) of 33.0 to 33.9 in adult 05/08/2018   • Deep vein thrombosis (DVT) of proximal vein of right lower extremity, unspecified chronicity (HCC) 08/10/2022   • Diabetes mellitus (HCC) 12/2018    Type 2 no insulin   • Disease of thyroid gland 2000   • GERD (gastroesophageal reflux disease)    • Hyperlipidemia    • Palpitations    • Pneumonia    • Shortness of  breath    • Stroke (HCC)    • SVT (supraventricular tachycardia) 06/08/2022       Past Surgical History:   Procedure Laterality Date   • AUGMENTATION MAMMAPLASTY Bilateral 1998    breast surgery- with prosthetic implant 1998; pre-pectoral salline   • BREAST BIOPSY Left 1974   • BREAST BIOPSY Left 1994   • BREAST EXCISIONAL BIOPSY Left 1993   • CARDIAC CATHETERIZATION      procedure summary   • CHOLECYSTECTOMY      onset 2003   • HEMORRHOID SURGERY     • ROTATOR CUFF REPAIR Bilateral     onset 2010   • TUBAL LIGATION      onset 1980       Family History   Problem Relation Age of Onset   • Aneurysm Mother         cerebral   • Ovarian cancer Mother 72   • Cancer Mother    • Cirrhosis Father         alcoholic   • Alcohol abuse Father    • Aneurysm Brother         abdominal aortic; cerebral   • Diabetes Son    • No Known Problems Daughter    • No Known Problems Maternal Grandmother    • No Known Problems Maternal Grandfather    • No Known Problems Paternal Grandmother    • No Known Problems Paternal Grandfather    • No Known Problems Son    • No Known Problems Son    • No Known Problems Son    • No Known Problems Maternal Aunt    • No Known Problems Maternal Aunt    • No Known Problems Maternal Aunt    • No Known Problems Maternal Aunt    • No Known Problems Paternal Aunt    • No Known Problems Paternal Aunt    • No Known Problems Paternal Aunt    • No Known Problems Paternal Aunt    • No Known Problems Paternal Aunt    • Cancer Brother 67        bladder cancer         Medications have been verified.        Objective   /86   Pulse 88   Temp 99 °F (37.2 °C)   Resp 18   SpO2 99%   No LMP recorded. Patient is postmenopausal.       Physical Exam     Physical Exam  Vitals and nursing note reviewed.   Constitutional:       General: She is not in acute distress.     Appearance: Normal appearance. She is normal weight. She is not ill-appearing.   HENT:      Head: Normocephalic.   Eyes:      Extraocular Movements:  Extraocular movements intact.      Conjunctiva/sclera: Conjunctivae normal.      Pupils: Pupils are equal, round, and reactive to light.   Cardiovascular:      Pulses: Normal pulses.      Heart sounds: Normal heart sounds.   Pulmonary:      Effort: Pulmonary effort is normal.      Breath sounds: Normal breath sounds.   Abdominal:      General: Abdomen is flat.   Musculoskeletal:        Feet:    Feet:      Right foot:      Skin integrity: Skin integrity normal.      Comments: Significant swelling to the right lateral malleolus.  +PMS. Negative anterior drawer test, negative laxity.  Tenderness over the PTFO and CFL.   Skin:     General: Skin is warm.   Neurological:      Mental Status: She is alert and oriented to person, place, and time. Mental status is at baseline.

## 2024-05-26 NOTE — RESULT ENCOUNTER NOTE
MPRESSION:   Lateral soft tissue swelling about the ankle. See separate report of ankle images. No acute osseous abnormality of the oriana    According to EMR pt was placed in boot and was given podiatry referral    Please have your office call patient for follow up

## 2024-05-26 NOTE — RESULT ENCOUNTER NOTE
IMPRESSION:   Acute avulsion fracture of the lateral malleolus with extensive adjacent soft tissue swelling. Findings are consistent with the preliminary interpretation by the urgent care facility.    According to EMR notes; pt was placed in boot and referral to podiatry was done    Have your office follow up with patient.  Thank you.

## 2024-05-28 ENCOUNTER — TELEPHONE (OUTPATIENT)
Dept: PODIATRY | Facility: CLINIC | Age: 79
End: 2024-05-28

## 2024-05-30 ENCOUNTER — OFFICE VISIT (OUTPATIENT)
Dept: PODIATRY | Facility: CLINIC | Age: 79
End: 2024-05-30
Payer: COMMERCIAL

## 2024-05-30 VITALS
HEART RATE: 118 BPM | HEIGHT: 66 IN | SYSTOLIC BLOOD PRESSURE: 145 MMHG | DIASTOLIC BLOOD PRESSURE: 82 MMHG | RESPIRATION RATE: 18 BRPM | BODY MASS INDEX: 28.89 KG/M2

## 2024-05-30 DIAGNOSIS — S99.919A ANKLE INJURY, INITIAL ENCOUNTER: Primary | ICD-10-CM

## 2024-05-30 DIAGNOSIS — S82.64XA CLOSED NONDISPLACED FRACTURE OF LATERAL MALLEOLUS OF RIGHT FIBULA, INITIAL ENCOUNTER: ICD-10-CM

## 2024-05-30 PROCEDURE — 99213 OFFICE O/P EST LOW 20 MIN: CPT | Performed by: PODIATRIST

## 2024-05-30 NOTE — PROGRESS NOTES
"Ambulatory Visit  Name: Kasie Cheatham      : 1945      MRN: [id number]  Encounter Provider: Mike Jack DPM  Encounter Date: 2024   Encounter department: [facility] [place]    Explained to patient that she has a fracture of the lateral malleolus of the right ankle.  This typically will take 6 to 8 weeks to heal.  Told her to always wear cam boot when walking.  She will be reassessed in 4 weeks.    Assessment & Plan   1. Ankle injury, initial encounter  -     Ambulatory Referral to Podiatry  2. Closed nondisplaced fracture of lateral malleolus of right fibula, initial encounter      History of Present Illness     Kasie Cheatham is a 79 y.o. female who presents with a cam boot on her right leg and ambulating with a cane.  Patient tripped approximately 5 days ago and fractured the lateral malleolus of the right ankle.  She states that her pain is minimal however she is extremely bruised.  Complicating her care is the fact that she has lymphedema.    I personally reviewed x-rays of the right ankle taken 2024.  They are positive for an avulsion fracture of the lateral malleolus right ankle.    Review of Systems   Cardiovascular:  Positive for leg swelling.   Musculoskeletal:  Positive for arthralgias.   Psychiatric/Behavioral: Negative.             Objective     /82   Pulse (!) 118   Resp 18   Ht 5' 6\" (1.676 m)   BMI 28.89 kg/m²     Physical Exam  Constitutional:       Appearance: Normal appearance.   Cardiovascular:      Pulses: Normal pulses.   Musculoskeletal:         General: Swelling, tenderness and signs of injury present.      Comments: Right ankle is markedly edematous and bruised.  Sharp pain with palpation at the lateral malleolus right ankle.  No open areas or fracture blisters.           Administrative Statements           "

## 2024-06-05 ENCOUNTER — OFFICE VISIT (OUTPATIENT)
Dept: NEUROLOGY | Facility: CLINIC | Age: 79
End: 2024-06-05
Payer: COMMERCIAL

## 2024-06-05 VITALS
SYSTOLIC BLOOD PRESSURE: 126 MMHG | OXYGEN SATURATION: 93 % | DIASTOLIC BLOOD PRESSURE: 78 MMHG | TEMPERATURE: 97.2 F | WEIGHT: 178 LBS | BODY MASS INDEX: 28.61 KG/M2 | HEIGHT: 66 IN | HEART RATE: 103 BPM

## 2024-06-05 DIAGNOSIS — Q21.12 PFO (PATENT FORAMEN OVALE): Primary | ICD-10-CM

## 2024-06-05 DIAGNOSIS — I63.30 CEREBRAL THROMBOSIS WITH CEREBRAL INFARCTION (HCC): ICD-10-CM

## 2024-06-05 DIAGNOSIS — G43.109 OCULAR MIGRAINE: ICD-10-CM

## 2024-06-05 PROCEDURE — G2211 COMPLEX E/M VISIT ADD ON: HCPCS | Performed by: PSYCHIATRY & NEUROLOGY

## 2024-06-05 PROCEDURE — 99214 OFFICE O/P EST MOD 30 MIN: CPT | Performed by: PSYCHIATRY & NEUROLOGY

## 2024-06-05 NOTE — PROGRESS NOTES
Patient ID: Kasie Cheatham is a 79 y.o. female.    Assessment/Plan:    This is a 80 y/o Female who is here as a follow up for hx of CVA, and has a PFO which will not be surgically intervened. She is doing well from a CVA standpoint.     PLAN:      Diagnoses and all orders for this visit:    PFO (patent foramen ovale)    Ocular migraine  -no more migraines    History of Cerebral thrombosis with cerebral infarction (HCC)  -for secondary stroke prevention, recommend continuation of combination of eliquis and atorvastatin  -Blood Pressure goal < 130/80, BP is at goal currently.   -LDL goal <70  -needs further cardiac workup with zio patch/loop recorder    Counseling/stroke education -   -I advised patient to avoid using NSAIDs for headaches or other pain and to stick to tylenol if needed  -Recommend lifestyle modifications such as mediterranean diet & regular exercise regimen atleast 4-5 times a week for 20-30 minutes.   -I educated patient/family regarding medication compliance  -encourage smoking cessation, and control of diabetes and hypertension; defer management to primary        Follow up as needed    I would be happy to see the patient sooner if any new questions/concerns arise.  Patient/Guardian was advised to the call the office if they have any questions and concerns in the meantime.     Patient/Guardian does understand that if they have any new stroke like symptoms such as facial droop on one side, weakness/paralysis on either side, speech trouble, numbness on one side, balance issues, any vision changes, extreme dizziness or any new headache, to call 9-1-1 immediately or to proceed to the nearest ER immediately.      Subjective:    HPI    This is a 80 y/o  Female who is here as a follow up for hx of stroke.    Patient is doing well and she denies any new TIA/CVA Like symptoms, and she is complaint with eliquis and is tolerating it well.     She fell two weeks ago and is wearing a cast on the  right leg and had a cast put on 1 week ago.     The following portions of the patient's history were reviewed and updated as appropriate: She  has a past medical history of Biliary dyskinesia, CAP (community acquired pneumonia), Class 1 obesity due to excess calories with serious comorbidity and body mass index (BMI) of 33.0 to 33.9 in adult (05/08/2018), Deep vein thrombosis (DVT) of proximal vein of right lower extremity, unspecified chronicity (HCC) (08/10/2022), Diabetes mellitus (HCC) (12/2018), Disease of thyroid gland (2000), GERD (gastroesophageal reflux disease), Hyperlipidemia, Palpitations, Pneumonia, Shortness of breath, Stroke (HCC), and SVT (supraventricular tachycardia) (06/08/2022).  She   Patient Active Problem List    Diagnosis Date Noted    Facet arthritis of lumbar region 04/16/2024    Pain of left lower extremity 04/16/2024    Acute midline low back pain without sciatica 04/07/2024    Anxiety 11/17/2023    Stroke-like symptom 10/02/2023    Ocular migraine 10/02/2023    Overweight (BMI 25.0-29.9) 09/12/2023    History of CVA (cerebrovascular accident) 07/05/2023    Frontal headache 05/24/2023    Hypokalemia 05/15/2023    PFO (patent foramen ovale) 05/15/2023    History of Cerebral thrombosis with cerebral infarction (HCC) 05/13/2023    Palpitations 05/13/2023    COVID-19 05/11/2023    Tachycardia 05/02/2023    Trigger finger 02/24/2023    Benign essential tremor 02/24/2023    Urinary incontinence 08/14/2022    Right groin pain 08/14/2022    Osteoarthritis 12/14/2021    Edema 06/08/2021    Thyroid nodule 06/08/2021    Exposure to COVID-19 virus 12/15/2020    Right flank pain 11/02/2020    Insomnia 09/03/2020    Oral ulceration 09/03/2020    Dyspnea on exertion 03/12/2020    Primary hypertension 08/20/2019    Encounter for screening mammogram for breast cancer 08/20/2019    Right lower quadrant abdominal pain 02/14/2019    Pelvic pain 02/14/2019    Family history of ovarian cancer 02/14/2019     Wellness examination 02/14/2019    Type 2 diabetes mellitus without complication, without long-term current use of insulin (HCC) 11/18/2018    Atypical chest pain 11/18/2018    Lymphedema 05/08/2018    Fatigue 05/08/2018    Hyperlipidemia 05/08/2018    Hypothyroidism 05/08/2018    Dupuytren's contracture 04/03/2017    Fibrocystic breast changes 12/27/2016    Diverticular disease of colon 08/22/2016    Gastro-esophageal reflux disease with esophagitis 08/22/2016    History of adenomatous polyp of colon 08/22/2016    Dense breast tissue on mammogram 08/16/2016    Hypothyroidism due to Hashimoto's thyroiditis 04/05/2016    Impaired fasting glucose 11/06/2012    Nephrolithiasis 11/06/2012     She  has a past surgical history that includes Cholecystectomy; Hemorrhoid surgery; Tubal ligation; Cardiac catheterization; Rotator cuff repair (Bilateral); Breast excisional biopsy (Left, 1993); Breast biopsy (Left, 1974); Breast biopsy (Left, 1994); and Augmentation mammaplasty (Bilateral, 1998).  Her family history includes Alcohol abuse in her father; Aneurysm in her brother and mother; Cancer in her mother; Cancer (age of onset: 67) in her brother; Cirrhosis in her father; Diabetes in her son; No Known Problems in her daughter, maternal aunt, maternal aunt, maternal aunt, maternal aunt, maternal grandfather, maternal grandmother, paternal aunt, paternal aunt, paternal aunt, paternal aunt, paternal aunt, paternal grandfather, paternal grandmother, son, son, and son; Ovarian cancer (age of onset: 72) in her mother.  She  reports that she quit smoking about 14 years ago. Her smoking use included cigarettes. She started smoking about 64 years ago. She has a 12.5 pack-year smoking history. She has never used smokeless tobacco. She reports current alcohol use of about 2.0 standard drinks of alcohol per week. She reports that she does not use drugs.  Current Outpatient Medications   Medication Sig Dispense Refill    amLODIPine  (NORVASC) 5 mg tablet Take 1 tablet (5 mg total) by mouth daily 90 tablet 3    apixaban (Eliquis) 5 mg Take 1 tablet (5 mg total) by mouth 2 (two) times a day 180 tablet 3    Ascorbic Acid (VITAMIN C) 1000 MG tablet Take 1 tablet by mouth daily      atorvastatin (LIPITOR) 40 mg tablet TAKE 1 TABLET DAILY WITH DINNER 90 tablet 1    Biotin 5000 MCG CAPS Take 1 capsule by mouth daily      cholecalciferol (VITAMIN D3) 1,000 units tablet Take 2,000 Units by mouth daily      diphenhydrAMINE-acetaminophen (TYLENOL PM)  MG TABS Take 1 tablet by mouth daily at bedtime as needed for sleep      esomeprazole (NexIUM) 40 MG capsule Take 1 capsule (40 mg total) by mouth daily 90 capsule 1    fluticasone (FLONASE) 50 mcg/act nasal spray USE 2 SPRAYS IN EACH NOSTRIL DAILY 48 g 3    levothyroxine 125 mcg tablet TAKE 1 TABLET DAILY ON AN EMPTY STOMACH 90 tablet 1    Magnesium 250 MG TABS Take 250 mg by mouth in the morning      Menthol, Topical Analgesic, (BIOFREEZE EX) Apply 1 application. topically daily as needed (pain)      metoprolol tartrate (LOPRESSOR) 25 mg tablet Take one tablet as needed for palpitations (Patient taking differently: if needed Take one tablet as needed for palpitations) 90 tablet 2    Multiple Vitamin (MULTIVITAMIN) capsule Take 1 capsule by mouth daily      Probiotic Product (ALIGN PO) Take 1 capsule by mouth in the morning      psyllium (METAMUCIL) 58.6 % powder Take 1 packet by mouth daily      nystatin (MYCOSTATIN) cream Apply topically 2 (two) times a day as needed (Patient not taking: Reported on 6/5/2024)      triamcinolone (KENALOG) 0.5 % ointment  (Patient not taking: Reported on 6/5/2024)       Current Facility-Administered Medications   Medication Dose Route Frequency Provider Last Rate Last Admin    lidocaine (XYLOCAINE) 1 % injection 1 mL  1 mL Injection  Mike Jack DPM   1 mL at 12/05/23 1515    lidocaine (XYLOCAINE) 1 % injection 1 mL  1 mL Injection     1 mL at 05/13/24 1300     lidocaine (XYLOCAINE) 1 % injection 1 mL  1 mL Injection     1 mL at 05/13/24 1300    triamcinolone acetonide (KENALOG-40) 40 mg/mL injection 20 mg  20 mg Infiltration  Mike JackSHERRY   20 mg at 12/05/23 1515    triamcinolone acetonide (KENALOG-40) 40 mg/mL injection 20 mg  20 mg Infiltration     20 mg at 05/13/24 1300    triamcinolone acetonide (KENALOG-40) 40 mg/mL injection 20 mg  20 mg Infiltration     20 mg at 05/13/24 1300     Current Outpatient Medications on File Prior to Visit   Medication Sig    amLODIPine (NORVASC) 5 mg tablet Take 1 tablet (5 mg total) by mouth daily    apixaban (Eliquis) 5 mg Take 1 tablet (5 mg total) by mouth 2 (two) times a day    Ascorbic Acid (VITAMIN C) 1000 MG tablet Take 1 tablet by mouth daily    atorvastatin (LIPITOR) 40 mg tablet TAKE 1 TABLET DAILY WITH DINNER    Biotin 5000 MCG CAPS Take 1 capsule by mouth daily    cholecalciferol (VITAMIN D3) 1,000 units tablet Take 2,000 Units by mouth daily    diphenhydrAMINE-acetaminophen (TYLENOL PM)  MG TABS Take 1 tablet by mouth daily at bedtime as needed for sleep    esomeprazole (NexIUM) 40 MG capsule Take 1 capsule (40 mg total) by mouth daily    fluticasone (FLONASE) 50 mcg/act nasal spray USE 2 SPRAYS IN EACH NOSTRIL DAILY    levothyroxine 125 mcg tablet TAKE 1 TABLET DAILY ON AN EMPTY STOMACH    Magnesium 250 MG TABS Take 250 mg by mouth in the morning    Menthol, Topical Analgesic, (BIOFREEZE EX) Apply 1 application. topically daily as needed (pain)    metoprolol tartrate (LOPRESSOR) 25 mg tablet Take one tablet as needed for palpitations (Patient taking differently: if needed Take one tablet as needed for palpitations)    Multiple Vitamin (MULTIVITAMIN) capsule Take 1 capsule by mouth daily    Probiotic Product (ALIGN PO) Take 1 capsule by mouth in the morning    psyllium (METAMUCIL) 58.6 % powder Take 1 packet by mouth daily    nystatin (MYCOSTATIN) cream Apply topically 2 (two) times a day as needed (Patient not  "taking: Reported on 6/5/2024)    triamcinolone (KENALOG) 0.5 % ointment  (Patient not taking: Reported on 6/5/2024)    [DISCONTINUED] naproxen sodium (ALEVE) 220 MG tablet Take by mouth     Current Facility-Administered Medications on File Prior to Visit   Medication    lidocaine (XYLOCAINE) 1 % injection 1 mL    lidocaine (XYLOCAINE) 1 % injection 1 mL    lidocaine (XYLOCAINE) 1 % injection 1 mL    triamcinolone acetonide (KENALOG-40) 40 mg/mL injection 20 mg    triamcinolone acetonide (KENALOG-40) 40 mg/mL injection 20 mg    triamcinolone acetonide (KENALOG-40) 40 mg/mL injection 20 mg     She is allergic to hydrochlorothiazide and meloxicam..         Objective:    Blood pressure 126/78, pulse 103, temperature (!) 97.2 °F (36.2 °C), temperature source Temporal, height 5' 6\" (1.676 m), weight 80.7 kg (178 lb), SpO2 93%, not currently breastfeeding.    Physical Exam  General - patient is alert   Speech - no dysarthria noted, no aphasia noted.     Neuro:   Cranial nerves: PERRL, EOMI, facial sensation intact to soft touch in V1, V2 and V3, no facial asymmetry noted, uvula/palate midline, tongue midline.   Motor: 5/5 throughout, normal tone, no pronator drift noted.   Sensory - intact to soft touch throughout  Reflexes - 2+ throughout  Coordination - no ataxia/dysmetria noted  Gait - normal      ROS:  Reviewed ROS   Review of Systems   Constitutional: Negative.    HENT: Negative.     Eyes: Negative.    Respiratory: Negative.     Cardiovascular: Negative.    Gastrointestinal: Negative.    Endocrine: Negative.    Genitourinary: Negative.    Musculoskeletal: Negative.    Skin: Negative.    Allergic/Immunologic: Negative.    Neurological: Negative.    Hematological: Negative.    Psychiatric/Behavioral: Negative.                     "

## 2024-06-20 DIAGNOSIS — J01.90 ACUTE NON-RECURRENT SINUSITIS, UNSPECIFIED LOCATION: ICD-10-CM

## 2024-06-21 RX ORDER — FLUTICASONE PROPIONATE 50 MCG
2 SPRAY, SUSPENSION (ML) NASAL DAILY
Qty: 48 G | Refills: 0 | Status: SHIPPED | OUTPATIENT
Start: 2024-06-21

## 2024-06-27 ENCOUNTER — OFFICE VISIT (OUTPATIENT)
Dept: PODIATRY | Facility: CLINIC | Age: 79
End: 2024-06-27
Payer: COMMERCIAL

## 2024-06-27 VITALS
HEIGHT: 66 IN | BODY MASS INDEX: 28.73 KG/M2 | SYSTOLIC BLOOD PRESSURE: 130 MMHG | HEART RATE: 97 BPM | DIASTOLIC BLOOD PRESSURE: 76 MMHG | RESPIRATION RATE: 18 BRPM

## 2024-06-27 DIAGNOSIS — S82.64XD CLOSED NONDISPLACED FRACTURE OF LATERAL MALLEOLUS OF RIGHT FIBULA WITH ROUTINE HEALING: Primary | ICD-10-CM

## 2024-06-27 PROCEDURE — 99213 OFFICE O/P EST LOW 20 MIN: CPT | Performed by: PODIATRIST

## 2024-06-27 NOTE — PROGRESS NOTES
Patient presents for assessment of right ankle.  She has been diagnosed with a fracture of the lateral malleolus of the right ankle and has been using a cam boot for the past 4 weeks.  She relates no pain in the ankle.  On exam, mild edema noted right ankle but patient does have lymphedema.  There is no pain with palpation.    I personally viewed x-rays of the right ankle taken today.  They reveals no evidence of significant fracture at this time.    Patient told that she may resume wearing a regular shoe and begin regular activities.  No need for physical therapy at this time.  She should avoid hot tubs and aggressive exercise.  Reappoint prn

## 2024-07-08 DIAGNOSIS — S82.64XD CLOSED NONDISPLACED FRACTURE OF LATERAL MALLEOLUS OF RIGHT FIBULA WITH ROUTINE HEALING: Primary | ICD-10-CM

## 2024-07-16 DIAGNOSIS — I63.9 ACUTE CVA (CEREBROVASCULAR ACCIDENT) (HCC): ICD-10-CM

## 2024-07-17 RX ORDER — APIXABAN 5 MG/1
5 TABLET, FILM COATED ORAL 2 TIMES DAILY
Qty: 180 TABLET | Refills: 1 | Status: SHIPPED | OUTPATIENT
Start: 2024-07-17

## 2024-07-18 ENCOUNTER — EVALUATION (OUTPATIENT)
Dept: PHYSICAL THERAPY | Age: 79
End: 2024-07-18
Payer: COMMERCIAL

## 2024-07-18 DIAGNOSIS — S82.64XD CLOSED NONDISPLACED FRACTURE OF LATERAL MALLEOLUS OF RIGHT FIBULA WITH ROUTINE HEALING: ICD-10-CM

## 2024-07-18 DIAGNOSIS — M25.571 ACUTE RIGHT ANKLE PAIN: Primary | ICD-10-CM

## 2024-07-18 PROCEDURE — 97110 THERAPEUTIC EXERCISES: CPT | Performed by: PHYSICAL THERAPIST

## 2024-07-18 PROCEDURE — 97161 PT EVAL LOW COMPLEX 20 MIN: CPT | Performed by: PHYSICAL THERAPIST

## 2024-07-19 NOTE — PROGRESS NOTES
PT Evaluation     Today's date: 2024  Patient name: Kasie Cheatham  : 1945  MRN: [id number]  Referring provider: Mike Jack DPM  Dx:   Encounter Diagnosis     ICD-10-CM    1. Acute right ankle pain  M25.571       2. Closed nondisplaced fracture of lateral malleolus of right fibula with routine healing  S82.64XD Ambulatory Referral to Physical Therapy                     Assessment  Impairments: abnormal or restricted ROM, impaired physical strength, pain with function and weight-bearing intolerance    Assessment details: Patient with R lateral ankle sprain with avulsion fracture.  Patient with decreased AROM, PROM, swelling, weakness, and gait deviations.  Understanding of Dx/Px/POC: good     Prognosis: excellent    Goals  Short Term goals - 4 weeks  1.  Patient will be independent HEP.   2.  Patient will report a 50% decrease in pain complaints.  3.  Increase strength 1/2 grade.  4.  Increase ROM 5-10 degrees.    Long Term goals - 8 weeks  1.  Patient will report elimination of pain complaints.  2.  Patient will return to all work related activities without restriction.  3.  Patient will return to all recreational activities without restriction.  4.  ROM WFL.  5.  Strength 5/5.    Plan    Planned therapy interventions: strengthening, stretching, home exercise program and therapeutic exercise    Frequency: 1x week  Duration in weeks: 3    Subjective Evaluation    History of Present Illness  Mechanism of injury: Patient suffered a inversion ankle sprain with avulsion fracture on 24.  Placed in cam walker for 5 weeks.  Current sx's - stiffness, weakness, and ambulation difficulty. - previous history.  Patient with known lymphedema and CVA.  Quality of life: excellent    Patient Goals  Patient goals for therapy: decreased edema, decreased pain, increased strength, independence with ADLs/IADLs and increased motion    Pain  Current pain ratin  At best pain ratin  At worst pain rating:  3  Quality: dull ache and tight  Aggravating factors: standing, walking, stair climbing and lifting      Objective     Tenderness     Right Ankle/Foot   Tenderness in the anterior talofibular ligament.     Active Range of Motion     Right Ankle/Foot   Dorsiflexion (ke): 5 degrees   Plantar flexion: 40 degrees   Inversion: 17 degrees   Eversion: 10 degrees     Joint Play     Right Ankle/Foot  Joints within functional limits are the talocrural joint.     Strength/Myotome Testing     Right Ankle/Foot   Dorsiflexion: 3  Plantar flexion: 2-  Inversion: 3  Eversion: 3    General Comments:      Ankle/Foot Comments   Significant b/l LE lymph swelling.  Moderate lateral ankle sprain swelling.           Precautions: None      Manuals             HEP - gastroc, soleus, and PF stretch, SLS, and Double heel raise.                                                    Neuro Re-Ed                                                                                                        Ther Ex                                                                                                                     Ther Activity                                       Gait Training                                       Modalities

## 2024-08-06 ENCOUNTER — TELEPHONE (OUTPATIENT)
Age: 79
End: 2024-08-06

## 2024-08-06 NOTE — TELEPHONE ENCOUNTER
Contacted patient off of [facility]  to verify needs of services in attempts to offer patient an appointment. Unable to lvm as phone kept consistently ringing with no answer. 1st attempt.

## 2024-08-29 ENCOUNTER — HOSPITAL ENCOUNTER (OUTPATIENT)
Dept: RADIOLOGY | Age: 79
Discharge: HOME | End: 2024-08-29
Payer: COMMERCIAL

## 2024-08-29 VITALS — BODY MASS INDEX: 28.93 KG/M2 | WEIGHT: 180 LBS | HEIGHT: 66 IN

## 2024-08-29 DIAGNOSIS — Z12.31 VISIT FOR SCREENING MAMMOGRAM: ICD-10-CM

## 2024-08-29 PROCEDURE — 77067 SCR MAMMO BI INCL CAD: CPT

## 2024-08-29 PROCEDURE — 77063 BREAST TOMOSYNTHESIS BI: CPT

## 2024-09-13 ENCOUNTER — OFFICE VISIT (OUTPATIENT)
Dept: INTERNAL MEDICINE CLINIC | Facility: CLINIC | Age: 79
End: 2024-09-13
Payer: COMMERCIAL

## 2024-09-13 VITALS
DIASTOLIC BLOOD PRESSURE: 90 MMHG | HEIGHT: 66 IN | BODY MASS INDEX: 30.22 KG/M2 | WEIGHT: 188 LBS | OXYGEN SATURATION: 96 % | HEART RATE: 89 BPM | RESPIRATION RATE: 20 BRPM | SYSTOLIC BLOOD PRESSURE: 138 MMHG

## 2024-09-13 DIAGNOSIS — F51.01 PRIMARY INSOMNIA: ICD-10-CM

## 2024-09-13 DIAGNOSIS — Z00.00 MEDICARE ANNUAL WELLNESS VISIT, SUBSEQUENT: Primary | ICD-10-CM

## 2024-09-13 DIAGNOSIS — E66.09 CLASS 1 OBESITY DUE TO EXCESS CALORIES WITH SERIOUS COMORBIDITY AND BODY MASS INDEX (BMI) OF 30.0 TO 30.9 IN ADULT: ICD-10-CM

## 2024-09-13 DIAGNOSIS — Z13.820 SCREENING FOR OSTEOPOROSIS: ICD-10-CM

## 2024-09-13 DIAGNOSIS — I10 PRIMARY HYPERTENSION: ICD-10-CM

## 2024-09-13 DIAGNOSIS — E03.9 HYPOTHYROIDISM, UNSPECIFIED TYPE: ICD-10-CM

## 2024-09-13 DIAGNOSIS — E11.9 TYPE 2 DIABETES MELLITUS WITHOUT COMPLICATION, WITHOUT LONG-TERM CURRENT USE OF INSULIN (HCC): ICD-10-CM

## 2024-09-13 DIAGNOSIS — N39.41 URGE URINARY INCONTINENCE: ICD-10-CM

## 2024-09-13 DIAGNOSIS — Z78.0 ASYMPTOMATIC MENOPAUSAL STATE: ICD-10-CM

## 2024-09-13 DIAGNOSIS — Z86.73 HISTORY OF CVA (CEREBROVASCULAR ACCIDENT): ICD-10-CM

## 2024-09-13 PROCEDURE — G0439 PPPS, SUBSEQ VISIT: HCPCS | Performed by: INTERNAL MEDICINE

## 2024-09-13 PROCEDURE — 99214 OFFICE O/P EST MOD 30 MIN: CPT | Performed by: INTERNAL MEDICINE

## 2024-09-13 RX ORDER — RAMELTEON 8 MG/1
8 TABLET ORAL
Qty: 30 TABLET | Refills: 3 | Status: SHIPPED | OUTPATIENT
Start: 2024-09-13

## 2024-09-13 NOTE — PROGRESS NOTES
Ambulatory Visit  Name: Kasie Cheatham      : 1945      MRN: [id number]  Encounter Provider: Yannick Gaspar DO  Encounter Date: 2024   Encounter department: [facility]    Assessment & Plan  Medicare annual wellness visit, subsequent  Assessment and plan 1.  Medicare subsequent annual wellness examination overall the patient is clinically stable and doing well, we encouraged the patient to follow a healthy and balanced diet.  We recommend that the patient exercise routinely approximately 30 minutes 5 times per week .  We have reviewed the patient's vaccines and have made recommendations for updates if necessary   annual flu shot   .  We will be ordering screening laboratories which are age appropriate.  Return to the office in   6 months   call if any problems.       Urge urinary incontinence         Primary insomnia  Sleep hygiene, Rx for Rozerem 8 mg 1 p.o. nightly use nightly for next 1 to 2 weeks and then as needed    Orders:    ramelteon (ROZEREM) 8 mg tablet; Take 1 tablet (8 mg total) by mouth daily at bedtime    Screening for osteoporosis    Orders:    DXA bone density spine hip and pelvis; Future    Asymptomatic menopausal state    Orders:    DXA bone density spine hip and pelvis; Future    Type 2 diabetes mellitus without complication, without long-term current use of insulin ([facility])    Lab Results   Component Value Date    HGBA1C 6.2 (H) 04/10/2024   I will be ordering diabetic laboratories including comprehensive metabolic panel, hemoglobin A1c, urine microalbumin, lipid panel.  I have counselled the pt to follow a healthy and balanced diet ,and recommend routine exercise.         Class 1 obesity due to excess calories with serious comorbidity and body mass index (BMI) of 30.0 to 30.9 in adult  Obesity -I have counseled patient following healthy and balanced diet, I would like the patient to lose weight, I would like the patient exercise routinely; we will  continue monitor the patient's progress.         Hypothyroidism, unspecified type  Hypothyroidism controlled the patient is currently euthyroid I will be ordering a TSH prior to the next office visit and the patient will continue with current medical regiment; we will continue to monitor the patient's progress.         History of CVA (cerebrovascular accident)  Clinically stable and doing well continue the current medical regiment will continue monitor.  Continue Lipitor, Eliquis         Primary hypertension  Mild elevation of the blood pressure today suspect likely related to salt exposure which the patient will discontinue salt therapy, continue monitor blood pressure daily to ensure blood pressure has improved if it is not improved please notify me           Depression Screening and Follow-up Plan: Patient was screened for depression during today's encounter. They screened negative with a PHQ-2 score of 0.    Falls Plan of Care: balance, strength, and gait training instructions were provided.     Urinary Incontinence Plan of Care: counseling topics discussed: practice Kegel (pelvic floor strengthening) exercises.       Preventive health issues were discussed with patient, and age appropriate screening tests were ordered as noted in patient's After Visit Summary. Personalized health advice and appropriate referrals for health education or preventive services given if needed, as noted in patient's After Visit Summary.    History of Present Illness     HPI 79-year old female coming in for a follow up office visit regarding urge incontinence mild, primary insomnia type 2 diabetes and class I obesity, hypothyroidism, CVA and elevated blood pressure reading; the patient reports me compliant taking medications without untoward side effects the.  The patient is here to review his medical condition, update me on the medical condition and the patient reports me no hospitalizations and no ER visits.  No injuries no  illnesses reports me overall doing very well on a healthy and balanced diet remains active.  Reviewed laboratories in detail she does report a recent salt therapy treatment also reports me difficulties with insomnia related to arthritic pains in the middle of the night patient would like to try a prescription medicine to help assist with her insomnia she has tried over-the-counter melatonin without improvement of her symptoms also does report me mild urge incontinence she would like to try exercises  Patient Care Team:  Yannick Gaspar DO as PCP - General  TAMMIE Pete MD Leyla Daneshdoost, DO Maikel Merlos MD Edward C Chen, MD Alejandrina Mendez, MD Mike Jack, DPM    Review of Systems   Constitutional:  Negative for activity change, appetite change and unexpected weight change.   HENT:  Negative for congestion and postnasal drip.    Eyes:  Negative for visual disturbance.   Respiratory:  Negative for cough and shortness of breath.    Cardiovascular:  Negative for chest pain.   Gastrointestinal:  Negative for abdominal pain, diarrhea, nausea and vomiting.   Neurological:  Negative for dizziness, light-headedness and headaches.   Hematological:  Negative for adenopathy.     Medical History Reviewed by provider this encounter:       Annual Wellness Visit Questionnaire   Kasie is here for her Subsequent Wellness visit.     Health Risk Assessment:   Patient rates overall health as good. Patient feels that their physical health rating is same. Patient is very satisfied with their life. Eyesight was rated as same. Hearing was rated as slightly worse. Patient feels that their emotional and mental health rating is same. Patients states they are never, rarely angry. Patient states they are sometimes unusually tired/fatigued. Pain experienced in the last 7 days has been some. Patient's pain rating has been 5/10. Patient states that she has experienced no weight  loss or gain in last 6 months.     Depression Screening:   PHQ-2 Score: 0      Fall Risk Screening:   In the past year, patient has experienced: history of falling in past year    Number of falls: 1  Injured during fall?: Yes    Feels unsteady when standing or walking?: No    Worried about falling?: No      Urinary Incontinence Screening:   Patient has leaked urine accidently in the last six months.     Home Safety:  Patient does not have trouble with stairs inside or outside of their home. Patient has working smoke alarms and has working carbon monoxide detector. Home safety hazards include: none.     Nutrition:   Current diet is Regular.     Medications:   Patient is currently taking over-the-counter supplements. OTC medications include: see medication list. Patient is able to manage medications.     Activities of Daily Living (ADLs)/Instrumental Activities of Daily Living (IADLs):   Walk and transfer into and out of bed and chair?: Yes  Dress and groom yourself?: Yes    Bathe or shower yourself?: Yes    Feed yourself? Yes  Do your laundry/housekeeping?: Yes  Manage your money, pay your bills and track your expenses?: Yes  Make your own meals?: Yes    Do your own shopping?: Yes    Previous Hospitalizations:   Any hospitalizations or ED visits within the last 12 months?: Yes    How many hospitalizations have you had in the last year?: 1-2    Advance Care Planning:   Living will: Yes    Durable POA for healthcare: Yes    Advanced directive: Yes      Cognitive Screening:   Provider or family/friend/caregiver concerned regarding cognition?: No    PREVENTIVE SCREENINGS      Cardiovascular Screening:    General: Screening Not Indicated and History Lipid Disorder      Diabetes Screening:     General: Screening Not Indicated and History Diabetes      Breast Cancer Screening:     General: Screening Current      Cervical Cancer Screening:    General: Screening Not Indicated      Lung Cancer Screening:     General: Screening  "Not Indicated      Hepatitis C Screening:    General: Screening Current    Screening, Brief Intervention, and Referral to Treatment (SBIRT)    Screening  Typical number of drinks in a day: 0  Typical number of drinks in a week: 0  Interpretation: Low risk drinking behavior.    AUDIT-C Screenin) How often did you have a drink containing alcohol in the past year? never  2) How many drinks did you have on a typical day when you were drinking in the past year? 0  3) How often did you have 6 or more drinks on one occasion in the past year? never    AUDIT-C Score: 0  Interpretation: Score 0-2 (female): Negative screen for alcohol misuse    Single Item Drug Screening:  How often have you used an illegal drug (including marijuana) or a prescription medication for non-medical reasons in the past year? never    Single Item Drug Screen Score: 0  Interpretation: Negative screen for possible drug use disorder    Social Determinants of Health     Financial Resource Strain: Low Risk  (2023)    Overall Financial Resource Strain (CARDIA)     Difficulty of Paying Living Expenses: Not hard at all   Food Insecurity: No Food Insecurity (2024)    Hunger Vital Sign     Worried About Running Out of Food in the Last Year: Never true     Ran Out of Food in the Last Year: Never true   Transportation Needs: No Transportation Needs (2024)    PRAPARE - Transportation     Lack of Transportation (Medical): No     Lack of Transportation (Non-Medical): No   Housing Stability: Low Risk  (2024)    Housing Stability Vital Sign     Unable to Pay for Housing in the Last Year: No     Number of Times Moved in the Last Year: 1     Homeless in the Last Year: No   Utilities: Not At Risk (2024)    [facility] Utilities     Threatened with loss of utilities: No     No results found.    Objective     /90 (BP Location: Left arm, Patient Position: Sitting, Cuff Size: Standard)   Pulse 89   Resp 20   Ht 5' 6\" (1.676 m)   Wt 85.3 kg " (188 lb)   SpO2 96%   BMI 30.34 kg/m²     Physical Exam  Vitals and nursing note reviewed.   Constitutional:       General: She is not in acute distress.     Appearance: Normal appearance. She is well-developed. She is obese. She is not ill-appearing, toxic-appearing or diaphoretic.   HENT:      Head: Normocephalic and atraumatic.      Right Ear: External ear normal.      Left Ear: External ear normal.      Nose: Nose normal.      Mouth/Throat:      Mouth: Oropharynx is clear and moist.   Eyes:      Pupils: Pupils are equal, round, and reactive to light.   Cardiovascular:      Rate and Rhythm: Normal rate and regular rhythm.      Heart sounds: Normal heart sounds. No murmur heard.  Pulmonary:      Effort: Pulmonary effort is normal.      Breath sounds: Normal breath sounds.   Abdominal:      General: There is no distension.      Palpations: Abdomen is soft.      Tenderness: There is no abdominal tenderness. There is no guarding.   Musculoskeletal:         General: No edema.   Psychiatric:         Mood and Affect: Mood and affect normal.

## 2024-09-13 NOTE — PATIENT INSTRUCTIONS

## 2024-09-13 NOTE — PROGRESS NOTES
Diabetic Foot Exam    Patient's shoes and socks removed.    Right Foot/Ankle   Right Foot Inspection  Skin Exam: skin normal and skin intact. No dry skin, no warmth, no callus, no erythema, no maceration, no abnormal color, no pre-ulcer, no ulcer and no callus.     Toe Exam: ROM and strength within normal limits.     Sensory   Monofilament testing: intact    Vascular  Capillary refills: < 3 seconds  The right DP pulse is 2+. The right PT pulse is 2+.     Left Foot/Ankle  Left Foot Inspection  Skin Exam: skin normal and skin intact. No dry skin, no warmth, no erythema, no maceration, normal color, no pre-ulcer, no ulcer and no callus. Amputation: amputation left foot     Toe Exam: ROM and strength within normal limits.     Sensory   Monofilament testing: intact    Vascular  Capillary refills: < 3 seconds  The left DP pulse is 2+. The left PT pulse is 2+.     Assign Risk Category  No deformity present  No loss of protective sensation  No weak pulses  Risk: 0    Assessment/Plan:    No problem-specific Assessment & Plan notes found for this encounter.         Problem List Items Addressed This Visit          Neurology/Sleep    Insomnia    Relevant Medications    ramelteon (ROZEREM) 8 mg tablet     Other Visit Diagnoses       Medicare annual wellness visit, subsequent    -  Primary    Urge urinary incontinence        Screening for osteoporosis        Relevant Orders    DXA bone density spine hip and pelvis    Asymptomatic menopausal state        Relevant Orders    DXA bone density spine hip and pelvis              Subjective:      Patient ID: Kasie Cheatham is a 79 y.o. female.    HPI    The following portions of the patient's history were reviewed and updated as appropriate: allergies, current medications, past family history, past medical history, past social history, past surgical history and problem list.    Review of Systems      Objective:    No follow-ups on file.    Procedure: Mammo screening bilateral w 3d &  cad    Result Date: 9/3/2024  Narrative: DIAGNOSIS: Visit for screening mammogram TECHNIQUE: Digital screening mammography was performed. Computer Aided Detection (CAD) analyzed all applicable images. COMPARISONS: Prior breast imaging dated: 08/28/2023, 08/15/2022, 08/14/2021, 03/10/2020, 03/06/2019, 03/06/2018, 02/28/2018, 02/27/2017, 02/19/2016, and 02/17/2015 RELEVANT HISTORY: Family Breast Cancer History: No known family history of breast cancer. Family Medical History: Family medical history includes ovarian cancer in mother. Personal History: Hormone history includes birth control and other. Surgical history includes breast biopsy and breast enhancement. No known relevant medical history. The patient is scheduled in a reminder system for screening mammography. 8-10% of cancers will be missed on mammography. Management of a palpable abnormality must be based on clinical grounds.  Patients will be notified of their results via letter from our facility. Accredited by [employer] and FDA. RISK ASSESSMENT: 5 Year Tyrer-Cuzick: 2.56% 10 Year Tyrer-Cuzick: No Score Lifetime Tyrer-Cuzick: 4.22% TISSUE DENSITY: The breasts are heterogeneously dense, which may obscure small masses. INDICATION: Kasie Cheatham is a 79 y.o. female presenting for screening mammography. FINDINGS: Bilateral There are no suspicious masses, grouped microcalcifications or areas of unexplained architectural distortion. The skin and nipple areolar complex are unremarkable.  Retroglandular saline bilateral implant(s) have been identified.    Impression: No mammographic evidence of malignancy. ASSESSMENT/BI-RADS CATEGORY: Left: 2 - Benign Right: 2 - Benign Overall: 2 - Benign RECOMMENDATION:      - Routine screening mammogram in 1 year for both breasts. Workstation ID: [id number]        Allergies   Allergen Reactions    Hydrochlorothiazide Palpitations    Meloxicam Rash       Past Medical History:   Diagnosis Date    Biliary  dyskinesia     CAP (community acquired pneumonia)     last assessed 8/17/16    Class 1 obesity due to excess calories with serious comorbidity and body mass index (BMI) of 33.0 to 33.9 in adult 05/08/2018    Deep vein thrombosis (DVT) of proximal vein of right lower extremity, unspecified chronicity (HCC) 08/10/2022    Diabetes mellitus (HCC) 12/2018    Type 2 no insulin    Disease of thyroid gland 2000    GERD (gastroesophageal reflux disease)     Hyperlipidemia     Palpitations     Pneumonia     Shortness of breath     Stroke ([facility])     SVT (supraventricular tachycardia) 06/08/2022     Past Surgical History:   Procedure Laterality Date    AUGMENTATION MAMMAPLASTY Bilateral 1998    breast surgery- with prosthetic implant 1998; pre-pectoral salline    BREAST BIOPSY Left 1974    BREAST BIOPSY Left 1994    BREAST EXCISIONAL BIOPSY Left 1993    CARDIAC CATHETERIZATION      procedure summary    CHOLECYSTECTOMY      onset 2003    HEMORRHOID SURGERY      ROTATOR CUFF REPAIR Bilateral     onset 2010    TUBAL LIGATION      onset 1980     Current Outpatient Medications on File Prior to Visit   Medication Sig Dispense Refill    amLODIPine (NORVASC) 5 mg tablet Take 1 tablet (5 mg total) by mouth daily 90 tablet 3    apixaban (Eliquis) 5 mg TAKE 1 TABLET TWICE A  tablet 1    Ascorbic Acid (VITAMIN C) 1000 MG tablet Take 1 tablet by mouth daily      atorvastatin (LIPITOR) 40 mg tablet TAKE 1 TABLET DAILY WITH DINNER 90 tablet 1    Biotin 5000 MCG CAPS Take 1 capsule by mouth daily      cholecalciferol (VITAMIN D3) 1,000 units tablet Take 2,000 Units by mouth daily      diphenhydrAMINE-acetaminophen (TYLENOL PM)  MG TABS Take 1 tablet by mouth daily at bedtime as needed for sleep      esomeprazole (NexIUM) 40 MG capsule Take 1 capsule (40 mg total) by mouth daily 90 capsule 1    fluticasone (FLONASE) 50 mcg/act nasal spray 2 sprays into each nostril daily 48 g 0    levothyroxine 125 mcg tablet TAKE 1 TABLET DAILY ON  AN EMPTY STOMACH 90 tablet 1    Magnesium 250 MG TABS Take 250 mg by mouth in the morning      Menthol, Topical Analgesic, (BIOFREEZE EX) Apply 1 application. topically daily as needed (pain)      metoprolol tartrate (LOPRESSOR) 25 mg tablet Take one tablet as needed for palpitations (Patient taking differently: if needed Take one tablet as needed for palpitations) 90 tablet 2    Multiple Vitamin (MULTIVITAMIN) capsule Take 1 capsule by mouth daily      Probiotic Product (ALIGN PO) Take 1 capsule by mouth in the morning      psyllium (METAMUCIL) 58.6 % powder Take 1 packet by mouth daily      nystatin (MYCOSTATIN) cream Apply topically 2 (two) times a day as needed (Patient not taking: Reported on 6/5/2024)      triamcinolone (KENALOG) 0.5 % ointment  (Patient not taking: Reported on 6/5/2024)      [DISCONTINUED] naproxen sodium (ALEVE) 220 MG tablet Take by mouth       Current Facility-Administered Medications on File Prior to Visit   Medication Dose Route Frequency Provider Last Rate Last Admin    lidocaine (XYLOCAINE) 1 % injection 1 mL  1 mL Injection  Mike Jack DPM   1 mL at 12/05/23 1515    lidocaine (XYLOCAINE) 1 % injection 1 mL  1 mL Injection     1 mL at 05/13/24 1300    lidocaine (XYLOCAINE) 1 % injection 1 mL  1 mL Injection     1 mL at 05/13/24 1300    triamcinolone acetonide (KENALOG-40) 40 mg/mL injection 20 mg  20 mg Infiltration  Mike Jack DPM   20 mg at 12/05/23 1515    triamcinolone acetonide (KENALOG-40) 40 mg/mL injection 20 mg  20 mg Infiltration     20 mg at 05/13/24 1300    triamcinolone acetonide (KENALOG-40) 40 mg/mL injection 20 mg  20 mg Infiltration     20 mg at 05/13/24 1300     Family History   Problem Relation Age of Onset    Aneurysm Mother         cerebral    Ovarian cancer Mother 72    Cancer Mother     Cirrhosis Father         alcoholic    Alcohol abuse Father     Aneurysm Brother         abdominal aortic; cerebral    Diabetes Son     No Known Problems Daughter     No  Known Problems Maternal Grandmother     No Known Problems Maternal Grandfather     No Known Problems Paternal Grandmother     No Known Problems Paternal Grandfather     No Known Problems Son     No Known Problems Son     No Known Problems Son     No Known Problems Maternal Aunt     No Known Problems Maternal Aunt     No Known Problems Maternal Aunt     No Known Problems Maternal Aunt     No Known Problems Paternal Aunt     No Known Problems Paternal Aunt     No Known Problems Paternal Aunt     No Known Problems Paternal Aunt     No Known Problems Paternal Aunt     Cancer Brother 67        bladder cancer     Social History     Socioeconomic History    Marital status: /Civil Union     Spouse name: Not on file    Number of children: Not on file    Years of education: 12    Highest education level: Not on file   Occupational History    Not on file   Tobacco Use    Smoking status: Former     Current packs/day: 0.00     Average packs/day: 0.3 packs/day for 50.0 years (12.5 ttl pk-yrs)     Types: Cigarettes     Start date:      Quit date: 2010     Years since quittin.7    Smokeless tobacco: Never    Tobacco comments:     0.5 ppw intermittently last smoked    Vaping Use    Vaping status: Never Used   Substance and Sexual Activity    Alcohol use: Yes     Alcohol/week: 2.0 standard drinks of alcohol     Types: 2 Glasses of wine per week     Comment: Maybe 2 glasses a month    Drug use: No    Sexual activity: Not Currently     Partners: Male     Birth control/protection: Post-menopausal     Comment: I'm 77   Other Topics Concern    Not on file   Social History Narrative    Caffeine use     Social Determinants of Health     Financial Resource Strain: Low Risk  (2023)    Overall Financial Resource Strain (CARDIA)     Difficulty of Paying Living Expenses: Not hard at all   Food Insecurity: No Food Insecurity (2024)    Hunger Vital Sign     Worried About Running Out of Food in the Last Year: Never  "true     Ran Out of Food in the Last Year: Never true   Transportation Needs: No Transportation Needs (9/13/2024)    PRAPARE - Transportation     Lack of Transportation (Medical): No     Lack of Transportation (Non-Medical): No   Physical Activity: Inactive (8/22/2022)    Exercise Vital Sign     Days of Exercise per Week: 0 days     Minutes of Exercise per Session: 0 min   Stress: Not on file   Social Connections: Not on file   Intimate Partner Violence: Not on file   Housing Stability: Low Risk  (9/13/2024)    Housing Stability Vital Sign     Unable to Pay for Housing in the Last Year: No     Number of Times Moved in the Last Year: 1     Homeless in the Last Year: No     Vitals:    09/13/24 0905   BP: 138/90   BP Location: Left arm   Patient Position: Sitting   Cuff Size: Standard   Pulse: 89   Resp: 20   SpO2: 96%   Weight: 85.3 kg (188 lb)   Height: 5' 6\" (1.676 m)     Results for orders placed or performed in visit on 05/20/24    Diabetes Eye Exam   Result Value Ref Range    Right Eye Diabetic Retinopathy None     Left Eye Diabetic Retinopathy None      Weight (last 2 days)       Date/Time Weight    09/13/24 0905 85.3 (188)          Body mass index is 30.34 kg/m².  BP      Temp      Pulse     Resp      SpO2        Vitals:    09/13/24 0905   Weight: 85.3 kg (188 lb)     Vitals:    09/13/24 0905   Weight: 85.3 kg (188 lb)       /90 (BP Location: Left arm, Patient Position: Sitting, Cuff Size: Standard)   Pulse 89   Resp 20   Ht 5' 6\" (1.676 m)   Wt 85.3 kg (188 lb)   SpO2 96%   BMI 30.34 kg/m²          Physical Exam  Cardiovascular:      Pulses: no weak pulses.           Dorsalis pedis pulses are 2+ on the right side and 2+ on the left side.        Posterior tibial pulses are 2+ on the right side and 2+ on the left side.   Feet:      Right foot:      Skin integrity: No ulcer, skin breakdown, erythema, warmth, callus or dry skin.      Left foot: amputated     Skin integrity: No ulcer, skin breakdown, " erythema, warmth, callus or dry skin.

## 2024-09-14 PROBLEM — Z00.00 MEDICARE ANNUAL WELLNESS VISIT, SUBSEQUENT: Status: ACTIVE | Noted: 2024-09-14

## 2024-09-14 PROBLEM — E66.9 OBESITY: Status: ACTIVE | Noted: 2019-08-20

## 2024-09-14 NOTE — ASSESSMENT & PLAN NOTE
Hypothyroidism controlled the patient is currently euthyroid I will be ordering a TSH prior to the next office visit and the patient will continue with current medical regiment; we will continue to monitor the patient's progress.

## 2024-09-14 NOTE — ASSESSMENT & PLAN NOTE
Assessment and plan 1.  Medicare subsequent annual wellness examination overall the patient is clinically stable and doing well, we encouraged the patient to follow a healthy and balanced diet.  We recommend that the patient exercise routinely approximately 30 minutes 5 times per week .  We have reviewed the patient's vaccines and have made recommendations for updates if necessary   annual flu shot   .  We will be ordering screening laboratories which are age appropriate.  Return to the office in   6 months   call if any problems.

## 2024-09-14 NOTE — ASSESSMENT & PLAN NOTE
Mild elevation of the blood pressure today suspect likely related to salt exposure which the patient will discontinue salt therapy, continue monitor blood pressure daily to ensure blood pressure has improved if it is not improved please notify me

## 2024-09-14 NOTE — ASSESSMENT & PLAN NOTE
Obesity -I have counseled patient following healthy and balanced diet, I would like the patient to lose weight, I would like the patient exercise routinely; we will continue monitor the patient's progress.

## 2024-09-14 NOTE — ASSESSMENT & PLAN NOTE
Lab Results   Component Value Date    HGBA1C 6.2 (H) 04/10/2024   I will be ordering diabetic laboratories including comprehensive metabolic panel, hemoglobin A1c, urine microalbumin, lipid panel.  I have counselled the pt to follow a healthy and balanced diet ,and recommend routine exercise.

## 2024-09-14 NOTE — ASSESSMENT & PLAN NOTE
Sleep hygiene, Rx for Rozerem 8 mg 1 p.o. nightly use nightly for next 1 to 2 weeks and then as needed    Orders:    ramelteon (ROZEREM) 8 mg tablet; Take 1 tablet (8 mg total) by mouth daily at bedtime

## 2024-09-14 NOTE — ASSESSMENT & PLAN NOTE
Clinically stable and doing well continue the current medical regiment will continue monitor.  Continue Lipitor, Eliquis

## 2024-09-25 ENCOUNTER — HOSPITAL ENCOUNTER (OUTPATIENT)
Dept: RADIOLOGY | Age: 79
Discharge: HOME | End: 2024-09-25
Payer: COMMERCIAL

## 2024-09-25 DIAGNOSIS — Z13.820 SCREENING FOR OSTEOPOROSIS: ICD-10-CM

## 2024-09-25 DIAGNOSIS — Z78.0 ASYMPTOMATIC MENOPAUSAL STATE: ICD-10-CM

## 2024-09-25 PROCEDURE — 77080 DXA BONE DENSITY AXIAL: CPT

## 2024-09-30 ENCOUNTER — OFFICE VISIT (OUTPATIENT)
Dept: VASCULAR SURGERY | Facility: CLINIC | Age: 79
End: 2024-09-30
Payer: COMMERCIAL

## 2024-09-30 VITALS
RESPIRATION RATE: 18 BRPM | SYSTOLIC BLOOD PRESSURE: 156 MMHG | HEIGHT: 66 IN | HEART RATE: 76 BPM | WEIGHT: 190 LBS | OXYGEN SATURATION: 96 % | DIASTOLIC BLOOD PRESSURE: 92 MMHG | BODY MASS INDEX: 30.53 KG/M2

## 2024-09-30 DIAGNOSIS — I89.0 LYMPHEDEMA: Primary | ICD-10-CM

## 2024-09-30 DIAGNOSIS — E03.9 HYPOTHYROIDISM, UNSPECIFIED TYPE: ICD-10-CM

## 2024-09-30 DIAGNOSIS — M79.605 PAIN OF LEFT LOWER EXTREMITY: ICD-10-CM

## 2024-09-30 PROCEDURE — 99213 OFFICE O/P EST LOW 20 MIN: CPT | Performed by: PHYSICIAN ASSISTANT

## 2024-09-30 RX ORDER — LEVOTHYROXINE SODIUM 125 UG/1
TABLET ORAL
Qty: 90 TABLET | Refills: 1 | Status: SHIPPED | OUTPATIENT
Start: 2024-09-30

## 2024-09-30 NOTE — ASSESSMENT & PLAN NOTE
"Lymphedema    Leg pain, left lateral x 5 months; pain every day, possibly due to underlying lymphedema    -Returns to the vascular office for evaluation of L lateral leg \"throbbing\" pain x 5 months. Pain day and night which does not affect activity. No good associations, but not related to activity or lymph pump usage. Ice did not help, but partially improved with Biofreeze.     -No worsening or uncontrolled lymphedema; uses lymph pumps and stockings every    -Exam: 1+ B LE edema with lymphedema pattern. Skin healthy and intact. No drainage, weeping or wounds. L lateral leg without defect, marked skin changes, tenderness on palpation.  Feet warm and well-perfused. + DP  pulses.     Plan: Will continue with lymphedema pump therapy, daily compression stockings, skin care and exercise.      Atypical left lateral leg pain for the past 6 months of unclear etiology.  Suspect \"throbbing\" pain may be due to underlying leg swelling and lymphedema. Recommend updated compression stockings. Will send her to PT OT for lymphedema for evaluation and course of manual massage. Will also check an x-ray- expect normal.  If she continues to have pain in the next month or two, she should call the office for re-evaluation. If continued pain, could consider reflux study or alteratively msk etiology +/- referral to ortho +/- scan.     -Continue with compression, elevation, low sodium, activity as tolerated and skin moisturizer.   -Continue with lymphedema pump therapy 60 minutes twice daily  -Regular exercise for weight loss will help your lymphedema and general health  -Refer physiotherapy with manual lymphatic drainage for therapeutic purposes, education, garments, etc  -Apply good moisturizer to skin daily and continue to monitor for any cracks, wounds or signs of infection.   -Follow up vascular surgery in 3 months    Orders:    Ambulatory Referral to PT/OT Lymphedema Therapy; Future    Ambulatory Referral to Vascular Surgery    XR " tibia fibula 2 vw left; Future    Compression Stocking

## 2024-09-30 NOTE — PROGRESS NOTES
"Ambulatory Visit  Name: Kasie Cheatham      : 1945      MRN: [id number]  Encounter Provider: Yoselyn Garcia PA-C  Encounter Date: 2024   Encounter department: [facility] [place]  Assessment & Plan  Lymphedema  Lymphedema    Leg pain, left lateral x 5 months; pain every day, possibly due to underlying lymphedema    -Returns to the vascular office for evaluation of L lateral leg \"throbbing\" pain x 5 months. Pain day and night which does not affect activity. No good associations, but not related to activity or lymph pump usage. Ice did not help, but partially improved with Biofreeze.     -No worsening or uncontrolled lymphedema; uses lymph pumps and stockings every    -Exam: 1+ B LE edema with lymphedema pattern. Skin healthy and intact. No drainage, weeping or wounds. L lateral leg without defect, marked skin changes, tenderness on palpation.  Feet warm and well-perfused. + DP  pulses.     Plan: Will continue with lymphedema pump therapy, daily compression stockings, skin care and exercise.      Atypical left lateral leg pain for the past 6 months of unclear etiology.  Suspect \"throbbing\" pain may be due to underlying leg swelling and lymphedema. Recommend updated compression stockings. Will send her to PT OT for lymphedema for evaluation and course of manual massage. Will also check an x-ray- expect normal.  If she continues to have pain in the next month or two, she should call the office for re-evaluation. If continued pain, could consider reflux study or alteratively msk etiology +/- referral to ortho +/- scan.     -Continue with compression, elevation, low sodium, activity as tolerated and skin moisturizer.   -Continue with lymphedema pump therapy 60 minutes twice daily  -Regular exercise for weight loss will help your lymphedema and general health  -Refer physiotherapy with manual lymphatic drainage for therapeutic purposes, education, garments, etc  -Apply good moisturizer to skin " "daily and continue to monitor for any cracks, wounds or signs of infection.   -Follow up vascular surgery in 3 months    Orders:    Ambulatory Referral to PT/OT Lymphedema Therapy; Future    Ambulatory Referral to Vascular Surgery    XR tibia fibula 2 vw left; Future    Compression Stocking    Pain of left lower extremity    Orders:    XR tibia fibula 2 vw left; Future      History of Present Illness     CC: edema. Patient is new to our practice and was referred by PCP. Pt c/o BLE edema and RLE throbbing pain. Pt does wear compression stockings daily and elevates her legs. Pt has compression pumps and has been using them for years. Pt uses them 2x/ day for an hour each time.     Kasie Cheatham is a 79 y.o. female who presents obesity, hypothyroidism, diabetes, hypertension, hyperlipidemia, Hx stroke, longstanding bilateral lower extremity lymphedema, R > L who is referred back to vascular for evaluation. She was a prior patient of Dr. Hope. She has had history of bilateral lower extremity lymphedema since at least 2013.  Patient tells me she was referred to vascular surgery for evaluation of venous disease with left leg pain.    9/30/24: Patient presents for leg pain reevaluation.  Her biggest concern is that 5 months ago she developed intermittent left lateral calf pain which occurs throughout the day and sticks with her for a while.  She has a low-level throbbing which is \"coming and going\" troubling to her every day and she would like to have a reason.      Her legs / left leg pain generally feels better  when she is walking.  It does not seem to be better or worse with lymph pumps or elevation.  She did try some ice but this did not help.  At night, she uses Biofreeze which helps a little bit helps her through the night otherwise she will have pain. No prior similar symptoms.     She has has been using compression pumps daily, moisturizing and exercises with recumbent bike. She is using the compression " pumps twice daily.. She denies any worsening or unstable lymphedema.  No foot pain or wounds.     On exam, she does have mild bilateral leg swelling consistent with pattern of lymphedema.  She denies any injuries to the left leg.  Though she did have a right ankle injury in May.    Unclear etiology for left leg pain.  She does have lymphedema and may have pain related to edema or venous disease.  Will see how she does with fresh compression and lymphedema therapy.  Alternatively, consider orthopedic issue.       VAS LEV left leg 4/16/24  [facility] REPORT  CLINICAL:  Indications:  Patient presents with left lower extremity pain and swelling x several days.  Patient is currently taking Eliquis.  Operative History:  Patient denies any cardiovascular surgeries  Risk Factors  The patient has history of HTN, Diabetes (NIDDM (oral meds)) and  Hyperlipidemia.        CONCLUSION:     Impression:     RIGHT LOWER LIMB LIMITED:  Evaluation shows no evidence of thrombus in the common femoral vein.  Doppler evaluation shows a normal response to augmentation maneuvers.     LEFT LOWER LIMB:  No evidence of acute or chronic deep vein thrombosis  No evidence of superficial thrombophlebitis noted.  Doppler evaluation shows a normal response to augmentation maneuvers.  Popliteal, posterior tibial and anterior tibial arterial Doppler waveforms are  triphasic.     Review of Systems   Constitutional: Negative.    HENT: Negative.     Eyes: Negative.    Respiratory: Negative.     Cardiovascular:  Positive for leg swelling.   Gastrointestinal: Negative.    Endocrine: Negative.    Genitourinary: Negative.    Musculoskeletal:  Positive for arthralgias.        RLE throbbing pain   Skin: Negative.    Allergic/Immunologic: Negative.    Neurological: Negative.    Hematological: Negative.    Psychiatric/Behavioral: Negative.             Objective     /92 (BP Location: Left arm, Patient Position: Sitting)   Pulse 76   Resp 18   Ht  "5' 6\" (1.676 m)   Wt 86.2 kg (190 lb)   SpO2 96%   BMI 30.67 kg/m²     Physical Exam  Vitals and nursing note reviewed.   Constitutional:       Appearance: She is well-developed.   HENT:      Head: Normocephalic and atraumatic.   Eyes:      Pupils: Pupils are equal, round, and reactive to light.   Neck:      Vascular: No JVD.   Cardiovascular:      Rate and Rhythm: Normal rate and regular rhythm.      Pulses:           Radial pulses are 2+ on the right side and 2+ on the left side.        Dorsalis pedis pulses are 2+ on the right side and 2+ on the left side.      Heart sounds: Normal heart sounds, S1 normal and S2 normal. No murmur heard.     No friction rub. No gallop.   Pulmonary:      Effort: Pulmonary effort is normal. No accessory muscle usage or respiratory distress.      Breath sounds: Normal breath sounds. No wheezing or rales.   Abdominal:      General: Bowel sounds are normal. There is no distension.      Palpations: Abdomen is soft.      Tenderness: There is no abdominal tenderness.   Musculoskeletal:         General: No deformity. Normal range of motion.      Right lower le+ Edema present.      Left lower le+ Edema present.   Skin:     General: Skin is warm and dry.      Findings: No lesion or rash.      Nails: There is no clubbing.   Neurological:      Mental Status: She is alert and oriented to person, place, and time.      Comments: Grossly normal    Psychiatric:         Behavior: Behavior is cooperative.       I have reviewed and made appropriate changes to the review of systems input by the medical assistant.    Vitals:    24 1320   BP: 156/92   BP Location: Left arm   Patient Position: Sitting   Pulse: 76   Resp: 18   SpO2: 96%   Weight: 86.2 kg (190 lb)   Height: 5' 6\" (1.676 m)       Patient Active Problem List   Diagnosis    Lymphedema    Fatigue    Hyperlipidemia    Hypothyroidism    Type 2 diabetes mellitus without complication, without long-term current use of insulin (HCC) "    Atypical chest pain    Right lower quadrant abdominal pain    Pelvic pain    Family history of ovarian cancer    Wellness examination    Dense breast tissue on mammogram    Fibrocystic breast changes    Dupuytren's contracture    Diverticular disease of colon    Gastro-esophageal reflux disease with esophagitis    History of adenomatous polyp of colon    Hypothyroidism due to Hashimoto's thyroiditis    Impaired fasting glucose    Nephrolithiasis    Primary hypertension    Obesity    Encounter for screening mammogram for breast cancer    Dyspnea on exertion    Insomnia    Oral ulceration    Right flank pain    Exposure to COVID-19 virus    Edema    Thyroid nodule    Osteoarthritis    Urinary incontinence    Right groin pain    Trigger finger    Benign essential tremor    Tachycardia    COVID-19    History of Cerebral thrombosis with cerebral infarction (HCC)    Palpitations    Hypokalemia    PFO (patent foramen ovale)    Frontal headache    History of CVA (cerebrovascular accident)    Overweight (BMI 25.0-29.9)    Stroke-like symptom    Ocular migraine    Anxiety    Acute midline low back pain without sciatica    Facet arthritis of lumbar region    Pain of left lower extremity    Medicare annual wellness visit, subsequent       Past Surgical History:   Procedure Laterality Date    AUGMENTATION MAMMAPLASTY Bilateral 1998    breast surgery- with prosthetic implant 1998; pre-pectoral salline    BREAST BIOPSY Left 1974    BREAST BIOPSY Left 1994    BREAST EXCISIONAL BIOPSY Left 1993    CARDIAC CATHETERIZATION      procedure summary    CHOLECYSTECTOMY      onset 2003    HEMORRHOID SURGERY      ROTATOR CUFF REPAIR Bilateral     onset 2010    TUBAL LIGATION      onset 1980       Family History   Problem Relation Age of Onset    Aneurysm Mother         cerebral    Ovarian cancer Mother 72    Cancer Mother     Cirrhosis Father         alcoholic    Alcohol abuse Father     Aneurysm Brother         abdominal aortic; cerebral     Diabetes Son     No Known Problems Daughter     No Known Problems Maternal Grandmother     No Known Problems Maternal Grandfather     No Known Problems Paternal Grandmother     No Known Problems Paternal Grandfather     No Known Problems Son     No Known Problems Son     No Known Problems Son     No Known Problems Maternal Aunt     No Known Problems Maternal Aunt     No Known Problems Maternal Aunt     No Known Problems Maternal Aunt     No Known Problems Paternal Aunt     No Known Problems Paternal Aunt     No Known Problems Paternal Aunt     No Known Problems Paternal Aunt     No Known Problems Paternal Aunt     Cancer Brother 67        bladder cancer       Social History     Socioeconomic History    Marital status: /Civil Union     Spouse name: Not on file    Number of children: Not on file    Years of education: 12    Highest education level: Not on file   Occupational History    Not on file   Tobacco Use    Smoking status: Former     Current packs/day: 0.00     Average packs/day: 0.3 packs/day for 50.0 years (12.5 ttl pk-yrs)     Types: Cigarettes     Start date:      Quit date: 2010     Years since quittin.7    Smokeless tobacco: Never    Tobacco comments:     0.5 ppw intermittently last smoked    Vaping Use    Vaping status: Never Used   Substance and Sexual Activity    Alcohol use: Yes     Alcohol/week: 2.0 standard drinks of alcohol     Types: 2 Glasses of wine per week     Comment: Maybe 2 glasses a month    Drug use: No    Sexual activity: Not Currently     Partners: Male     Birth control/protection: Post-menopausal     Comment: I'm 77   Other Topics Concern    Not on file   Social History Narrative    Caffeine use     Social Determinants of Health     Financial Resource Strain: Low Risk  (2023)    Overall Financial Resource Strain (CARDIA)     Difficulty of Paying Living Expenses: Not hard at all   Food Insecurity: No Food Insecurity (2024)    Hunger Vital Sign      Worried About Running Out of Food in the Last Year: Never true     Ran Out of Food in the Last Year: Never true   Transportation Needs: No Transportation Needs (9/13/2024)    PRAPARE - Transportation     Lack of Transportation (Medical): No     Lack of Transportation (Non-Medical): No   Physical Activity: Inactive (8/22/2022)    Exercise Vital Sign     Days of Exercise per Week: 0 days     Minutes of Exercise per Session: 0 min   Stress: Not on file   Social Connections: Not on file   Intimate Partner Violence: Not on file   Housing Stability: Low Risk  (9/13/2024)    Housing Stability Vital Sign     Unable to Pay for Housing in the Last Year: No     Number of Times Moved in the Last Year: 1     Homeless in the Last Year: No       Allergies   Allergen Reactions    Hydrochlorothiazide Palpitations    Meloxicam Rash         Current Outpatient Medications:     amLODIPine (NORVASC) 5 mg tablet, Take 1 tablet (5 mg total) by mouth daily, Disp: 90 tablet, Rfl: 3    apixaban (Eliquis) 5 mg, TAKE 1 TABLET TWICE A DAY, Disp: 180 tablet, Rfl: 1    Ascorbic Acid (VITAMIN C) 1000 MG tablet, Take 1 tablet by mouth daily, Disp: , Rfl:     atorvastatin (LIPITOR) 40 mg tablet, TAKE 1 TABLET DAILY WITH DINNER, Disp: 90 tablet, Rfl: 1    Biotin 5000 MCG CAPS, Take 1 capsule by mouth daily, Disp: , Rfl:     cholecalciferol (VITAMIN D3) 1,000 units tablet, Take 2,000 Units by mouth daily, Disp: , Rfl:     diphenhydrAMINE-acetaminophen (TYLENOL PM)  MG TABS, Take 1 tablet by mouth daily at bedtime as needed for sleep, Disp: , Rfl:     esomeprazole (NexIUM) 40 MG capsule, Take 1 capsule (40 mg total) by mouth daily, Disp: 90 capsule, Rfl: 1    fluticasone (FLONASE) 50 mcg/act nasal spray, 2 sprays into each nostril daily, Disp: 48 g, Rfl: 0    levothyroxine 125 mcg tablet, TAKE 1 TABLET DAILY ON AN EMPTY STOMACH, Disp: 90 tablet, Rfl: 1    Magnesium 250 MG TABS, Take 250 mg by mouth in the morning, Disp: , Rfl:     Menthol, Topical  Analgesic, (BIOFREEZE EX), Apply 1 application. topically daily as needed (pain), Disp: , Rfl:     metoprolol tartrate (LOPRESSOR) 25 mg tablet, Take one tablet as needed for palpitations (Patient taking differently: if needed Take one tablet as needed for palpitations), Disp: 90 tablet, Rfl: 2    Multiple Vitamin (MULTIVITAMIN) capsule, Take 1 capsule by mouth daily, Disp: , Rfl:     Probiotic Product (ALIGN PO), Take 1 capsule by mouth in the morning, Disp: , Rfl:     psyllium (METAMUCIL) 58.6 % powder, Take 1 packet by mouth daily, Disp: , Rfl:     nystatin (MYCOSTATIN) cream, Apply topically 2 (two) times a day as needed (Patient not taking: Reported on 6/5/2024), Disp: , Rfl:     ramelteon (ROZEREM) 8 mg tablet, Take 1 tablet (8 mg total) by mouth daily at bedtime (Patient not taking: Reported on 9/30/2024), Disp: 30 tablet, Rfl: 3    triamcinolone (KENALOG) 0.5 % ointment, , Disp: , Rfl:     Current Facility-Administered Medications:     lidocaine (XYLOCAINE) 1 % injection 1 mL, 1 mL, Injection, , Mike Jack DPM, 1 mL at 12/05/23 1515    lidocaine (XYLOCAINE) 1 % injection 1 mL, 1 mL, Injection, , , 1 mL at 05/13/24 1300    lidocaine (XYLOCAINE) 1 % injection 1 mL, 1 mL, Injection, , , 1 mL at 05/13/24 1300    triamcinolone acetonide (KENALOG-40) 40 mg/mL injection 20 mg, 20 mg, Infiltration, , Mike Jack DPM, 20 mg at 12/05/23 1515    triamcinolone acetonide (KENALOG-40) 40 mg/mL injection 20 mg, 20 mg, Infiltration, , , 20 mg at 05/13/24 1300    triamcinolone acetonide (KENALOG-40) 40 mg/mL injection 20 mg, 20 mg, Infiltration, , , 20 mg at 05/13/24 1300

## 2024-09-30 NOTE — PATIENT INSTRUCTIONS
Lymphedema    Leg pain, left lateral x 5 months; pain every day, possibly due to underlying lymphedema    Plan: Will continue with lymphedema pump therapy, daily compression stockings, skin care and exercise.      Atypical left lateral leg pain for the past 6 months of unclear etiology.  Suspect due to underlying leg swelling and lymphedema. Will send her to PT OT for lymphedema for evaluation and course of manual massage. Will also check an x-ray- expect normal.  If she continues to have pain in the next month or two, she should call the office and we will further evaluate with consideration for reflux study +/- referral to ortho +/- scan.     -Continue with compression, elevation, activity as tolerated and skin moisturizer.   -Continue with lymphedema pump therapy 60 minutes twice daily  -Elevate your legs when at rest  -Regular exercise for weight loss will help your lymphedema and general health  -Good heart healthy diet with low sodium  -Refer physiotherapy with manual lymphatic drainage for therapeutic purposes  -Apply good moisturizer to skin daily and continue to monitor for any cracks, wounds or signs of infection.   -Follow up vascular surgery in 3 months

## 2024-10-14 PROBLEM — Z00.00 MEDICARE ANNUAL WELLNESS VISIT, SUBSEQUENT: Status: RESOLVED | Noted: 2024-09-14 | Resolved: 2024-10-14

## 2024-10-21 DIAGNOSIS — I10 ESSENTIAL HYPERTENSION: ICD-10-CM

## 2024-10-21 DIAGNOSIS — K21.00 GASTROESOPHAGEAL REFLUX DISEASE WITH ESOPHAGITIS, UNSPECIFIED WHETHER HEMORRHAGE: ICD-10-CM

## 2024-10-21 RX ORDER — AMLODIPINE BESYLATE 5 MG/1
5 TABLET ORAL DAILY
Qty: 90 TABLET | Refills: 1 | Status: SHIPPED | OUTPATIENT
Start: 2024-10-21

## 2024-10-21 RX ORDER — ESOMEPRAZOLE MAGNESIUM 40 MG/1
40 CAPSULE, DELAYED RELEASE ORAL DAILY
Qty: 90 CAPSULE | Refills: 1 | Status: SHIPPED | OUTPATIENT
Start: 2024-10-21

## 2024-10-29 DIAGNOSIS — R00.2 PALPITATIONS: ICD-10-CM

## 2024-10-29 DIAGNOSIS — J01.90 ACUTE NON-RECURRENT SINUSITIS, UNSPECIFIED LOCATION: ICD-10-CM

## 2024-10-29 RX ORDER — FLUTICASONE PROPIONATE 50 MCG
2 SPRAY, SUSPENSION (ML) NASAL DAILY
Qty: 48 G | Refills: 3 | Status: SHIPPED | OUTPATIENT
Start: 2024-10-29

## 2024-10-30 RX ORDER — METOPROLOL TARTRATE 25 MG/1
TABLET, FILM COATED ORAL
Qty: 90 TABLET | Refills: 1 | Status: SHIPPED | OUTPATIENT
Start: 2024-10-30

## 2024-11-18 DIAGNOSIS — I63.9 ACUTE CVA (CEREBROVASCULAR ACCIDENT) (HCC): ICD-10-CM

## 2024-11-18 DIAGNOSIS — I63.30 CEREBRAL THROMBOSIS WITH CEREBRAL INFARCTION (HCC): ICD-10-CM

## 2024-11-19 RX ORDER — ATORVASTATIN CALCIUM 40 MG/1
40 TABLET, FILM COATED ORAL
Qty: 90 TABLET | Refills: 1 | Status: SHIPPED | OUTPATIENT
Start: 2024-11-19

## 2024-11-21 ENCOUNTER — OFFICE VISIT (OUTPATIENT)
Dept: PODIATRY | Facility: CLINIC | Age: 79
End: 2024-11-21
Payer: COMMERCIAL

## 2024-11-21 VITALS — BODY MASS INDEX: 30.37 KG/M2 | HEIGHT: 66 IN | RESPIRATION RATE: 18 BRPM | WEIGHT: 189 LBS

## 2024-11-21 DIAGNOSIS — M20.22 HALLUX RIGIDUS OF LEFT FOOT: Primary | ICD-10-CM

## 2024-11-21 DIAGNOSIS — M25.572 ARTHRALGIA OF LEFT FOOT: ICD-10-CM

## 2024-11-21 PROCEDURE — RECHECK: Performed by: PODIATRIST

## 2024-11-21 PROCEDURE — 20600 DRAIN/INJ JOINT/BURSA W/O US: CPT | Performed by: PODIATRIST

## 2024-11-21 RX ORDER — LIDOCAINE HYDROCHLORIDE 10 MG/ML
1 INJECTION, SOLUTION INFILTRATION; PERINEURAL
Status: SHIPPED | OUTPATIENT
Start: 2024-11-21

## 2024-11-21 RX ORDER — TRIAMCINOLONE ACETONIDE 40 MG/ML
20 INJECTION, SUSPENSION INTRA-ARTICULAR; INTRAMUSCULAR
Status: SHIPPED | OUTPATIENT
Start: 2024-11-21

## 2024-11-21 RX ADMIN — TRIAMCINOLONE ACETONIDE 20 MG: 40 INJECTION, SUSPENSION INTRA-ARTICULAR; INTRAMUSCULAR at 08:30

## 2024-11-21 RX ADMIN — LIDOCAINE HYDROCHLORIDE 1 ML: 10 INJECTION, SOLUTION INFILTRATION; PERINEURAL at 08:30

## 2024-11-21 NOTE — PROGRESS NOTES
Patient presents desirous of cortisone injection due to pain secondary to hallux rigidus of the left foot.  Patient was diagnosed with hallux rigidus years back.  Periodic cortisone injection is provided relief for many months after injections.  Another one is desired today.    On exam, range of motion left great toe joint limited to 5 degrees of dorsiflexion.  Sharp pain with palpation lateral aspect first MPJ.    Treatment: Injected lateral aspect left first MPJ with 0.5 cc Kenalog 40 along with 1 cc 1% Xylocaine.  Reappoint 6 weeks.    Small joint arthrocentesis: L great MTP  Universal Protocol:  procedure performed by consultantConsent: Verbal consent obtained.  Consent given by: patient  Patient understanding: patient states understanding of the procedure being performed  Patient identity confirmed: verbally with patient  Supporting Documentation  Indications: pain   Procedure Details  Location: great toe - L great MTP  Needle size: 25 G  Approach: dorsal  Medications administered: 1 mL lidocaine 1 %; 20 mg triamcinolone acetonide 40 mg/mL

## 2024-12-11 ENCOUNTER — TELEPHONE (OUTPATIENT)
Age: 79
End: 2024-12-11

## 2024-12-11 NOTE — TELEPHONE ENCOUNTER
Caller: Patient    Reason for call: Patient called to schedule a NP apt with Dr Greene for BL knee pain; call disconnected.

## 2024-12-13 ENCOUNTER — OFFICE VISIT (OUTPATIENT)
Dept: OBGYN CLINIC | Facility: CLINIC | Age: 79
End: 2024-12-13
Payer: COMMERCIAL

## 2024-12-13 ENCOUNTER — APPOINTMENT (OUTPATIENT)
Dept: RADIOLOGY | Age: 79
End: 2024-12-13
Payer: COMMERCIAL

## 2024-12-13 VITALS
DIASTOLIC BLOOD PRESSURE: 75 MMHG | SYSTOLIC BLOOD PRESSURE: 121 MMHG | WEIGHT: 188 LBS | BODY MASS INDEX: 30.22 KG/M2 | HEART RATE: 97 BPM | HEIGHT: 66 IN

## 2024-12-13 DIAGNOSIS — M25.562 PAIN IN BOTH KNEES, UNSPECIFIED CHRONICITY: Primary | ICD-10-CM

## 2024-12-13 DIAGNOSIS — M25.562 PAIN IN BOTH KNEES, UNSPECIFIED CHRONICITY: ICD-10-CM

## 2024-12-13 DIAGNOSIS — M25.561 PAIN IN BOTH KNEES, UNSPECIFIED CHRONICITY: Primary | ICD-10-CM

## 2024-12-13 DIAGNOSIS — M17.12 PRIMARY OSTEOARTHRITIS OF LEFT KNEE: ICD-10-CM

## 2024-12-13 DIAGNOSIS — M25.561 PAIN IN BOTH KNEES, UNSPECIFIED CHRONICITY: ICD-10-CM

## 2024-12-13 PROCEDURE — 73564 X-RAY EXAM KNEE 4 OR MORE: CPT

## 2024-12-13 PROCEDURE — 99204 OFFICE O/P NEW MOD 45 MIN: CPT | Performed by: STUDENT IN AN ORGANIZED HEALTH CARE EDUCATION/TRAINING PROGRAM

## 2024-12-13 PROCEDURE — 20610 DRAIN/INJ JOINT/BURSA W/O US: CPT | Performed by: STUDENT IN AN ORGANIZED HEALTH CARE EDUCATION/TRAINING PROGRAM

## 2024-12-13 RX ADMIN — BUPIVACAINE HYDROCHLORIDE 2 ML: 2.5 INJECTION, SOLUTION INFILTRATION; PERINEURAL at 11:15

## 2024-12-13 RX ADMIN — BETAMETHASONE SODIUM PHOSPHATE AND BETAMETHASONE ACETATE 12 MG: 3; 3 INJECTION, SUSPENSION INTRA-ARTICULAR; INTRALESIONAL; INTRAMUSCULAR; SOFT TISSUE at 11:15

## 2024-12-13 NOTE — PROGRESS NOTES
Date: 24  Kasie Cheatham   MRN# [id number]  : 1945      Chief Complaint: Bilateral knee pain     Assessment and Plan:    Primary osteoarthritis of left knee  -WBAT  -Activity modification to limit strain or impact on the joint  -Tylenol 1000mg up to three times daily as needed. Do not exceed 3000mg daily  -Supervised physical therapy. Script provided   -Home exercise program directed by PT  -Weight loss discussed   -Knee sleeve or brace for comfort  -Cane or walker recommended to offload joint  -Corticosteroid injection was offered, accepted, and administered.  Risk benefits and alternatives were discussed prior to injection.  Patient tolerated the procedure well.  -Patient may follow up prn for further evaluation and treatment           Subjective:     Knee Pain  Patient complains of bilateral knee pain. Left > right. The pain began several years ago. The pain is located medial, lateral.  She describes the symptoms as aching and dull. Patient denies pain with activities.  She reports night time symptoms that wake her from sleep. The knee has not given out or felt unstable. The patient can bend and straighten the knee fully.  The patient is active in none.     External Records Reviewed: none    Allergy:  Allergies   Allergen Reactions    Hydrochlorothiazide Palpitations    Meloxicam Rash     Medications:  all current active meds have been reviewed  Past Medical History:  Past Medical History:   Diagnosis Date    Biliary dyskinesia     CAP (community acquired pneumonia)     last assessed 16    Class 1 obesity due to excess calories with serious comorbidity and body mass index (BMI) of 33.0 to 33.9 in adult 2018    Deep vein thrombosis (DVT) of proximal vein of right lower extremity, unspecified chronicity (HCC) 08/10/2022    Diabetes mellitus ([facility]) 2018    Type 2 no insulin    Disease of thyroid gland     GERD (gastroesophageal reflux disease)     Hyperlipidemia      Palpitations     Pneumonia     Shortness of breath     Stroke (HCC)     SVT (supraventricular tachycardia) ([facility]) 06/08/2022     Past Surgical History:  Past Surgical History:   Procedure Laterality Date    AUGMENTATION MAMMAPLASTY Bilateral 1998    breast surgery- with prosthetic implant 1998; pre-pectoral salline    BREAST BIOPSY Left 1974    BREAST BIOPSY Left 1994    BREAST EXCISIONAL BIOPSY Left 1993    CARDIAC CATHETERIZATION      procedure summary    CHOLECYSTECTOMY      onset 2003    HEMORRHOID SURGERY      ROTATOR CUFF REPAIR Bilateral     onset 2010    TUBAL LIGATION      onset 1980     Family History:  Family History   Problem Relation Age of Onset    Aneurysm Mother         cerebral    Ovarian cancer Mother 72    Cancer Mother     Cirrhosis Father         alcoholic    Alcohol abuse Father     Aneurysm Brother         abdominal aortic; cerebral    Diabetes Son     No Known Problems Daughter     No Known Problems Maternal Grandmother     No Known Problems Maternal Grandfather     No Known Problems Paternal Grandmother     No Known Problems Paternal Grandfather     No Known Problems Son     No Known Problems Son     No Known Problems Son     No Known Problems Maternal Aunt     No Known Problems Maternal Aunt     No Known Problems Maternal Aunt     No Known Problems Maternal Aunt     No Known Problems Paternal Aunt     No Known Problems Paternal Aunt     No Known Problems Paternal Aunt     No Known Problems Paternal Aunt     No Known Problems Paternal Aunt     Cancer Brother 67        bladder cancer     Social History:  Social History     Substance and Sexual Activity   Alcohol Use Yes    Alcohol/week: 2.0 standard drinks of alcohol    Types: 2 Glasses of wine per week    Comment: Maybe 2 glasses a month     Social History     Substance and Sexual Activity   Drug Use No     Social History     Tobacco Use   Smoking Status Former    Current packs/day: 0.00    Average packs/day: 0.3 packs/day for 50.0 years  "(12.5 ttl pk-yrs)    Types: Cigarettes    Start date:     Quit date: 2010    Years since quittin.9   Smokeless Tobacco Never   Tobacco Comments    0.5 ppw intermittently last smoked            ROS:   Review of Systems   Constitutional:  Negative for chills and fever.   HENT:  Negative for drooling and sneezing.    Eyes:  Negative for redness.   Respiratory:  Negative for cough and wheezing.    Gastrointestinal:  Negative for nausea and vomiting.   Musculoskeletal:         Please see ortho exam   Psychiatric/Behavioral:  Negative for behavioral problems. The patient is not nervous/anxious.         Objective:   BP Readings from Last 1 Encounters:   24 130/82      Wt Readings from Last 1 Encounters:   24 88.4 kg (194 lb 14.4 oz)      Pulse Readings from Last 1 Encounters:   24 87      BMI: Estimated body mass index is 30.34 kg/m² as calculated from the following:    Height as of this encounter: 5' 6\" (1.676 m).    Weight as of this encounter: 85.3 kg (188 lb).        Physical Exam  Vitals reviewed.   Constitutional:       Appearance: She is well-developed.   Eyes:      Pupils: Pupils are equal, round, and reactive to light.   Pulmonary:      Effort: Pulmonary effort is normal.      Breath sounds: Normal breath sounds.   Abdominal:      General: Abdomen is flat. There is no distension.   Skin:     General: Skin is warm and dry.   Neurological:      Mental Status: She is alert and oriented to person, place, and time.   Psychiatric:         Behavior: Behavior normal.         Thought Content: Thought content normal.         Judgment: Judgment normal.          Gait and Station:   normal    Bilateral Lower Extremity:  Left Knee:      Inspection:  normal color, temperature, turgor and moisture    Overall limb alignment: neutral    Effusion: no    ROM 0 to 110 without pain    Extensor Lag: Absent    Palpation: Medial and Lateral joint line tenderness to palpation    stable to AP translation " at 90 deg    Coronal plane stable in full extension    Coronal plane stable in mid-flexion     Motor: 5/5 EHL/FHL/TA/GS/Qd/Hs    Vascular: Toes WWP with BCR    Sensory: SILT DP/SP/Liliana/Saph/Tib    Right knee:     Inspection:  normal color, temperature, turgor and moisture    Overall limb alignment: neutral    Effusion: no    ROM 0 to 110 without pain    Extensor Lag: Absent    Palpation: No joint line tenderness to palpation    stable to AP translation at 90 deg    Coronal plane stable in full extension    Coronal plane stable in mid-flexion     Motor: 5/5 EHL/FHL/TA/GS/Qd/Hs    Vascular: Toes WWP with BCR    Sensory: SILT DP/SP/Liliana/Saph/Tib    Images:    I personally reviewed relevant images in the PACS system and my interpretation is as follows:  X-rays of the bilateral knee reveal moderate degenerative changes with joint space narrowing, subchondral sclerosis and osteophyte formation    Large joint arthrocentesis: L knee  Universal Protocol:  procedure performed by consultantConsent: Verbal consent obtained.  Consent given by: patient  Patient understanding: patient states understanding of the procedure being performed  Site marked: the operative site was marked  Patient identity confirmed: verbally with patient  Supporting Documentation  Indications: pain   Procedure Details  Location: knee - L knee  Needle size: 22 G  Ultrasound guidance: no  Approach: superior  Medications administered: 12 mg betamethasone acetate-betamethasone sodium phosphate 6 (3-3) mg/mL; 2 mL bupivacaine 0.25 %    Patient tolerance: patient tolerated the procedure well with no immediate complications  Dressing:  Sterile dressing applied           Duke Greene MD  Adult Reconstruction Specialist   [facility]     Scribe Attestation      I,:  Angelica Vigil MA am acting as a scribe while in the presence of the attending physician.:       I,:  Duke Greene MD personally performed the services described  in this documentation    as scribed in my presence.:

## 2024-12-16 ENCOUNTER — OFFICE VISIT (OUTPATIENT)
Dept: CARDIOLOGY CLINIC | Facility: CLINIC | Age: 79
End: 2024-12-16
Payer: COMMERCIAL

## 2024-12-16 VITALS
HEIGHT: 66 IN | SYSTOLIC BLOOD PRESSURE: 130 MMHG | HEART RATE: 87 BPM | WEIGHT: 194.9 LBS | BODY MASS INDEX: 31.32 KG/M2 | DIASTOLIC BLOOD PRESSURE: 82 MMHG

## 2024-12-16 DIAGNOSIS — R00.2 PALPITATIONS: ICD-10-CM

## 2024-12-16 DIAGNOSIS — E78.2 MIXED HYPERLIPIDEMIA: ICD-10-CM

## 2024-12-16 DIAGNOSIS — I63.30 CEREBRAL THROMBOSIS WITH CEREBRAL INFARCTION (HCC): ICD-10-CM

## 2024-12-16 DIAGNOSIS — I10 PRIMARY HYPERTENSION: Primary | ICD-10-CM

## 2024-12-16 DIAGNOSIS — I47.10 SVT (SUPRAVENTRICULAR TACHYCARDIA) (HCC): ICD-10-CM

## 2024-12-16 DIAGNOSIS — Q21.12 PFO (PATENT FORAMEN OVALE): ICD-10-CM

## 2024-12-16 PROBLEM — M17.12 PRIMARY OSTEOARTHRITIS OF LEFT KNEE: Status: ACTIVE | Noted: 2021-12-14

## 2024-12-16 PROCEDURE — 93000 ELECTROCARDIOGRAM COMPLETE: CPT | Performed by: INTERNAL MEDICINE

## 2024-12-16 PROCEDURE — 99214 OFFICE O/P EST MOD 30 MIN: CPT | Performed by: INTERNAL MEDICINE

## 2024-12-16 RX ORDER — BUPIVACAINE HYDROCHLORIDE 2.5 MG/ML
2 INJECTION, SOLUTION INFILTRATION; PERINEURAL
Status: COMPLETED | OUTPATIENT
Start: 2024-12-13 | End: 2024-12-13

## 2024-12-16 RX ORDER — BETAMETHASONE SODIUM PHOSPHATE AND BETAMETHASONE ACETATE 3; 3 MG/ML; MG/ML
12 INJECTION, SUSPENSION INTRA-ARTICULAR; INTRALESIONAL; INTRAMUSCULAR; SOFT TISSUE
Status: COMPLETED | OUTPATIENT
Start: 2024-12-13 | End: 2024-12-13

## 2024-12-16 NOTE — PROGRESS NOTES
Cardiology Follow Up    Kasie Cheatham  1945  [id number]  [facility] CARDIAC CATH [facility]  [address] [address]  [phone]    1. Primary hypertension        2. History of Cerebral thrombosis with cerebral infarction (HCC)        3. PFO (patent foramen ovale)        4. Palpitations        5. Mixed hyperlipidemia        6. SVT (supraventricular tachycardia) ([facility])            Interval History: Cardiology follow-up.  Patient has been experiencing occasional palpitations.  He has been using the beta-blocker more frequently, she did recently receive the COVID-vaccine.  Denies any syncope or presyncope denies any chest pain or dyspnea.  She does have no new no focal neurological deficits.  Denies amaurosis fugax.  States been compliant with low-cholesterol diet.  Lipids this year total cholesterol 143, HDL 54, LDL 67, excellent control on high intense statin therapy.  Denies any bleeding issues on full anticoagulation with factor Xa inhibitor.  Compliant with low-sodium diet, blood pressures been well-controlled.    Patient Active Problem List   Diagnosis    Lymphedema    Fatigue    Hyperlipidemia    Hypothyroidism    Type 2 diabetes mellitus without complication, without long-term current use of insulin (HCC)    Atypical chest pain    Right lower quadrant abdominal pain    Pelvic pain    Family history of ovarian cancer    Wellness examination    Dense breast tissue on mammogram    Fibrocystic breast changes    Dupuytren's contracture    Diverticular disease of colon    Gastro-esophageal reflux disease with esophagitis    History of adenomatous polyp of colon    Hypothyroidism due to Hashimoto's thyroiditis    Impaired fasting glucose    Nephrolithiasis    Primary hypertension    Obesity    Encounter for screening mammogram for breast cancer    Dyspnea on exertion    Insomnia    Oral ulceration    Right flank pain    Exposure  to COVID-19 virus    Edema    Thyroid nodule    Osteoarthritis    SVT (supraventricular tachycardia) ([facility])    Urinary incontinence    Right groin pain    Trigger finger    Benign essential tremor    Tachycardia    COVID-19    History of Cerebral thrombosis with cerebral infarction (HCC)    Palpitations    Hypokalemia    PFO (patent foramen ovale)    Frontal headache    History of CVA (cerebrovascular accident)    Overweight (BMI 25.0-29.9)    Stroke-like symptom    Ocular migraine    Anxiety    Acute midline low back pain without sciatica    Facet arthritis of lumbar region    Pain of left lower extremity     Past Medical History:   Diagnosis Date    Biliary dyskinesia     CAP (community acquired pneumonia)     last assessed 16    Class 1 obesity due to excess calories with serious comorbidity and body mass index (BMI) of 33.0 to 33.9 in adult 2018    Deep vein thrombosis (DVT) of proximal vein of right lower extremity, unspecified chronicity ([facility]) 08/10/2022    Diabetes mellitus ([facility]) 2018    Type 2 no insulin    Disease of thyroid gland     GERD (gastroesophageal reflux disease)     Hyperlipidemia     Palpitations     Pneumonia     Shortness of breath     Stroke ([facility])     SVT (supraventricular tachycardia) ([facility]) 2022     Social History     Socioeconomic History    Marital status: /Civil Union     Spouse name: Not on file    Number of children: Not on file    Years of education: 12    Highest education level: Not on file   Occupational History    Not on file   Tobacco Use    Smoking status: Former     Current packs/day: 0.00     Average packs/day: 0.3 packs/day for 50.0 years (12.5 ttl pk-yrs)     Types: Cigarettes     Start date:      Quit date: 2010     Years since quittin.9    Smokeless tobacco: Never    Tobacco comments:     0.5 ppw intermittently last smoked    Vaping Use    Vaping status: Never Used   Substance and Sexual Activity    Alcohol use: Yes      Alcohol/week: 2.0 standard drinks of alcohol     Types: 2 Glasses of wine per week     Comment: Maybe 2 glasses a month    Drug use: No    Sexual activity: Not Currently     Partners: Male     Birth control/protection: Post-menopausal     Comment: I'm 77   Other Topics Concern    Not on file   Social History Narrative    Caffeine use     Social Drivers of Health     Financial Resource Strain: Low Risk  (9/12/2023)    Overall Financial Resource Strain (CARDIA)     Difficulty of Paying Living Expenses: Not hard at all   Food Insecurity: No Food Insecurity (9/13/2024)    Nursing - Inadequate Food Risk Classification     Worried About Running Out of Food in the Last Year: Never true     Ran Out of Food in the Last Year: Never true     Ran Out of Food in the Last Year: Not on file   Transportation Needs: No Transportation Needs (9/13/2024)    PRAPARE - Transportation     Lack of Transportation (Medical): No     Lack of Transportation (Non-Medical): No   Physical Activity: Inactive (8/22/2022)    Exercise Vital Sign     Days of Exercise per Week: 0 days     Minutes of Exercise per Session: 0 min   Stress: Not on file   Social Connections: Not on file   Intimate Partner Violence: Not on file   Housing Stability: Low Risk  (9/13/2024)    Housing Stability Vital Sign     Unable to Pay for Housing in the Last Year: No     Number of Times Moved in the Last Year: 1     Homeless in the Last Year: No      Family History   Problem Relation Age of Onset    Aneurysm Mother         cerebral    Ovarian cancer Mother 72    Cancer Mother     Cirrhosis Father         alcoholic    Alcohol abuse Father     Aneurysm Brother         abdominal aortic; cerebral    Diabetes Son     No Known Problems Daughter     No Known Problems Maternal Grandmother     No Known Problems Maternal Grandfather     No Known Problems Paternal Grandmother     No Known Problems Paternal Grandfather     No Known Problems Son     No Known Problems Son     No Known  Problems Son     No Known Problems Maternal Aunt     No Known Problems Maternal Aunt     No Known Problems Maternal Aunt     No Known Problems Maternal Aunt     No Known Problems Paternal Aunt     No Known Problems Paternal Aunt     No Known Problems Paternal Aunt     No Known Problems Paternal Aunt     No Known Problems Paternal Aunt     Cancer Brother 67        bladder cancer     Past Surgical History:   Procedure Laterality Date    AUGMENTATION MAMMAPLASTY Bilateral 1998    breast surgery- with prosthetic implant 1998; pre-pectoral salline    BREAST BIOPSY Left 1974    BREAST BIOPSY Left 1994    BREAST EXCISIONAL BIOPSY Left 1993    CARDIAC CATHETERIZATION      procedure summary    CHOLECYSTECTOMY      onset 2003    HEMORRHOID SURGERY      ROTATOR CUFF REPAIR Bilateral     onset 2010    TUBAL LIGATION      onset 1980       Current Outpatient Medications:     amLODIPine (NORVASC) 5 mg tablet, TAKE 1 TABLET DAILY, Disp: 90 tablet, Rfl: 1    apixaban (Eliquis) 5 mg, TAKE 1 TABLET TWICE A DAY, Disp: 180 tablet, Rfl: 1    Ascorbic Acid (VITAMIN C) 1000 MG tablet, Take 1 tablet by mouth daily, Disp: , Rfl:     atorvastatin (LIPITOR) 40 mg tablet, TAKE 1 TABLET DAILY WITH DINNER, Disp: 90 tablet, Rfl: 1    Biotin 5000 MCG CAPS, Take 1 capsule by mouth daily, Disp: , Rfl:     cholecalciferol (VITAMIN D3) 1,000 units tablet, Take 2,000 Units by mouth daily, Disp: , Rfl:     diphenhydrAMINE-acetaminophen (TYLENOL PM)  MG TABS, Take 1 tablet by mouth daily at bedtime as needed for sleep, Disp: , Rfl:     esomeprazole (NexIUM) 40 MG capsule, TAKE 1 CAPSULE DAILY, Disp: 90 capsule, Rfl: 1    fluticasone (FLONASE) 50 mcg/act nasal spray, USE 2 SPRAYS IN EACH NOSTRIL DAILY, Disp: 48 g, Rfl: 3    levothyroxine 125 mcg tablet, TAKE 1 TABLET DAILY ON AN EMPTY STOMACH, Disp: 90 tablet, Rfl: 1    Magnesium 250 MG TABS, Take 250 mg by mouth in the morning, Disp: , Rfl:     Menthol, Topical Analgesic, (BIOFREEZE EX), Apply 1  application. topically daily as needed (pain), Disp: , Rfl:     metoprolol tartrate (LOPRESSOR) 25 mg tablet, Take one tablet as needed for palpitations, Disp: 90 tablet, Rfl: 1    Multiple Vitamin (MULTIVITAMIN) capsule, Take 1 capsule by mouth daily, Disp: , Rfl:     nystatin (MYCOSTATIN) cream, Apply topically 2 (two) times a day as needed (Patient not taking: Reported on 6/5/2024), Disp: , Rfl:     Probiotic Product (ALIGN PO), Take 1 capsule by mouth in the morning, Disp: , Rfl:     psyllium (METAMUCIL) 58.6 % powder, Take 1 packet by mouth daily, Disp: , Rfl:     ramelteon (ROZEREM) 8 mg tablet, Take 1 tablet (8 mg total) by mouth daily at bedtime (Patient not taking: Reported on 9/30/2024), Disp: 30 tablet, Rfl: 3    triamcinolone (KENALOG) 0.5 % ointment, , Disp: , Rfl:     Current Facility-Administered Medications:     lidocaine (XYLOCAINE) 1 % injection 1 mL, 1 mL, Injection, , Mike Jack DPM, 1 mL at 12/05/23 1515    lidocaine (XYLOCAINE) 1 % injection 1 mL, 1 mL, Injection, , , 1 mL at 05/13/24 1300    lidocaine (XYLOCAINE) 1 % injection 1 mL, 1 mL, Injection, , , 1 mL at 05/13/24 1300    lidocaine (XYLOCAINE) 1 % injection 1 mL, 1 mL, Injection, , , 1 mL at 11/21/24 0830    triamcinolone acetonide (KENALOG-40) 40 mg/mL injection 20 mg, 20 mg, Infiltration, , Mike Jack DPM, 20 mg at 12/05/23 1515    triamcinolone acetonide (KENALOG-40) 40 mg/mL injection 20 mg, 20 mg, Infiltration, , , 20 mg at 05/13/24 1300    triamcinolone acetonide (KENALOG-40) 40 mg/mL injection 20 mg, 20 mg, Infiltration, , , 20 mg at 05/13/24 1300    triamcinolone acetonide (Kenalog-40) 40 mg/mL injection 20 mg, 20 mg, Infiltration, , , 20 mg at 11/21/24 0830  Allergies   Allergen Reactions    Hydrochlorothiazide Palpitations    Meloxicam Rash       Labs:  No visits with results within 6 Month(s) from this visit.   Latest known visit with results is:   Telephone on 05/20/2024   Component Date Value    Right Eye Diabetic  Retin* 01/17/2024 None     Left Eye Diabetic Retino* 01/17/2024 None      Imaging: XR knee 4+ vw right injury  Result Date: 12/13/2024  Narrative: RIGHT KNEE INDICATION:   Pain in right knee. Pain in left knee.   COMPARISON:  None. VIEWS:  XR KNEE 4+ VW RIGHT INJURY FINDINGS: There is no acute fracture or dislocation. There is a small joint effusion. There is mild to moderate narrowing of the medial compartment space. Small osteophyte of the medial tibial plateau and lateral plateau. Minimal varus angulation secondarily. Patellofemoral space preserved. No lytic or blastic osseous lesion. Soft tissues are unremarkable.     Impression: Mild to moderate medial compartment degenerative changes as above with minimal varus angulation. Very small effusion. No fracture Electronically signed: 12/13/2024 02:50 PM Erick Vincent MD    XR knee 4+ vw left injury  Result Date: 12/13/2024  Narrative: LEFT KNEE INDICATION:   Pain in right knee. Pain in left knee. COMPARISON:  None. VIEWS:  XR KNEE 4+ VW LEFT INJURY FINDINGS: There is no acute fracture or dislocation. There is a small joint effusion. No significant degenerative changes. No lytic or blastic osseous lesion. Soft tissues are unremarkable.     Impression: No acute osseous abnormality. Small suprapatellar effusion. Electronically signed: 12/13/2024 02:45 PM Erick Vincent MD      Review of Systems:  Review of Systems   Constitutional:  Negative for activity change, diaphoresis, fatigue and fever.   HENT:  Negative for nosebleeds.    Respiratory:  Negative for apnea, shortness of breath, wheezing and stridor.    Cardiovascular:  Positive for palpitations. Negative for chest pain and leg swelling.   Gastrointestinal:  Negative for abdominal pain, anal bleeding and blood in stool.   Endocrine: Negative for cold intolerance.   Genitourinary:  Negative for hematuria.   Musculoskeletal:  Negative for arthralgias, gait problem and myalgias.   Skin:  Negative for pallor and  rash.   Allergic/Immunologic: Negative for immunocompromised state.   Neurological:  Negative for dizziness, syncope and weakness.   Psychiatric/Behavioral:  Negative for sleep disturbance. The patient is not nervous/anxious.        Physical Exam:  Physical Exam  Vitals reviewed.   Constitutional:       General: She is not in acute distress.     Appearance: Normal appearance. She is not ill-appearing, toxic-appearing or diaphoretic.   Eyes:      General: No scleral icterus.  Neck:      Vascular: No carotid bruit.   Cardiovascular:      Rate and Rhythm: Normal rate and regular rhythm.      Pulses: Normal pulses.      Heart sounds: Normal heart sounds. No murmur heard.     No friction rub. No gallop.   Pulmonary:      Effort: Pulmonary effort is normal. No respiratory distress.      Breath sounds: Normal breath sounds. No stridor. No wheezing, rhonchi or rales.   Musculoskeletal:      Right lower leg: No edema.      Left lower leg: No edema.   Skin:     General: Skin is warm and dry.      Capillary Refill: Capillary refill takes less than 2 seconds.      Coloration: Skin is not jaundiced or pale.      Findings: No bruising or erythema.   Neurological:      Mental Status: She is alert and oriented to person, place, and time.   Psychiatric:         Mood and Affect: Mood normal.         Discussion/Summary:    Status post CVA, atherothrombotic, MRI of the brain 2023 revealed left MCA encephalomalacia.  My feeling that this is related to her previous COVID-19 infection.  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  I will continue full anticoagulation on a permanent basis.  And given that I do not favor implantable loop as this will not change her medical therapy.  2-week monitor 2024 revealed short bursts of SVT, no atrial fibrillation.  Will repeat a Holter monitor.  ANDREW 2023 revealed normal ventricular function with a small PFO and a small atrial septal aneurysm minimal right-to-left shunting.   Additionally given her age group she is not a candidate for PFO closure, even if these were to be done, this would not change her anticoagulation regimen.  I explained the rational to the patient and her  who understand and agree with current plan.   CTA of the head and neck follow-up revealed resolution of the problems in the carotid.,  There was no obstructive disease.  Continue current medical regimen.  Heart catheterization 2020 revealed normal epicardial coronary arteries.        This note was completed in part utilizing [employer] direct voice recognition software.   Grammatical errors, random word insertion, spelling mistakes, and incomplete sentences may be an occasional consequence of the system secondary to software limitations, ambient noise and hardware issues. At the time of dictation, efforts were made to edit, clarify and /or correct errors.  Please read the chart carefully and recognize, using context, where substitutions have occurred.  If you have any questions or concerns about the context, text or information contained within the body of this dictation, please contact myself, the provider, for further clarification.

## 2024-12-16 NOTE — ASSESSMENT & PLAN NOTE
-WBAT  -Activity modification to limit strain or impact on the joint  -Tylenol 1000mg up to three times daily as needed. Do not exceed 3000mg daily  -Supervised physical therapy. Script provided   -Home exercise program directed by PT  -Weight loss discussed   -Knee sleeve or brace for comfort  -Cane or walker recommended to offload joint  -Corticosteroid injection was offered, accepted, and administered.  Risk benefits and alternatives were discussed prior to injection.  Patient tolerated the procedure well.  -Patient may follow up prn for further evaluation and treatment

## 2025-01-04 DIAGNOSIS — K21.00 GASTROESOPHAGEAL REFLUX DISEASE WITH ESOPHAGITIS, UNSPECIFIED WHETHER HEMORRHAGE: Primary | ICD-10-CM

## 2025-01-06 ENCOUNTER — TRANSCRIBE ORDERS (OUTPATIENT)
Dept: GASTROENTEROLOGY | Facility: CLINIC | Age: 80
End: 2025-01-06

## 2025-01-08 ENCOUNTER — PROCEDURE VISIT (OUTPATIENT)
Dept: CARDIOLOGY CLINIC | Facility: CLINIC | Age: 80
End: 2025-01-08

## 2025-01-08 DIAGNOSIS — I47.10 SVT (SUPRAVENTRICULAR TACHYCARDIA) (HCC): ICD-10-CM

## 2025-01-08 DIAGNOSIS — I63.30 CEREBRAL THROMBOSIS WITH CEREBRAL INFARCTION (HCC): ICD-10-CM

## 2025-01-08 DIAGNOSIS — R00.2 PALPITATIONS: ICD-10-CM

## 2025-01-08 PROCEDURE — RECHECK: Performed by: INTERNAL MEDICINE

## 2025-01-10 ENCOUNTER — TELEPHONE (OUTPATIENT)
Age: 80
End: 2025-01-10

## 2025-01-10 NOTE — TELEPHONE ENCOUNTER
Received a call from Kasie had a monitor and that area is raised and reddened. No openings or infection, or draining.   Looking to see if should put Vaseline on it    Advised would recommend trying it clean and dry. Advised not sure Vaseline is the  best option, more like a barrier, maybe a OTC benadryl cream it itchy.     Verbally understood

## 2025-01-14 DIAGNOSIS — I63.9 ACUTE CVA (CEREBROVASCULAR ACCIDENT) (HCC): ICD-10-CM

## 2025-01-15 RX ORDER — APIXABAN 5 MG/1
5 TABLET, FILM COATED ORAL 2 TIMES DAILY
Qty: 60 TABLET | Refills: 0 | Status: SHIPPED | OUTPATIENT
Start: 2025-01-15 | End: 2025-01-20 | Stop reason: SDUPTHER

## 2025-01-16 ENCOUNTER — TELEPHONE (OUTPATIENT)
Dept: PODIATRY | Facility: CLINIC | Age: 80
End: 2025-01-16

## 2025-01-20 DIAGNOSIS — I63.9 ACUTE CVA (CEREBROVASCULAR ACCIDENT) (HCC): ICD-10-CM

## 2025-01-29 ENCOUNTER — VBI (OUTPATIENT)
Dept: ADMINISTRATIVE | Facility: OTHER | Age: 80
End: 2025-01-29

## 2025-01-29 NOTE — TELEPHONE ENCOUNTER
01/29/25 2:00 PM     Chart reviewed for Blood Pressure was/were submitted to the patient's insurance.     Lilibeth Sorensen MA   [facility]

## 2025-01-31 DIAGNOSIS — M79.675 PAIN IN TOE OF LEFT FOOT: Primary | ICD-10-CM

## 2025-02-02 ENCOUNTER — APPOINTMENT (OUTPATIENT)
Dept: RADIOLOGY | Age: 80
End: 2025-02-02
Payer: COMMERCIAL

## 2025-02-02 DIAGNOSIS — M79.675 PAIN IN TOE OF LEFT FOOT: ICD-10-CM

## 2025-02-02 PROCEDURE — 73630 X-RAY EXAM OF FOOT: CPT

## 2025-02-04 ENCOUNTER — OFFICE VISIT (OUTPATIENT)
Dept: PODIATRY | Facility: CLINIC | Age: 80
End: 2025-02-04
Payer: COMMERCIAL

## 2025-02-04 VITALS — RESPIRATION RATE: 18 BRPM | BODY MASS INDEX: 31.46 KG/M2 | HEIGHT: 66 IN

## 2025-02-04 DIAGNOSIS — R60.0 EDEMA OF TOE: ICD-10-CM

## 2025-02-04 DIAGNOSIS — M25.572 TOE JOINT PAIN, LEFT: Primary | ICD-10-CM

## 2025-02-04 PROCEDURE — 20600 DRAIN/INJ JOINT/BURSA W/O US: CPT | Performed by: PODIATRIST

## 2025-02-04 PROCEDURE — 99213 OFFICE O/P EST LOW 20 MIN: CPT | Performed by: PODIATRIST

## 2025-02-04 RX ORDER — LIDOCAINE HYDROCHLORIDE 10 MG/ML
3 INJECTION, SOLUTION INFILTRATION; PERINEURAL
Status: SHIPPED | OUTPATIENT
Start: 2025-02-04

## 2025-02-04 RX ORDER — TRIAMCINOLONE ACETONIDE 40 MG/ML
20 INJECTION, SUSPENSION INTRA-ARTICULAR; INTRAMUSCULAR
Status: SHIPPED | OUTPATIENT
Start: 2025-02-04

## 2025-02-04 RX ADMIN — TRIAMCINOLONE ACETONIDE 20 MG: 40 INJECTION, SUSPENSION INTRA-ARTICULAR; INTRAMUSCULAR at 15:15

## 2025-02-04 RX ADMIN — LIDOCAINE HYDROCHLORIDE 3 ML: 10 INJECTION, SOLUTION INFILTRATION; PERINEURAL at 15:15

## 2025-02-04 NOTE — PROGRESS NOTES
Patient seen as an emergency.  Approximately 1 week ago, patient developed pain and swelling in her left fifth toe.  This occurred after she was bowling but she does not recall any trauma while bowling.  Pain began after she took her bone shoe off.  She notes that the left fifth toe became red hot and swollen.    On questioning, patient denies arthritis throughout her body but does have known arthritis in the left great toe joint.  Pedal pulses are palpable.  She notes that she is prediabetic.  She does take a blood thinner.  She denies a history of gout.  Last A1c dated 4/10/2024 was 6.2.  On exam, the left fifth toe is swollen, red and shiny.  It is very painful with direct pressure.  There are no open lesions.  There is no drainage from the digit.  No other toe is so affected.  Patient has no range of motion left first MPJ consistent with hallux rigidus.    I personally reviewed x-rays taken on 2/2/2025.  They are negative for osseous pathology left fifth toe.  They do reveal arthritis at the left great toe joint.    Treatment: Explained to patient that she is dealing with an inflammatory arthropathy.  It is unlikely that she has gout.  Discussed treatment options recommending either a Medrol Dosepak or cortisone injection with patient preferring the latter.  Injected 3 cc of 1% Xylocaine to the base of the left fifth toe followed by 0.5 cc Kenalog 40.  Reappoint 30 days.    Small joint arthrocentesis: L small MTP  Universal Protocol:  procedure performed by consultantConsent: Verbal consent obtained.  Risks and benefits: risks, benefits and alternatives were discussed  Consent given by: patient  Patient understanding: patient states understanding of the procedure being performed  Patient identity confirmed: verbally with patient  Supporting Documentation  Indications: pain and joint swelling   Procedure Details  Location: small toe - L small MTP  Needle size: 25 G  Ultrasound guidance: no  Approach:  dorsal  Medications administered: 3 mL lidocaine 1 %; 20 mg triamcinolone acetonide 40 mg/mL

## 2025-03-07 ENCOUNTER — OFFICE VISIT (OUTPATIENT)
Dept: OBGYN CLINIC | Facility: CLINIC | Age: 80
End: 2025-03-07
Payer: COMMERCIAL

## 2025-03-07 VITALS — HEIGHT: 66 IN | WEIGHT: 197 LBS | BODY MASS INDEX: 31.66 KG/M2

## 2025-03-07 DIAGNOSIS — M17.11 PRIMARY OSTEOARTHRITIS OF RIGHT KNEE: ICD-10-CM

## 2025-03-07 DIAGNOSIS — M17.12 PRIMARY OSTEOARTHRITIS OF LEFT KNEE: Primary | ICD-10-CM

## 2025-03-07 DIAGNOSIS — M25.562 PAIN IN BOTH KNEES, UNSPECIFIED CHRONICITY: ICD-10-CM

## 2025-03-07 DIAGNOSIS — M25.561 PAIN IN BOTH KNEES, UNSPECIFIED CHRONICITY: ICD-10-CM

## 2025-03-07 PROCEDURE — 20610 DRAIN/INJ JOINT/BURSA W/O US: CPT | Performed by: STUDENT IN AN ORGANIZED HEALTH CARE EDUCATION/TRAINING PROGRAM

## 2025-03-07 PROCEDURE — 99214 OFFICE O/P EST MOD 30 MIN: CPT | Performed by: STUDENT IN AN ORGANIZED HEALTH CARE EDUCATION/TRAINING PROGRAM

## 2025-03-07 RX ADMIN — LIDOCAINE HYDROCHLORIDE 2 ML: 10 INJECTION, SOLUTION INFILTRATION; PERINEURAL at 09:30

## 2025-03-07 RX ADMIN — BETAMETHASONE SODIUM PHOSPHATE AND BETAMETHASONE ACETATE 12 MG: 3; 3 INJECTION, SUSPENSION INTRA-ARTICULAR; INTRALESIONAL; INTRAMUSCULAR; SOFT TISSUE at 09:30

## 2025-03-07 NOTE — ASSESSMENT & PLAN NOTE
Orders:    Large joint arthrocentesis  -WBAT  -Activity modification to limit strain or impact on the joint  -Tylenol 1000mg up to three times daily as needed. Do not exceed 3000mg daily  -Continue home exercise program   -Knee sleeve or brace for comfort  -Cane or walker recommended to offload joint  -Corticosteroid injection was offered, accepted, and administered.  Risk benefits and alternatives were discussed prior to injection.  Patient tolerated the procedure well.  -Patient may follow up in 3 months for further evaluation and treatment

## 2025-03-07 NOTE — PROGRESS NOTES
"      Date: 25  Kasie Cheatham   MRN# [id number]  : 1945      Assessment & Plan  Primary osteoarthritis of left knee    Orders:    Large joint arthrocentesis  -WBAT  -Activity modification to limit strain or impact on the joint  -Tylenol 1000mg up to three times daily as needed. Do not exceed 3000mg daily  -Continue home exercise program   -Knee sleeve or brace for comfort  -Cane or walker recommended to offload joint  -Corticosteroid injection was offered, accepted, and administered.  Risk benefits and alternatives were discussed prior to injection.  Patient tolerated the procedure well.  -Patient may follow up in 3 months for further evaluation and treatment      Primary osteoarthritis of right knee         Pain in both knees, unspecified chronicity         Large joint arthrocentesis: L knee  Universal Protocol:  procedure performed by consultantConsent: Verbal consent obtained.  Risks and benefits: risks, benefits and alternatives were discussed  Consent given by: patient  Time out: Immediately prior to procedure a \"time out\" was called to verify the correct patient, procedure, equipment, support staff and site/side marked as required.  Timeout called at: 3/7/2025 10:18 AM.  Patient understanding: patient states understanding of the procedure being performed  Patient consent: the patient's understanding of the procedure matches consent given  Site marked: the operative site was marked  Patient identity confirmed: verbally with patient  Supporting Documentation  Indications: pain   Procedure Details  Location: knee - L knee  Preparation: Patient was prepped and draped in the usual sterile fashion  Needle gauge: 21 G.  Ultrasound guidance: no  Approach: anterolateral  Medications administered: 12 mg betamethasone acetate-betamethasone sodium phosphate 6 (3-3) mg/mL; 2 mL lidocaine 1 %    Patient tolerance: patient tolerated the procedure well with no immediate complications            Chief " Complaint: Bilateral Knee Pain    Assessment and Plan:  The patient verbalized understanding of exam findings and treatment plan. We engaged in the shared decision-making process and treatment options were discussed at length with the patient. Surgical and conservative management discussed today along with risks and benefits. Patient was agreeable with the plan and all questions were answered to satisfaction.        Subjective:   Kasie Cheatham is a 79 y.o. female who is being seen in follow-up for Bilateral knee pain. At her previous visit on 12/13/2024, she underwent a left knee CSI in which she received significant relief for 2.5 months. She notes the pain returned 2 weeks ago, and is aggravated with weight bearing related activity including walking, climbing and descending stairs, and with sleeping. She has been compliant with a home exercise program including stationary biking and knee stretching and strengthening. She denies further trauma or injury to bilateral knees. No distal numbness or tingling.       Prior treatment:  NSAIDs Yes    Bracing Yes   Physical Therapy Yes   Cortisone Injections Yes   Viscosupplementation No     Allergy:  Allergies   Allergen Reactions    Hydrochlorothiazide Palpitations    Meloxicam Rash     Medications:  All current active meds have been reviewed   Past Medical History:  Past Medical History:   Diagnosis Date    Biliary dyskinesia     CAP (community acquired pneumonia)     last assessed 8/17/16    Class 1 obesity due to excess calories with serious comorbidity and body mass index (BMI) of 33.0 to 33.9 in adult 05/08/2018    Deep vein thrombosis (DVT) of proximal vein of right lower extremity, unspecified chronicity (HCC) 08/10/2022    Diabetes mellitus ([facility]) 12/2018    Type 2 no insulin    Disease of thyroid gland 2000    GERD (gastroesophageal reflux disease)     Hyperlipidemia     Palpitations     Pneumonia     Shortness of breath     Stroke ([facility])     SVT  (supraventricular tachycardia) ([facility]) 06/08/2022     Past Surgical History:  Past Surgical History:   Procedure Laterality Date    AUGMENTATION MAMMAPLASTY Bilateral 1998    breast surgery- with prosthetic implant 1998; pre-pectoral salline    BREAST BIOPSY Left 1974    BREAST BIOPSY Left 1994    BREAST EXCISIONAL BIOPSY Left 1993    CARDIAC CATHETERIZATION      procedure summary    CHOLECYSTECTOMY      onset 2003    HEMORRHOID SURGERY      ROTATOR CUFF REPAIR Bilateral     onset 2010    TUBAL LIGATION      onset 1980     Family History:  Family History   Problem Relation Age of Onset    Aneurysm Mother         cerebral    Ovarian cancer Mother 72    Cancer Mother     Cirrhosis Father         alcoholic    Alcohol abuse Father     Aneurysm Brother         abdominal aortic; cerebral    Diabetes Son     No Known Problems Daughter     No Known Problems Maternal Grandmother     No Known Problems Maternal Grandfather     No Known Problems Paternal Grandmother     No Known Problems Paternal Grandfather     No Known Problems Son     No Known Problems Son     No Known Problems Son     No Known Problems Maternal Aunt     No Known Problems Maternal Aunt     No Known Problems Maternal Aunt     No Known Problems Maternal Aunt     No Known Problems Paternal Aunt     No Known Problems Paternal Aunt     No Known Problems Paternal Aunt     No Known Problems Paternal Aunt     No Known Problems Paternal Aunt     Cancer Brother 67        bladder cancer     Social History:  Social History     Substance and Sexual Activity   Alcohol Use Yes    Alcohol/week: 2.0 standard drinks of alcohol    Types: 2 Glasses of wine per week    Comment: Maybe 2 glasses a month     Social History     Substance and Sexual Activity   Drug Use No     Social History     Tobacco Use   Smoking Status Former    Current packs/day: 0.00    Average packs/day: 0.3 packs/day for 50.0 years (12.5 ttl pk-yrs)    Types: Cigarettes    Start date: 1960    Quit date:  "1/1/2010    Years since quitting: 15.1   Smokeless Tobacco Never   Tobacco Comments    0.5 ppw intermittently last smoked 2010           Review of Systems:  General- denies fever/chills  HEENT- denies hearing loss or sore throat  Eyes- denies eye pain or visual disturbances, denies red eyes  Respiratory- denies cough or SOB  Cardio- denies chest pain or palpitations  GI- denies abdominal pain  Endocrine- denies urinary frequency  Urinary- denies pain with urination  Musculoskeletal- Negative except noted above  Skin- denies rashes or wounds  Neurological- denies dizziness or headache  Psychiatric- denies anxiety or difficulty concentrating    Objective:   BP Readings from Last 1 Encounters:   12/16/24 130/82      Wt Readings from Last 1 Encounters:   03/07/25 89.4 kg (197 lb)      Pulse Readings from Last 1 Encounters:   12/16/24 87        BMI: Estimated body mass index is 31.8 kg/m² as calculated from the following:    Height as of this encounter: 5' 6\" (1.676 m).    Weight as of this encounter: 89.4 kg (197 lb).    Physical Exam  Ht 5' 6\" (1.676 m) Comment: verbal  Wt 89.4 kg (197 lb)   BMI 31.80 kg/m²   General/Constitutional: No apparent distress: well-nourished and well developed.  Eyes: normal ocular motion  Cardio: RRR, Normal S1S2, No m/r/g.   Lymphatic: No appreciable lymphadenopathy  Respiratory: Non-labored breathing, CTA b/l no w/c/r  Vascular: No edema, swelling or tenderness, except as noted in detailed exam. Extremities well perfused. No LE edema  Integumentary: No impressive skin lesions present, except as noted in detailed exam.  Neuro: No ataxia or tremors noted  Psych: Normal mood and affect, oriented to person, place and time. Appropriate affect.  Musculoskeletal: Normal, except as noted in detailed exam and in HPI.    Gait and Station:   normal    Bilateral Knee Exam:    Left knee:   Inspection:  normal color, temperature, turgor and moisture    Overall limb alignment: neutral    Effusion: " no    ROM 0 to 130 with discomfort in the lateral knee    Extensor Lag: Absent    Palpation: Medial and Lateral joint line tenderness to palpation    stable to AP translation at 90 deg    Coronal plane stable in full extension    Coronal plane stable in mid-flexion     Motor: 5/5 EHL/FHL/TA/GS/Qd/Hs    Vascular: Toes WWP with BCR    Sensory: SILT DP/SP/Liliana/Saph/Ti    Right Knee:   Inspection:  normal color, temperature, turgor and moisture    Overall limb alignment: neutral    Effusion: no    ROM 0 to 120 without pain    Extensor Lag: Absent    Palpation: Medial and Lateral joint line tenderness to palpation    stable to AP translation at 90 deg    Coronal plane stable in full extension    Coronal plane stable in mid-flexion     Motor: 5/5 EHL/FHL/TA/GS/Qd/Hs    Vascular: Toes WWP with BCR    Sensory: SILT DP/SP/Liliana/Saph/Ti    Images:  No new imaging    I have spent a total time of 30 minutes in caring for this patient on the day of the visit/encounter including Diagnostic results, Risks and benefits of tx options, Patient and family education, Importance of tx compliance, Documenting in the medical record, and Obtaining or reviewing history  .     Scribe Attestation      I,:  Damon Lopes am acting as a scribe while in the presence of the attending physician.:       I,:  Duke Greene MD personally performed the services described in this documentation    as scribed in my presence.:               Duke Greene MD  Adult Reconstruction Specialist   [facility]

## 2025-03-10 RX ORDER — BETAMETHASONE SODIUM PHOSPHATE AND BETAMETHASONE ACETATE 3; 3 MG/ML; MG/ML
12 INJECTION, SUSPENSION INTRA-ARTICULAR; INTRALESIONAL; INTRAMUSCULAR; SOFT TISSUE
Status: COMPLETED | OUTPATIENT
Start: 2025-03-07 | End: 2025-03-07

## 2025-03-10 RX ORDER — LIDOCAINE HYDROCHLORIDE 10 MG/ML
2 INJECTION, SOLUTION INFILTRATION; PERINEURAL
Status: COMPLETED | OUTPATIENT
Start: 2025-03-07 | End: 2025-03-07

## 2025-03-11 ENCOUNTER — RA CDI HCC (OUTPATIENT)
Dept: OTHER | Facility: HOSPITAL | Age: 80
End: 2025-03-11

## 2025-03-13 ENCOUNTER — APPOINTMENT (OUTPATIENT)
Dept: LAB | Age: 80
End: 2025-03-13
Payer: COMMERCIAL

## 2025-03-13 DIAGNOSIS — E03.9 HYPOTHYROIDISM, UNSPECIFIED TYPE: ICD-10-CM

## 2025-03-13 DIAGNOSIS — E11.9 TYPE 2 DIABETES MELLITUS WITHOUT COMPLICATION, WITHOUT LONG-TERM CURRENT USE OF INSULIN (HCC): ICD-10-CM

## 2025-03-13 LAB
ALBUMIN SERPL BCG-MCNC: 3.9 G/DL (ref 3.5–5)
ALP SERPL-CCNC: 38 U/L (ref 34–104)
ALT SERPL W P-5'-P-CCNC: 18 U/L (ref 7–52)
ANION GAP SERPL CALCULATED.3IONS-SCNC: 4 MMOL/L (ref 4–13)
AST SERPL W P-5'-P-CCNC: 17 U/L (ref 13–39)
BASOPHILS # BLD AUTO: 0.05 THOUSANDS/ÂΜL (ref 0–0.1)
BASOPHILS NFR BLD AUTO: 1 % (ref 0–1)
BILIRUB SERPL-MCNC: 0.67 MG/DL (ref 0.2–1)
BUN SERPL-MCNC: 16 MG/DL (ref 5–25)
CALCIUM SERPL-MCNC: 9 MG/DL (ref 8.4–10.2)
CHLORIDE SERPL-SCNC: 102 MMOL/L (ref 96–108)
CHOLEST SERPL-MCNC: 147 MG/DL (ref ?–200)
CO2 SERPL-SCNC: 32 MMOL/L (ref 21–32)
CREAT SERPL-MCNC: 0.53 MG/DL (ref 0.6–1.3)
CREAT UR-MCNC: 43.1 MG/DL
EOSINOPHIL # BLD AUTO: 0.13 THOUSAND/ÂΜL (ref 0–0.61)
EOSINOPHIL NFR BLD AUTO: 2 % (ref 0–6)
ERYTHROCYTE [DISTWIDTH] IN BLOOD BY AUTOMATED COUNT: 14 % (ref 11.6–15.1)
EST. AVERAGE GLUCOSE BLD GHB EST-MCNC: 131 MG/DL
GFR SERPL CREATININE-BSD FRML MDRD: 91 ML/MIN/1.73SQ M
GLUCOSE P FAST SERPL-MCNC: 101 MG/DL (ref 65–99)
HBA1C MFR BLD: 6.2 %
HCT VFR BLD AUTO: 41.5 % (ref 34.8–46.1)
HDLC SERPL-MCNC: 63 MG/DL
HGB BLD-MCNC: 12.9 G/DL (ref 11.5–15.4)
IMM GRANULOCYTES # BLD AUTO: 0.02 THOUSAND/UL (ref 0–0.2)
IMM GRANULOCYTES NFR BLD AUTO: 0 % (ref 0–2)
LDLC SERPL CALC-MCNC: 65 MG/DL (ref 0–100)
LYMPHOCYTES # BLD AUTO: 2.45 THOUSANDS/ÂΜL (ref 0.6–4.47)
LYMPHOCYTES NFR BLD AUTO: 31 % (ref 14–44)
MCH RBC QN AUTO: 28.2 PG (ref 26.8–34.3)
MCHC RBC AUTO-ENTMCNC: 31.1 G/DL (ref 31.4–37.4)
MCV RBC AUTO: 91 FL (ref 82–98)
MICROALBUMIN UR-MCNC: <7 MG/L
MONOCYTES # BLD AUTO: 0.66 THOUSAND/ÂΜL (ref 0.17–1.22)
MONOCYTES NFR BLD AUTO: 9 % (ref 4–12)
NEUTROPHILS # BLD AUTO: 4.5 THOUSANDS/ÂΜL (ref 1.85–7.62)
NEUTS SEG NFR BLD AUTO: 57 % (ref 43–75)
NRBC BLD AUTO-RTO: 0 /100 WBCS
PLATELET # BLD AUTO: 336 THOUSANDS/UL (ref 149–390)
PMV BLD AUTO: 10.3 FL (ref 8.9–12.7)
POTASSIUM SERPL-SCNC: 4.3 MMOL/L (ref 3.5–5.3)
PROT SERPL-MCNC: 6.3 G/DL (ref 6.4–8.4)
RBC # BLD AUTO: 4.58 MILLION/UL (ref 3.81–5.12)
SODIUM SERPL-SCNC: 138 MMOL/L (ref 135–147)
TRIGL SERPL-MCNC: 93 MG/DL (ref ?–150)
TSH SERPL DL<=0.05 MIU/L-ACNC: 1.06 UIU/ML (ref 0.45–4.5)
WBC # BLD AUTO: 7.81 THOUSAND/UL (ref 4.31–10.16)

## 2025-03-13 PROCEDURE — 84443 ASSAY THYROID STIM HORMONE: CPT

## 2025-03-13 PROCEDURE — 82043 UR ALBUMIN QUANTITATIVE: CPT

## 2025-03-13 PROCEDURE — 85025 COMPLETE CBC W/AUTO DIFF WBC: CPT

## 2025-03-13 PROCEDURE — 82570 ASSAY OF URINE CREATININE: CPT

## 2025-03-17 ENCOUNTER — OFFICE VISIT (OUTPATIENT)
Dept: INTERNAL MEDICINE CLINIC | Facility: CLINIC | Age: 80
End: 2025-03-17
Payer: COMMERCIAL

## 2025-03-17 ENCOUNTER — RESULTS FOLLOW-UP (OUTPATIENT)
Dept: INTERNAL MEDICINE CLINIC | Facility: CLINIC | Age: 80
End: 2025-03-17

## 2025-03-17 ENCOUNTER — TELEPHONE (OUTPATIENT)
Age: 80
End: 2025-03-17

## 2025-03-17 VITALS
WEIGHT: 196 LBS | BODY MASS INDEX: 31.64 KG/M2 | OXYGEN SATURATION: 98 % | HEART RATE: 58 BPM | SYSTOLIC BLOOD PRESSURE: 120 MMHG | DIASTOLIC BLOOD PRESSURE: 82 MMHG

## 2025-03-17 DIAGNOSIS — E11.9 TYPE 2 DIABETES MELLITUS WITHOUT COMPLICATION, WITHOUT LONG-TERM CURRENT USE OF INSULIN (HCC): ICD-10-CM

## 2025-03-17 DIAGNOSIS — R10.11 RIGHT UPPER QUADRANT ABDOMINAL PAIN: ICD-10-CM

## 2025-03-17 DIAGNOSIS — R10.31 ABDOMINAL PAIN, CHRONIC, RIGHT LOWER QUADRANT: ICD-10-CM

## 2025-03-17 DIAGNOSIS — I89.0 LYMPHEDEMA: ICD-10-CM

## 2025-03-17 DIAGNOSIS — G89.29 ABDOMINAL PAIN, CHRONIC, RIGHT LOWER QUADRANT: ICD-10-CM

## 2025-03-17 DIAGNOSIS — E66.09 CLASS 1 OBESITY DUE TO EXCESS CALORIES WITH SERIOUS COMORBIDITY AND BODY MASS INDEX (BMI) OF 31.0 TO 31.9 IN ADULT: ICD-10-CM

## 2025-03-17 DIAGNOSIS — R35.1 NOCTURIA: ICD-10-CM

## 2025-03-17 DIAGNOSIS — I10 PRIMARY HYPERTENSION: Primary | ICD-10-CM

## 2025-03-17 DIAGNOSIS — E66.811 CLASS 1 OBESITY DUE TO EXCESS CALORIES WITH SERIOUS COMORBIDITY AND BODY MASS INDEX (BMI) OF 31.0 TO 31.9 IN ADULT: ICD-10-CM

## 2025-03-17 LAB
BILIRUB UR QL STRIP: NEGATIVE
CLARITY UR: CLEAR
COLOR UR: NORMAL
GLUCOSE UR STRIP-MCNC: NEGATIVE MG/DL
HGB UR QL STRIP.AUTO: NEGATIVE
KETONES UR STRIP-MCNC: NEGATIVE MG/DL
LEUKOCYTE ESTERASE UR QL STRIP: NEGATIVE
NITRITE UR QL STRIP: NEGATIVE
PH UR STRIP.AUTO: 7 [PH]
PROT UR STRIP-MCNC: NEGATIVE MG/DL
SP GR UR STRIP.AUTO: 1 (ref 1–1.03)
UROBILINOGEN UR STRIP-ACNC: <2 MG/DL

## 2025-03-17 PROCEDURE — 81003 URINALYSIS AUTO W/O SCOPE: CPT | Performed by: INTERNAL MEDICINE

## 2025-03-17 PROCEDURE — 99214 OFFICE O/P EST MOD 30 MIN: CPT | Performed by: INTERNAL MEDICINE

## 2025-03-17 PROCEDURE — G2211 COMPLEX E/M VISIT ADD ON: HCPCS | Performed by: INTERNAL MEDICINE

## 2025-03-17 NOTE — PROGRESS NOTES
Name: Kasie Cheatham      : 1945      MRN: [id number]  Encounter Provider: Yannick Gaspar DO  Encounter Date: 3/17/2025   Encounter department: [facility]  :  Assessment & Plan  Type 2 diabetes mellitus without complication, without long-term current use of insulin (HCC)  I have counselled the pt to follow a healthy and balanced diet ,and recommend routine exercise.  I will be ordering diabetic laboratories including comprehensive metabolic panel, hemoglobin A1c, urine microalbumin, lipid panel.  Lab Results   Component Value Date    HGBA1C 6.2 (H) 2025     Orders:  •  Comprehensive metabolic panel; Future  •  Hemoglobin A1C; Future  •  Lipid Panel with Direct LDL reflex; Future    Primary hypertension  Hypertension - controlled, I have counseled patient following healthy balance diet, I would like the patient reduce sodium, exercise routinely, I would like the patient continued the med current medical regiment and we will continue to monitor.       Class 1 obesity due to excess calories with serious comorbidity and body mass index (BMI) of 31.0 to 31.9 in adult  Obesity -I have counseled patient following healthy and balanced diet, I would like the patient to lose weight, I would like the patient exercise routinely; we will continue monitor the patient's progress.  Patient did not tolerate GLP-1 continue to optimize a diet       Lymphedema  Currently stable continue compression stockings       Abdominal pain, chronic, right lower quadrant  Right upper quadrant, centralized/epigastric discomfort tenderness with palpation bloating/burping family history of ovarian cancer check CT abdomen pelvis consult GI for colonoscopy  Orders:  •  Ambulatory Referral to Gastroenterology; Future  •  CT abdomen pelvis w contrast; Future  •  UA w Reflex to Microscopic w Reflex to Culture    Nocturia  Check urinalysis/reflex culture see urology blood sugar is stable kidney function  stable  Orders:  •  Ambulatory Referral to Gastroenterology; Future  •  Ambulatory Referral to Urology; Future    Right upper quadrant abdominal pain    Orders:  •  CT abdomen pelvis w contrast; Future    RTO in 3 months call if any problems       History of Present Illness   HPI 79-year old female coming in for a follow up office visit regarding type 2 diabetes, primary hypertension, class I obesity, lymphedema chronic, abdominal pain right upper quadrant right mid abdomen nocturia the last several weeks; the patient reports me compliant taking medications without untoward side effects the.  The patient is here to review his medical condition, update me on the medical condition and the patient reports me no hospitalizations and no ER visits.  No injuries no illnesses; patient does report to me chronic right lower quadrant abdominal pain also reports to me right upper quadrant abdominal pain and mid  abdominal pain she has not had a recent colonoscopy.  W she does not report to me blood in the stool or change in bowel habitus.  She has a family history mother ovarian cancer she does report darleen bloating and burping.  Aking Q 2 hours to urinate,  pain in right side yrs ?bursitis  Bloating and gas less   Review of Systems   Constitutional:  Negative for activity change, appetite change and unexpected weight change.   HENT:  Negative for congestion and postnasal drip.    Eyes:  Negative for visual disturbance.   Respiratory:  Negative for cough and shortness of breath.    Cardiovascular:  Negative for chest pain.   Gastrointestinal:  Negative for abdominal pain, diarrhea, nausea and vomiting.   Neurological:  Negative for dizziness, light-headedness and headaches.   Hematological:  Negative for adenopathy.       Objective   /82   Pulse 58   Wt 88.9 kg (196 lb)   SpO2 98%   BMI 31.64 kg/m²      Physical Exam  Vitals and nursing note reviewed.   Constitutional:       General: She is not in acute distress.      Appearance: Normal appearance. She is well-developed. She is not ill-appearing, toxic-appearing or diaphoretic.   HENT:      Head: Normocephalic and atraumatic.      Right Ear: External ear normal.      Left Ear: External ear normal.      Nose: Nose normal.   Eyes:      Pupils: Pupils are equal, round, and reactive to light.   Cardiovascular:      Rate and Rhythm: Normal rate and regular rhythm.      Heart sounds: Normal heart sounds. No murmur heard.  Pulmonary:      Effort: Pulmonary effort is normal.      Breath sounds: Normal breath sounds.   Abdominal:      General: Abdomen is flat. Bowel sounds are normal. There is no distension.      Palpations: Abdomen is soft.      Tenderness: There is abdominal tenderness. There is no guarding.      Comments: Mid abdominal tenderness, right upper quadrant tenderness right mid abdominal tenderness no guarding rebound or rigidity   Neurological:      Mental Status: She is alert.       Administrative Statements   I have spent a total time of 30 minutes in caring for this patient on the day of the visit/encounter including Diagnostic results, Instructions for management, Impressions, Counseling / Coordination of care, Documenting in the medical record, Reviewing/placing orders in the medical record (including tests, medications, and/or procedures), and Obtaining or reviewing history  .

## 2025-03-17 NOTE — ASSESSMENT & PLAN NOTE
I have counselled the pt to follow a healthy and balanced diet ,and recommend routine exercise.  I will be ordering diabetic laboratories including comprehensive metabolic panel, hemoglobin A1c, urine microalbumin, lipid panel.  Lab Results   Component Value Date    HGBA1C 6.2 (H) 03/13/2025     Orders:  •  Comprehensive metabolic panel; Future  •  Hemoglobin A1C; Future  •  Lipid Panel with Direct LDL reflex; Future

## 2025-03-17 NOTE — ASSESSMENT & PLAN NOTE
Obesity -I have counseled patient following healthy and balanced diet, I would like the patient to lose weight, I would like the patient exercise routinely; we will continue monitor the patient's progress.  Patient did not tolerate GLP-1 continue to optimize a diet

## 2025-03-17 NOTE — TELEPHONE ENCOUNTER
New Patient    Appointment Scheduling  What office location does the patient prefer?: [facility], Only wants Dr. Hernandez because she was referred to him    What is the reason for the patient's appointment?: Nocturia     Have patient records been requested?: No  If No, are the records showing in Epic: Yes    Appointment Details  Date: 4/14/2025  Time:    10:15 am  Location:   [facility]  Provider:  Dr. Hernandez  Does the appointment need further review? (Reason) Patient only wants David      HISTORY  Is the patient having active symptoms? If so, describe symptoms: Frequent urination at night    Has the patient had any previous Urologist(s)?: No    Was the patient seen in the ED?: no    Has the patient had any outside testing done?: No    Does patient have Imaging/Lab Results: Urine testing in Chart, sent for CT by PCP, results will post prior to appt if needed, might be unrelated    Have you had Cancer Unknown    INSURANCE   Have you confirmed Patient's insurance? Yes  Is the insurance accepted?  Yes  Is the insurance active? Yes

## 2025-03-24 ENCOUNTER — HOSPITAL ENCOUNTER (OUTPATIENT)
Dept: RADIOLOGY | Age: 80
Discharge: HOME | End: 2025-03-24
Payer: COMMERCIAL

## 2025-03-24 DIAGNOSIS — G89.29 ABDOMINAL PAIN, CHRONIC, RIGHT LOWER QUADRANT: ICD-10-CM

## 2025-03-24 DIAGNOSIS — R10.31 ABDOMINAL PAIN, CHRONIC, RIGHT LOWER QUADRANT: ICD-10-CM

## 2025-03-24 DIAGNOSIS — R10.11 RIGHT UPPER QUADRANT ABDOMINAL PAIN: ICD-10-CM

## 2025-03-24 PROCEDURE — 74177 CT ABD & PELVIS W/CONTRAST: CPT

## 2025-03-24 RX ADMIN — IOHEXOL 100 ML: 350 INJECTION, SOLUTION INTRAVENOUS at 14:37

## 2025-03-28 DIAGNOSIS — E03.9 HYPOTHYROIDISM, UNSPECIFIED TYPE: ICD-10-CM

## 2025-03-28 RX ORDER — LEVOTHYROXINE SODIUM 125 UG/1
125 TABLET ORAL DAILY
Qty: 90 TABLET | Refills: 1 | Status: SHIPPED | OUTPATIENT
Start: 2025-03-28

## 2025-04-03 ENCOUNTER — OFFICE VISIT (OUTPATIENT)
Dept: PODIATRY | Facility: CLINIC | Age: 80
End: 2025-04-03
Payer: COMMERCIAL

## 2025-04-03 VITALS — BODY MASS INDEX: 31.34 KG/M2 | RESPIRATION RATE: 18 BRPM | WEIGHT: 195 LBS | HEIGHT: 66 IN

## 2025-04-03 DIAGNOSIS — M20.22 HALLUX RIGIDUS OF LEFT FOOT: Primary | ICD-10-CM

## 2025-04-03 PROCEDURE — RECHECK: Performed by: PODIATRIST

## 2025-04-03 PROCEDURE — 20600 DRAIN/INJ JOINT/BURSA W/O US: CPT | Performed by: PODIATRIST

## 2025-04-03 RX ORDER — LIDOCAINE HYDROCHLORIDE 10 MG/ML
1 INJECTION, SOLUTION INFILTRATION; PERINEURAL
Status: SHIPPED | OUTPATIENT
Start: 2025-04-03

## 2025-04-03 RX ORDER — LIDOCAINE HYDROCHLORIDE 10 MG/ML
1 INJECTION, SOLUTION EPIDURAL; INFILTRATION; INTRACAUDAL; PERINEURAL ONCE
Status: COMPLETED | OUTPATIENT
Start: 2025-04-03 | End: 2025-04-03

## 2025-04-03 RX ORDER — TRIAMCINOLONE ACETONIDE 40 MG/ML
20 INJECTION, SUSPENSION INTRA-ARTICULAR; INTRAMUSCULAR
Status: SHIPPED | OUTPATIENT
Start: 2025-04-03

## 2025-04-03 RX ORDER — TRIAMCINOLONE ACETONIDE 40 MG/ML
20 INJECTION, SUSPENSION INTRA-ARTICULAR; INTRAMUSCULAR ONCE
Status: COMPLETED | OUTPATIENT
Start: 2025-04-03 | End: 2025-04-03

## 2025-04-03 RX ADMIN — TRIAMCINOLONE ACETONIDE 20 MG: 40 INJECTION, SUSPENSION INTRA-ARTICULAR; INTRAMUSCULAR at 08:00

## 2025-04-03 RX ADMIN — TRIAMCINOLONE ACETONIDE 20 MG: 40 INJECTION, SUSPENSION INTRA-ARTICULAR; INTRAMUSCULAR at 08:25

## 2025-04-03 RX ADMIN — LIDOCAINE HYDROCHLORIDE 1 ML: 10 INJECTION, SOLUTION INFILTRATION; PERINEURAL at 08:00

## 2025-04-03 RX ADMIN — LIDOCAINE HYDROCHLORIDE 1 ML: 10 INJECTION, SOLUTION EPIDURAL; INFILTRATION; INTRACAUDAL; PERINEURAL at 08:25

## 2025-04-03 NOTE — PROGRESS NOTES
Patient presents to assess left fifth toe.  Patient had cortisone injection due to inflammatory arthropathy approximately 6 weeks ago.  This injection was helpful and patient no longer has pain or swelling in her left fifth toe.    However, patient again having pain secondary to hallux rigidus of the left foot.  Patient has had this problem for years and cortisone injection gave only provides relief for 4 to 6 months.  Another injection is desired today.  On exam, pain with palpation lateral aspect left first MPJ.  Range of motion left first MPJ limited to 5 degrees of dorsiflexion.  Injected lateral aspect left first MPJ with 0.5 cc Kenalog 40 along with 1 cc 1% Xylocaine.  Patient will contact me when pain recurs.    Small joint arthrocentesis: L great MTP  Universal Protocol:  procedure performed by consultantConsent: Verbal consent obtained.  Risks and benefits: risks, benefits and alternatives were discussed  Consent given by: patient  Patient identity confirmed: verbally with patient  Supporting Documentation  Indications: pain   Procedure Details  Location: great toe - L great MTP  Approach: dorsal  Medications administered: 1 mL lidocaine 1 %; 20 mg triamcinolone acetonide 40 mg/mL

## 2025-04-07 ENCOUNTER — TELEPHONE (OUTPATIENT)
Dept: INTERNAL MEDICINE CLINIC | Facility: CLINIC | Age: 80
End: 2025-04-07

## 2025-04-21 ENCOUNTER — OFFICE VISIT (OUTPATIENT)
Dept: AUDIOLOGY | Age: 80
End: 2025-04-21

## 2025-04-21 DIAGNOSIS — K21.00 GASTROESOPHAGEAL REFLUX DISEASE WITH ESOPHAGITIS, UNSPECIFIED WHETHER HEMORRHAGE: ICD-10-CM

## 2025-04-21 DIAGNOSIS — H90.3 SENSORY HEARING LOSS, BILATERAL: Primary | ICD-10-CM

## 2025-04-21 DIAGNOSIS — I10 ESSENTIAL HYPERTENSION: ICD-10-CM

## 2025-04-21 RX ORDER — ESOMEPRAZOLE MAGNESIUM 40 MG/1
40 CAPSULE, DELAYED RELEASE ORAL DAILY
Qty: 90 CAPSULE | Refills: 1 | Status: SHIPPED | OUTPATIENT
Start: 2025-04-21

## 2025-04-21 RX ORDER — AMLODIPINE BESYLATE 5 MG/1
5 TABLET ORAL DAILY
Qty: 90 TABLET | Refills: 1 | Status: SHIPPED | OUTPATIENT
Start: 2025-04-21

## 2025-04-21 NOTE — PROGRESS NOTES
Hearing Aid Visit:    Name:  Kasie Cheatham  :  1945  Age:  80 y.o.  MRN:  [id number]  Date of Evaluation: 25     HISTORY:    Kasie Cheatham was seen today (2025) for a(n) out-of-warranty hearing aid check of her bilateral hearing aids. Today, Kasie reports the hearing aids are being intermittent.      DEVICE INFORMATION:         Left Device Right Device   Hearing Aid Make: OtCleanBeeBaby  OtCleanBeeBaby    Hearing Aid Model: MORE 3 Mini [employer]R MORE 3 Mini [employer]   Serial Number: [id number]   Repair Warranty Date: 24   Loss/Damage Warranty Status:             Length/Output    Wax System: Pro Wax Pro Wax   Dome Size/Style: - -   Battery: Lithium-ion Rechargeable Lithium-ion Rechargeable       [employer] Serial Number: [id number]   [employer] Warranty Date:  N/A N/A    Serial Number: N/A   Warranty Date: N/A    Accessories: N/A      ACTION/ADJUSTMENTS:    Wires replaced. Good sound quality noted. If issues persist, they will be sent in for repair. Patient is aware of cost and would like loaners.    RECOMMENDATIONS:     Follow up PRN      Nini Allison., [facility]  Clinical Audiologist  [facility]  [address]  [place] [address]

## 2025-05-06 DIAGNOSIS — I63.9 ACUTE CVA (CEREBROVASCULAR ACCIDENT) (HCC): ICD-10-CM

## 2025-05-08 ENCOUNTER — CONSULT (OUTPATIENT)
Dept: GASTROENTEROLOGY | Facility: AMBULARY SURGERY CENTER | Age: 80
End: 2025-05-08
Payer: COMMERCIAL

## 2025-05-08 VITALS
SYSTOLIC BLOOD PRESSURE: 128 MMHG | DIASTOLIC BLOOD PRESSURE: 80 MMHG | OXYGEN SATURATION: 94 % | BODY MASS INDEX: 32.14 KG/M2 | WEIGHT: 200 LBS | HEART RATE: 112 BPM | HEIGHT: 66 IN

## 2025-05-08 DIAGNOSIS — R14.0 ABDOMINAL BLOATING: ICD-10-CM

## 2025-05-08 DIAGNOSIS — K21.00 GASTROESOPHAGEAL REFLUX DISEASE WITH ESOPHAGITIS, UNSPECIFIED WHETHER HEMORRHAGE: ICD-10-CM

## 2025-05-08 DIAGNOSIS — Z86.0101 HISTORY OF ADENOMATOUS POLYP OF COLON: Primary | ICD-10-CM

## 2025-05-08 PROCEDURE — 99204 OFFICE O/P NEW MOD 45 MIN: CPT | Performed by: INTERNAL MEDICINE

## 2025-05-08 NOTE — PROGRESS NOTES
Name: Kasie Cheatham      : 1945      MRN: [id number]  Encounter Provider: Juan F Dumont MD  Encounter Date: 2025   Encounter department: [facility]  :  Assessment & Plan  Gastroesophageal reflux disease with esophagitis, unspecified whether hemorrhage  - While there was evidence of esophagitis on previous EGD, no evidence of Gonzalez's as per endoscopic report.  Patient has been maintained on Nexium.  Will continue this as he is asymptomatic.  Continue to follow GERD diet.  In absence of alarm symptoms, normal hemoglobin, would recommend to hold off on endoscopic evaluation at this time.  Continue to minimize use of NSAIDs.  Use acetaminophen when needed.  Will continue with Nexium 40 mg daily.  Orders:    Ambulatory Referral to Gastroenterology    History of adenomatous polyp of colon  - Noted to have tubular adenomatous polyps on a colonoscopy 2020.  -Can consider repeat colonoscopy in 2025 if patient is in good health.  Eliquis will need to be held 48 hours prior to the procedure.  This was discussed with the patient.  Will have to obtain clearance from cardiology in regards to holding this.       Abdominal bloating  - Possibly due to constipation, patient reports improvement since stopping fiber supplement and probiotic and starting on prunes instead.  Would recommend that she can take prunes or prune juice.  She can also add MiraLAX 1 capful on daily basis if constipation returns.  Would recommend 64 ounces of water on daily basis in addition to fiber.  Would recommend daily exercise.  TSH is normal, not anemic.  Renal function is preserved.             History of Present Illness   Kasie Cheatham is a 80 y.o. female with history of SVT, hypertension, type 2 diabetes, hypothyroidism, CAD, on Eliquis, hyperlipidemia, presents for evaluation of nausea and abdominal bloating.  Patient also reports chronic symptoms of acid reflux.  Patient  "is on Nexium 40 mg on daily basis.  Patient reports that she was having significant abdominal bloating and nausea however she stopped taking Metamucil and a probiotic and has noted that the symptoms have improved.  Patient reports that she is now taking which have been helping with her constipation.  She reports having regular bowel movements on daily basis.  She denies any melena or hematochezia.  No change in bowel habits otherwise.  Patient recently had CT of abdomen and pelvis done which was notable for diverticulosis without diverticulitis.  I have reviewed EGD and colonoscopy report from Dr. Alford in November 2020 under media.  EGD was notable for esophagitis, gastritis.  Pathology results are not available.  Colonoscopy was notable for colon polyp, external hemorrhoids and diverticulosis.  Pathology was consistent with adenomatous polyp.  Gastric biopsies negative for H. pylori and intestinal metaplasia.  HPI    Review of Systems   Constitutional: Negative.    HENT: Negative.     Eyes: Negative.    Respiratory: Negative.     Cardiovascular: Negative.    Gastrointestinal:         See HPI.   Endocrine: Negative.    Genitourinary: Negative.    Musculoskeletal: Negative.    Skin: Negative.    Allergic/Immunologic: Negative.    Neurological: Negative.    Hematological: Negative.    Psychiatric/Behavioral: Negative.     All other systems reviewed and are negative.   A complete review of systems is negative other than that noted above in the HPI.         Objective   /80 (BP Location: Left arm, Patient Position: Sitting, Cuff Size: Adult)   Pulse (!) 112   Ht 5' 6\" (1.676 m)   Wt 90.7 kg (200 lb)   SpO2 94%   BMI 32.28 kg/m²     Physical Exam  Vitals and nursing note reviewed.   Constitutional:       General: She is not in acute distress.     Appearance: She is well-developed.   HENT:      Head: Normocephalic and atraumatic.   Eyes:      General: No scleral icterus.     Conjunctiva/sclera: Conjunctivae " normal.   Cardiovascular:      Rate and Rhythm: Normal rate and regular rhythm.      Heart sounds: No murmur heard.  Pulmonary:      Effort: Pulmonary effort is normal. No respiratory distress.      Breath sounds: Normal breath sounds.   Abdominal:      Palpations: Abdomen is soft.      Tenderness: There is no abdominal tenderness.   Musculoskeletal:         General: No swelling.      Cervical back: Neck supple.   Skin:     General: Skin is warm and dry.   Neurological:      Mental Status: She is alert.   Psychiatric:         Mood and Affect: Mood normal.           Lab Results: I personally reviewed relevant lab results.

## 2025-05-08 NOTE — ASSESSMENT & PLAN NOTE
- While there was evidence of esophagitis on previous EGD, no evidence of Gonzalez's as per endoscopic report.  Patient has been maintained on Nexium.  Will continue this as he is asymptomatic.  Continue to follow GERD diet.  In absence of alarm symptoms, normal hemoglobin, would recommend to hold off on endoscopic evaluation at this time.  Continue to minimize use of NSAIDs.  Use acetaminophen when needed.  Will continue with Nexium 40 mg daily.  Orders:    Ambulatory Referral to Gastroenterology

## 2025-05-09 NOTE — ASSESSMENT & PLAN NOTE
- Noted to have tubular adenomatous polyps on a colonoscopy November 2020.  -Can consider repeat colonoscopy in November 2025 if patient is in good health.  Eliquis will need to be held 48 hours prior to the procedure.  This was discussed with the patient.  Will have to obtain clearance from cardiology in regards to holding this.

## 2025-05-15 DIAGNOSIS — I63.30 CEREBRAL THROMBOSIS WITH CEREBRAL INFARCTION (HCC): ICD-10-CM

## 2025-05-15 DIAGNOSIS — I63.9 ACUTE CVA (CEREBROVASCULAR ACCIDENT) (HCC): ICD-10-CM

## 2025-05-15 RX ORDER — ATORVASTATIN CALCIUM 40 MG/1
40 TABLET, FILM COATED ORAL
Qty: 90 TABLET | Refills: 1 | Status: SHIPPED | OUTPATIENT
Start: 2025-05-15

## 2025-05-23 ENCOUNTER — OFFICE VISIT (OUTPATIENT)
Dept: OBGYN CLINIC | Facility: CLINIC | Age: 80
End: 2025-05-23

## 2025-05-23 VITALS — HEIGHT: 66 IN | WEIGHT: 200 LBS | BODY MASS INDEX: 32.14 KG/M2

## 2025-05-23 DIAGNOSIS — M17.11 PRIMARY OSTEOARTHRITIS OF RIGHT KNEE: Primary | ICD-10-CM

## 2025-05-23 RX ORDER — BETAMETHASONE SODIUM PHOSPHATE AND BETAMETHASONE ACETATE 3; 3 MG/ML; MG/ML
12 INJECTION, SUSPENSION INTRA-ARTICULAR; INTRALESIONAL; INTRAMUSCULAR; SOFT TISSUE
Status: COMPLETED | OUTPATIENT
Start: 2025-05-23 | End: 2025-05-23

## 2025-05-23 RX ORDER — LIDOCAINE HYDROCHLORIDE 10 MG/ML
2 INJECTION, SOLUTION INFILTRATION; PERINEURAL
Status: COMPLETED | OUTPATIENT
Start: 2025-05-23 | End: 2025-05-23

## 2025-05-23 RX ADMIN — LIDOCAINE HYDROCHLORIDE 2 ML: 10 INJECTION, SOLUTION INFILTRATION; PERINEURAL at 09:15

## 2025-05-23 RX ADMIN — BETAMETHASONE SODIUM PHOSPHATE AND BETAMETHASONE ACETATE 12 MG: 3; 3 INJECTION, SUSPENSION INTRA-ARTICULAR; INTRALESIONAL; INTRAMUSCULAR; SOFT TISSUE at 09:15

## 2025-05-23 NOTE — ASSESSMENT & PLAN NOTE
-WBAT  -Activity modification to limit strain or impact on the joint  -Tylenol 1000mg up to three times daily as needed. Do not exceed 3000mg daily  -Home exercise program directed by PT  -Weight loss discussed   -Knee sleeve or brace for comfort  -Cane or walker recommended to offload joint  -Corticosteroid injection was offered, accepted, and administered.  Risk benefits and alternatives were discussed prior to injection.  Patient tolerated the procedure well.  -Patient may follow up prn for further evaluation and treatment

## 2025-05-23 NOTE — PROGRESS NOTES
"      Date: 25  Kasie Cheatham   MRN# [id number]  : 1945      Assessment & Plan  Primary osteoarthritis of right knee  -WBAT  -Activity modification to limit strain or impact on the joint  -Tylenol 1000mg up to three times daily as needed. Do not exceed 3000mg daily  -Home exercise program directed by PT  -Weight loss discussed   -Knee sleeve or brace for comfort  -Cane or walker recommended to offload joint  -Corticosteroid injection was offered, accepted, and administered.  Risk benefits and alternatives were discussed prior to injection.  Patient tolerated the procedure well.  -Patient may follow up prn for further evaluation and treatment           Large joint arthrocentesis: R knee    Performed by: Duke Greene MD  Authorized by: Duke Greene MD    Universal Protocol:  procedure performed by consultantConsent: Verbal consent obtained  Risks and benefits: risks, benefits and alternatives were discussed  Consent given by: patient  Time out: Immediately prior to procedure a \"time out\" was called to verify the correct patient, procedure, equipment, support staff and site/side marked as required.  Timeout called at: 3/7/2025 10:18 AM.  Patient understanding: patient states understanding of the procedure being performed  Patient consent: the patient's understanding of the procedure matches consent given  Site marked: the operative site was marked  Patient identity confirmed: verbally with patient  Supporting Documentation  Indications: pain     Is this a Visco injection? NoProcedure Details  Location: knee - R knee  Preparation: Patient was prepped and draped in the usual sterile fashion  Needle gauge: 21 G.  Ultrasound guidance: no  Approach: anterolateral  Medications administered: 12 mg betamethasone acetate-betamethasone sodium phosphate 6 (3-3) mg/mL; 2 mL lidocaine 1 %    Patient tolerance: patient tolerated the procedure well with no immediate complications          Chief " Complaint: Bilateral Knee Pain         Subjective:   Kasie Cheatham is a 80 y.o. female presenting for evaluation of right knee pain.  Patient has been treated for the left knee in the past for diagnosis of internal derangement.  Patient states that she has now had the insidious onset of right knee pain which is activity related.  The pain is primarily medially based, is worse with activity and improves somewhat with rest.  Patient has a remote history of previous steroid injection.  Patient does remain active and tries to walk as well as use a stationary bike, but the knee pain is prohibitive.  No other issues or complaints      Prior treatment:  NSAIDs Yes    Bracing Yes   Physical Therapy Yes   Cortisone Injections Yes   Viscosupplementation No     Allergy:  Allergies   Allergen Reactions    Hydrochlorothiazide Palpitations    Meloxicam Rash     Medications:  All current active meds have been reviewed   Past Medical History:  Past Medical History:   Diagnosis Date    Biliary dyskinesia     CAP (community acquired pneumonia)     last assessed 8/17/16    Class 1 obesity due to excess calories with serious comorbidity and body mass index (BMI) of 33.0 to 33.9 in adult 05/08/2018    Deep vein thrombosis (DVT) of proximal vein of right lower extremity, unspecified chronicity ([facility]) 08/10/2022    Diabetes mellitus ([facility]) 12/2018    Type 2 no insulin    Disease of thyroid gland 2000    GERD (gastroesophageal reflux disease)     Hyperlipidemia     Palpitations     Pneumonia     Shortness of breath     Stroke ([facility])     SVT (supraventricular tachycardia) ([facility]) 06/08/2022     Past Surgical History:  Past Surgical History:   Procedure Laterality Date    AUGMENTATION MAMMAPLASTY Bilateral 1998    breast surgery- with prosthetic implant 1998; pre-pectoral salline    BREAST BIOPSY Left 1974    BREAST BIOPSY Left 1994    BREAST EXCISIONAL BIOPSY Left 1993    CARDIAC CATHETERIZATION      procedure summary    CHOLECYSTECTOMY       onset     HEMORRHOID SURGERY      ROTATOR CUFF REPAIR Bilateral     onset     TUBAL LIGATION      onset      Family History:  Family History   Problem Relation Name Age of Onset    Aneurysm Mother Joellen Dubois         cerebral    Ovarian cancer Mother Joellen Dubois 72    Cancer Mother Joellen Dubois     Cirrhosis Father Michael Cheatham         alcoholic    Alcohol abuse Father Michael Cheatham     Aneurysm Brother          abdominal aortic; cerebral    Diabetes Son Michael Cheatham     No Known Problems Daughter  in infancy     No Known Problems Maternal Grandmother      No Known Problems Maternal Grandfather      No Known Problems Paternal Grandmother      No Known Problems Paternal Grandfather      No Known Problems Son      No Known Problems Son      No Known Problems Son brooke- in infancy     No Known Problems Maternal Aunt      No Known Problems Maternal Aunt      No Known Problems Maternal Aunt      No Known Problems Maternal Aunt      No Known Problems Paternal Aunt      No Known Problems Paternal Aunt      No Known Problems Paternal Aunt      No Known Problems Paternal Aunt      No Known Problems Paternal Aunt      Cancer Brother oldest 67        bladder cancer     Social History:  Social History     Substance and Sexual Activity   Alcohol Use Yes    Alcohol/week: 2.0 standard drinks of alcohol    Types: 2 Glasses of wine per week    Comment: Maybe 2 glasses a month     Social History     Substance and Sexual Activity   Drug Use No     Social History     Tobacco Use   Smoking Status Former    Current packs/day: 0.00    Average packs/day: 0.3 packs/day for 50.0 years (12.5 ttl pk-yrs)    Types: Cigarettes    Start date:     Quit date: 2010    Years since quitting: 15.4   Smokeless Tobacco Never   Tobacco Comments    0.5 ppw intermittently last smoked            Review of Systems:  General- denies fever/chills  HEENT- denies hearing loss or sore throat  Eyes- denies eye pain or  "visual disturbances, denies red eyes  Respiratory- denies cough or SOB  Cardio- denies chest pain or palpitations  GI- denies abdominal pain  Endocrine- denies urinary frequency  Urinary- denies pain with urination  Musculoskeletal- Negative except noted above  Skin- denies rashes or wounds  Neurological- denies dizziness or headache  Psychiatric- denies anxiety or difficulty concentrating    Objective:   BP Readings from Last 1 Encounters:   05/08/25 128/80      Wt Readings from Last 1 Encounters:   05/23/25 90.7 kg (200 lb)      Pulse Readings from Last 1 Encounters:   05/08/25 (!) 112        BMI: Estimated body mass index is 32.28 kg/m² as calculated from the following:    Height as of this encounter: 5' 6\" (1.676 m).    Weight as of this encounter: 90.7 kg (200 lb).    Physical Exam  Ht 5' 6\" (1.676 m)   Wt 90.7 kg (200 lb)   BMI 32.28 kg/m²   General/Constitutional: No apparent distress: well-nourished and well developed.  Eyes: normal ocular motion  Cardio: RRR, Normal S1S2, No m/r/g.   Lymphatic: No appreciable lymphadenopathy  Respiratory: Non-labored breathing, CTA b/l no w/c/r  Vascular: No edema, swelling or tenderness, except as noted in detailed exam. Extremities well perfused. No LE edema  Integumentary: No impressive skin lesions present, except as noted in detailed exam.  Neuro: No ataxia or tremors noted  Psych: Normal mood and affect, oriented to person, place and time. Appropriate affect.  Musculoskeletal: Normal, except as noted in detailed exam and in HPI.    Gait and Station:   normal      Right Knee:   Inspection:  normal color, temperature, turgor and moisture    Overall limb alignment: neutral    Effusion: no    ROM 0 to 120 without pain    Extensor Lag: Absent    Palpation: Medial and Lateral joint line tenderness to palpation    stable to AP translation at 90 deg    Coronal plane stable in full extension    Coronal plane stable in mid-flexion     Motor: 5/5 EHL/FHL/TA/GS/Qd/Hs    " Vascular: Toes WWP with BCR    Sensory: SILT DP/SP/Liliana/Saph/Ti    Images:    Previously obtained radiographs of the right knee demonstrate moderate osteoarthritis with joint space narrowing, subchondral sclerosis and osteophyte formation      I have spent a total time of 30 minutes in caring for this patient on the day of the visit/encounter including Diagnostic results, Risks and benefits of tx options, Patient and family education, Importance of tx compliance, Documenting in the medical record, and Obtaining or reviewing history  .     Scribe Attestation      I,:   am acting as a scribe while in the presence of the attending physician.:       I,:   personally performed the services described in this documentation    as scribed in my presence.:               Duke Greene MD  Adult Reconstruction Specialist   [facility]

## 2025-05-27 ENCOUNTER — APPOINTMENT (OUTPATIENT)
Dept: RADIOLOGY | Age: 80
End: 2025-05-27
Attending: ORTHOPAEDIC SURGERY
Payer: COMMERCIAL

## 2025-05-27 ENCOUNTER — OFFICE VISIT (OUTPATIENT)
Dept: OBGYN CLINIC | Facility: CLINIC | Age: 80
End: 2025-05-27
Payer: COMMERCIAL

## 2025-05-27 VITALS — WEIGHT: 200 LBS | BODY MASS INDEX: 32.14 KG/M2 | HEIGHT: 66 IN

## 2025-05-27 DIAGNOSIS — M25.511 RIGHT SHOULDER PAIN, UNSPECIFIED CHRONICITY: Primary | ICD-10-CM

## 2025-05-27 DIAGNOSIS — M75.21 BICEPS TENDINITIS OF RIGHT SHOULDER: ICD-10-CM

## 2025-05-27 DIAGNOSIS — M25.511 RIGHT SHOULDER PAIN, UNSPECIFIED CHRONICITY: ICD-10-CM

## 2025-05-27 PROCEDURE — 73030 X-RAY EXAM OF SHOULDER: CPT

## 2025-05-27 PROCEDURE — 99204 OFFICE O/P NEW MOD 45 MIN: CPT | Performed by: ORTHOPAEDIC SURGERY

## 2025-05-27 NOTE — PROGRESS NOTES
Sports Medicine and Shoulder Surgery    Kasie Cheatham, 80 y.o. female   MRN# [id number]   : 1945        Assessment & Plan  Right shoulder pain, unspecified chronicity    Orders:    XR shoulder 2+ vw right; Future    Biceps tendinitis of right shoulder              Differentials for the patient's shoulder include: Impingement Syndrome, Rotator Cuff Tendonitis, Subacromial Bursitis, and Biceps Tendinopathy  Recommend acetaminophen 500 mg every 6 hours as needed for breakthrough pain  Advise activity modification by avoiding overhead movements, heavy lifting, or activities that exacerbate pain   Encourage light activities within pain tolerance to avoid stiffness  Follow up: as needed  If any issues, questions, or concerns arise between now and the next appointment, we have encouraged the patient contact our team.            Chief Complaint:    Right Shoulder Pain and Dysfunction    Subjective:   Patient is a right hand dominant individual who comes in for evaluation of the shoulder.  Patient said about 6 weeks ago she was lifting weights when she started experiencing anterior right upper arm pain.  She said since then her pain has resolved.  She comes in today concerned that she may have damaged her rotator cuff.  Patient denies any pain currently and limitation on range of motion.  She does report history of rotator cuff repair about 10 years ago with Dr. Decker.    Physical Examination:  General/Constitutional: Sitting comfortably in no apparent distress  Eyes: Normal ocular motion  Respiratory: Non-labored breathing  Psych: Normal mood and affect, oriented to person, place and time. Appropriate affect.  Musculoskeletal: Normal, except as noted in detailed exam and in HPI.    Right Shoulder(s)  Inspection  No visible deformity, swelling or erythema  Palpation  negative  over the AC  joint  Negative  Tenderness at the greater tuberosity and the anterior aspect of the shoulder (bicipital groove)  Range  of Motion  Active ROM  Abduction: 160 / 160  Forward Flexion: 160 / 160  Internal Rotation: T7/T7  Passive ROM  Full in all directions but elicited discomfort at end ranges of abduction and internal rotation  Strength Testing  Abduction (Supraspinatus): 5/5   External rotation (Infraspinatus): 5/5   Internal rotation (Subscapularis): 5/5  Special Orthopedic Tests  Rotator Cuff/biceps tests  Negative [place]  Negative  Cassidy test  Negative  Neer impingement test  Neurological examination  Sensation intact to light touch in over the C5-T1 dermatomes  Fingers warm and well perfused     Imaging Studies:  Independent interpretation of shoulder x-rays demonstrate mild degenerative changes of the glenohumeral and AC joint, no obvious acute osseous abnormalities     Review of Systems:  General- denies fever/chills  HEENT- denies hearing loss or sore throat  Eyes- denies eye pain or visual disturbances, denies red eyes  Respiratory- denies cough or SOB  Cardio- denies chest pain or palpitations  GI- denies abdominal pain  Endocrine- denies urinary frequency  Urinary- denies pain with urination  Musculoskeletal- Negative except noted above  Skin- denies rashes or wounds  Neurological- denies dizziness or headache  Psychiatric- denies anxiety or difficulty concentrating      Allergies:  Allergies   Allergen Reactions    Hydrochlorothiazide Palpitations    Meloxicam Rash       Medications:  Current Medications[1]    Past Medical History:  Past Medical History[2]     Past Surgical History:  Past Surgical History[3]     Family History:  Family History[4]     Social History:  Social History     Socioeconomic History    Marital status: /Civil Union     Spouse name: Not on file    Number of children: Not on file    Years of education: 12    Highest education level: Not on file   Occupational History    Not on file   Tobacco Use    Smoking status: Former     Current packs/day: 0.00     Average packs/day: 0.3 packs/day for  50.0 years (12.5 ttl pk-yrs)     Types: Cigarettes     Start date: 1960     Quit date: 1/1/2010     Years since quitting: 15.4    Smokeless tobacco: Never    Tobacco comments:     0.5 ppw intermittently last smoked 2010   Vaping Use    Vaping status: Never Used   Substance and Sexual Activity    Alcohol use: Yes     Alcohol/week: 2.0 standard drinks of alcohol     Types: 2 Glasses of wine per week     Comment: Maybe 2 glasses a month    Drug use: No    Sexual activity: Not Currently     Partners: Male     Birth control/protection: Post-menopausal     Comment: I'm 77   Other Topics Concern    Not on file   Social History Narrative    Caffeine use     Social Drivers of Health     Financial Resource Strain: Low Risk  (9/12/2023)    Overall Financial Resource Strain (CARDIA)     Difficulty of Paying Living Expenses: Not hard at all   Food Insecurity: No Food Insecurity (9/13/2024)    Nursing - Inadequate Food Risk Classification     Worried About Running Out of Food in the Last Year: Never true     Ran Out of Food in the Last Year: Never true     Ran Out of Food in the Last Year: Not on file   Transportation Needs: No Transportation Needs (9/13/2024)    PRAPARE - Transportation     Lack of Transportation (Medical): No     Lack of Transportation (Non-Medical): No   Physical Activity: Inactive (8/22/2022)    Exercise Vital Sign     Days of Exercise per Week: 0 days     Minutes of Exercise per Session: 0 min   Stress: Not on file   Social Connections: Not on file   Intimate Partner Violence: Not on file   Housing Stability: Low Risk  (9/13/2024)    Housing Stability Vital Sign     Unable to Pay for Housing in the Last Year: No     Number of Times Moved in the Last Year: 1     Homeless in the Last Year: No         Objective:   Body mass index is 32.28 kg/m².   ---------------------------------------------------------------------  Miguelito Quinn MD, PhD   Orthopedic Surgery, [facility]   Sports  Medicine and Shoulder Surgery    The complexity of the medical decision making, including the comprehensive history, detailed examination and moderate risk assessment, supports coding at a Level 4 for this encounter     Scribe Attestation      I,:  Jasvir Kinsey PA-C am acting as a scribe while in the presence of the attending physician.:       I,:  Miguelito Quinn MD personally performed the services described in this documentation    as scribed in my presence.:                          [1]   Current Outpatient Medications:     amLODIPine (NORVASC) 5 mg tablet, TAKE 1 TABLET DAILY, Disp: 90 tablet, Rfl: 1    apixaban (Eliquis) 5 mg, Take 1 tablet (5 mg total) by mouth 2 (two) times a day, Disp: 180 tablet, Rfl: 3    Ascorbic Acid (VITAMIN C) 1000 MG tablet, Take 1 tablet by mouth in the morning., Disp: , Rfl:     atorvastatin (LIPITOR) 40 mg tablet, TAKE 1 TABLET DAILY WITH DINNER, Disp: 90 tablet, Rfl: 1    Biotin 5000 MCG CAPS, Take 1 capsule by mouth in the morning., Disp: , Rfl:     cholecalciferol (VITAMIN D3) 1,000 units tablet, Take 2,000 Units by mouth in the morning., Disp: , Rfl:     esomeprazole (NexIUM) 40 MG capsule, TAKE 1 CAPSULE DAILY, Disp: 90 capsule, Rfl: 1    fluticasone (FLONASE) 50 mcg/act nasal spray, USE 2 SPRAYS IN EACH NOSTRIL DAILY, Disp: 48 g, Rfl: 3    levothyroxine 125 mcg tablet, TAKE 1 TABLET DAILY ON AN EMPTY STOMACH, Disp: 90 tablet, Rfl: 1    Magnesium 250 MG TABS, Take 250 mg by mouth in the morning, Disp: , Rfl:     Menthol, Topical Analgesic, (BIOFREEZE EX), Apply 1 application. topically daily as needed (pain), Disp: , Rfl:     metoprolol tartrate (LOPRESSOR) 25 mg tablet, Take one tablet as needed for palpitations, Disp: 90 tablet, Rfl: 1    Multiple Vitamin (MULTIVITAMIN) capsule, Take 1 capsule by mouth in the morning., Disp: , Rfl:     CHOLECALCIFEROL PO, Take 400 Units by mouth in the morning., Disp: , Rfl:     diphenhydrAMINE-acetaminophen (TYLENOL PM)  MG  TABS, Take 1 tablet by mouth daily at bedtime as needed for sleep (Patient not taking: Reported on 5/8/2025), Disp: , Rfl:     nystatin (MYCOSTATIN) cream, Apply topically 2 (two) times a day as needed (Patient not taking: Reported on 6/5/2024), Disp: , Rfl:     Probiotic Product (ALIGN PO), Take 1 capsule by mouth in the morning (Patient not taking: Reported on 5/27/2025), Disp: , Rfl:     psyllium (METAMUCIL) 58.6 % powder, Take 1 packet by mouth daily (Patient not taking: Reported on 5/8/2025), Disp: , Rfl:     triamcinolone (KENALOG) 0.5 % ointment, , Disp: , Rfl:     Current Facility-Administered Medications:     lidocaine (XYLOCAINE) 1 % injection 1 mL, 1 mL, Injection, , Mike Jack DPM, 1 mL at 12/05/23 1515    lidocaine (XYLOCAINE) 1 % injection 1 mL, 1 mL, Injection, , , 1 mL at 05/13/24 1300    lidocaine (XYLOCAINE) 1 % injection 1 mL, 1 mL, Injection, , , 1 mL at 05/13/24 1300    lidocaine (XYLOCAINE) 1 % injection 1 mL, 1 mL, Injection, , , 1 mL at 11/21/24 0830    lidocaine (XYLOCAINE) 1 % injection 1 mL, 1 mL, Injection, , , 1 mL at 04/03/25 0800    lidocaine (XYLOCAINE) 1 % injection 3 mL, 3 mL, Injection, , , 3 mL at 02/04/25 1515    triamcinolone acetonide (KENALOG-40) 40 mg/mL injection 20 mg, 20 mg, Infiltration, , Mike Jack DPM, 20 mg at 12/05/23 1515    triamcinolone acetonide (KENALOG-40) 40 mg/mL injection 20 mg, 20 mg, Infiltration, , , 20 mg at 05/13/24 1300    triamcinolone acetonide (KENALOG-40) 40 mg/mL injection 20 mg, 20 mg, Infiltration, , , 20 mg at 05/13/24 1300    triamcinolone acetonide (Kenalog-40) 40 mg/mL injection 20 mg, 20 mg, Infiltration, , , 20 mg at 11/21/24 0830    triamcinolone acetonide (Kenalog-40) 40 mg/mL injection 20 mg, 20 mg, Infiltration, , , 20 mg at 02/04/25 1515    triamcinolone acetonide (Kenalog-40) 40 mg/mL injection 20 mg, 20 mg, Infiltration, , , 20 mg at 04/03/25 0800  [2]   Past Medical History:  Diagnosis Date    Biliary dyskinesia     CAP  (community acquired pneumonia)     last assessed 16    Class 1 obesity due to excess calories with serious comorbidity and body mass index (BMI) of 33.0 to 33.9 in adult 2018    Deep vein thrombosis (DVT) of proximal vein of right lower extremity, unspecified chronicity ([facility]) 08/10/2022    Diabetes mellitus ([facility]) 2018    Type 2 no insulin    Disease of thyroid gland     GERD (gastroesophageal reflux disease)     Hyperlipidemia     Palpitations     Pneumonia     Shortness of breath     Stroke ([facility])     SVT (supraventricular tachycardia) ([facility]) 2022   [3]   Past Surgical History:  Procedure Laterality Date    AUGMENTATION MAMMAPLASTY Bilateral     breast surgery- with prosthetic implant ; pre-pectoral salline    BREAST BIOPSY Left     BREAST BIOPSY Left     BREAST EXCISIONAL BIOPSY Left     CARDIAC CATHETERIZATION      procedure summary    CHOLECYSTECTOMY      onset     HEMORRHOID SURGERY      ROTATOR CUFF REPAIR Bilateral     onset     TUBAL LIGATION      onset    [4]   Family History  Problem Relation Name Age of Onset    Aneurysm Mother Joellen KIRKLAND'Shayan         cerebral    Ovarian cancer Mother Joellen KIRKLAND'Shayan 72    Cancer Mother Joellen KIRKLAND'Shayan     Cirrhosis Father Michael Cheatham         alcoholic    Alcohol abuse Father Michael Cheatham     Aneurysm Brother          abdominal aortic; cerebral    Diabetes Son Michael Cheatham     No Known Problems Daughter  in infancy     No Known Problems Maternal Grandmother      No Known Problems Maternal Grandfather      No Known Problems Paternal Grandmother      No Known Problems Paternal Grandfather      No Known Problems Son      No Known Problems Son      No Known Problems Son brooke- in infancy     No Known Problems Maternal Aunt      No Known Problems Maternal Aunt      No Known Problems Maternal Aunt      No Known Problems Maternal Aunt      No Known Problems Paternal Aunt      No Known Problems Paternal Aunt      No  Known Problems Paternal Aunt      No Known Problems Paternal Aunt      No Known Problems Paternal Aunt      Cancer Brother oldest 67        bladder cancer

## 2025-07-10 ENCOUNTER — OFFICE VISIT (OUTPATIENT)
Dept: OBGYN CLINIC | Facility: CLINIC | Age: 80
End: 2025-07-10
Payer: COMMERCIAL

## 2025-07-10 VITALS — BODY MASS INDEX: 32.78 KG/M2 | WEIGHT: 204 LBS | HEIGHT: 66 IN

## 2025-07-10 DIAGNOSIS — M17.12 PRIMARY OSTEOARTHRITIS OF LEFT KNEE: ICD-10-CM

## 2025-07-10 DIAGNOSIS — M79.89 LEG SWELLING: Primary | ICD-10-CM

## 2025-07-10 DIAGNOSIS — M17.11 PRIMARY OSTEOARTHRITIS OF RIGHT KNEE: Primary | ICD-10-CM

## 2025-07-10 PROCEDURE — 99214 OFFICE O/P EST MOD 30 MIN: CPT | Performed by: STUDENT IN AN ORGANIZED HEALTH CARE EDUCATION/TRAINING PROGRAM

## 2025-07-10 PROCEDURE — 20610 DRAIN/INJ JOINT/BURSA W/O US: CPT | Performed by: STUDENT IN AN ORGANIZED HEALTH CARE EDUCATION/TRAINING PROGRAM

## 2025-07-10 RX ORDER — BETAMETHASONE SODIUM PHOSPHATE AND BETAMETHASONE ACETATE 3; 3 MG/ML; MG/ML
2 INJECTION, SUSPENSION INTRA-ARTICULAR; INTRALESIONAL; INTRAMUSCULAR; SOFT TISSUE
Status: COMPLETED | OUTPATIENT
Start: 2025-07-10 | End: 2025-07-10

## 2025-07-10 RX ORDER — ROPIVACAINE HYDROCHLORIDE 5 MG/ML
2 INJECTION, SOLUTION EPIDURAL; INFILTRATION; PERINEURAL
Status: COMPLETED | OUTPATIENT
Start: 2025-07-10 | End: 2025-07-10

## 2025-07-10 RX ADMIN — ROPIVACAINE HYDROCHLORIDE 2 ML: 5 INJECTION, SOLUTION EPIDURAL; INFILTRATION; PERINEURAL at 07:30

## 2025-07-10 RX ADMIN — BETAMETHASONE SODIUM PHOSPHATE AND BETAMETHASONE ACETATE 2 MG: 3; 3 INJECTION, SUSPENSION INTRA-ARTICULAR; INTRALESIONAL; INTRAMUSCULAR; SOFT TISSUE at 07:30

## 2025-07-10 NOTE — PROGRESS NOTES
Date: 07/10/25  Kasie Cheatham   MRN# [id number]  : 1945      Chief Complaint: Bilateral Knee Pain    Assessment and Plan:  The patient verbalized understanding of exam findings and treatment plan. We engaged in the shared decision-making process and treatment options were discussed at length with the patient. Surgical and conservative management discussed today along with risks and benefits. Patient was agreeable with the plan and all questions were answered to satisfaction.     Assessment & Plan  Primary osteoarthritis of right knee  Patient was not eligible for CSI today due to last injection being 25       Primary osteoarthritis of left knee  -WBAT  -Activity modification to limit strain or impact on the joint  -Tylenol 1000mg up to three times daily as needed. Do not exceed 3000mg daily  -Supervised physical therapy. Script provided   -Home exercise program directed by PT  -Weight loss discussed   -Knee sleeve or brace for comfort  -Cane or walker recommended to offload joint  -Corticosteroid injection was offered, accepted, and administered.  Risk benefits and alternatives were discussed prior to injection.  Patient tolerated the procedure well.  -Patient may follow up prn for further evaluation and treatment             Subjective:   Kasie Cheatham is a 80 y.o. female who is being seen in follow-up for Bilateral knee pain. Patient states her left knee CSI on 3/7/25 provided relief until a few weeks ago. CSI of the right knee on 25 provided about 4-6 weeks of relief. When we last saw she we recommended conservative treatment.  Pain has improved, than returned.. No other orthopedic complaints or concerns.       Prior treatment:  NSAIDs Yes    Bracing Yes   Physical Therapy Yes   Cortisone Injections Yes   Viscosupplementation No     Allergy:  Allergies[1]  Medications:  All current active meds have been reviewed   Past Medical History:  Past Medical History[2]  Past Surgical  "History:  Past Surgical History[3]  Family History:  Family History[4]  Social History:  Social History     Substance and Sexual Activity   Alcohol Use Yes    Alcohol/week: 2.0 standard drinks of alcohol    Types: 2 Glasses of wine per week    Comment: Maybe 2 glasses a month     Social History     Substance and Sexual Activity   Drug Use No     Tobacco Use History[5]        Review of Systems:  General- denies fever/chills  HEENT- denies hearing loss or sore throat  Eyes- denies eye pain or visual disturbances, denies red eyes  Respiratory- denies cough or SOB  Cardio- denies chest pain or palpitations  GI- denies abdominal pain  Endocrine- denies urinary frequency  Urinary- denies pain with urination  Musculoskeletal- Negative except noted above  Skin- denies rashes or wounds  Neurological- denies dizziness or headache  Psychiatric- denies anxiety or difficulty concentrating    Objective:   BP Readings from Last 1 Encounters:   05/08/25 128/80      Wt Readings from Last 1 Encounters:   07/10/25 92.5 kg (204 lb)      Pulse Readings from Last 1 Encounters:   05/08/25 (!) 112        BMI: Estimated body mass index is 32.93 kg/m² as calculated from the following:    Height as of this encounter: 5' 6\" (1.676 m).    Weight as of this encounter: 92.5 kg (204 lb).    Physical Exam  Ht 5' 6\" (1.676 m)   Wt 92.5 kg (204 lb)   BMI 32.93 kg/m²   General/Constitutional: No apparent distress: well-nourished and well developed.  Eyes: normal ocular motion  Cardio: RRR, Normal S1S2, No m/r/g.   Lymphatic: No appreciable lymphadenopathy  Respiratory: Non-labored breathing, CTA b/l no w/c/r  Vascular: No edema, swelling or tenderness, except as noted in detailed exam. Extremities well perfused. No LE edema  Integumentary: No impressive skin lesions present, except as noted in detailed exam.  Neuro: No ataxia or tremors noted  Psych: Normal mood and affect, oriented to person, place and time. Appropriate affect.  Musculoskeletal: " Normal, except as noted in detailed exam and in HPI.    Gait and Station:   antalgic    Bilateral Knee:   Inspection:  normal color, temperature, turgor and moisture    Overall limb alignment: neutral    Effusion: no    ROM 0 to 120 without pain    Extensor Lag: Absent    Palpation: Medial and Lateral joint line tenderness to palpation    stable to AP translation at 90 deg    Coronal plane stable in full extension    Coronal plane stable in mid-flexion     Motor: 5/5 EHL/FHL/TA/GS/Qd/Hs    Vascular: Toes WWP with BCR    Sensory: SILT DP/SP/Liliana/Saph/Ti    Large joint arthrocentesis: L knee    Performed by: Duke Greene MD  Authorized by: Duke Greene MD    Universal Protocol:  Consent: Verbal consent obtained  Risks and benefits: risks, benefits and alternatives were discussed  Consent given by: patient  Patient understanding: patient states understanding of the procedure being performed  Patient consent: the patient's understanding of the procedure matches consent given  Patient identity confirmed: verbally with patient  Supporting Documentation  Indications: pain and joint swelling     Is this a Visco injection? NoProcedure Details  Location: knee - L knee  Needle size: 22 G  Ultrasound guidance: no  Approach: lateral  Medications administered: 2 mg betamethasone acetate-betamethasone sodium phosphate 6 (3-3) mg/mL; 2 mL ropivacaine 0.5 %    Patient tolerance: patient tolerated the procedure well with no immediate complications  Dressing:  Sterile dressing applied           Images:  Previously obtained radiographs of the right knee demonstrate moderate osteoarthritis with joint space narrowing, subchondral sclerosis and osteophyte formation.  Radiographs of the left knee demonstrate mild osteoarthritis with some joint space narrowing and subchondral sclerosis     I have spent a total time of 30 minutes in caring for this patient on the day of the visit/encounter including Diagnostic results, Prognosis,  Risks and benefits of tx options, Instructions for management, Patient and family education, Importance of tx compliance, Risk factor reductions, Impressions, Counseling / Coordination of care, Documenting in the medical record, and Obtaining or reviewing history  .        Scribe Attestation      I,:  Luke Carballowald am acting as a scribe while in the presence of the attending physician.:       I,:  Duke Greene MD personally performed the services described in this documentation    as scribed in my presence.:               Duke Greene MD  Adult Reconstruction Specialist   [facility]          [1]   Allergies  Allergen Reactions    Hydrochlorothiazide Palpitations    Meloxicam Rash   [2]   Past Medical History:  Diagnosis Date    Biliary dyskinesia     CAP (community acquired pneumonia)     last assessed 8/17/16    Class 1 obesity due to excess calories with serious comorbidity and body mass index (BMI) of 33.0 to 33.9 in adult 05/08/2018    Deep vein thrombosis (DVT) of proximal vein of right lower extremity, unspecified chronicity ([facility]) 08/10/2022    Diabetes mellitus ([facility]) 12/2018    Type 2 no insulin    Disease of thyroid gland 2000    GERD (gastroesophageal reflux disease)     Hyperlipidemia     Palpitations     Pneumonia     Shortness of breath     Stroke ([facility])     SVT (supraventricular tachycardia) ([facility]) 06/08/2022   [3]   Past Surgical History:  Procedure Laterality Date    AUGMENTATION MAMMAPLASTY Bilateral 1998    breast surgery- with prosthetic implant 1998; pre-pectoral salline    BREAST BIOPSY Left 1974    BREAST BIOPSY Left 1994    BREAST EXCISIONAL BIOPSY Left 1993    CARDIAC CATHETERIZATION      procedure summary    CHOLECYSTECTOMY      onset 2003    HEMORRHOID SURGERY      ROTATOR CUFF REPAIR Bilateral     onset 2010    TUBAL LIGATION      onset 1980   [4]   Family History  Problem Relation Name Age of Onset    Aneurysm Mother Joellen O'Patluann         cerebral    Ovarian  cancer Mother Joellen ISAEL'Patry 72    Cancer Mother Joellen ISAEL'Shayan     Cirrhosis Father Michael Cheatham         alcoholic    Alcohol abuse Father Michael Cheatham     Aneurysm Brother          abdominal aortic; cerebral    Diabetes Son Michael Cheatham     No Known Problems Daughter  in infancy     No Known Problems Maternal Grandmother      No Known Problems Maternal Grandfather      No Known Problems Paternal Grandmother      No Known Problems Paternal Grandfather      No Known Problems Son      No Known Problems Son      No Known Problems Son brooke- in infancy     No Known Problems Maternal Aunt      No Known Problems Maternal Aunt      No Known Problems Maternal Aunt      No Known Problems Maternal Aunt      No Known Problems Paternal Aunt      No Known Problems Paternal Aunt      No Known Problems Paternal Aunt      No Known Problems Paternal Aunt      No Known Problems Paternal Aunt      Cancer Brother oldest 67        bladder cancer   [5]   Social History  Tobacco Use   Smoking Status Former    Current packs/day: 0.00    Average packs/day: 0.3 packs/day for 50.0 years (12.5 ttl pk-yrs)    Types: Cigarettes    Start date:     Quit date: 2010    Years since quitting: 15.5   Smokeless Tobacco Never   Tobacco Comments    0.5 ppw intermittently last smoked

## 2025-07-21 ENCOUNTER — PATIENT MESSAGE (OUTPATIENT)
Dept: VASCULAR SURGERY | Facility: CLINIC | Age: 80
End: 2025-07-21

## 2025-07-25 ENCOUNTER — TELEPHONE (OUTPATIENT)
Age: 80
End: 2025-07-25

## 2025-07-25 ENCOUNTER — OFFICE VISIT (OUTPATIENT)
Dept: INTERNAL MEDICINE CLINIC | Facility: CLINIC | Age: 80
End: 2025-07-25
Payer: COMMERCIAL

## 2025-07-25 VITALS
OXYGEN SATURATION: 91 % | SYSTOLIC BLOOD PRESSURE: 118 MMHG | BODY MASS INDEX: 31.85 KG/M2 | WEIGHT: 198.2 LBS | DIASTOLIC BLOOD PRESSURE: 68 MMHG | HEIGHT: 66 IN | HEART RATE: 100 BPM | RESPIRATION RATE: 16 BRPM

## 2025-07-25 DIAGNOSIS — E66.811 CLASS 1 OBESITY DUE TO EXCESS CALORIES WITH SERIOUS COMORBIDITY AND BODY MASS INDEX (BMI) OF 31.0 TO 31.9 IN ADULT: ICD-10-CM

## 2025-07-25 DIAGNOSIS — M17.11 PRIMARY OSTEOARTHRITIS OF RIGHT KNEE: ICD-10-CM

## 2025-07-25 DIAGNOSIS — I10 PRIMARY HYPERTENSION: ICD-10-CM

## 2025-07-25 DIAGNOSIS — M17.12 PRIMARY OSTEOARTHRITIS OF LEFT KNEE: ICD-10-CM

## 2025-07-25 DIAGNOSIS — E66.09 CLASS 1 OBESITY DUE TO EXCESS CALORIES WITH SERIOUS COMORBIDITY AND BODY MASS INDEX (BMI) OF 31.0 TO 31.9 IN ADULT: ICD-10-CM

## 2025-07-25 DIAGNOSIS — R60.1 GENERALIZED EDEMA: ICD-10-CM

## 2025-07-25 DIAGNOSIS — Z86.73 HISTORY OF CVA (CEREBROVASCULAR ACCIDENT): ICD-10-CM

## 2025-07-25 DIAGNOSIS — M79.89 LEG SWELLING: ICD-10-CM

## 2025-07-25 DIAGNOSIS — R06.83 SNORING: Primary | ICD-10-CM

## 2025-07-25 PROCEDURE — G2211 COMPLEX E/M VISIT ADD ON: HCPCS | Performed by: INTERNAL MEDICINE

## 2025-07-25 PROCEDURE — 99214 OFFICE O/P EST MOD 30 MIN: CPT | Performed by: INTERNAL MEDICINE

## 2025-07-25 NOTE — TELEPHONE ENCOUNTER
Patient called in has referral for home sleep study  Advised once order is reviewed we can schedule

## 2025-07-31 ENCOUNTER — TRANSCRIBE ORDERS (OUTPATIENT)
Dept: SLEEP CENTER | Facility: CLINIC | Age: 80
End: 2025-07-31

## 2025-07-31 DIAGNOSIS — G47.8 OTHER SLEEP DISORDERS: Primary | ICD-10-CM

## 2025-07-31 DIAGNOSIS — R06.83 SNORING: ICD-10-CM

## 2025-08-14 ENCOUNTER — HOSPITAL ENCOUNTER (OUTPATIENT)
Dept: NON INVASIVE DIAGNOSTICS | Facility: HOSPITAL | Age: 80
Discharge: HOME | End: 2025-08-14
Payer: COMMERCIAL

## 2025-08-18 ENCOUNTER — OFFICE VISIT (OUTPATIENT)
Dept: VASCULAR SURGERY | Facility: CLINIC | Age: 80
End: 2025-08-18
Payer: COMMERCIAL

## 2025-08-18 VITALS
HEART RATE: 96 BPM | DIASTOLIC BLOOD PRESSURE: 78 MMHG | SYSTOLIC BLOOD PRESSURE: 122 MMHG | HEIGHT: 66 IN | BODY MASS INDEX: 32.14 KG/M2 | WEIGHT: 200 LBS

## 2025-08-18 DIAGNOSIS — M79.89 LEG SWELLING: ICD-10-CM

## 2025-08-18 DIAGNOSIS — I89.0 LYMPHEDEMA: Primary | ICD-10-CM

## 2025-08-18 PROCEDURE — 99214 OFFICE O/P EST MOD 30 MIN: CPT | Performed by: PHYSICIAN ASSISTANT
